# Patient Record
Sex: FEMALE | Race: WHITE | NOT HISPANIC OR LATINO | Employment: OTHER | URBAN - METROPOLITAN AREA
[De-identification: names, ages, dates, MRNs, and addresses within clinical notes are randomized per-mention and may not be internally consistent; named-entity substitution may affect disease eponyms.]

---

## 2017-01-09 RX ORDER — FLECAINIDE ACETATE 100 MG/1
TABLET ORAL
Qty: 60 TABLET | Refills: 0 | Status: SHIPPED | OUTPATIENT
Start: 2017-01-09 | End: 2017-02-07 | Stop reason: SDUPTHER

## 2017-01-11 ENCOUNTER — OUTSIDE FACILITY SERVICE (OUTPATIENT)
Dept: PAIN MEDICINE | Facility: CLINIC | Age: 62
End: 2017-01-11

## 2017-01-11 PROCEDURE — 99152 MOD SED SAME PHYS/QHP 5/>YRS: CPT | Performed by: ANESTHESIOLOGY

## 2017-01-11 PROCEDURE — 64490 INJ PARAVERT F JNT C/T 1 LEV: CPT | Performed by: ANESTHESIOLOGY

## 2017-01-11 PROCEDURE — 64491 INJ PARAVERT F JNT C/T 2 LEV: CPT | Performed by: ANESTHESIOLOGY

## 2017-01-11 PROCEDURE — 99153 MOD SED SAME PHYS/QHP EA: CPT | Performed by: ANESTHESIOLOGY

## 2017-01-13 ENCOUNTER — OFFICE VISIT (OUTPATIENT)
Dept: CARDIOLOGY | Facility: CLINIC | Age: 62
End: 2017-01-13

## 2017-01-13 VITALS
DIASTOLIC BLOOD PRESSURE: 58 MMHG | HEART RATE: 55 BPM | WEIGHT: 187.8 LBS | SYSTOLIC BLOOD PRESSURE: 118 MMHG | HEIGHT: 65 IN | BODY MASS INDEX: 31.29 KG/M2

## 2017-01-13 DIAGNOSIS — R00.2 PALPITATIONS: Primary | ICD-10-CM

## 2017-01-13 DIAGNOSIS — I10 ESSENTIAL HYPERTENSION: ICD-10-CM

## 2017-01-13 PROCEDURE — 93000 ELECTROCARDIOGRAM COMPLETE: CPT | Performed by: INTERNAL MEDICINE

## 2017-01-13 PROCEDURE — 99213 OFFICE O/P EST LOW 20 MIN: CPT | Performed by: INTERNAL MEDICINE

## 2017-01-13 NOTE — MR AVS SNAPSHOT
Mecca Renee   1/13/2017 10:00 AM   Office Visit    Dept Phone:  [phone]   Encounter #:  [id number]    Provider:  William Brewster MD   Department:  [facility] CARDIOLOGY                Your Full Care Plan              Your Updated Medication List          This list is accurate as of: 1/13/17 10:27 AM.  Always use your most recent med list.                ACE WRIST BRACE/SPLINT misc   1 each Daily.       amitriptyline 10 MG tablet   Commonly known as:  ELAVIL   TAKE 1 TABLET BY MOUTH EVERY NIGHT.       bisoprolol 5 MG tablet   Commonly known as:  ZEBeta       diclofenac 1.3 % patch patch   Commonly known as:  FLECTOR   Apply 1 patch topically 2 (Two) Times a Day.       flecainide 100 MG tablet   Commonly known as:  TAMBOCOR   TAKE 1 TABLET EVERY 12 HOURS DAILY.               We Performed the Following     ECG 12 Lead       You Were Diagnosed With        Codes Comments    Palpitations    -  Primary ICD-10-CM: R00.2  ICD-9-CM: 785.1     Essential hypertension     ICD-10-CM: I10  ICD-9-CM: 401.9       Instructions     None    Patient Instructions History      Upcoming Appointments     Visit Type Date Time Department    FOLLOW UP 1/13/2017 10:00 AM [facility] CARD [facility]    OFFICE VISIT 1/17/2017 10:15 AM [facility] PAIN MGMT [facility]      MyChart Signup     Our records indicate that you have declined [facility] ShopTexthart signup. If you would like to sign up for ShopTexthart, please email [facility] [email or web address] or call [phone] to obtain an activation code.             Other Info from Your Visit           Your Appointments     Jan 17, 2017 10:15 AM EST   Office Visit with James Bruno MD   [facility] PAIN MANAGEMENT (--)    [address],  [address] [address] [phone]           Arrive 15 minutes prior to appointment.            Jan 19, 2018 10:15 AM EST   Follow Up with William Brewster MD   [facility]  "[facility] CARDIOLOGY (--)    [address]  [facility] [address] [phone]           Arrive 15 minutes prior to appointment.              Allergies     Sulfa Antibiotics        Reason for Visit     Follow-up PALPS      Vital Signs     Blood Pressure Pulse Height Weight Body Mass Index Smoking Status    118/58 (BP Location: Left arm, Patient Position: Sitting) 55 65\" (165.1 cm) 187 lb 12.8 oz (85.2 kg) 31.25 kg/m2 Never Smoker      Problems and Diagnoses Noted     Palpitations    High blood pressure            "

## 2017-01-13 NOTE — PROGRESS NOTES
[facility]  Office Progress Note  Mecca Renee  1955  [phone]      Visit Date: 01/13/2017    PCP: Mg Sarkar MD  [address] [address]    IDENTIFICATION: A 61 y.o. female female   from [place].     PROBLEM LIST:   1. Palpitations with near syncope:  a. Echocardiogram, 09/13/2013: LVEF (55% to 60%), abnormal LV diastolic filling consistent with impaired relaxation, trace MR, mild TR with an RVSP of 31.  b. Event recorder, September through October 2013: Low frequency PACs with occasional short runs of nonsustained atrial tachycardia.   2. Asthma.  3. GERD.  4. Rheumatoid arthritis.   5. Fibromyalgia.  6. Osteoarthritis.   7. Diverticulosis.   8. Surgical history:  a. Right shoulder surgery.  Right foot surgery.     Chief Complaint   Patient presents with   • Follow-up     PALPS           Allergies  Allergies   Allergen Reactions   • Sulfa Antibiotics        Current Medications    Current Outpatient Prescriptions:   •  amitriptyline (ELAVIL) 10 MG tablet, TAKE 1 TABLET BY MOUTH EVERY NIGHT., Disp: 30 tablet, Rfl: 5  •  bisoprolol (ZEBETA) 5 MG tablet, Take  by mouth., Disp: , Rfl:   •  diclofenac (FLECTOR) 1.3 % patch patch, Apply 1 patch topically 2 (Two) Times a Day., Disp: 60 patch, Rfl: 11  •  Elastic Bandages & Supports (ACE WRIST BRACE/SPLINT) misc, 1 each Daily., Disp: 1 each, Rfl: 0  •  flecainide (TAMBOCOR) 100 MG tablet, TAKE 1 TABLET EVERY 12 HOURS DAILY., Disp: 60 tablet, Rfl: 0      History of Present Illness     Pt denies any chest pain, dyspnea, dyspnea on exertion, orthopnea, PND, palpitations, lower extremity edema.  Neck pain w recent injection per pain mngmt    ROS:  All systems have been reviewed and are negative with the exception of those mentioned in the HPI.    OBJECTIVE:  Vitals:    01/13/17 1009   BP: 118/58   BP Location: Left arm   Patient Position: Sitting   Pulse: 55   Weight: 187 lb 12.8 oz (85.2  "kg)   Height: 65\" (165.1 cm)     Physical Exam   Constitutional: She appears well-developed and well-nourished.   Neck: Normal range of motion. Neck supple. No hepatojugular reflux and no JVD present. Carotid bruit is not present. No tracheal deviation present. No thyromegaly present.   Cardiovascular: Normal rate, regular rhythm, S1 normal, S2 normal, intact distal pulses and normal pulses.  PMI is not displaced.  Exam reveals no gallop, no distant heart sounds, no friction rub, no midsystolic click and no opening snap.    No murmur heard.  Pulses:       Radial pulses are 2+ on the right side, and 2+ on the left side.        Dorsalis pedis pulses are 2+ on the right side, and 2+ on the left side.        Posterior tibial pulses are 2+ on the right side, and 2+ on the left side.   Pulmonary/Chest: Effort normal and breath sounds normal. She has no wheezes. She has no rales.   Abdominal: Soft. Bowel sounds are normal. She exhibits no mass. There is no tenderness. There is no guarding.       Diagnostic Data:    ECG 12 Lead  Date/Time: 1/13/2017 10:19 AM  Performed by: JARED BREWSTER  Authorized by: JARED BREWSTER   Rhythm: sinus rhythm and A-V block  Conduction: 1st degree  Clinical impression: non-specific ECG              ASSESSMENT:   Diagnosis Plan   1. Palpitations     2. Essential hypertension         PLAN:  1. Premature atrial contractions. Suppress flecainide, beta blockade, tolerant of such at current. I would document TSH at this time.   2. Unknown lipid status. Document FLP, lab order given to her.  3. Exogenous obesity. I commended her on weight reduction. Otherwise, I will see her back in 1 year.    Mg Sarkar MD, thank you for referring Ms. Renee for evaluation.  I have forwarded my electronically generated recommendations to you for review.  Please do not hesitate to call with any questions.      Jared Brewster MD, FACC  "

## 2017-01-17 ENCOUNTER — OFFICE VISIT (OUTPATIENT)
Dept: PAIN MEDICINE | Facility: CLINIC | Age: 62
End: 2017-01-17

## 2017-01-17 VITALS
RESPIRATION RATE: 18 BRPM | OXYGEN SATURATION: 98 % | WEIGHT: 188.6 LBS | BODY MASS INDEX: 31.42 KG/M2 | DIASTOLIC BLOOD PRESSURE: 60 MMHG | TEMPERATURE: 97.2 F | HEIGHT: 65 IN | HEART RATE: 60 BPM | SYSTOLIC BLOOD PRESSURE: 106 MMHG

## 2017-01-17 DIAGNOSIS — R53.81 PHYSICAL DECONDITIONING: ICD-10-CM

## 2017-01-17 DIAGNOSIS — M06.9 RHEUMATOID ARTHRITIS, INVOLVING UNSPECIFIED SITE, UNSPECIFIED RHEUMATOID FACTOR PRESENCE: ICD-10-CM

## 2017-01-17 DIAGNOSIS — R00.0 TACHYARRHYTHMIA: ICD-10-CM

## 2017-01-17 DIAGNOSIS — G56.03 BILATERAL CARPAL TUNNEL SYNDROME: ICD-10-CM

## 2017-01-17 DIAGNOSIS — M79.18 MYOFASCIAL PAIN: ICD-10-CM

## 2017-01-17 DIAGNOSIS — M54.81 BILATERAL OCCIPITAL NEURALGIA: ICD-10-CM

## 2017-01-17 DIAGNOSIS — M47.816 SPONDYLOSIS OF LUMBAR REGION WITHOUT MYELOPATHY OR RADICULOPATHY: ICD-10-CM

## 2017-01-17 DIAGNOSIS — M81.0 OSTEOPOROSIS: ICD-10-CM

## 2017-01-17 DIAGNOSIS — M47.812 CERVICAL SPONDYLOSIS WITHOUT MYELOPATHY: ICD-10-CM

## 2017-01-17 DIAGNOSIS — M50.920 CERVICAL DISC DISORDER OF MID-CERVICAL REGION: ICD-10-CM

## 2017-01-17 PROCEDURE — 99213 OFFICE O/P EST LOW 20 MIN: CPT | Performed by: ANESTHESIOLOGY

## 2017-01-17 NOTE — MR AVS SNAPSHOT
Mecca Renee   1/17/2017 10:15 AM   Office Visit    Dept Phone:  [phone]   Encounter #:  [id number]    Provider:  James Bruno MD   Department:  [facility] PAIN MANAGEMENT                Your Full Care Plan              Your Updated Medication List          This list is accurate as of: 1/17/17 10:34 AM.  Always use your most recent med list.                ACE WRIST BRACE/SPLINT misc   1 each Daily.       amitriptyline 10 MG tablet   Commonly known as:  ELAVIL   TAKE 1 TABLET BY MOUTH EVERY NIGHT.       bisoprolol 5 MG tablet   Commonly known as:  ZEBeta       diclofenac 1.3 % patch patch   Commonly known as:  FLECTOR   Apply 1 patch topically 2 (Two) Times a Day.       flecainide 100 MG tablet   Commonly known as:  TAMBOCOR   TAKE 1 TABLET EVERY 12 HOURS DAILY.               You Were Diagnosed With        Codes Comments    Cervical spondylosis without myelopathy     ICD-10-CM: M47.812  ICD-9-CM: 721.0     Cervical disc disorder of mid-cervical region     ICD-10-CM: M50.920  ICD-9-CM: 722.91     Bilateral occipital neuralgia     ICD-10-CM: M54.81  ICD-9-CM: 723.8     Spondylosis of lumbar region without myelopathy or radiculopathy     ICD-10-CM: M47.816  ICD-9-CM: 721.3     Myofascial pain     ICD-10-CM: M79.1  ICD-9-CM: 729.1     Bilateral carpal tunnel syndrome     ICD-10-CM: G56.03  ICD-9-CM: 354.0     Rheumatoid arthritis, involving unspecified site, unspecified rheumatoid factor presence     ICD-10-CM: M06.9  ICD-9-CM: 714.0     Osteoporosis     ICD-10-CM: M81.0  ICD-9-CM: 733.00     Tachyarrhythmia     ICD-10-CM: R00.0  ICD-9-CM: 785.0     Physical deconditioning     ICD-10-CM: R53.81  ICD-9-CM: 799.3       Instructions     None    Patient Instructions History      Upcoming Appointments     Visit Type Date Time Department    OFFICE VISIT 1/17/2017 10:15 AM [facility] PAIN [facility]    OUTSIDE FACILITY 1/30/2017 11:15 AM [facility] PAIN [facility]      MyChart Signup     "Our records indicate that you have declined [facility] Vitaldenthart signup. If you would like to sign up for Vitald[employer]art, please email [facility] [place] [email or web address] or call [phone] to obtain an activation code.             Other Info from Your Visit           Your Appointments     Jan 30, 2017 11:15 AM EST   Outside Facility with James Bruno MD   [facility] PAIN MANAGEMENT (--)    [address],  [address]  [facility] [address] [phone]            Jan 19, 2018 10:15 AM EST   Follow Up with William Brewster MD   [facility] CARDIOLOGY (--)    [address]  [facility] [address] [phone]           Arrive 15 minutes prior to appointment.              Allergies     Sulfa Antibiotics        Reason for Visit     Neck Pain     Headache migrane      Vital Signs     Blood Pressure Pulse Temperature Respirations Height Weight    106/60 (BP Location: Left arm, Patient Position: Sitting) 60 97.2 °F (36.2 °C) (Temporal Artery ) 18 65\" (165.1 cm) 188 lb 9.6 oz (85.5 kg)    Oxygen Saturation Body Mass Index Smoking Status             98% 31.38 kg/m2 Never Smoker         Problems and Diagnoses Noted     Bilateral carpal tunnel syndrome    Bilateral occipital neuralgia    Disc disease of the neck    Degenerative arthritis of cervical spine    Myofascial pain    Osteoarthritis (arthritis due to wear and tear of joints)    Osteoporosis    Physical deconditioning    Rheumatoid arthritis    Tachyarrhythmia        "

## 2017-01-17 NOTE — PROGRESS NOTES
"CHIEF COMPLAINT: \"My neck pain and headaches went away for the rest of the day after the blocks.\"     BRIEF HISTORY: Mrs. Mecca Renee is a 61 y.o. female, who returns to the clinic for follow-up of diagnostic bilateral cervical medial branch blocks performed on 12/11/2017. Patient experienced 100% pain relief along with remarkable functional improvement that lasted until late in the evening, about 12 hours. Her headaches have been much better since her procedure. Patient denies side effects from the procedure.  Ms. Mecca Renee, 61 y.o. Female, was originally referred by Dr.Gerald Díaz in consultation for intractable chronic neck pain and headaches.   Pain history: She reports a 30+ year history of pain, which began without incident. Pain has progressed in intensity over the past 1 year.   Pain description: Constant neck pain with intermittent exacerbation, described as sharp, stabbing and tingling sensation.   Radiation of pain: The pain in the neck radiates up into the occipital region causing headaches.  Pain intensity today: 2/10  Average pain intensity last week: 7/10  Pain intensity ranges from: 2/10 to 9/10  Aggravating factors: Pain increases with extension of the cervical spine, lifting, ambulating more than 10-15 minutes and standing 2-3 minutes.   Alleviating factors: Pain decreases with resting and taking amitriptylene.   Associated symptoms:   Patient denies pain, numbness or weakness in the upper extremities, with the exception of intermittent and random episodes of numbness occurring while she is asleep; and in both hands.   Patient denies any new bladder or bowel problems.   Patient denies difficulties with her balance.   Patient used to experience bilateral occipital headaches lasting 3-4 days and 3 times a month, significantly improved since her diagnostic procedure.  In terms of current analgesics, Mecca Renee takes: gabapentin, amitriptylene, meloxicam, tramadol, without side " effects  Mecca Renee also states that she has a history of lower back pain with radiation to hips. She would like this addressed at a later time.     Review of previous therapies and additional medical records:  Mecca Renee has already failed the following measures, including:   Conservative measures: Oral analgesics, opioids and physical therapy   Interventional measures: As referenced above  Surgical measures: No previous spinal surgery  Mecca Renee underwent neurological consultation with Dr. Díaz on 09/08/2016, and was referred for pain management consultation of cervicalgia.  Patient underwent neurosurgical consultation with Dr. Ronnell Ramirez a few years back and was found to be a potential surgical candidate (records not available for review).  Mecca Renee presents with significant comorbidities including tachyarrhythmia, engaged in treatment.  I have reviewed [facility] Report #[id number] consistent to medication reconciliation.     Diagnostic Studies:  MRI, cervical, without contrast, 12/31/2008; The visualized craniocervical junction and spinal cord are normal. C2-C3 through C4-C5 and C6-C7, C7-T1: The disks are normal.   C5-C6: Broad-based posterior disc protrusion which abuts the spinal cord but does not cause spinal stenosis.  MRI, lumbar, without contrast, December 31, 2008 L1-L2 through L5-S1: The disks are normal. Early changes of degenerative disc disease without disc bulging or herniation.   CT, Head, w/o, 04/26/2011; Normal head CT without contrast.  X-rays of the cervical spine revealed degenerative disc disease and facet arthritis most impressive at C5-C6, 11/4/2015  X-rays of the lumbar spine 11/4/2015, revealed mild levoscoliosis. Lumbar facet arthropathy.  MRI of the lumbar spine from 2008, unrevealing    Review of Systems   HENT: Positive for congestion and postnasal drip.    Musculoskeletal: Positive for arthralgias, back pain, neck pain and neck stiffness.   All other  "systems reviewed and are negative.     The following portions of the patient's history were reviewed and updated as appropriate: problem list, past medical history, past surgery history, social history, family history, medications, and allergies     Visit Vitals   • /60 (BP Location: Left arm, Patient Position: Sitting)   • Pulse 60   • Temp 97.2 °F (36.2 °C) (Temporal Artery )   • Resp 18   • Ht 65\" (165.1 cm)   • Wt 188 lb 9.6 oz (85.5 kg)   • SpO2 98%   • BMI 31.38 kg/m2      Physical Exam   Neurologic Exam  Constitutional: Patient is oriented to person, place, and time. Vital signs are normal. Patient appears well-developed and well-nourished.   HENT: Head: Normocephalic and atraumatic. Eyes: Conjunctivae and lids are normal. Pupils: Equal, round, reactive to light.   Neck: Trachea normal. Neck supple. No JVD present.   Lymphatic: No cervical adenopathy  Pulmonary Respiratory effort: No increased work of breathing or signs of respiratory distress. Auscultation of lungs: Clear to auscultation.   Cardiovascular Auscultation of heart: Normal rate and rhythm, normal S1 and S2, no murmurs. Peripheral vascular exam: Normal. No edema.   Abdomen: The abdomen was soft and nontender. Bowel sounds were normal.   Musculoskeletal   Gait and station: Gait evaluation demonstrated a normal gait.   Cervical spine: Passive and active range of motion is limited secondary to pain. Extension, flexion, lateral flexion, rotation of the cervical spine increased and reproduced pain. Cervical facet joint loading maneuvers are positive.  Presence of trigger points in the bilateral levator scapula.  Shoulders: The range of motion of the glenohumeral joints is full and without pain. Rotator cuff strength is 5/5.   Lumbar spine: The range of motion is limited secondary to pain. Extension, lateral flexion, and rotation increase or reproduce pain. Lumbar facet joint loading maneuvers are positive. Berto and Gaenslen's tests are " negative.  Neurological: Patient is alert and oriented to person, place, and time. Speech: speech is normal. Cortical function: Normal mental status.   Cranial nerves: Cranial nerves 2-12 intact.   Reflex Scores:  Right brachioradialis: 1+  Left brachioradialis: 1+  Right biceps: 1+  Left biceps: 1+  Right triceps: 1+  Left triceps: 1+  Right patellar: 1+  Left patellar: 1+  Right achilles: 1+  Left achilles: 1+  Motor strength: 5/5  Motor Tone: normal tone.   Involuntary movements: none.   Superficial/Primitive Reflexes: primitive reflexes were absent.   Right Villalta: absent  Left Villalta: absent  Right ankle clonus: absent  Left ankle clonus: absent   Spurling sign is negative. Lhermitte sign is negative. Negative long tract signs. Straight leg raising test is negative. Femoral stretch sign is negative. Positive Tinel sign in both wrists.  Sensation: No sensory loss. Sensory exam: intact to light touch, intact pain and temperature sensation, intact vibration sensation and normal proprioception.   Coordination: Normal finger to nose and heel to shin. Normal balance and negative. Romberg's sign negative.   Skin and subcutaneous tissue: Skin is warm and intact. No rash noted. No cyanosis.   Psychiatric:   Judgment and insight: Normal.   Orientation to person, place and time: Normal.   Recent and remote memory: Intact.   Mood and affect: Normal.      ASSESSMENT:   1. Cervical spondylosis without myelopathy    2. Cervical disc disorder of mid-cervical region    3. Bilateral occipital neuralgia    4. Spondylosis of lumbar region without myelopathy or radiculopathy    5. Myofascial pain    6. Bilateral carpal tunnel syndrome    7. Rheumatoid arthritis, involving unspecified site, unspecified rheumatoid factor presence    8. Osteoporosis    9. Tachyarrhythmia    10. Physical deconditioning      PLAN: Patient's chronic pain condition improved after her first set of diagnostic bilateral cervical medial branch blocks, as  referenced above. Continue current management and treatments as outlined in the following plan. I had a lengthy conversation with Ms. Mecca Renee regarding her chronic pain condition and potential therapeutic options. Patient has failed to obtain pain relief with conservative measures. I have reviewed all available patient's medical records as well as previous therapies as referenced above. Therefore, I have proposed the following plan:  1. Interventional pain management measures:   A. For treatment of her chronic cervicalgia and headaches: Patient will be scheduled for a second set of diagnostic bilateral cervical medial branch blocks at C3, C4, C5; for bilateral cervical facet joints at C3-C4, C4-C5 to clarify the origin of chronic refractory mechanical/axial cervicalgia. If patient experiences more than 80% pain relief along with significant improvement in the range of motion of the cervical spine, then, patient will be scheduled for bilateral cervical medial branch rhizotomies combined with trigger point injections at the bilateral levator scapulae to expedite her rehabilitation. Otherwise, we will proceed with cervical epidural steroid injection by interlaminar approach or bilateral occipital nerve blocks. We may repeat procedures depending on patient's outcome.   B. For treatment of her chronic mechanical lower back pain: I have discussed the possibility of diagnostic bilateral lumbar medial branch blocks at L3, L4, L5; for bilateral lumbar facet joints at L4-L5, L5-S1 to clarify the origin of chronic refractory mechanical lower back pain. If patient experiences more than 80% relief along with significant improvement the range of motion of the lumbar spine, then, patient will be scheduled for a second set of diagnostic bilateral lumbar medial branch blocks, to then, proceed with bilateral lumbar medial branch rhizotomies  2. Long-term rehabilitation efforts:  A. The patient does not have a history of  falls. I did complete a risk assessment for falls.   B. Patient will start a comprehensive physical therapy program for water therapy, therapeutic exercise, upper body strengthening/posture correction, core strengthening, gait and balance training, myofascial release, cupping and dry needling once her pain is under control  C. Start an exercise program such as yoga, water therapy and daily walks for fitness  D. Wear bilateral wrist splints/braces for treatment of bilateral carpal tunnel syndrome  3. Patient has been screened for tobacco use: Current tobacco non-user  4. Pharmacological measures: We have deferred the use of systemic analgesics due to potential interactions. Trial with Flector patches   5. The patient has been instructed to contact my office with any questions or difficulties. The patient understands the plan and agrees to proceed accordingly.      Patient Care Team:  Mg Sarkar MD as PCP - General (General Practice)  Shadi Díaz MD as Consulting Physician (Neurology)  Ana Garcia MD as Consulting Physician (Rheumatology)  Mg Sarkar MD as Referring Physician (Family Medicine)     No orders of the defined types were placed in this encounter.        Future Appointments  Date Time Provider Department Center   1/19/2018 10:15 AM William Brewster MD [facility] None         James Bruno MD       EMR Dragon/Transcription disclaimer:  Much of this encounter note is an electronic transcription of spoken language to printed text. Electronic transcription of spoken language may permit erroneous, or at times, nonsensical words or phrases to be inadvertently transcribed. Although I have reviewed the note for such errors, some may still exist.

## 2017-01-17 NOTE — LETTER
"January 17, 2017     Mg Sarkar MD  [address]  [facility] [address]    Patient: Mecca Renee   YOB: 1955   Date of Visit: 1/17/2017       Dear Dr. Antonina MD:    Thank you for referring Mecca Renee to me for evaluation. Below are the relevant portions of my assessment and plan of care.    If you have questions, please do not hesitate to call me. I look forward to following Mecca along with you.         Sincerely,        James Bruno MD        CC: MD Ana Hoyt MD Luis A. Vascello, MD  1/17/2017 10:29 AM  Signed  CHIEF COMPLAINT: \"My neck pain and headaches went away for the rest of the day after the blocks.\"     BRIEF HISTORY: Mrs. Mecca Renee is a 61 y.o. female, who returns to the clinic for follow-up of diagnostic bilateral cervical medial branch blocks performed on 12/11/2017. Patient experienced 100% pain relief along with remarkable functional improvement that lasted until late in the evening, about 12 hours. Her headaches have been much better since her procedure. Patient denies side effects from the procedure.  Ms. Mecca Renee, 61 y.o. Female, was originally referred by Dr.Gerald Díaz in consultation for intractable chronic neck pain and headaches.   Pain history: She reports a 30+ year history of pain, which began without incident. Pain has progressed in intensity over the past 1 year.   Pain description: Constant neck pain with intermittent exacerbation, described as sharp, stabbing and tingling sensation.   Radiation of pain: The pain in the neck radiates up into the occipital region causing headaches.  Pain intensity today: 2/10  Average pain intensity last week: 7/10  Pain intensity ranges from: 2/10 to 9/10  Aggravating factors: Pain increases with extension of the cervical spine, lifting, ambulating more than 10-15 minutes and standing 2-3 minutes.   Alleviating factors: Pain decreases with resting and taking " amitriptylene.   Associated symptoms:   Patient denies pain, numbness or weakness in the upper extremities, with the exception of intermittent and random episodes of numbness occurring while she is asleep; and in both hands.   Patient denies any new bladder or bowel problems.   Patient denies difficulties with her balance.   Patient used to experience bilateral occipital headaches lasting 3-4 days and 3 times a month, significantly improved since her diagnostic procedure.  In terms of current analgesics, Mecca Renee takes: gabapentin, amitriptylene, meloxicam, tramadol, without side effects  Mecca Renee also states that she has a history of lower back pain with radiation to hips. She would like this addressed at a later time.     Review of previous therapies and additional medical records:  Mecca Renee has already failed the following measures, including:   Conservative measures: Oral analgesics, opioids and physical therapy   Interventional measures: As referenced above  Surgical measures: No previous spinal surgery  Mecca Renee underwent neurological consultation with Dr. Díaz on 09/08/2016, and was referred for pain management consultation of cervicalgia.  Patient underwent neurosurgical consultation with Dr. Ronnell Ramirez a few years back and was found to be a potential surgical candidate (records not available for review).  Mecca Renee presents with significant comorbidities including tachyarrhythmia, engaged in treatment.  I have reviewed [facility] Report #[id number] consistent to medication reconciliation.     Diagnostic Studies:  MRI, cervical, without contrast, 12/31/2008; The visualized craniocervical junction and spinal cord are normal. C2-C3 through C4-C5 and C6-C7, C7-T1: The disks are normal.   C5-C6: Broad-based posterior disc protrusion which abuts the spinal cord but does not cause spinal stenosis.  MRI, lumbar, without contrast, December 31, 2008 L1-L2 through L5-S1:  "The disks are normal. Early changes of degenerative disc disease without disc bulging or herniation.   CT, Head, w/o, 04/26/2011; Normal head CT without contrast.  X-rays of the cervical spine revealed degenerative disc disease and facet arthritis most impressive at C5-C6, 11/4/2015  X-rays of the lumbar spine 11/4/2015, revealed mild levoscoliosis. Lumbar facet arthropathy.  MRI of the lumbar spine from 2008, unrevealing    Review of Systems   HENT: Positive for congestion and postnasal drip.    Musculoskeletal: Positive for arthralgias, back pain, neck pain and neck stiffness.   All other systems reviewed and are negative.     The following portions of the patient's history were reviewed and updated as appropriate: problem list, past medical history, past surgery history, social history, family history, medications, and allergies     Visit Vitals   • /60 (BP Location: Left arm, Patient Position: Sitting)   • Pulse 60   • Temp 97.2 °F (36.2 °C) (Temporal Artery )   • Resp 18   • Ht 65\" (165.1 cm)   • Wt 188 lb 9.6 oz (85.5 kg)   • SpO2 98%   • BMI 31.38 kg/m2      Physical Exam   Neurologic Exam  Constitutional: Patient is oriented to person, place, and time. Vital signs are normal. Patient appears well-developed and well-nourished.   HENT: Head: Normocephalic and atraumatic. Eyes: Conjunctivae and lids are normal. Pupils: Equal, round, reactive to light.   Neck: Trachea normal. Neck supple. No JVD present.   Lymphatic: No cervical adenopathy  Pulmonary Respiratory effort: No increased work of breathing or signs of respiratory distress. Auscultation of lungs: Clear to auscultation.   Cardiovascular Auscultation of heart: Normal rate and rhythm, normal S1 and S2, no murmurs. Peripheral vascular exam: Normal. No edema.   Abdomen: The abdomen was soft and nontender. Bowel sounds were normal.   Musculoskeletal   Gait and station: Gait evaluation demonstrated a normal gait.   Cervical spine: Passive and active " range of motion is limited secondary to pain. Extension, flexion, lateral flexion, rotation of the cervical spine increased and reproduced pain. Cervical facet joint loading maneuvers are positive.  Presence of trigger points in the bilateral levator scapula.  Shoulders: The range of motion of the glenohumeral joints is full and without pain. Rotator cuff strength is 5/5.   Lumbar spine: The range of motion is limited secondary to pain. Extension, lateral flexion, and rotation increase or reproduce pain. Lumbar facet joint loading maneuvers are positive. Berto and Gaenslen's tests are negative.  Neurological: Patient is alert and oriented to person, place, and time. Speech: speech is normal. Cortical function: Normal mental status.   Cranial nerves: Cranial nerves 2-12 intact.   Reflex Scores:  Right brachioradialis: 1+  Left brachioradialis: 1+  Right biceps: 1+  Left biceps: 1+  Right triceps: 1+  Left triceps: 1+  Right patellar: 1+  Left patellar: 1+  Right achilles: 1+  Left achilles: 1+  Motor strength: 5/5  Motor Tone: normal tone.   Involuntary movements: none.   Superficial/Primitive Reflexes: primitive reflexes were absent.   Right Villalta: absent  Left Villalta: absent  Right ankle clonus: absent  Left ankle clonus: absent   Spurling sign is negative. Lhermitte sign is negative. Negative long tract signs. Straight leg raising test is negative. Femoral stretch sign is negative. Positive Tinel sign in both wrists.  Sensation: No sensory loss. Sensory exam: intact to light touch, intact pain and temperature sensation, intact vibration sensation and normal proprioception.   Coordination: Normal finger to nose and heel to shin. Normal balance and negative. Romberg's sign negative.   Skin and subcutaneous tissue: Skin is warm and intact. No rash noted. No cyanosis.   Psychiatric:   Judgment and insight: Normal.   Orientation to person, place and time: Normal.   Recent and remote memory: Intact.   Mood and  affect: Normal.      ASSESSMENT:   1. Cervical spondylosis without myelopathy    2. Cervical disc disorder of mid-cervical region    3. Bilateral occipital neuralgia    4. Spondylosis of lumbar region without myelopathy or radiculopathy    5. Myofascial pain    6. Bilateral carpal tunnel syndrome    7. Rheumatoid arthritis, involving unspecified site, unspecified rheumatoid factor presence    8. Osteoporosis    9. Tachyarrhythmia    10. Physical deconditioning      PLAN: Patient's chronic pain condition improved after her first set of diagnostic bilateral cervical medial branch blocks, as referenced above. Continue current management and treatments as outlined in the following plan. I had a lengthy conversation with Ms. Mecca Renee regarding her chronic pain condition and potential therapeutic options. Patient has failed to obtain pain relief with conservative measures. I have reviewed all available patient's medical records as well as previous therapies as referenced above. Therefore, I have proposed the following plan:  1. Interventional pain management measures:   A. For treatment of her chronic cervicalgia and headaches: Patient will be scheduled for a second set of diagnostic bilateral cervical medial branch blocks at C3, C4, C5; for bilateral cervical facet joints at C3-C4, C4-C5 to clarify the origin of chronic refractory mechanical/axial cervicalgia. If patient experiences more than 80% pain relief along with significant improvement in the range of motion of the cervical spine, then, patient will be scheduled for bilateral cervical medial branch rhizotomies combined with trigger point injections at the bilateral levator scapulae to expedite her rehabilitation. Otherwise, we will proceed with cervical epidural steroid injection by interlaminar approach or bilateral occipital nerve blocks. We may repeat procedures depending on patient's outcome.   B. For treatment of her chronic mechanical lower back  pain: I have discussed the possibility of diagnostic bilateral lumbar medial branch blocks at L3, L4, L5; for bilateral lumbar facet joints at L4-L5, L5-S1 to clarify the origin of chronic refractory mechanical lower back pain. If patient experiences more than 80% relief along with significant improvement the range of motion of the lumbar spine, then, patient will be scheduled for a second set of diagnostic bilateral lumbar medial branch blocks, to then, proceed with bilateral lumbar medial branch rhizotomies  2. Long-term rehabilitation efforts:  A. The patient does not have a history of falls. I did complete a risk assessment for falls.   B. Patient will start a comprehensive physical therapy program for water therapy, therapeutic exercise, upper body strengthening/posture correction, core strengthening, gait and balance training, myofascial release, cupping and dry needling once her pain is under control  C. Start an exercise program such as yoga, water therapy and daily walks for fitness  D. Wear bilateral wrist splints/braces for treatment of bilateral carpal tunnel syndrome  3. Patient has been screened for tobacco use: Current tobacco non-user  4. Pharmacological measures: We have deferred the use of systemic analgesics due to potential interactions. Trial with Flector patches   5. The patient has been instructed to contact my office with any questions or difficulties. The patient understands the plan and agrees to proceed accordingly.      Patient Care Team:  Mg Sarkar MD as PCP - General (General Practice)  Shadi Díaz MD as Consulting Physician (Neurology)  Ana Garcia MD as Consulting Physician (Rheumatology)  Mg Sarkar MD as Referring Physician (Family Medicine)     No orders of the defined types were placed in this encounter.        Future Appointments  Date Time Provider Department Center   1/19/2018 10:15 AM William Brewster MD [facility] None         James Bruno MD        EMR Dragon/Transcription disclaimer:  Much of this encounter note is an electronic transcription of spoken language to printed text. Electronic transcription of spoken language may permit erroneous, or at times, nonsensical words or phrases to be inadvertently transcribed. Although I have reviewed the note for such errors, some may still exist.

## 2017-01-20 RX ORDER — BISOPROLOL FUMARATE 5 MG/1
TABLET, FILM COATED ORAL
Qty: 30 TABLET | Refills: 6 | Status: SHIPPED | OUTPATIENT
Start: 2017-01-20 | End: 2017-08-22 | Stop reason: SDUPTHER

## 2017-01-30 ENCOUNTER — OUTSIDE FACILITY SERVICE (OUTPATIENT)
Dept: PAIN MEDICINE | Facility: CLINIC | Age: 62
End: 2017-01-30

## 2017-01-30 PROCEDURE — 64490 INJ PARAVERT F JNT C/T 1 LEV: CPT | Performed by: ANESTHESIOLOGY

## 2017-01-30 PROCEDURE — 64491 INJ PARAVERT F JNT C/T 2 LEV: CPT | Performed by: ANESTHESIOLOGY

## 2017-01-30 PROCEDURE — 99152 MOD SED SAME PHYS/QHP 5/>YRS: CPT | Performed by: ANESTHESIOLOGY

## 2017-01-31 ENCOUNTER — OFFICE VISIT (OUTPATIENT)
Dept: PAIN MEDICINE | Facility: CLINIC | Age: 62
End: 2017-01-31

## 2017-01-31 VITALS
WEIGHT: 189 LBS | HEIGHT: 65 IN | RESPIRATION RATE: 18 BRPM | BODY MASS INDEX: 31.49 KG/M2 | SYSTOLIC BLOOD PRESSURE: 91 MMHG | DIASTOLIC BLOOD PRESSURE: 52 MMHG | TEMPERATURE: 97.8 F | OXYGEN SATURATION: 97 % | HEART RATE: 59 BPM

## 2017-01-31 DIAGNOSIS — M47.816 SPONDYLOSIS OF LUMBAR REGION WITHOUT MYELOPATHY OR RADICULOPATHY: ICD-10-CM

## 2017-01-31 DIAGNOSIS — M81.0 OSTEOPOROSIS: ICD-10-CM

## 2017-01-31 DIAGNOSIS — R00.0 TACHYARRHYTHMIA: ICD-10-CM

## 2017-01-31 DIAGNOSIS — M47.812 CERVICAL SPONDYLOSIS WITHOUT MYELOPATHY: ICD-10-CM

## 2017-01-31 DIAGNOSIS — M79.7 FIBROMYALGIA: ICD-10-CM

## 2017-01-31 DIAGNOSIS — M06.00 RHEUMATOID ARTHRITIS WITH NEGATIVE RHEUMATOID FACTOR, INVOLVING UNSPECIFIED SITE (HCC): ICD-10-CM

## 2017-01-31 DIAGNOSIS — G56.03 BILATERAL CARPAL TUNNEL SYNDROME: ICD-10-CM

## 2017-01-31 DIAGNOSIS — M54.81 BILATERAL OCCIPITAL NEURALGIA: ICD-10-CM

## 2017-01-31 DIAGNOSIS — M50.920 CERVICAL DISC DISORDER OF MID-CERVICAL REGION: ICD-10-CM

## 2017-01-31 DIAGNOSIS — M15.9 PRIMARY OSTEOARTHRITIS INVOLVING MULTIPLE JOINTS: ICD-10-CM

## 2017-01-31 DIAGNOSIS — R53.81 PHYSICAL DECONDITIONING: ICD-10-CM

## 2017-01-31 PROCEDURE — 99212 OFFICE O/P EST SF 10 MIN: CPT | Performed by: ANESTHESIOLOGY

## 2017-01-31 NOTE — MR AVS SNAPSHOT
Mecca Renee   1/31/2017 1:30 PM   Office Visit    Dept Phone:  [phone]   Encounter #:  [id number]    Provider:  James Bruno MD   Department:  [facility]                Your Full Care Plan              Your Updated Medication List          This list is accurate as of: 1/31/17  1:27 PM.  Always use your most recent med list.                ACE WRIST BRACE/SPLINT misc   1 each Daily.       amitriptyline 10 MG tablet   Commonly known as:  ELAVIL   TAKE 1 TABLET BY MOUTH EVERY NIGHT.       bisoprolol 5 MG tablet   Commonly known as:  ZEBeta   TAKE 1/2 TABLET BY MOUTH TWICE DAILY       diclofenac 1.3 % patch patch   Commonly known as:  FLECTOR   Apply 1 patch topically 2 (Two) Times a Day.       flecainide 100 MG tablet   Commonly known as:  TAMBOCOR   TAKE 1 TABLET EVERY 12 HOURS DAILY.               Instructions     None    Patient Instructions History      Upcoming Appointments     Visit Type Date Time Department    OFFICE VISIT 1/31/2017  1:30 PM [facility]    OUTSIDE FACILITY 2/8/2017 11:00 AM [facility]    FOLLOW UP 1/19/2018 10:15 AM [facility]      [employer] Signup     Our records indicate that you have declined [facility] [employer] signup. If you would like to sign up for [employer], please email [email or web address] or call [phone] to obtain an activation code.             Other Info from Your Visit           Your Appointments     Jan 31, 2017  1:30 PM EST   Office Visit with James Bruno MD   [facility] (--)    [address],  [address] [address] [phone]           Arrive 15 minutes prior to appointment.            Feb 08, 2017 11:00 AM EST   Outside Facility with James Bruno MD   [facility] (--)    [address],  [address] [id number] [phone]            Jan 19, 2018 10:15 AM  "EST   Follow Up with William Brewster MD   [facility] (--)    [address]  [facility] [address] [phone]           Arrive 15 minutes prior to appointment.              Allergies     Sulfa Antibiotics        Reason for Visit     Neck Pain           Vital Signs     Blood Pressure Pulse Temperature Respirations Height Weight    91/52 (BP Location: Left arm, Patient Position: Sitting) 59 97.8 °F (36.6 °C) (Temporal Artery ) 18 65\" (165.1 cm) 189 lb (85.7 kg)    Oxygen Saturation Body Mass Index Smoking Status             97% 31.45 kg/m2 Never Smoker           "

## 2017-01-31 NOTE — LETTER
"January 31, 2017     Mg Sarkar MD  [address]  formerly [facility] [id number]    Patient: Mecca Renee   YOB: 1955   Date of Visit: 1/31/2017       Dear Dr. Antonina MD:    Thank you for referring Mecca Renee to me for evaluation. Below are the relevant portions of my assessment and plan of care.    If you have questions, please do not hesitate to call me. I look forward to following Mecca along with you.         Sincerely,        James Bruno MD        CC: MD Ana Hoyt MD Luis A. Vascello, MD  1/31/2017  5:54 PM  Signed  CHIEF COMPLAINT: \"Again, I did great after my diagnostic blocks.  I was pain free until today at noon.  I just have a mild headache.\"     BRIEF HISTORY: Mrs. Mecca Renee is a 61 y.o. female, who returns to the clinic for follow-up of her second set of diagnostic bilateral cervical medial branch blocks performed on 01/30/2017. Patient reports that she experienced 100% pain relief along with remarkable functional improvement that lasted until today at noon. Her headaches also resolved, and just at the time of today's visit, she started expressing a mild/low-grade headache.  Patient denies side effects from the procedure.  Ms. Mecca Renee, 61 y.o. Female, was originally referred by Dr.Gerald Díaz in consultation for intractable chronic neck pain and headaches.   Pain history: She reports a 30+ year history of pain, which began without incident. Pain has progressed in intensity over the past 1 year.   Pain description: Constant neck pain with intermittent exacerbation, described as sharp, stabbing and tingling sensation.   Radiation of pain: The pain in the neck radiates up into the occipital region causing headaches.  Pain intensity today: 4/10  Average pain intensity last week: 7/10  Pain intensity ranges from: 2/10 to 9/10  Aggravating factors: Pain increases with extension of the cervical spine, lifting, ambulating more " than 10-15 minutes and standing 2-3 minutes.   Alleviating factors: Pain decreases with resting and taking amitriptylene.   Associated symptoms:   Patient denies pain, numbness or weakness in the upper extremities, with the exception of intermittent and random episodes of numbness occurring while she is asleep; and in both hands.   Patient denies any new bladder or bowel problems.   Patient denies difficulties with her balance.   Patient used to experience bilateral occipital headaches lasting 3-4 days and 3 times a month, significantly improved since her diagnostic procedure.  In terms of current analgesics, Mecca Renee takes: gabapentin, amitriptylene, meloxicam, tramadol, without side effects  Mecca Renee also states that she has a history of lower back pain with radiation to hips. She would like this addressed at a later time.     Review of previous therapies and additional medical records:  Mecca Renee has already failed the following measures, including:   Conservative measures: Oral analgesics, opioids and physical therapy   Interventional measures: As referenced above  Surgical measures: No previous spinal surgery  Mecca Renee underwent neurological consultation with Dr. Díaz on 09/08/2016, and was referred for pain management consultation of cervicalgia.  Patient underwent neurosurgical consultation with Dr. Ronnell Ramirez a few years back and was found to be a potential surgical candidate (records not available for review).  Mecca Renee presents with significant comorbidities including tachyarrhythmia, engaged in treatment.  I have reviewed [facility] Report #[id number] consistent to medication reconciliation.     Diagnostic Studies:  MRI, cervical, without contrast, 12/31/2008; The visualized craniocervical junction and spinal cord are normal. C2-C3 through C4-C5 and C6-C7, C7-T1: The disks are normal.   C5-C6: Broad-based posterior disc protrusion which abuts the spinal cord  "but does not cause spinal stenosis.  MRI, lumbar, without contrast, December 31, 2008 L1-L2 through L5-S1: The disks are normal. Early changes of degenerative disc disease without disc bulging or herniation.   CT, Head, w/o, 04/26/2011; Normal head CT without contrast.  X-rays of the cervical spine revealed degenerative disc disease and facet arthritis most impressive at C5-C6, 11/4/2015  X-rays of the lumbar spine 11/4/2015, revealed mild levoscoliosis. Lumbar facet arthropathy.  MRI of the lumbar spine from 2008, unrevealing    Review of Systems   Musculoskeletal: Positive for back pain, neck pain and neck stiffness.   All other systems reviewed and are negative.     The following portions of the patient's history were reviewed and updated as appropriate: problem list, past medical history, past surgery history, social history, family history, medications, and allergies     Visit Vitals   • BP 91/52 (BP Location: Left arm, Patient Position: Sitting)   • Pulse 59   • Temp 97.8 °F (36.6 °C) (Temporal Artery )   • Resp 18   • Ht 65\" (165.1 cm)   • Wt 189 lb (85.7 kg)   • SpO2 97%   • BMI 31.45 kg/m2      Physical Exam   Neurologic Exam  Constitutional: Patient is oriented to person, place, and time. Vital signs are normal. Patient appears well-developed and well-nourished.   HENT: Head: Normocephalic and atraumatic. Eyes: Conjunctivae and lids are normal. Pupils: Equal, round, reactive to light.   Neck: Trachea normal. Neck supple. No JVD present.   Lymphatic: No cervical adenopathy  Pulmonary Respiratory effort: No increased work of breathing or signs of respiratory distress. Auscultation of lungs: Clear to auscultation.   Cardiovascular Auscultation of heart: Normal rate and rhythm, normal S1 and S2, no murmurs. Peripheral vascular exam: Normal. No edema.   Abdomen: The abdomen was soft and nontender. Bowel sounds were normal.   Musculoskeletal   Gait and station: Gait evaluation demonstrated a normal " gait.   Cervical spine: Passive and active range of motion is limited secondary to pain. Extension, flexion, lateral flexion, rotation of the cervical spine increased and reproduced pain. Cervical facet joint loading maneuvers are positive.  Presence of trigger points in the bilateral levator scapula.  Shoulders: The range of motion of the glenohumeral joints is full and without pain. Rotator cuff strength is 5/5.   Lumbar spine: The range of motion is limited secondary to pain. Extension, lateral flexion, and rotation increase or reproduce pain. Lumbar facet joint loading maneuvers are positive. Berto and Gaenslen's tests are negative.  Neurological: Patient is alert and oriented to person, place, and time. Speech: speech is normal. Cortical function: Normal mental status.   Cranial nerves: Cranial nerves 2-12 intact.   Reflex Scores:  Right brachioradialis: 1+  Left brachioradialis: 1+  Right biceps: 1+  Left biceps: 1+  Right triceps: 1+  Left triceps: 1+  Right patellar: 1+  Left patellar: 1+  Right achilles: 1+  Left achilles: 1+  Motor strength: 5/5  Motor Tone: normal tone.   Involuntary movements: none.   Superficial/Primitive Reflexes: primitive reflexes were absent.   Right Villalta: absent  Left Villalta: absent  Right ankle clonus: absent  Left ankle clonus: absent   Spurling sign is negative. Lhermitte sign is negative. Negative long tract signs. Straight leg raising test is negative. Femoral stretch sign is negative. Positive Tinel sign in both wrists.  Sensation: No sensory loss. Sensory exam: intact to light touch, intact pain and temperature sensation, intact vibration sensation and normal proprioception.   Coordination: Normal finger to nose and heel to shin. Normal balance and negative. Romberg's sign negative.   Skin and subcutaneous tissue: Skin is warm and intact. No rash noted. No cyanosis.   Psychiatric:   Judgment and insight: Normal.   Orientation to person, place and time: Normal.   Recent  and remote memory: Intact.   Mood and affect: Normal.      ASSESSMENT:   1. Cervical spondylosis without myelopathy    2. Cervical disc disorder of mid-cervical region    3. Bilateral occipital neuralgia    4. Spondylosis of lumbar region without myelopathy or radiculopathy    5. Primary osteoarthritis involving multiple joints    6. Bilateral carpal tunnel syndrome    7. Fibromyalgia    8. Rheumatoid arthritis     9. Osteoporosis    10. Tachyarrhythmia    11. Physical deconditioning      PLAN: Patient's chronic pain condition improved after her first set of diagnostic bilateral cervical medial branch blocks, as referenced above. Continue current management and treatments as outlined in the following plan. I had a lengthy conversation with Ms. Mecca Renee regarding her chronic pain condition and potential therapeutic options. Patient has failed to obtain pain relief with conservative measures. I have reviewed all available patient's medical records as well as previous therapies as referenced above. Therefore, I have proposed the following plan:  1. Interventional pain management measures:   A. For treatment of her chronic cervicalgia and headaches: Patient will be scheduled for a bilateral cervical medial branch rhizotomies at C3, C4, C5; for bilateral cervical facet joints at C3-C4, C4-C5 combined with trigger point injections at the bilateral levator scapulae to expedite her rehabilitation.   B. For treatment of her chronic mechanical lower back pain: I have discussed the possibility of diagnostic bilateral lumbar medial branch blocks at L3, L4, L5; for bilateral lumbar facet joints at L4-L5, L5-S1 to clarify the origin of chronic refractory mechanical lower back pain. If patient experiences more than 80% relief along with significant improvement the range of motion of the lumbar spine, then, patient will be scheduled for a second set of diagnostic bilateral lumbar medial branch blocks, to then, proceed with  bilateral lumbar medial branch rhizotomies  2. Long-term rehabilitation efforts:  A. Patient will start a comprehensive physical therapy program for water therapy, therapeutic exercise, upper body strengthening/posture correction, core strengthening, gait and balance training, myofascial release, cupping and dry needling once her pain is under control  B. Start an exercise program such as yoga, water therapy and daily walks for fitness  C. Wear bilateral wrist splints/braces for treatment of bilateral carpal tunnel syndrome  3. Pharmacological measures: We have deferred the use of systemic analgesics due to potential interactions. Trial with Flector patches   4. The patient has been instructed to contact my office with any questions or difficulties. The patient understands the plan and agrees to proceed accordingly.      Patient Care Team:  Mg Sarkar MD as PCP - General (General Practice)  Shadi Díaz MD as Consulting Physician (Neurology)  Ana Garcia MD as Consulting Physician (Rheumatology)  Mg Sarkar MD as Referring Physician (Family Medicine)     No orders of the defined types were placed in this encounter.        Future Appointments  Date Time Provider Department Center   2/8/2017 11:00 AM James Bruno MD [facility] None   1/19/2018 10:15 AM William Brewster MD [facility] None         James Bruno MD       EMR Dragon/Transcription disclaimer:  Much of this encounter note is an electronic transcription of spoken language to printed text. Electronic transcription of spoken language may permit erroneous, or at times, nonsensical words or phrases to be inadvertently transcribed. Although I have reviewed the note for such errors, some may still exist.

## 2017-01-31 NOTE — PROGRESS NOTES
"CHIEF COMPLAINT: \"Again, I did great after my diagnostic blocks.  I was pain free until today at noon.  I just have a mild headache.\"     BRIEF HISTORY: Mrs. Mecca Renee is a 61 y.o. female, who returns to the clinic for follow-up of her second set of diagnostic bilateral cervical medial branch blocks performed on 01/30/2017. Patient reports that she experienced 100% pain relief along with remarkable functional improvement that lasted until today at noon. Her headaches also resolved, and just at the time of today's visit, she started expressing a mild/low-grade headache.  Patient denies side effects from the procedure.  Ms. Mecca Renee, 61 y.o. Female, was originally referred by Dr.Gerald Díaz in consultation for intractable chronic neck pain and headaches.   Pain history: She reports a 30+ year history of pain, which began without incident. Pain has progressed in intensity over the past 1 year.   Pain description: Constant neck pain with intermittent exacerbation, described as sharp, stabbing and tingling sensation.   Radiation of pain: The pain in the neck radiates up into the occipital region causing headaches.  Pain intensity today: 4/10  Average pain intensity last week: 7/10  Pain intensity ranges from: 2/10 to 9/10  Aggravating factors: Pain increases with extension of the cervical spine, lifting, ambulating more than 10-15 minutes and standing 2-3 minutes.   Alleviating factors: Pain decreases with resting and taking amitriptylene.   Associated symptoms:   Patient denies pain, numbness or weakness in the upper extremities, with the exception of intermittent and random episodes of numbness occurring while she is asleep; and in both hands.   Patient denies any new bladder or bowel problems.   Patient denies difficulties with her balance.   Patient used to experience bilateral occipital headaches lasting 3-4 days and 3 times a month, significantly improved since her diagnostic procedure.  In " terms of current analgesics, Mecca Renee takes: gabapentin, amitriptylene, meloxicam, tramadol, without side effects  Mecca Renee also states that she has a history of lower back pain with radiation to hips. She would like this addressed at a later time.     Review of previous therapies and additional medical records:  Mecca Renee has already failed the following measures, including:   Conservative measures: Oral analgesics, opioids and physical therapy   Interventional measures: As referenced above  Surgical measures: No previous spinal surgery  Mecca Renee underwent neurological consultation with Dr. Díaz on 09/08/2016, and was referred for pain management consultation of cervicalgia.  Patient underwent neurosurgical consultation with Dr. Ronnell Ramirez a few years back and was found to be a potential surgical candidate (records not available for review).  Mecca Renee presents with significant comorbidities including tachyarrhythmia, engaged in treatment.  I have reviewed [facility] Report #[id number] consistent to medication reconciliation.     Diagnostic Studies:  MRI, cervical, without contrast, 12/31/2008; The visualized craniocervical junction and spinal cord are normal. C2-C3 through C4-C5 and C6-C7, C7-T1: The disks are normal.   C5-C6: Broad-based posterior disc protrusion which abuts the spinal cord but does not cause spinal stenosis.  MRI, lumbar, without contrast, December 31, 2008 L1-L2 through L5-S1: The disks are normal. Early changes of degenerative disc disease without disc bulging or herniation.   CT, Head, w/o, 04/26/2011; Normal head CT without contrast.  X-rays of the cervical spine revealed degenerative disc disease and facet arthritis most impressive at C5-C6, 11/4/2015  X-rays of the lumbar spine 11/4/2015, revealed mild levoscoliosis. Lumbar facet arthropathy.  MRI of the lumbar spine from 2008, unrevealing    Review of Systems   Musculoskeletal: Positive for  "back pain, neck pain and neck stiffness.   All other systems reviewed and are negative.     The following portions of the patient's history were reviewed and updated as appropriate: problem list, past medical history, past surgery history, social history, family history, medications, and allergies     Visit Vitals   • BP 91/52 (BP Location: Left arm, Patient Position: Sitting)   • Pulse 59   • Temp 97.8 °F (36.6 °C) (Temporal Artery )   • Resp 18   • Ht 65\" (165.1 cm)   • Wt 189 lb (85.7 kg)   • SpO2 97%   • BMI 31.45 kg/m2      Physical Exam   Neurologic Exam  Constitutional: Patient is oriented to person, place, and time. Vital signs are normal. Patient appears well-developed and well-nourished.   HENT: Head: Normocephalic and atraumatic. Eyes: Conjunctivae and lids are normal. Pupils: Equal, round, reactive to light.   Neck: Trachea normal. Neck supple. No JVD present.   Lymphatic: No cervical adenopathy  Pulmonary Respiratory effort: No increased work of breathing or signs of respiratory distress. Auscultation of lungs: Clear to auscultation.   Cardiovascular Auscultation of heart: Normal rate and rhythm, normal S1 and S2, no murmurs. Peripheral vascular exam: Normal. No edema.   Abdomen: The abdomen was soft and nontender. Bowel sounds were normal.   Musculoskeletal   Gait and station: Gait evaluation demonstrated a normal gait.   Cervical spine: Passive and active range of motion is limited secondary to pain. Extension, flexion, lateral flexion, rotation of the cervical spine increased and reproduced pain. Cervical facet joint loading maneuvers are positive.  Presence of trigger points in the bilateral levator scapula.  Shoulders: The range of motion of the glenohumeral joints is full and without pain. Rotator cuff strength is 5/5.   Lumbar spine: The range of motion is limited secondary to pain. Extension, lateral flexion, and rotation increase or reproduce pain. Lumbar facet joint loading maneuvers are " positive. Berto and Gaenslen's tests are negative.  Neurological: Patient is alert and oriented to person, place, and time. Speech: speech is normal. Cortical function: Normal mental status.   Cranial nerves: Cranial nerves 2-12 intact.   Reflex Scores:  Right brachioradialis: 1+  Left brachioradialis: 1+  Right biceps: 1+  Left biceps: 1+  Right triceps: 1+  Left triceps: 1+  Right patellar: 1+  Left patellar: 1+  Right achilles: 1+  Left achilles: 1+  Motor strength: 5/5  Motor Tone: normal tone.   Involuntary movements: none.   Superficial/Primitive Reflexes: primitive reflexes were absent.   Right Villalta: absent  Left Villalta: absent  Right ankle clonus: absent  Left ankle clonus: absent   Spurling sign is negative. Lhermitte sign is negative. Negative long tract signs. Straight leg raising test is negative. Femoral stretch sign is negative. Positive Tinel sign in both wrists.  Sensation: No sensory loss. Sensory exam: intact to light touch, intact pain and temperature sensation, intact vibration sensation and normal proprioception.   Coordination: Normal finger to nose and heel to shin. Normal balance and negative. Romberg's sign negative.   Skin and subcutaneous tissue: Skin is warm and intact. No rash noted. No cyanosis.   Psychiatric:   Judgment and insight: Normal.   Orientation to person, place and time: Normal.   Recent and remote memory: Intact.   Mood and affect: Normal.      ASSESSMENT:   1. Cervical spondylosis without myelopathy    2. Cervical disc disorder of mid-cervical region    3. Bilateral occipital neuralgia    4. Spondylosis of lumbar region without myelopathy or radiculopathy    5. Primary osteoarthritis involving multiple joints    6. Bilateral carpal tunnel syndrome    7. Fibromyalgia    8. Rheumatoid arthritis     9. Osteoporosis    10. Tachyarrhythmia    11. Physical deconditioning      PLAN: Patient's chronic pain condition improved after her first set of diagnostic bilateral  cervical medial branch blocks, as referenced above. Continue current management and treatments as outlined in the following plan. I had a lengthy conversation with Ms. Mecca Renee regarding her chronic pain condition and potential therapeutic options. Patient has failed to obtain pain relief with conservative measures. I have reviewed all available patient's medical records as well as previous therapies as referenced above. Therefore, I have proposed the following plan:  1. Interventional pain management measures:   A. For treatment of her chronic cervicalgia and headaches: Patient will be scheduled for a bilateral cervical medial branch rhizotomies at C3, C4, C5; for bilateral cervical facet joints at C3-C4, C4-C5 combined with trigger point injections at the bilateral levator scapulae to expedite her rehabilitation.   B. For treatment of her chronic mechanical lower back pain: I have discussed the possibility of diagnostic bilateral lumbar medial branch blocks at L3, L4, L5; for bilateral lumbar facet joints at L4-L5, L5-S1 to clarify the origin of chronic refractory mechanical lower back pain. If patient experiences more than 80% relief along with significant improvement the range of motion of the lumbar spine, then, patient will be scheduled for a second set of diagnostic bilateral lumbar medial branch blocks, to then, proceed with bilateral lumbar medial branch rhizotomies  2. Long-term rehabilitation efforts:  A. Patient will start a comprehensive physical therapy program for water therapy, therapeutic exercise, upper body strengthening/posture correction, core strengthening, gait and balance training, myofascial release, cupping and dry needling once her pain is under control  B. Start an exercise program such as yoga, water therapy and daily walks for fitness  C. Wear bilateral wrist splints/braces for treatment of bilateral carpal tunnel syndrome  3. Pharmacological measures: We have deferred the use  of systemic analgesics due to potential interactions. Trial with Flector patches   4. The patient has been instructed to contact my office with any questions or difficulties. The patient understands the plan and agrees to proceed accordingly.      Patient Care Team:  Mg Sarkar MD as PCP - General (General Practice)  Shadi Díaz MD as Consulting Physician (Neurology)  Ana Garcia MD as Consulting Physician (Rheumatology)  Mg Sarkar MD as Referring Physician (Family Medicine)     No orders of the defined types were placed in this encounter.        Future Appointments  Date Time Provider Department Center   2/8/2017 11:00 AM James Bruno MD [facility] None   1/19/2018 10:15 AM William Brewster MD [facility] None         James Bruno MD       EMR Dragon/Transcription disclaimer:  Much of this encounter note is an electronic transcription of spoken language to printed text. Electronic transcription of spoken language may permit erroneous, or at times, nonsensical words or phrases to be inadvertently transcribed. Although I have reviewed the note for such errors, some may still exist.

## 2017-02-07 RX ORDER — FLECAINIDE ACETATE 100 MG/1
TABLET ORAL
Qty: 180 TABLET | Refills: 1 | Status: SHIPPED | OUTPATIENT
Start: 2017-02-07 | End: 2017-08-22 | Stop reason: SDUPTHER

## 2017-02-08 ENCOUNTER — OUTSIDE FACILITY SERVICE (OUTPATIENT)
Dept: PAIN MEDICINE | Facility: CLINIC | Age: 62
End: 2017-02-08

## 2017-02-08 PROCEDURE — 99153 MOD SED SAME PHYS/QHP EA: CPT | Performed by: ANESTHESIOLOGY

## 2017-02-08 PROCEDURE — 99152 MOD SED SAME PHYS/QHP 5/>YRS: CPT | Performed by: ANESTHESIOLOGY

## 2017-02-08 PROCEDURE — 64634 DESTROY C/TH FACET JNT ADDL: CPT | Performed by: ANESTHESIOLOGY

## 2017-02-08 PROCEDURE — 20552 NJX 1/MLT TRIGGER POINT 1/2: CPT | Performed by: ANESTHESIOLOGY

## 2017-02-08 PROCEDURE — 64633 DESTROY CERV/THOR FACET JNT: CPT | Performed by: ANESTHESIOLOGY

## 2017-02-14 ENCOUNTER — OFFICE VISIT (OUTPATIENT)
Dept: PAIN MEDICINE | Facility: CLINIC | Age: 62
End: 2017-02-14

## 2017-02-14 VITALS
HEIGHT: 65 IN | DIASTOLIC BLOOD PRESSURE: 65 MMHG | TEMPERATURE: 97.3 F | BODY MASS INDEX: 31.36 KG/M2 | OXYGEN SATURATION: 96 % | SYSTOLIC BLOOD PRESSURE: 114 MMHG | RESPIRATION RATE: 18 BRPM | HEART RATE: 57 BPM | WEIGHT: 188.2 LBS

## 2017-02-14 DIAGNOSIS — R53.81 PHYSICAL DECONDITIONING: ICD-10-CM

## 2017-02-14 DIAGNOSIS — M54.81 BILATERAL OCCIPITAL NEURALGIA: ICD-10-CM

## 2017-02-14 DIAGNOSIS — M47.816 SPONDYLOSIS OF LUMBAR REGION WITHOUT MYELOPATHY OR RADICULOPATHY: ICD-10-CM

## 2017-02-14 DIAGNOSIS — M47.812 CERVICAL SPONDYLOSIS WITHOUT MYELOPATHY: ICD-10-CM

## 2017-02-14 DIAGNOSIS — M50.920 CERVICAL DISC DISORDER OF MID-CERVICAL REGION: ICD-10-CM

## 2017-02-14 DIAGNOSIS — M79.7 FIBROMYALGIA: ICD-10-CM

## 2017-02-14 DIAGNOSIS — M81.0 OSTEOPOROSIS: ICD-10-CM

## 2017-02-14 DIAGNOSIS — G56.03 BILATERAL CARPAL TUNNEL SYNDROME: ICD-10-CM

## 2017-02-14 DIAGNOSIS — M15.9 PRIMARY OSTEOARTHRITIS INVOLVING MULTIPLE JOINTS: ICD-10-CM

## 2017-02-14 PROCEDURE — 99024 POSTOP FOLLOW-UP VISIT: CPT | Performed by: ANESTHESIOLOGY

## 2017-02-14 PROCEDURE — 99213 OFFICE O/P EST LOW 20 MIN: CPT | Performed by: ANESTHESIOLOGY

## 2017-02-14 NOTE — PROGRESS NOTES
"CHIEF COMPLAINT: \"My neck pain and the movement of my neck are much better since my rhizotomies. I am here for my lower back pain.\"     BRIEF HISTORY: Mrs. Mecca Renee is a 61 y.o. female, who returns to the clinic for evaluation of her mechanical lower back pain.  Patient underwent cervical medial branch rhizotomies on February 8, 2017, and experienced 100% pain relief along with a remarkable improvement in the range of motion of her cervical spine.  Patient reports resolution of her pre-existing severe headaches. Patient used to experience bilateral occipital headaches lasting 3-4 days and 3 times a month.  In addition, patient reports significant analgesics benefit from the use of wrist splints for treatment of her bilateral carpal tunnel syndrome. Pain level in her cervical spine 0/10.   Pain history:   Pain history: Ms. Mecca Renee, 61 y.o. Female, was originally referred by Dr.Gerald Díaz in consultation for intractable chronic neck pain, headaches, and chronic lower back pain. She reports a 30+ year history of pain, which began without incident.   Pain description: Constant lower back pain with intermittent exacerbation, described as aching and dull sensation.   Radiation of pain: The lower back pain radiates to the hips  Pain intensity today: 6/10  Average pain intensity last week: 7/10  Pain intensity ranges from: 2/10 to 9/10  Aggravating factors: Pain increases with extension of the lumbar spine, lifting, ambulating more than 10-15 minutes, and standing 2-3 minutes.   Alleviating factors: Pain decreases with rest and amitriptylene.   Associated symptoms: Patient denies pain, numbness or weakness in the upper extremities, with the exception of intermittent and random episodes of numbness occurring while she is asleep; and in both hands (improved by wearing bilateral wrist splints).   Patient denies any new bladder or bowel problems.   Patient denies difficulties with her balance.   In terms " of current analgesics, Mecca Renee takes: gabapentin, amitriptylene, meloxicam, tramadol, without side effects     Review of previous therapies and additional medical records:  Mecca Renee has already failed the following measures, including:   Conservative measures: Oral analgesics, opioids and physical therapy   Interventional measures: As referenced above  Surgical measures: No previous spinal surgery  Mecca Renee underwent neurological consultation with Dr. Díaz on 09/08/2016, and was referred for pain management consultation of cervicalgia.  Patient underwent neurosurgical consultation with Dr. Ronnell Ramirez a few years back and was found to be a potential surgical candidate (records not available for review).  Mecca Renee presents with significant comorbidities including tachyarrhythmia, engaged in treatment.  I have reviewed [facility] Report #[id number] consistent to medication reconciliation.     Diagnostic Studies:  MRI, cervical, without contrast, 12/31/2008; The visualized craniocervical junction and spinal cord are normal. C2-C3 through C4-C5 and C6-C7, C7-T1: The disks are normal.   C5-C6: Broad-based posterior disc protrusion which abuts the spinal cord but does not cause spinal stenosis.  MRI, lumbar, without contrast, December 31, 2008 L1-L2 through L5-S1: The disks are normal. Early changes of degenerative disc disease without disc bulging or herniation.   CT, Head, w/o, 04/26/2011; Normal head CT without contrast.  X-rays of the cervical spine revealed degenerative disc disease and facet arthritis most impressive at C5-C6, 11/4/2015  X-rays of the lumbar spine 11/4/2015, revealed mild levoscoliosis. Lumbar facet arthropathy.    Review of Systems   Musculoskeletal: Positive for arthralgias and back pain.   Hematological: Bruises/bleeds easily.   All other systems reviewed and are negative.     The following portions of the patient's history were reviewed and updated as  "appropriate: problem list, past medical history, past surgery history, social history, family history, medications, and allergies     Visit Vitals   • /65 (BP Location: Left arm, Patient Position: Sitting)   • Pulse 57   • Temp 97.3 °F (36.3 °C) (Temporal Artery )   • Resp 18   • Ht 65\" (165.1 cm)   • Wt 188 lb 3.2 oz (85.4 kg)   • SpO2 96%   • BMI 31.32 kg/m2      Physical Exam   Neurologic Exam  Constitutional: Patient is oriented to person, place, and time. Vital signs are normal. Patient appears well-developed and well-nourished.   HENT: Head: Normocephalic and atraumatic. Eyes: Conjunctivae and lids are normal. Pupils: Equal, round, reactive to light.   Neck: Trachea normal. Neck supple. No JVD present.   Lymphatic: No cervical adenopathy  Pulmonary Respiratory effort: No increased work of breathing or signs of respiratory distress. Auscultation of lungs: Clear to auscultation.   Cardiovascular Auscultation of heart: Normal rate and rhythm, normal S1 and S2, no murmurs. Peripheral vascular exam: Normal. No edema.   Abdomen: The abdomen was soft and nontender. Bowel sounds were normal.   Musculoskeletal   Gait and station: Gait evaluation demonstrated a normal gait.   Cervical spine: Passive and active range of motion full and without pain.  Extension, flexion, lateral flexion, rotation of the cervical spine did not increase or produce pain. Cervical facet joint loading maneuvers are negative at this time. No active trigger points in the bilateral levator scapulae.  Shoulders: The range of motion of the glenohumeral joints is full and without pain. Rotator cuff strength is 5/5.   Lumbar spine: The range of motion is limited secondary to pain. Extension, lateral flexion, and rotation increase or reproduce pain. Lumbar facet joint loading maneuvers are positive. Berto and Gaenslen's tests are negative.  Neurological: Patient is alert and oriented to person, place, and time. Speech: speech is normal. " Cortical function: Normal mental status.   Cranial nerves: Cranial nerves 2-12 intact.   Reflex Scores:  Right brachioradialis: 1+  Left brachioradialis: 1+  Right biceps: 1+  Left biceps: 1+  Right triceps: 1+  Left triceps: 1+  Right patellar: 1+  Left patellar: 1+  Right achilles: 1+  Left achilles: 1+  Motor strength: 5/5  Motor Tone: normal tone.   Involuntary movements: none.   Superficial/Primitive Reflexes: primitive reflexes were absent.   Right Villatla: absent  Left Villalta: absent  Right ankle clonus: absent  Left ankle clonus: absent   Spurling sign is negative. Lhermitte sign is negative. Negative long tract signs. Straight leg raising test is negative. Femoral stretch sign is negative. Positive Tinel sign in both wrists.  Sensation: No sensory loss. Sensory exam: intact to light touch, intact pain and temperature sensation, intact vibration sensation and normal proprioception.   Coordination: Normal finger to nose and heel to shin. Normal balance and negative. Romberg's sign negative.   Skin and subcutaneous tissue: Skin is warm and intact. No rash noted. No cyanosis.   Psychiatric:   Judgment and insight: Normal.   Orientation to person, place and time: Normal.   Recent and remote memory: Intact.   Mood and affect: Normal.      ASSESSMENT:   1. Spondylosis of lumbar region without myelopathy or radiculopathy    2. Bilateral carpal tunnel syndrome    3. Cervical spondylosis without myelopathy    4. Cervical disc disorder of mid-cervical region    5. Bilateral occipital neuralgia    6. Fibromyalgia    7. Primary osteoarthritis involving multiple joints    8. Osteoporosis    9. Physical deconditioning         PLAN: Patient's chronic cervicalgia and headaches have resolved since bilateral cervical medial branch rhizotomies, as referenced under HPI. I had a lengthy conversation with Ms. Mecca ROMERO Renee regarding her chronic mechanical lower back pain and potential therapeutic options. Patient has failed  to obtain pain relief with conservative measures. I have reviewed all available patient's medical records as well as previous therapies as referenced above. Therefore, I have proposed the following plan:  1. Interventional pain management measures: Patient will be scheduled for diagnostic bilateral lumbar medial branch blocks at L3, L4, L5; for bilateral lumbar facet joints at L4-L5, L5-S1 to clarify the origin of chronic refractory mechanical lower back pain. If patient experiences more than 80% relief along with significant improvement the range of motion of the lumbar spine, then, patient will be scheduled for a second set of diagnostic bilateral lumbar medial branch blocks, to then, proceed with bilateral lumbar medial branch rhizotomies  2. Long-term rehabilitation efforts:  A. Start a comprehensive physical therapy program for water therapy, therapeutic exercise, upper body strengthening/posture correction, core strengthening, gait and balance training, myofascial release, cupping and dry needling   B. Start an exercise program such as yoga, water therapy and daily walks for fitness  C. Wear bilateral wrist splints/braces for treatment of bilateral carpal tunnel syndrome  3. Pharmacological measures: We have deferred the use of systemic analgesics due to potential interactions. Trial with Flector patches   4. The patient has been instructed to contact my office with any questions or difficulties. The patient understands the plan and agrees to proceed accordingly.      Patient Care Team:  Mg Sarkar MD as PCP - General (General Practice)  Shadi Díaz MD as Consulting Physician (Neurology)  Ana Garcia MD as Consulting Physician (Rheumatology)  Mg Sarkar MD as Referring Physician (Family Medicine)     No orders of the defined types were placed in this encounter.        Future Appointments  Date Time Provider Department Center   2/27/2017 11:15 AM James Bruno MD [facility] None    1/19/2018 10:15 AM William Brewster MD [facility] None         James Bruno MD       EMR Dragon/Transcription disclaimer:  Much of this encounter note is an electronic transcription of spoken language to printed text. Electronic transcription of spoken language may permit erroneous, or at times, nonsensical words or phrases to be inadvertently transcribed. Although I have reviewed the note for such errors, some may still exist.

## 2017-02-21 DIAGNOSIS — M47.816 SPONDYLOSIS OF LUMBAR REGION WITHOUT MYELOPATHY OR RADICULOPATHY: Primary | ICD-10-CM

## 2017-03-14 ENCOUNTER — HOSPITAL ENCOUNTER (OUTPATIENT)
Dept: MRI IMAGING | Facility: HOSPITAL | Age: 62
Discharge: HOME | End: 2017-03-14
Attending: ANESTHESIOLOGY | Admitting: ANESTHESIOLOGY

## 2017-03-14 PROCEDURE — 72148 MRI LUMBAR SPINE W/O DYE: CPT

## 2017-03-30 ENCOUNTER — TELEPHONE (OUTPATIENT)
Dept: PAIN MEDICINE | Facility: CLINIC | Age: 62
End: 2017-03-30

## 2017-04-17 ENCOUNTER — OUTSIDE FACILITY SERVICE (OUTPATIENT)
Dept: PAIN MEDICINE | Facility: CLINIC | Age: 62
End: 2017-04-17

## 2017-04-17 PROCEDURE — 64494 INJ PARAVERT F JNT L/S 2 LEV: CPT | Performed by: ANESTHESIOLOGY

## 2017-04-17 PROCEDURE — 64493 INJ PARAVERT F JNT L/S 1 LEV: CPT | Performed by: ANESTHESIOLOGY

## 2017-04-27 ENCOUNTER — OFFICE VISIT (OUTPATIENT)
Dept: PAIN MEDICINE | Facility: CLINIC | Age: 62
End: 2017-04-27

## 2017-04-27 VITALS
RESPIRATION RATE: 18 BRPM | OXYGEN SATURATION: 99 % | BODY MASS INDEX: 31.19 KG/M2 | SYSTOLIC BLOOD PRESSURE: 89 MMHG | TEMPERATURE: 97.4 F | HEART RATE: 54 BPM | WEIGHT: 187.2 LBS | DIASTOLIC BLOOD PRESSURE: 62 MMHG | HEIGHT: 65 IN

## 2017-04-27 DIAGNOSIS — M05.79 RHEUMATOID ARTHRITIS INVOLVING MULTIPLE SITES WITH POSITIVE RHEUMATOID FACTOR (HCC): ICD-10-CM

## 2017-04-27 DIAGNOSIS — G56.03 BILATERAL CARPAL TUNNEL SYNDROME: ICD-10-CM

## 2017-04-27 DIAGNOSIS — M50.920 CERVICAL DISC DISORDER OF MID-CERVICAL REGION: ICD-10-CM

## 2017-04-27 DIAGNOSIS — R53.81 PHYSICAL DECONDITIONING: ICD-10-CM

## 2017-04-27 DIAGNOSIS — M47.812 CERVICAL SPONDYLOSIS WITHOUT MYELOPATHY: ICD-10-CM

## 2017-04-27 DIAGNOSIS — M79.7 FIBROMYALGIA: ICD-10-CM

## 2017-04-27 DIAGNOSIS — M81.0 OSTEOPOROSIS: ICD-10-CM

## 2017-04-27 DIAGNOSIS — M15.9 PRIMARY OSTEOARTHRITIS INVOLVING MULTIPLE JOINTS: ICD-10-CM

## 2017-04-27 DIAGNOSIS — M54.81 BILATERAL OCCIPITAL NEURALGIA: ICD-10-CM

## 2017-04-27 DIAGNOSIS — M47.816 SPONDYLOSIS OF LUMBAR REGION WITHOUT MYELOPATHY OR RADICULOPATHY: ICD-10-CM

## 2017-04-27 PROCEDURE — 99213 OFFICE O/P EST LOW 20 MIN: CPT | Performed by: ANESTHESIOLOGY

## 2017-04-27 NOTE — PROGRESS NOTES
"CHIEF COMPLAINT: \"I did very well after my blocks.\"      BRIEF HISTORY: Mrs. Mecca Renee underwent a first set of diagnostic bilateral lumbar medial branch rhizotomies at L3, L4, L5; for bilateral lumbar facet joints at L4-L5 and L5-S1 on April 17 or 17th and experienced complete relief along with remarkable functional improvement that lasted for approximately 24 hours.    Patient underwent cervical medial branch rhizotomies on February 8, 2017, and since then, she has experienced 100% ongoing pain relief along with a remarkable improvement in the range of motion of her cervical spine.  Patient reports resolution of her pre-existing severe headaches. Patient used to experience bilateral occipital headaches lasting 3-4 days and 3 times a month.  In addition, patient reports significant analgesics benefit from the use of wrist splints for treatment of her bilateral carpal tunnel syndrome. Pain level in her cervical spine 0/10.   Pain description: Constant lower back pain with intermittent exacerbation, described as aching and dull sensation.   Radiation of pain: The lower back pain radiates to the hips  Pain intensity today: 7/10  Pain intensity ranges from: 2/10 to 8/10  Aggravating factors: Pain increases with extension of the lumbar spine, lifting, ambulating more than 10-15 minutes, and standing 2-3 minutes.   Alleviating factors: Pain decreases with rest and amitriptylene.   Associated symptoms: Patient denies pain, numbness or weakness in the upper extremities, with the exception of intermittent and random episodes of numbness occurring while she is asleep; and in both hands (improved by wearing bilateral wrist splints).   Patient denies any new bladder or bowel problems.   Patient denies difficulties with her balance.      Review of previous therapies and additional medical records:  Mecca Renee has already failed the following measures, including:   Conservative measures: Oral analgesics, opioids " and physical therapy   Interventional measures: As referenced under HPI  Surgical measures: No previous spinal surgery  Mecca Renee underwent neurological consultation with Dr. Díaz on 09/08/2016, and was referred for pain management consultation of cervicalgia.  Patient underwent neurosurgical consultation with Dr. Ronnell Ramirez a few years back and was found to be a potential surgical candidate (records not available for review).  Mecca Renee presents with significant comorbidities including tachyarrhythmia, engaged in treatment.  In terms of current analgesics, Mecca Renee takes: gabapentin, amitriptylene, meloxicam, tramadol, without side effects  I have reviewed [facility] Report #[id number] consistent to medication reconciliation.     Diagnostic Studies:  MRI, cervical, without contrast, 12/31/2008; The visualized craniocervical junction and spinal cord are normal. C2-C3 through C4-C5 and C6-C7, C7-T1: The disks are normal.   C5-C6: Broad-based posterior disc protrusion which abuts the spinal cord but does not cause spinal stenosis.  MRI, lumbar, without contrast, December 31, 2008 L1-L2 through L5-S1: The disks are normal. Early changes of degenerative disc disease without disc bulging or herniation.   CT, Head, w/o, 04/26/2011; Normal head CT without contrast.  X-rays of the cervical spine revealed degenerative disc disease and facet arthritis most impressive at C5-C6, 11/4/2015  X-rays of the lumbar spine 11/4/2015, revealed mild levoscoliosis. Lumbar facet arthropathy.    Review of Systems   HENT: Positive for congestion.    Respiratory: Positive for cough.    Musculoskeletal: Positive for back pain and myalgias.   All other systems reviewed and are negative.     The following portions of the patient's history were reviewed and updated as appropriate: problem list, past medical history, past surgery history, social history, family history, medications, and allergies     BP (!) 89/62   "Pulse 54  Temp 97.4 °F (36.3 °C) (Temporal Artery )   Resp 18  Ht 65\" (165.1 cm)  Wt 187 lb 3.2 oz (84.9 kg)  SpO2 99%  BMI 31.15 kg/m2     Physical Exam   Neurologic Exam  Constitutional: Patient is oriented to person, place, and time. Vital signs are normal. Patient appears well-developed and well-nourished.   HENT: Head: Normocephalic and atraumatic. Eyes: Conjunctivae and lids are normal. Pupils: Equal, round, reactive to light.   Neck: Trachea normal. Neck supple. No JVD present.   Lymphatic: No cervical adenopathy  Pulmonary Respiratory effort: No increased work of breathing or signs of respiratory distress. Auscultation of lungs: Clear to auscultation.   Cardiovascular Auscultation of heart: Normal rate and rhythm, normal S1 and S2, no murmurs. Peripheral vascular exam: Normal. No edema.   Abdomen: The abdomen was soft and nontender. Bowel sounds were normal.   Musculoskeletal   Gait and station: Gait evaluation demonstrated a normal gait.   Cervical spine: Passive and active range of motion full and without pain.  Extension, flexion, lateral flexion, rotation of the cervical spine did not increase or produce pain. Cervical facet joint loading maneuvers are negative at this time. No active trigger points in the bilateral levator scapulae.  Shoulders: The range of motion of the glenohumeral joints is full and without pain. Rotator cuff strength is 5/5.   Lumbar spine: The range of motion is limited secondary to pain. Extension, lateral flexion, and rotation increase or reproduce pain. Lumbar facet joint loading maneuvers are positive. Berto and Gaenslen's tests are negative.  Neurological: Patient is alert and oriented to person, place, and time. Speech: speech is normal. Cortical function: Normal mental status.   Cranial nerves: Cranial nerves 2-12 intact.   Reflex Scores:  Right brachioradialis: 1+  Left brachioradialis: 1+  Right biceps: 1+  Left biceps: 1+  Right triceps: 1+  Left triceps: " 1+  Right patellar: 1+  Left patellar: 1+  Right achilles: 1+  Left achilles: 1+  Motor strength: 5/5  Motor Tone: normal tone.   Involuntary movements: none.   Superficial/Primitive Reflexes: primitive reflexes were absent.   Right Villalta: absent  Left Villalta: absent  Right ankle clonus: absent  Left ankle clonus: absent   Spurling sign is negative. Lhermitte sign is negative. Negative long tract signs. Straight leg raising test is negative. Femoral stretch sign is negative. Positive Tinel sign in both wrists.  Sensation: No sensory loss. Sensory exam: intact to light touch, intact pain and temperature sensation, intact vibration sensation and normal proprioception.   Coordination: Normal finger to nose and heel to shin. Normal balance and negative. Romberg's sign negative.   Skin and subcutaneous tissue: Skin is warm and intact. No rash noted. No cyanosis.   Psychiatric:   Judgment and insight: Normal.   Orientation to person, place and time: Normal.   Recent and remote memory: Intact.   Mood and affect: Normal.      ASSESSMENT:   1. Spondylosis of lumbar region without myelopathy or radiculopathy    2. Bilateral carpal tunnel syndrome    3. Cervical spondylosis without myelopathy    4. Cervical disc disorder of mid-cervical region    5. Bilateral occipital neuralgia    6. Fibromyalgia    7. Primary osteoarthritis involving multiple joints    8. Osteoporosis    9. Rheumatoid arthritis involving multiple sites with positive rheumatoid factor    10. Physical deconditioning       PLAN: Patient experienced complete pain relief and functional improvement that lasted for two days after her first set of diagnostic bilateral lumbar medial branch blocks, as referenced under HPI. I have reviewed all available patient's medical records as well as previous therapies as referenced above. Therefore, I have proposed the following plan:  1. Interventional pain management measures: Patient will be scheduled for a second set of  diagnostic bilateral lumbar medial branch blocks at L3, L4, L5; for bilateral lumbar facet joints at L4-L5, L5-S1 to confirm the origin of chronic refractory mechanical lower back pain. If patient experiences more than 80% relief along with significant improvement the range of motion of the lumbar spine, then, patient will be scheduled for bilateral lumbar medial branch rhizotomies  2. Long-term rehabilitation efforts:  A. Patient will start a comprehensive physical therapy program for water therapy, therapeutic exercise, upper body strengthening/posture correction, core strengthening, gait and balance training, myofascial release, cupping and dry needling after her lumbar RFTC  B. Start an exercise program such as yoga, water therapy and daily walks for fitness  C. Wear bilateral wrist splints/braces for treatment of bilateral carpal tunnel syndrome  3. Pharmacological measures:   A. We have deferred the use of systemic analgesics due to potential interactions.   B. Trial with Flector patches (samples)  4. The patient has been instructed to contact my office with any questions or difficulties. The patient understands the plan and agrees to proceed accordingly.      Patient Care Team:  Mg Sarkar MD as PCP - General (General Practice)  Shadi Díaz MD as Consulting Physician (Neurology)  Ana Garcia MD as Consulting Physician (Rheumatology)  James Bruno MD as Consulting Physician (Pain Medicine)  William Brewster MD as Consulting Physician (Cardiology)     No orders of the defined types were placed in this encounter.        Future Appointments  Date Time Provider Department Center   5/1/2017 2:10 PM Catherine Helm MD [facility] None   1/19/2018 10:15 AM William Brewster MD [facility] None         James Bruno MD       EMR Dragon/Transcription disclaimer:  Much of this encounter note is an electronic transcription of spoken language to printed text. Electronic transcription of spoken  language may permit erroneous, or at times, nonsensical words or phrases to be inadvertently transcribed. Although I have reviewed the note for such errors, some may still exist.

## 2017-05-01 ENCOUNTER — OFFICE VISIT (OUTPATIENT)
Dept: ORTHOPEDIC SURGERY | Facility: CLINIC | Age: 62
End: 2017-05-01

## 2017-05-01 VITALS
BODY MASS INDEX: 31.24 KG/M2 | SYSTOLIC BLOOD PRESSURE: 110 MMHG | DIASTOLIC BLOOD PRESSURE: 66 MMHG | HEIGHT: 64 IN | WEIGHT: 183 LBS | HEART RATE: 57 BPM

## 2017-05-01 DIAGNOSIS — M15.9 PRIMARY OSTEOARTHRITIS INVOLVING MULTIPLE JOINTS: ICD-10-CM

## 2017-05-01 DIAGNOSIS — L40.3 PUSTULAR PSORIASIS OF THE PALMS AND/OR SOLES: ICD-10-CM

## 2017-05-01 DIAGNOSIS — M05.79 RHEUMATOID ARTHRITIS INVOLVING MULTIPLE SITES WITH POSITIVE RHEUMATOID FACTOR (HCC): Primary | ICD-10-CM

## 2017-05-01 DIAGNOSIS — I87.8 VENOUS STASIS: ICD-10-CM

## 2017-05-01 PROCEDURE — 99213 OFFICE O/P EST LOW 20 MIN: CPT | Performed by: ORTHOPAEDIC SURGERY

## 2017-05-01 RX ORDER — GABAPENTIN 100 MG/1
100 CAPSULE ORAL 2 TIMES DAILY
COMMUNITY
End: 2019-04-17

## 2017-05-01 RX ORDER — UBIDECARENONE 100 MG
100 CAPSULE ORAL DAILY
COMMUNITY
End: 2021-02-12

## 2017-05-10 ENCOUNTER — OUTSIDE FACILITY SERVICE (OUTPATIENT)
Dept: PAIN MEDICINE | Facility: CLINIC | Age: 62
End: 2017-05-10

## 2017-05-10 PROCEDURE — 99152 MOD SED SAME PHYS/QHP 5/>YRS: CPT | Performed by: ANESTHESIOLOGY

## 2017-05-10 PROCEDURE — 64493 INJ PARAVERT F JNT L/S 1 LEV: CPT | Performed by: ANESTHESIOLOGY

## 2017-05-10 PROCEDURE — 64494 INJ PARAVERT F JNT L/S 2 LEV: CPT | Performed by: ANESTHESIOLOGY

## 2017-05-18 ENCOUNTER — OFFICE VISIT (OUTPATIENT)
Dept: PAIN MEDICINE | Facility: CLINIC | Age: 62
End: 2017-05-18

## 2017-05-18 VITALS
DIASTOLIC BLOOD PRESSURE: 64 MMHG | TEMPERATURE: 97 F | SYSTOLIC BLOOD PRESSURE: 106 MMHG | BODY MASS INDEX: 31.24 KG/M2 | RESPIRATION RATE: 18 BRPM | OXYGEN SATURATION: 95 % | HEART RATE: 56 BPM | HEIGHT: 64 IN | WEIGHT: 183 LBS

## 2017-05-18 DIAGNOSIS — M50.920 CERVICAL DISC DISORDER OF MID-CERVICAL REGION: ICD-10-CM

## 2017-05-18 DIAGNOSIS — M47.812 CERVICAL SPONDYLOSIS WITHOUT MYELOPATHY: ICD-10-CM

## 2017-05-18 DIAGNOSIS — M81.0 OSTEOPOROSIS: ICD-10-CM

## 2017-05-18 DIAGNOSIS — M15.9 PRIMARY OSTEOARTHRITIS INVOLVING MULTIPLE JOINTS: ICD-10-CM

## 2017-05-18 DIAGNOSIS — M47.816 SPONDYLOSIS OF LUMBAR REGION WITHOUT MYELOPATHY OR RADICULOPATHY: ICD-10-CM

## 2017-05-18 DIAGNOSIS — R53.81 PHYSICAL DECONDITIONING: ICD-10-CM

## 2017-05-18 DIAGNOSIS — G56.03 BILATERAL CARPAL TUNNEL SYNDROME: ICD-10-CM

## 2017-05-18 DIAGNOSIS — M79.7 FIBROMYALGIA: ICD-10-CM

## 2017-05-18 DIAGNOSIS — M54.81 BILATERAL OCCIPITAL NEURALGIA: ICD-10-CM

## 2017-05-18 PROCEDURE — 99213 OFFICE O/P EST LOW 20 MIN: CPT | Performed by: ANESTHESIOLOGY

## 2017-06-07 ENCOUNTER — OUTSIDE FACILITY SERVICE (OUTPATIENT)
Dept: PAIN MEDICINE | Facility: CLINIC | Age: 62
End: 2017-06-07

## 2017-06-07 PROCEDURE — 99153 MOD SED SAME PHYS/QHP EA: CPT | Performed by: ANESTHESIOLOGY

## 2017-06-07 PROCEDURE — 64636 DESTROY L/S FACET JNT ADDL: CPT | Performed by: ANESTHESIOLOGY

## 2017-06-07 PROCEDURE — 99152 MOD SED SAME PHYS/QHP 5/>YRS: CPT | Performed by: ANESTHESIOLOGY

## 2017-06-07 PROCEDURE — 64635 DESTROY LUMB/SAC FACET JNT: CPT | Performed by: ANESTHESIOLOGY

## 2017-06-07 RX ORDER — TIZANIDINE 2 MG/1
TABLET ORAL
Qty: 90 TABLET | Refills: 3 | Status: SHIPPED | OUTPATIENT
Start: 2017-06-07 | End: 2021-02-12

## 2017-08-22 RX ORDER — FLECAINIDE ACETATE 100 MG/1
TABLET ORAL
Qty: 180 TABLET | Refills: 3 | Status: SHIPPED | OUTPATIENT
Start: 2017-08-22 | End: 2018-09-10 | Stop reason: SDUPTHER

## 2017-08-22 RX ORDER — BISOPROLOL FUMARATE 5 MG/1
TABLET, FILM COATED ORAL
Qty: 30 TABLET | Refills: 6 | Status: SHIPPED | OUTPATIENT
Start: 2017-08-22 | End: 2018-03-23 | Stop reason: SDUPTHER

## 2018-01-19 ENCOUNTER — OFFICE VISIT (OUTPATIENT)
Dept: CARDIOLOGY | Facility: CLINIC | Age: 63
End: 2018-01-19

## 2018-01-19 VITALS
HEART RATE: 51 BPM | WEIGHT: 189.4 LBS | BODY MASS INDEX: 31.56 KG/M2 | HEIGHT: 65 IN | SYSTOLIC BLOOD PRESSURE: 106 MMHG | DIASTOLIC BLOOD PRESSURE: 62 MMHG

## 2018-01-19 DIAGNOSIS — I10 ESSENTIAL HYPERTENSION: ICD-10-CM

## 2018-01-19 DIAGNOSIS — R00.2 PALPITATIONS: Primary | ICD-10-CM

## 2018-01-19 DIAGNOSIS — E78.2 MIXED HYPERLIPIDEMIA: ICD-10-CM

## 2018-01-19 PROCEDURE — 99213 OFFICE O/P EST LOW 20 MIN: CPT | Performed by: INTERNAL MEDICINE

## 2018-01-19 PROCEDURE — 93000 ELECTROCARDIOGRAM COMPLETE: CPT | Performed by: INTERNAL MEDICINE

## 2018-01-19 NOTE — PROGRESS NOTES
[facility] at [facility]  Office Progress Note  Mecca Renee  1955  [phone]      Visit Date: 01/13/2017    PCP: Mg Sarkar MD  [address] [address]    IDENTIFICATION: A 62 y.o. female female   from [place].     PROBLEM LIST:   1. Palpitations with near syncope:  a. Echocardiogram, 09/13/2013: LVEF (55% to 60%), abnormal LV diastolic filling consistent with impaired relaxation, trace MR, mild TR with an RVSP of 31.  b. Event recorder, September through October 2013: Low frequency PACs with occasional short runs of nonsustained atrial tachycardia.   2. CP  6/15 Lexiscan wnl  3. GERD.  4. Rheumatoid arthritis.   5. Fibromyalgia.  6. Osteoarthritis.   7. Diverticulosis.   8. Surgical history:  a. Right shoulder surgery.  Right foot surgery.     Chief Complaint   Patient presents with   • Tachyarrhythmia   • Palpitations           Allergies  Allergies   Allergen Reactions   • Sulfa Antibiotics        Current Medications    Current Outpatient Prescriptions:   •  amitriptyline (ELAVIL) 10 MG tablet, TAKE 1 TABLET BY MOUTH EVERY NIGHT., Disp: 30 tablet, Rfl: 5  •  bisoprolol (ZEBeta) 5 MG tablet, TAKE 1/2 TABLET BY MOUTH TWICE DAILY, Disp: 30 tablet, Rfl: 6  •  Calcium Carbonate-Vit D-Min (CALCIUM 1200 PO), Take 1 tablet by mouth 2 (Two) Times a Day., Disp: , Rfl:   •  Cholecalciferol (VITAMIN D3) 5000 UNITS capsule capsule, Take  by mouth Daily., Disp: , Rfl:   •  coenzyme Q10 100 MG capsule, Take 100 mg by mouth Daily., Disp: , Rfl:   •  diclofenac (VOLTAREN) 1 % gel gel, Apply 4 g topically As Needed., Disp: , Rfl:   •  Elastic Bandages & Supports (ACE WRIST BRACE/SPLINT) misc, 1 each Daily., Disp: 1 each, Rfl: 0  •  flecainide (TAMBOCOR) 100 MG tablet, TAKE 1 TABLET EVERY 12 HOURS DAILY., Disp: 180 tablet, Rfl: 3  •  gabapentin (NEURONTIN) 100 MG capsule, Take 100 mg by mouth 2 (Two) Times a Day., Disp: , Rfl:   •  Hydroxychloroquine Sulfate  "(PLAQUENIL PO), Take 100 mg by mouth 2 (Two) Times a Day., Disp: , Rfl:   •  Loratadine (CLARITIN PO), Take 1 tablet by mouth Every Night., Disp: , Rfl:   •  tiZANidine (ZANAFLEX) 2 MG tablet, Take half to 1 tablet three times a day as needed for muscle spasms. (Patient taking differently: As Needed. Take half to 1 tablet three times a day as needed for muscle spasms.), Disp: 90 tablet, Rfl: 3      History of Present Illness     Pt denies any chest pain, dyspnea, dyspnea on exertion, orthopnea, PND, palpitations, lower extremity edema.  Neck pain w recent injection per pain mngmt  Seeing ortho re L knee pain.    ROS:  All systems have been reviewed and are negative with the exception of those mentioned in the HPI.    OBJECTIVE:  Vitals:    01/19/18 1017   BP: 106/62   BP Location: Right arm   Patient Position: Sitting   Pulse: 51   Weight: 85.9 kg (189 lb 6.4 oz)   Height: 165.1 cm (65\")     Physical Exam   Constitutional: She appears well-developed and well-nourished.   Neck: Normal range of motion. Neck supple. No hepatojugular reflux and no JVD present. Carotid bruit is not present. No tracheal deviation present. No thyromegaly present.   Cardiovascular: Normal rate, regular rhythm, S1 normal, S2 normal, intact distal pulses and normal pulses.  PMI is not displaced.  Exam reveals no gallop, no distant heart sounds, no friction rub, no midsystolic click and no opening snap.    No murmur heard.  Pulses:       Radial pulses are 2+ on the right side, and 2+ on the left side.        Dorsalis pedis pulses are 2+ on the right side, and 2+ on the left side.        Posterior tibial pulses are 2+ on the right side, and 2+ on the left side.   Pulmonary/Chest: Effort normal and breath sounds normal. She has no wheezes. She has no rales.   Abdominal: Soft. Bowel sounds are normal. She exhibits no mass. There is no tenderness. There is no guarding.       Diagnostic Data:    ECG 12 Lead  Date/Time: 1/19/2018 10:32 AM  Performed " by: JARED BREWSTER  Authorized by: JARED BREWSTER   Rhythm: sinus rhythm and A-V block  Clinical impression: non-specific ECG              ASSESSMENT:   Diagnosis Plan   1. Palpitations     2. Essential hypertension     3. Mixed hyperlipidemia         PLAN:  1. Premature atrial contractions. Suppress flecainide, beta blockade, tolerant of such at current. I would document TSH at this time.   2. Unknown recent lipid status. Documented FLP w pcp will obtain.  3. Exogenous obesity. I commended her on weight reduction. Otherwise, I will see her back in 1 year.    Mg Sarkar MD, thank you for referring Ms. Renee for evaluation.  I have forwarded my electronically generated recommendations to you for review.  Please do not hesitate to call with any questions.      Jared Brewster MD, FACC

## 2018-01-24 ENCOUNTER — OFFICE VISIT (OUTPATIENT)
Dept: ORTHOPEDIC SURGERY | Facility: CLINIC | Age: 63
End: 2018-01-24

## 2018-01-24 VITALS
BODY MASS INDEX: 31.22 KG/M2 | HEIGHT: 65 IN | HEART RATE: 60 BPM | SYSTOLIC BLOOD PRESSURE: 136 MMHG | WEIGHT: 187.39 LBS | DIASTOLIC BLOOD PRESSURE: 65 MMHG

## 2018-01-24 DIAGNOSIS — S83.242A TEAR OF MEDIAL MENISCUS OF LEFT KNEE, UNSPECIFIED TEAR TYPE, UNSPECIFIED WHETHER OLD OR CURRENT TEAR, INITIAL ENCOUNTER: ICD-10-CM

## 2018-01-24 DIAGNOSIS — M17.5 OTHER SECONDARY OSTEOARTHRITIS OF LEFT KNEE: Primary | ICD-10-CM

## 2018-01-24 PROCEDURE — 20610 DRAIN/INJ JOINT/BURSA W/O US: CPT | Performed by: PHYSICIAN ASSISTANT

## 2018-01-24 PROCEDURE — 99214 OFFICE O/P EST MOD 30 MIN: CPT | Performed by: PHYSICIAN ASSISTANT

## 2018-01-24 RX ORDER — ETODOLAC 400 MG/1
400 TABLET, FILM COATED ORAL 2 TIMES DAILY
COMMUNITY

## 2018-01-24 RX ORDER — LIDOCAINE HYDROCHLORIDE 10 MG/ML
4 INJECTION, SOLUTION INFILTRATION; PERINEURAL
Status: COMPLETED | OUTPATIENT
Start: 2018-01-24 | End: 2018-01-24

## 2018-01-24 RX ORDER — ASPIRIN 81 MG/1
81 TABLET ORAL DAILY
COMMUNITY

## 2018-01-24 RX ORDER — TRAMADOL HYDROCHLORIDE 50 MG/1
50 TABLET ORAL EVERY 6 HOURS PRN
COMMUNITY

## 2018-01-24 RX ORDER — TRIAMCINOLONE ACETONIDE 40 MG/ML
40 INJECTION, SUSPENSION INTRA-ARTICULAR; INTRAMUSCULAR
Status: DISCONTINUED | OUTPATIENT
Start: 2018-01-24 | End: 2018-01-30

## 2018-01-24 RX ADMIN — METHYLPREDNISOLONE ACETATE 40 MG: 40 INJECTION, SUSPENSION INTRA-ARTICULAR; INTRALESIONAL; INTRAMUSCULAR; SOFT TISSUE at 14:33

## 2018-01-24 RX ADMIN — LIDOCAINE HYDROCHLORIDE 4 ML: 10 INJECTION, SOLUTION INFILTRATION; PERINEURAL at 14:33

## 2018-01-24 NOTE — PROGRESS NOTES
Procedure   Large Joint Arthrocentesis  Date/Time: 1/24/2018 2:33 PM  Consent given by: patient  Site marked: site marked  Timeout: Immediately prior to procedure a time out was called to verify the correct patient, procedure, equipment, support staff and site/side marked as required   Supporting Documentation  Indications: pain   Procedure Details  Location: knee - L knee  Preparation: Patient was prepped and draped in the usual sterile fashion  Needle size: 22 G  Approach: anterolateral  Medications administered: 4 mL lidocaine 1 %; 40 mg methylPREDNISolone acetate 40 MG/ML (4cc Bupivacaine  NDC#22073-533-36  Lot#[id number]  Exp. Date: 09/01/2019)  Patient tolerance: patient tolerated the procedure well with no immediate complications

## 2018-01-24 NOTE — PROGRESS NOTES
[facility] Note    Subjective     Patient: Mecca Renee  : 1955    Primary Care Provider: Mg Sarkar MD    Requesting Provider: As above    Pain of the Left Knee      History    Chief Complaint: Left knee pain    History of Present Illness: This is a very pleasant 62-year-old female presenting today to discuss her left anterior knee pain.  She complains of pain for 1 month after twisting the knee.  She complains of an aching and throbbing pain with weightbearing and walking and an occasional aching and throbbing pain at night.  She denies any radiating pain with some anterior popping but no catching or locking in the knee.  She rates the pain 8/10 at its worst.  She reports 50% improved pain since the injury happened.  She's been treating with a copper knee sleeve.  She takes etodolac and Plaquenil for rheumatoid arthritis.  She denies any medial pain or mechanical symptoms.  She had MRI 18 which showed a complex tear of the posterior medial meniscus and degenerative changes in the knee.  She is not had any previous surgery or injections in the knee.  She is here today to find out what is causing her pain and see what she can do to improve it.  She runs a  and is up and on her feet all day.    Current Outpatient Prescriptions on File Prior to Visit   Medication Sig Dispense Refill   • bisoprolol (ZEBeta) 5 MG tablet TAKE 1/2 TABLET BY MOUTH TWICE DAILY 30 tablet 6   • Calcium Carbonate-Vit D-Min (CALCIUM 1200 PO) Take 1 tablet by mouth 2 (Two) Times a Day.     • Cholecalciferol (VITAMIN D3) 5000 UNITS capsule capsule Take  by mouth Daily.     • coenzyme Q10 100 MG capsule Take 100 mg by mouth Daily.     • diclofenac (VOLTAREN) 1 % gel gel Apply 4 g topically As Needed.     • Elastic Bandages & Supports (ACE WRIST BRACE/SPLINT) misc 1 each Daily. 1 each 0   • flecainide (TAMBOCOR) 100 MG tablet TAKE 1 TABLET EVERY 12 HOURS DAILY. 180 tablet 3   •  gabapentin (NEURONTIN) 100 MG capsule Take 100 mg by mouth 2 (Two) Times a Day.     • Hydroxychloroquine Sulfate (PLAQUENIL PO) Take 100 mg by mouth 2 (Two) Times a Day.     • Loratadine (CLARITIN PO) Take 1 tablet by mouth Every Night.     • tiZANidine (ZANAFLEX) 2 MG tablet Take half to 1 tablet three times a day as needed for muscle spasms. (Patient taking differently: As Needed. Take half to 1 tablet three times a day as needed for muscle spasms.) 90 tablet 3   • amitriptyline (ELAVIL) 10 MG tablet TAKE 1 TABLET BY MOUTH EVERY NIGHT. 30 tablet 5     No current facility-administered medications on file prior to visit.       Allergies   Allergen Reactions   • Sulfa Antibiotics       Past Medical History:   Diagnosis Date   • Asthma    • Chronic pain disorder    • Diverticulosis    • Fibromyalgia    • GERD (gastroesophageal reflux disease)    • Migraine    • Neck pain    • Osteoarthritis    • Palpitations     palpitations with near syncope   • Rheumatoid arthritis      Past Surgical History:   Procedure Laterality Date   • CHOLECYSTECTOMY     • FOOT SURGERY Right    • SHOULDER SURGERY Right      Family History   Problem Relation Age of Onset   • Diabetes Mother    • Cancer Mother    • Heart disease Mother    • Hypertension Mother    • Cancer Father    • Cancer Brother    • Heart disease Maternal Grandmother    • Stroke Maternal Grandmother       Social History     Social History   • Marital status:      Spouse name: N/A   • Number of children: N/A   • Years of education: N/A     Occupational History   • Not on file.     Social History Main Topics   • Smoking status: Never Smoker   • Smokeless tobacco: Never Used   • Alcohol use No   • Drug use: No   • Sexual activity: Defer     Other Topics Concern   • Not on file     Social History Narrative        Review of Systems   Constitutional: Positive for chills (Every now and then) and fatigue. Negative for fever.   Eyes: Negative.    Respiratory: Negative.   "  Cardiovascular: Negative.    Gastrointestinal: Negative.    Endocrine: Negative.    Genitourinary: Negative.    Musculoskeletal: Positive for arthralgias (Left knee pain), back pain and neck pain.   Skin: Negative.    Allergic/Immunologic: Negative.    Neurological: Positive for headaches.   Hematological: Negative.    Psychiatric/Behavioral: Negative.        The following portions of the patient's history were reviewed and updated as appropriate: allergies, current medications, past family history, past medical history, past social history, past surgical history and problem list.      Objective      Physical Exam  /65  Pulse 60  Ht 165 cm (64.96\")  Wt 85 kg (187 lb 6.3 oz)  BMI 31.22 kg/m2    Body mass index is 31.22 kg/(m^2).    GENERAL: Body habitus: obese    Lower extremity edema: Left: trace; Right: trace    Varicose veins:  Left: moderate; Right: moderate    Gait: antalgic     Mental Status:  awake and alert; oriented to person, place, and time    Voice:  clear  SKIN:  Normal    Hair Growth:  Right:normal; Left:  normal  HEENT: Head: Normocephalic, atraumatic,  without obvious abnormality.  eye: normal external eye, no icterus   PULM:  Repiratory effort normal    Ortho Exam  Peripheral Vascular:    Upper Extremity:   Inspection:  Left--no cyanotic nail beds Right--no cyanotic nail beds   Bilateral:  Pink nail beds with brisk capillary refill   Palpation:  Bilateral radial pulse normal    Musculoskeletal:  Global Assessment:  Overall assessment of Lower Extremity Muscle Strength and Tone:  Left quadriceps--5/5   Left hamstrings--5/5       Left tibialis anterior--5/5  Left gastroc-soleus--5/5  Left EHL--5/5      Lower Extremity:  Knee/Patella:  No digital clubbing or cyanosis.    Examination of left and right knees reveals:  Normal deep tendon reflexes, coordination, strength, tone, sensation.  No known fractures or deformities.    Inspection and Palpation:    Left knee:  Tenderness:  Medial, " patellofemoral tenderness with direct posterior knee tenderness  Effusion:  none  Crepitus:  Positive with positive grind test  Pulses:  2+  Ecchymosis:  None  Warmth:  None     Right knee:  Tenderness:  Medial joint line  Effusion:  none  Crepitus:  positive  Pulses:  2+  Ecchymosis:  None  Warmth:  None     ROM:  Right:  Extension:0    Flexion:135  Left:  Extension:0     Flexion:135    Instability:    Left:  Lachman Test:  Negative, Varus stress test negative, Valgus stress test negative   Anterior Drawer Test:  Negative, Posterior Drawer Test:  Negative    Right:  Lachman Test:  Negative, Varus stress test negative, Valgus stress test negative   Anterior Drawer Test:  Negative, Posterior Drawer Test:  Negative    Deformities/Malalignments/Discrepancies:    Left:  none  Right:  none    Functional Testing:    Left:  Jhonny's test:  Negative  Patella grind test:  Positive    Q-angle:  Normal  Apprehension Sign:  Negative    SENSATION TO LIGHT TOUCH:  DEEP PERONEAL/SUPERFICIAL PERONEAL/SURAL/SAPHENOUS/TIBIAL:   Left intact; Right intact          Medical Decision Making    Data Review:   ordered and reviewed x-rays today and reviewed radiology images  MRI left knee as noted above    Assessment:  1. Other secondary osteoarthritis of left knee    2. Tear of medial meniscus of left knee, unspecified tear type, unspecified whether old or current tear, initial encounter        Plan:  Left knee arthritis with medial meniscus tear.  I reviewed x-rays, MRI findings and clinical findings with the patient.  X-rays today show medial compartment arthritis with sclerosis of the medial tibial plateau and mild pat-fem arthritis.  MRI shows medial meniscus tear, however, I told her that I do not think that the meniscus tear is causing her pain.  Her complaints are all anterior in nature.  I suspect her pain and functional limitations are secondary to a flare of the arthritis.  We discussed treatment options including good shoe  wear, ice, NSAIDs, low impact exercise, knee/hip strengthening, weight loss and the 4 fold multiplier on the joint, steroid injection, viscosupplementation and eventually knee replacement.  We discussed avoiding patella loading activities like squatting, kneeling.  She reports she does not do a lot of those activities.  Recommendation today is steroid injection into the left knee.  I explained should she begin having medial pain with mechanical symptoms, she may want to consider arthroscopy to address the meniscus tear.  She will see how the steroid improves her pain and return in 6 weeks to consider some PT and possibly injection for the right knee as well.    Using sterile technique, the left knee was sterilely prepped with Hibiclens. Following a time out,  using a 22 gauge needle, the left knee was injected with 40mg Depo Medrol, 4 cc lidocaine and 4 cc marcaine.  Patient tolerated the procedure well.  No complications.      Leny Alcaraz PA-C  01/24/18  2:50 PM

## 2018-01-30 RX ORDER — METHYLPREDNISOLONE ACETATE 40 MG/ML
40 INJECTION, SUSPENSION INTRA-ARTICULAR; INTRALESIONAL; INTRAMUSCULAR; SOFT TISSUE
Status: COMPLETED | OUTPATIENT
Start: 2018-01-24 | End: 2018-01-24

## 2018-03-21 ENCOUNTER — OFFICE VISIT (OUTPATIENT)
Dept: ORTHOPEDIC SURGERY | Facility: CLINIC | Age: 63
End: 2018-03-21

## 2018-03-21 VITALS
DIASTOLIC BLOOD PRESSURE: 67 MMHG | BODY MASS INDEX: 31.16 KG/M2 | SYSTOLIC BLOOD PRESSURE: 138 MMHG | HEART RATE: 57 BPM | HEIGHT: 65 IN | WEIGHT: 187 LBS

## 2018-03-21 DIAGNOSIS — M17.4 OTHER SECONDARY OSTEOARTHRITIS OF BOTH KNEES: Primary | ICD-10-CM

## 2018-03-21 DIAGNOSIS — M25.572 LEFT ANKLE PAIN, UNSPECIFIED CHRONICITY: ICD-10-CM

## 2018-03-21 PROCEDURE — 20610 DRAIN/INJ JOINT/BURSA W/O US: CPT | Performed by: PHYSICIAN ASSISTANT

## 2018-03-21 PROCEDURE — 99214 OFFICE O/P EST MOD 30 MIN: CPT | Performed by: PHYSICIAN ASSISTANT

## 2018-03-21 RX ORDER — METHYLPREDNISOLONE ACETATE 40 MG/ML
40 INJECTION, SUSPENSION INTRA-ARTICULAR; INTRALESIONAL; INTRAMUSCULAR; SOFT TISSUE
Status: COMPLETED | OUTPATIENT
Start: 2018-03-21 | End: 2018-03-21

## 2018-03-21 RX ORDER — LIDOCAINE HYDROCHLORIDE 10 MG/ML
4 INJECTION, SOLUTION INFILTRATION; PERINEURAL
Status: COMPLETED | OUTPATIENT
Start: 2018-03-21 | End: 2018-03-21

## 2018-03-21 RX ADMIN — LIDOCAINE HYDROCHLORIDE 4 ML: 10 INJECTION, SOLUTION INFILTRATION; PERINEURAL at 09:11

## 2018-03-21 RX ADMIN — METHYLPREDNISOLONE ACETATE 40 MG: 40 INJECTION, SUSPENSION INTRA-ARTICULAR; INTRALESIONAL; INTRAMUSCULAR; SOFT TISSUE at 09:11

## 2018-03-21 NOTE — PROGRESS NOTES
I have reviewed the notes, assessments, and/or procedures performed by Leny Alcaraz PA-C, I concur with her documentation of Mecca Renee.

## 2018-03-21 NOTE — PROGRESS NOTES
[facility]    Subjective     Patient: Mecca Renee  : 1955    Primary Care Provider: Mg Sarkar MD    Requesting Provider: As above    Follow-up of the Left Knee (Other secondary osteoarthritis of left knee. 6 week f/u) and Follow-up and Pain of the Right Knee      History    Chief Complaint: Right knee pain and left ankle pain    History of Present Illness: Patient presents today to discuss her right knee pain.  She denies any trauma or injury.  She reports pain similar to her left knee.  She complains of anterior functional pain with no night pain.  Pain is worse with stairs, better with walking flat surfaces.  She denies any radiating pain or mechanical symptoms.  She rates the pain to be 6/10 when it is bothering her.  She has RA which is well controlled with plaquenil and etodolac.  She has done well with steroid injection in the left knee and was hoping that it may help the right knee as well.    Complains a 2-3 month history of left medial hindfoot and ankle pain.  She denies any trauma or injury.  She reports no rest pain with worsening pain as the day progresses weightbearing and walking.  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  She denies any warmth, erythema or swelling.  She rates the pain to be 8/10 at its worst.  She has custom orthotics that she uses the majority of the time prescribed to her by Dr. Hoang.  She reports this is a different pain than she had when she last saw Dr. Hoang for her thin fat pads.  She also has developed a painful corn between the left great toe and second toe.  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    Current Outpatient Prescriptions on File Prior to Visit   Medication Sig Dispense Refill   • amitriptyline (ELAVIL) 10 MG tablet TAKE 1 TABLET BY MOUTH EVERY NIGHT. 30 tablet 5   • aspirin 81 MG EC tablet Take 81 mg by mouth Daily.     • bisoprolol  (ZEBeta) 5 MG tablet TAKE 1/2 TABLET BY MOUTH TWICE DAILY 30 tablet 6   • Calcium Carbonate-Vit D-Min (CALCIUM 1200 PO) Take 1 tablet by mouth 2 (Two) Times a Day.     • Cholecalciferol (VITAMIN D3) 5000 UNITS capsule capsule Take  by mouth Daily.     • coenzyme Q10 100 MG capsule Take 100 mg by mouth Daily.     • diclofenac (VOLTAREN) 1 % gel gel Apply 4 g topically As Needed.     • Elastic Bandages & Supports (ACE WRIST BRACE/SPLINT) misc 1 each Daily. 1 each 0   • etodolac (LODINE) 400 MG tablet Take 400 mg by mouth 2 (Two) Times a Day.     • flecainide (TAMBOCOR) 100 MG tablet TAKE 1 TABLET EVERY 12 HOURS DAILY. 180 tablet 3   • gabapentin (NEURONTIN) 100 MG capsule Take 100 mg by mouth 2 (Two) Times a Day.     • Hydroxychloroquine Sulfate (PLAQUENIL PO) Take 100 mg by mouth 2 (Two) Times a Day.     • Loratadine (CLARITIN PO) Take 1 tablet by mouth Every Night.     • tiZANidine (ZANAFLEX) 2 MG tablet Take half to 1 tablet three times a day as needed for muscle spasms. (Patient taking differently: As Needed. Take half to 1 tablet three times a day as needed for muscle spasms.) 90 tablet 3   • traMADol (ULTRAM) 50 MG tablet Take 50 mg by mouth Every 6 (Six) Hours As Needed for Moderate Pain .       No current facility-administered medications on file prior to visit.       Allergies   Allergen Reactions   • Sulfa Antibiotics       Past Medical History:   Diagnosis Date   • Asthma    • Chronic pain disorder    • Diverticulosis    • Fibromyalgia    • GERD (gastroesophageal reflux disease)    • Migraine    • Neck pain    • Osteoarthritis    • Palpitations     palpitations with near syncope   • Rheumatoid arthritis      Past Surgical History:   Procedure Laterality Date   • CHOLECYSTECTOMY     • FOOT SURGERY Right    • SHOULDER SURGERY Right      Family History   Problem Relation Age of Onset   • Diabetes Mother    • Cancer Mother    • Heart disease Mother    • Hypertension Mother    • Cancer Father    • Cancer Brother  "   • Heart disease Maternal Grandmother    • Stroke Maternal Grandmother       Social History     Social History   • Marital status:      Spouse name: N/A   • Number of children: N/A   • Years of education: N/A     Occupational History   • Not on file.     Social History Main Topics   • Smoking status: Never Smoker   • Smokeless tobacco: Never Used   • Alcohol use No   • Drug use: No   • Sexual activity: Defer     Other Topics Concern   • Not on file     Social History Narrative   • No narrative on file        Review of Systems   Constitutional: Negative.    HENT: Negative.    Eyes: Negative.    Respiratory: Negative.    Cardiovascular: Negative.    Gastrointestinal: Negative.    Endocrine: Negative.    Genitourinary: Negative.    Musculoskeletal: Positive for joint swelling.        Joint Pain    Skin: Negative.    Allergic/Immunologic: Negative.    Neurological: Negative.    Hematological: Negative.    Psychiatric/Behavioral: Negative.        The following portions of the patient's history were reviewed and updated as appropriate: allergies, current medications, past family history, past medical history, past social history, past surgical history and problem list.      Objective      Physical Exam  /67   Pulse 57   Ht 165 cm (64.96\")   Wt 84.8 kg (187 lb)   BMI 31.16 kg/m²     Body mass index is 31.16 kg/m².    GENERAL: Body habitus: obese    Lower extremity edema: Left: trace; Right: trace    Varicose veins:  Left: moderate; Right: moderate    Gait: antalgic     Mental Status:  awake and alert; oriented to person, place, and time    Voice:  clear  SKIN:  Normal    Hair Growth:  Right:normal; Left:  normal  HEENT: Head: Normocephalic, atraumatic,  without obvious abnormality.  eye: normal external eye, no icterus   PULM:  Repiratory effort normal    Ortho Exam  Right Knee Exam  ----------  ALIGNMENT: Right: neutral----------  RANGE OF MOTION:  Right: 0-120  LIGAMENTOUS STABILITY:   Right:stable to " varus and valgus stress at terminal extension and 30 degrees without any evidence of laxity----------  STRENGTH:  KNEE FLEXION Right 5/5  KNEE EXTENSION Right 5/5 ----------  PAIN WITH PALPATION: Right medial joint line and anterior knee  PAIN WITH KNEE ROM: Right no  PATELLAR CREPITUS: Right yes   ----------  SENSATION TO LIGHT TOUCH:  DEEP PERONEAL/SUPERFICIAL PERONEAL/SURAL/SAPHENOUS/TIBIAL:   Right intact----------  EDEMA:  Right:  no;  ERYTHEMA:  Right: no;  WOUNDS/INCISIONS: none, no overlying skin problems.    V:  Dorsalis Pedis:  Right: 2+; Left:2+    Posterior Tibial: Right:2+; Left:2+    Capillary Refill:  Brisk  MSK:  Hand:mild arthritis, CMC      Tibia:  Right:  non tender; Left:  non tender      Ankle:  Right: non tender, ROM  normal and motor function  normal; Left:  tender over the posterior tib behind the medial malleolus, ROM  symmetric and motor function  normal      Foot:  Right:  non tender, ROM  normal and motor function  normal; Left:  tender at the navicular, ROM  normal and motor function  normal      NEURO: Heel Walking:  Right:  normal; Left:  normal    Toe Walking:  Right:  normal; Left:  no inversion with single leg toe raise and positive too many toes sign     Greenwood-Eva 5.07 monofilament test: not evaluated    Lower extremity sensation: intact     Calf Atrophy:none    Motor Function: all 5/5        Medical Decision Making    Data Review:   ordered and reviewed x-rays today    Assessment:  1. Other secondary osteoarthritis of both knees    2. Left ankle pain, unspecified chronicity        Plan:  1.  Bilateral knee arthritis in the face of RA.  I reviewed right knee x-rays clinical findings past and current treatment the patient.  On exam, she has medial and patellofemoral joint line tenderness with no evidence of osteolysis, subsidence or fracture.  X-rays show moderate tricompartmental arthritis with.  Articular osteophytes very similar to the left knee.  She's been treated in the  past with steroid injection the left knee with good relief.  We briefly reviewed symptomatic treatment for knee arthritis.  Plan today is steroid injection into the right knee.    2.  Ankle pain.  I reviewed x-rays and clinical findings with the patient.  On exam, she is tender along the posterior tibial tendon with no inversion of the heel with coming up on her toes into many toes signs.  She also has pain with firing of the posterior tib.  X-rays show arthritic changes in the midfoot with no evidence of fracture or anything more worrisome.  I think her pain and functional limitations may be secondary to posterior tibial tendinitis.  She has significant varicose veins along that area as well.    Recommendation today is MRI of the left ankle for further evaluation.  I recommended she begin using her custom orthotics and good shoes again.  I also recommended she return to using compression stockings for her varicose veins.  She will return following the MRI of the ankle for further treatment recommendations.    Using sterile technique, the right knee was sterilely prepped with Hibiclens.  Following a time out,  using a 22 gauge needle, the right knee was injected with 40mg Depo Medrol, 4 cc lidocaine and 4 cc marcaine.  Patient tolerated the procedure well.  No complications.          Leny Alcaraz PA-C  03/21/18  11:42 AM

## 2018-03-21 NOTE — PROGRESS NOTES
Procedure   Large Joint Arthrocentesis  Date/Time: 3/21/2018 9:11 AM  Consent given by: patient  Site marked: site marked  Timeout: Immediately prior to procedure a time out was called to verify the correct patient, procedure, equipment, support staff and site/side marked as required   Supporting Documentation  Indications: pain   Procedure Details  Location: knee - R knee  Preparation: Patient was prepped and draped in the usual sterile fashion  Needle size: 22 G  Approach: anterolateral  Medications administered: 4 mL lidocaine 1 %; 40 mg methylPREDNISolone acetate 40 MG/ML (Bupivacaine 0.25%, NDC: 1679-3297-13, LOT: [id number], EXP: 09/01/2019, [facility])  Patient tolerance: patient tolerated the procedure well with no immediate complications

## 2018-03-26 RX ORDER — BISOPROLOL FUMARATE 5 MG/1
TABLET, FILM COATED ORAL
Qty: 30 TABLET | Refills: 6 | Status: SHIPPED | OUTPATIENT
Start: 2018-03-26 | End: 2018-11-06 | Stop reason: SDUPTHER

## 2018-04-10 ENCOUNTER — TELEPHONE (OUTPATIENT)
Dept: ORTHOPEDIC SURGERY | Facility: CLINIC | Age: 63
End: 2018-04-10

## 2018-04-10 NOTE — TELEPHONE ENCOUNTER
I spoke with the patient today regarding her denial of her MRI.  We discussed further treatment including trying to get the MRI approved versus treating her pain as posterior tibial tendinitis.  I again reviewed the treatment regimen with her.  She would like to try to get the MRI approved.  I will call her back once I've done this.

## 2018-04-24 ENCOUNTER — TELEPHONE (OUTPATIENT)
Dept: ORTHOPEDIC SURGERY | Facility: CLINIC | Age: 63
End: 2018-04-24

## 2018-04-24 NOTE — TELEPHONE ENCOUNTER
----- Message from Leny Alcaraz PA-C sent at 4/24/2018  2:05 PM EDT -----  Can you please call the patient and let her know I spoke with her dermatologist and it is fine she be casted.  Tell her to come next week prepared for casting.  Thanks,  Leny

## 2018-05-03 ENCOUNTER — OFFICE VISIT (OUTPATIENT)
Dept: ORTHOPEDIC SURGERY | Facility: CLINIC | Age: 63
End: 2018-05-03

## 2018-05-03 VITALS — WEIGHT: 191.8 LBS | HEART RATE: 87 BPM | BODY MASS INDEX: 31.96 KG/M2 | HEIGHT: 65 IN

## 2018-05-03 DIAGNOSIS — M76.822 POSTERIOR TIBIAL TENDONITIS, LEFT: ICD-10-CM

## 2018-05-03 DIAGNOSIS — S93.402A SPRAIN OF LEFT ANKLE, UNSPECIFIED LIGAMENT, INITIAL ENCOUNTER: Primary | ICD-10-CM

## 2018-05-03 PROCEDURE — 99214 OFFICE O/P EST MOD 30 MIN: CPT | Performed by: PHYSICIAN ASSISTANT

## 2018-05-03 NOTE — PROGRESS NOTES
[facility] Orthopaedic Surgery Clinic Note    Subjective     Patient: Mecca Renee  : 1955    Primary Care Provider: Mg Sarkar MD    Requesting Provider: As above    Follow-up of the Left Ankle      History    Chief Complaint: Follow-up left posterior tib tendinitis and new lateral ankle pain    History of Present Illness: Patient returns today for follow-up of her left posterior tibial tendinitis here to go into a cast.  She reports a week or 2 ago she inverted her ankle and now has lateral ankle pain as well.  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  She reports the medial ankle pain has not changed at all and is making her limp by the end of the day.  She did have a biopsy of her 6 psoriasis on her foot and it is still an open wounds.  She is using cream on S and it seems to be healing well.    Current Outpatient Prescriptions on File Prior to Visit   Medication Sig Dispense Refill   • amitriptyline (ELAVIL) 10 MG tablet TAKE 1 TABLET BY MOUTH EVERY NIGHT. 30 tablet 5   • aspirin 81 MG EC tablet Take 81 mg by mouth Daily.     • bisoprolol (ZEBeta) 5 MG tablet TAKE 1/2 TABLET BY MOUTH TWICE DAILY 30 tablet 6   • Calcium Carbonate-Vit D-Min (CALCIUM 1200 PO) Take 1 tablet by mouth 2 (Two) Times a Day.     • Cholecalciferol (VITAMIN D3) 5000 UNITS capsule capsule Take  by mouth Daily.     • coenzyme Q10 100 MG capsule Take 100 mg by mouth Daily.     • diclofenac (VOLTAREN) 1 % gel gel Apply 4 g topically As Needed.     • Elastic Bandages & Supports (ACE WRIST BRACE/SPLINT) misc 1 each Daily. 1 each 0   • etodolac (LODINE) 400 MG tablet Take 400 mg by mouth 2 (Two) Times a Day.     • flecainide (TAMBOCOR) 100 MG tablet TAKE 1 TABLET EVERY 12 HOURS DAILY. 180 tablet 3   • gabapentin (NEURONTIN) 100 MG capsule Take 100 mg by mouth 2 (Two) Times a Day.     • Hydroxychloroquine Sulfate (PLAQUENIL PO) Take 100 mg by mouth 2 (Two) Times a Day.     •  Loratadine (CLARITIN PO) Take 1 tablet by mouth Every Night.     • tiZANidine (ZANAFLEX) 2 MG tablet Take half to 1 tablet three times a day as needed for muscle spasms. (Patient taking differently: As Needed. Take half to 1 tablet three times a day as needed for muscle spasms.) 90 tablet 3   • traMADol (ULTRAM) 50 MG tablet Take 50 mg by mouth Every 6 (Six) Hours As Needed for Moderate Pain .       No current facility-administered medications on file prior to visit.       Allergies   Allergen Reactions   • Sulfa Antibiotics       Past Medical History:   Diagnosis Date   • Asthma    • Chronic pain disorder    • Diverticulosis    • Fibromyalgia    • GERD (gastroesophageal reflux disease)    • Migraine    • Neck pain    • Osteoarthritis    • Palpitations     palpitations with near syncope   • Rheumatoid arthritis      Past Surgical History:   Procedure Laterality Date   • CHOLECYSTECTOMY     • FOOT SURGERY Right    • SHOULDER SURGERY Right      Family History   Problem Relation Age of Onset   • Diabetes Mother    • Cancer Mother    • Heart disease Mother    • Hypertension Mother    • Cancer Father    • Cancer Brother    • Heart disease Maternal Grandmother    • Stroke Maternal Grandmother       Social History     Social History   • Marital status:      Spouse name: N/A   • Number of children: N/A   • Years of education: N/A     Occupational History   • Not on file.     Social History Main Topics   • Smoking status: Never Smoker   • Smokeless tobacco: Never Used   • Alcohol use No   • Drug use: No   • Sexual activity: Defer     Other Topics Concern   • Not on file     Social History Narrative   • No narrative on file        Review of Systems    The following portions of the patient's history were reviewed and updated as appropriate: allergies, current medications, past family history, past medical history, past social history, past surgical history and problem list.      Objective      Physical Exam  Pulse 87    "Ht 165 cm (64.96\")   Wt 87 kg (191 lb 12.8 oz)   BMI 31.96 kg/m²     Body mass index is 31.96 kg/m².      Ortho Exam  Peripheral Vascular:  Lower Extremity:  Inspection:  Left--rapid capillary refill  Palpation:  Dorsalis pedis pulse:  Left--normal    Neurologic  Sensory:    Light Touch:     Left foot:  Dorsal intact and plantar intact    Overall Assessment of Muscle Strength and Tone:  Lower Extremities:       Left:  Tibialis anterior--5/5    Gastroc soleus--5/5    EHL--5/5    FHL--5/5    Musculoskeletal  Lower Extremity  Ankle/Foot:  Inspection and Palpation:        Left:  Tenderness:over ATFL and posterior tibial tendon    Swelling:none with significant varicose veins    Effusion:  None    Crepitus:  None     ROM:     Left: Plantarflexion--50    Dorsiflexion--20    Inversion--10    Eversion--10      Medical Decision Making    Data Review:   ordered and reviewed x-rays today    Assessment:  1. Sprain of left ankle, unspecified ligament, initial encounter    2. Posterior tibial tendonitis, left        Plan:  Left ankle sprain with persisting left posterior tibial tendinitis.  I reviewed x-rays and clinical findings with the patient today.  On exam, she is tender over the ATFL and posterior tibial tendon with no bony tenderness and normal motion and strength.  X-rays show no evidence of fracture or anything more worrisome.  She does have an open wound on her left lateral here from biopsy at the dermatologist.  She was supposed to go into a cast today for the posterior tibial tendinitis, however, I do not want to cast her with this open wound.  Plan today is to put her in a walking boot that she will treat as a cast to come out of just for bathing and dressing the wound.  I think the boot would also take some of the pressure off of the area to help it heal.  She will return in 2 weeks to hopefully at that time go into a solid cast.  I explained to her that the normal mobilization in the cast will help settle down " the pain from the ankle sprain as well.      Leny Alcaraz PA-C  05/03/18  4:01 PM

## 2018-05-17 ENCOUNTER — OFFICE VISIT (OUTPATIENT)
Dept: ORTHOPEDIC SURGERY | Facility: CLINIC | Age: 63
End: 2018-05-17

## 2018-05-17 VITALS
BODY MASS INDEX: 31.96 KG/M2 | HEART RATE: 58 BPM | SYSTOLIC BLOOD PRESSURE: 125 MMHG | HEIGHT: 65 IN | DIASTOLIC BLOOD PRESSURE: 69 MMHG | WEIGHT: 191.8 LBS

## 2018-05-17 DIAGNOSIS — M76.822 POSTERIOR TIBIAL TENDONITIS, LEFT: Primary | ICD-10-CM

## 2018-05-17 PROCEDURE — 99212 OFFICE O/P EST SF 10 MIN: CPT | Performed by: PHYSICIAN ASSISTANT

## 2018-05-17 NOTE — PROGRESS NOTES
[facility] Orthopaedic Surgery Clinic Note    Subjective     Patient: Mecca Renee  : 1955    Primary Care Provider: Mg Sarkar MD    Requesting Provider: As above    Follow-up of the Left Ankle (2 weeks)      History    Chief Complaint: Follow-up left posterior tib tendinitis    History of Present Illness: Patient returns after 2 weeks of using the boot as a cast for her posterior tib tendinitis.  She reports improved pain in the posterior tib.  She reports that the wound on the bottom of her heel from her biopsy by dermatology continues to improve, but she doesn't think it is healed enough to go in a cast.    Current Outpatient Prescriptions on File Prior to Visit   Medication Sig Dispense Refill   • amitriptyline (ELAVIL) 10 MG tablet TAKE 1 TABLET BY MOUTH EVERY NIGHT. 30 tablet 5   • aspirin 81 MG EC tablet Take 81 mg by mouth Daily.     • bisoprolol (ZEBeta) 5 MG tablet TAKE 1/2 TABLET BY MOUTH TWICE DAILY 30 tablet 6   • Calcium Carbonate-Vit D-Min (CALCIUM 1200 PO) Take 1 tablet by mouth 2 (Two) Times a Day.     • Cholecalciferol (VITAMIN D3) 5000 UNITS capsule capsule Take  by mouth Daily.     • coenzyme Q10 100 MG capsule Take 100 mg by mouth Daily.     • diclofenac (VOLTAREN) 1 % gel gel Apply 4 g topically As Needed.     • Elastic Bandages & Supports (ACE WRIST BRACE/SPLINT) misc 1 each Daily. 1 each 0   • etodolac (LODINE) 400 MG tablet Take 400 mg by mouth 2 (Two) Times a Day.     • flecainide (TAMBOCOR) 100 MG tablet TAKE 1 TABLET EVERY 12 HOURS DAILY. 180 tablet 3   • gabapentin (NEURONTIN) 100 MG capsule Take 100 mg by mouth 2 (Two) Times a Day.     • Hydroxychloroquine Sulfate (PLAQUENIL PO) Take 100 mg by mouth 2 (Two) Times a Day.     • Loratadine (CLARITIN PO) Take 1 tablet by mouth Every Night.     • tiZANidine (ZANAFLEX) 2 MG tablet Take half to 1 tablet three times a day as needed for muscle spasms. (Patient taking differently: As Needed. Take half to 1 tablet three  times a day as needed for muscle spasms.) 90 tablet 3   • traMADol (ULTRAM) 50 MG tablet Take 50 mg by mouth Every 6 (Six) Hours As Needed for Moderate Pain .       No current facility-administered medications on file prior to visit.       Allergies   Allergen Reactions   • Sulfa Antibiotics       Past Medical History:   Diagnosis Date   • Asthma    • Chronic pain disorder    • Diverticulosis    • Fibromyalgia    • GERD (gastroesophageal reflux disease)    • Migraine    • Neck pain    • Osteoarthritis    • Palpitations     palpitations with near syncope   • Rheumatoid arthritis      Past Surgical History:   Procedure Laterality Date   • CHOLECYSTECTOMY     • FOOT SURGERY Right    • SHOULDER SURGERY Right      Family History   Problem Relation Age of Onset   • Diabetes Mother    • Cancer Mother    • Heart disease Mother    • Hypertension Mother    • Cancer Father    • Cancer Brother    • Heart disease Maternal Grandmother    • Stroke Maternal Grandmother       Social History     Social History   • Marital status:      Spouse name: N/A   • Number of children: N/A   • Years of education: N/A     Occupational History   • Not on file.     Social History Main Topics   • Smoking status: Never Smoker   • Smokeless tobacco: Never Used   • Alcohol use No   • Drug use: No   • Sexual activity: Defer     Other Topics Concern   • Not on file     Social History Narrative   • No narrative on file        Review of Systems   Constitutional: Negative.    HENT: Negative.    Eyes: Negative.    Respiratory: Negative.    Cardiovascular: Negative.    Gastrointestinal: Negative.    Endocrine: Negative.    Genitourinary: Negative.    Musculoskeletal: Positive for arthralgias (ankle pain).   Skin: Negative.    Allergic/Immunologic: Negative.    Neurological: Negative.    Hematological: Negative.    Psychiatric/Behavioral: Negative.        The following portions of the patient's history were reviewed and updated as appropriate:  "allergies, current medications, past family history, past medical history, past social history, past surgical history and problem list.      Objective      Physical Exam  /69   Pulse 58   Ht 165 cm (64.96\")   Wt 87 kg (191 lb 12.8 oz)   BMI 31.96 kg/m²     Body mass index is 31.96 kg/m².      Ortho Exam  Left foot and ankle exam:  Tender over the posterior tibial tendon  Normal strength and motion  3 mm open wound on the lateral heel with no erythema or drainage.  Superficial wound.  Pulses 2+      Medical Decision Making    Data Review:   none    Assessment:  1. Posterior tibial tendonitis, left        Plan:  Left posterior tibial tendinitis using a boot as a cast secondary to wound on the heel.  Patient reports improved pain.  This the wound on the heel is still superficial and not completely healed.  We discussed further treatment options including continue use of the boot as a cast and she has been compliant.  Plan today is to schedule her an appointment for 6 weeks.  Hoping that the wound on the heel will heal in the next several weeks, she'll return to be casted.      Leny Alcaraz PA-C  05/18/18  9:14 AM  "

## 2018-06-05 ENCOUNTER — TELEPHONE (OUTPATIENT)
Dept: ORTHOPEDIC SURGERY | Facility: CLINIC | Age: 63
End: 2018-06-05

## 2018-06-05 NOTE — TELEPHONE ENCOUNTER
Ask the patient if the wound on her foot as healed yet.  If it is better, she can come in for a cast.

## 2018-06-07 ENCOUNTER — OFFICE VISIT (OUTPATIENT)
Dept: ORTHOPEDIC SURGERY | Facility: CLINIC | Age: 63
End: 2018-06-07

## 2018-06-07 VITALS — HEIGHT: 65 IN | WEIGHT: 191.8 LBS | BODY MASS INDEX: 31.96 KG/M2 | HEART RATE: 67 BPM | OXYGEN SATURATION: 96 %

## 2018-06-07 DIAGNOSIS — S99.912A INJURY OF LEFT ANKLE, INITIAL ENCOUNTER: Primary | ICD-10-CM

## 2018-06-07 DIAGNOSIS — M76.822 POSTERIOR TIBIAL TENDONITIS, LEFT: ICD-10-CM

## 2018-06-07 PROCEDURE — 29425 APPL SHORT LEG CAST WALKING: CPT | Performed by: PHYSICIAN ASSISTANT

## 2018-06-07 PROCEDURE — 99214 OFFICE O/P EST MOD 30 MIN: CPT | Performed by: PHYSICIAN ASSISTANT

## 2018-06-07 NOTE — PROGRESS NOTES
[facility] Orthopaedic Surgery Clinic Note    Subjective     Patient: Mecca Renee  : 1955    Primary Care Provider: Mg Sarkar MD    Requesting Provider: As above    Follow-up (3 weeks-  Posterior tibial tendonitis, left)      History    Chief Complaint: f/up left posterior tib and new ankle injury    History of Present Illness: Patient returns for f/u of her left posterior tibial tendonitis with new Ankle injury when she fell with her boot on.  She fell approximately 1 week ago She complains of left lateral ankle pain since the injury.  She has swelling.  She reports the medial aspect of the ankle is mildly improved since being in the boot for 5 weeks.  She does have orthotics, but does not have them in her boot.    Current Outpatient Prescriptions on File Prior to Visit   Medication Sig Dispense Refill   • amitriptyline (ELAVIL) 10 MG tablet TAKE 1 TABLET BY MOUTH EVERY NIGHT. 30 tablet 5   • aspirin 81 MG EC tablet Take 81 mg by mouth Daily.     • bisoprolol (ZEBeta) 5 MG tablet TAKE 1/2 TABLET BY MOUTH TWICE DAILY 30 tablet 6   • Calcium Carbonate-Vit D-Min (CALCIUM 1200 PO) Take 1 tablet by mouth 2 (Two) Times a Day.     • Cholecalciferol (VITAMIN D3) 5000 UNITS capsule capsule Take  by mouth Daily.     • coenzyme Q10 100 MG capsule Take 100 mg by mouth Daily.     • diclofenac (VOLTAREN) 1 % gel gel Apply 4 g topically As Needed.     • Elastic Bandages & Supports (ACE WRIST BRACE/SPLINT) misc 1 each Daily. 1 each 0   • etodolac (LODINE) 400 MG tablet Take 400 mg by mouth 2 (Two) Times a Day.     • flecainide (TAMBOCOR) 100 MG tablet TAKE 1 TABLET EVERY 12 HOURS DAILY. 180 tablet 3   • gabapentin (NEURONTIN) 100 MG capsule Take 100 mg by mouth 2 (Two) Times a Day.     • Hydroxychloroquine Sulfate (PLAQUENIL PO) Take 100 mg by mouth 2 (Two) Times a Day.     • Loratadine (CLARITIN PO) Take 1 tablet by mouth Every Night.     • tiZANidine (ZANAFLEX) 2 MG tablet Take half to 1 tablet three  times a day as needed for muscle spasms. (Patient taking differently: As Needed. Take half to 1 tablet three times a day as needed for muscle spasms.) 90 tablet 3   • traMADol (ULTRAM) 50 MG tablet Take 50 mg by mouth Every 6 (Six) Hours As Needed for Moderate Pain .       No current facility-administered medications on file prior to visit.       Allergies   Allergen Reactions   • Sulfa Antibiotics       Past Medical History:   Diagnosis Date   • Asthma    • Chronic pain disorder    • Diverticulosis    • Fibromyalgia    • GERD (gastroesophageal reflux disease)    • Migraine    • Neck pain    • Osteoarthritis    • Palpitations     palpitations with near syncope   • Rheumatoid arthritis      Past Surgical History:   Procedure Laterality Date   • CHOLECYSTECTOMY     • FOOT SURGERY Right    • SHOULDER SURGERY Right      Family History   Problem Relation Age of Onset   • Diabetes Mother    • Cancer Mother    • Heart disease Mother    • Hypertension Mother    • Cancer Father    • Cancer Brother    • Heart disease Maternal Grandmother    • Stroke Maternal Grandmother       Social History     Social History   • Marital status:      Spouse name: N/A   • Number of children: N/A   • Years of education: N/A     Occupational History   • Not on file.     Social History Main Topics   • Smoking status: Never Smoker   • Smokeless tobacco: Never Used   • Alcohol use No   • Drug use: No   • Sexual activity: Defer     Other Topics Concern   • Not on file     Social History Narrative   • No narrative on file        Review of Systems   Constitutional: Negative.    HENT: Negative.    Eyes: Negative.    Respiratory: Negative.    Cardiovascular: Negative.    Gastrointestinal: Negative.    Endocrine: Negative.    Genitourinary: Negative.    Musculoskeletal: Positive for arthralgias, gait problem and joint swelling.   Skin: Negative.    Allergic/Immunologic: Negative.    Hematological: Negative.    Psychiatric/Behavioral: Negative.   "      The following portions of the patient's history were reviewed and updated as appropriate: allergies, current medications, past family history, past medical history, past social history, past surgical history and problem list.      Objective      Physical Exam  Pulse 67   Ht 165 cm (64.96\")   Wt 87 kg (191 lb 12.8 oz)   SpO2 96%   BMI 31.96 kg/m²     Body mass index is 31.96 kg/m².      Ortho Exam  Left ankle exam:  Tender over the posterior tibial and around the lateral malleolus  Boggy swelling in the ankle  Normal ROM and strength  Pulses 2+    Medical Decision Making    Data Review:   ordered and reviewed x-rays today    Assessment:  1. Injury of left ankle, initial encounter    2. Posterior tibial tendonitis, left        Plan:  Posterior tibial tendinitis with left ankle injury and sprain.  I reviewed today's x-rays and clinical findings with the patient.  She is tender over the posterior tibial tendon and diffusely around the lateral malleolus.  X-rays show no evidence of fracture or anything more worrisome.  She's been treated in her posterior tibial tendinitis and a boot for 5 weeks secondary to a biopsy she had on the plantar aspect of her foot that was not healing.  The wound has now completely healed.  We discussed further options including starting from the beginning and with casting for 6 weeks.  She reports mild improvement of pain since being in the boot.  Patient was placed in a short leg WB fiberglass cast today.  I told her this will also treat the pain from her ankle sprain.  She return to see me in 6 weeks or sooner if needed.      Leny Alcaraz PA-C  06/11/18  2:20 PM  "

## 2018-06-07 NOTE — TELEPHONE ENCOUNTER
The patient called the office today to check on the status of her message. She said her wound on her foot has healed and I scheduled her to come in today to see Leny and get her cast put on.

## 2018-06-07 NOTE — TELEPHONE ENCOUNTER
I called and spoke with the patient and she is going to come in today at 3:00 to get her cast put on.  Bobbi

## 2018-06-12 ENCOUNTER — TELEPHONE (OUTPATIENT)
Dept: ORTHOPEDIC SURGERY | Facility: CLINIC | Age: 63
End: 2018-06-12

## 2018-06-12 DIAGNOSIS — M79.605 LEG PAIN, LEFT: Primary | ICD-10-CM

## 2018-06-12 DIAGNOSIS — S99.912D INJURY OF LEFT ANKLE, SUBSEQUENT ENCOUNTER: ICD-10-CM

## 2018-06-12 DIAGNOSIS — M76.821 POSTERIOR TIBIAL TENDINITIS OF RIGHT LOWER EXTREMITY: ICD-10-CM

## 2018-06-12 NOTE — TELEPHONE ENCOUNTER
Patient came in and I removed the cast and had Leny see the patient.  She ordered Doppler study of lower extremity to check for blood clot and she ordered an MRI.  The patient will go back into her boot for support and they we will see her back here in the office after MRI is complete.  The doppler results will be called to [facility]

## 2018-06-12 NOTE — TELEPHONE ENCOUNTER
PATIENT IS HAVING A LOT OF PAIN SINCE CAST WAS PUT ON. PLEASE CALL WITH SUGGESTIONS TO HELP WITH THE PAIN.

## 2018-06-12 NOTE — TELEPHONE ENCOUNTER
Patient came into the office today with pain in her cast.  She had a cast applied on 6/8/18 for both posterior tibial tendinitis as well as left ankle injury.  She reports pain and swelling in the cast.  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  Prior to casting she had been treated in a walking boot for 5 weeks.  She has limited her activity as much as she can without improvement of the pain. She is having daily functional pain and rest pain at this point with persisting swelling.  Recommendation is an MRI of the left ankle for further evaluation.  Given the swelling and exquisite tenderness, we will get a venous doppler today as well.  She will return after the MRI for further treatment recommendations.  She will return to her boot with the orthotic in the boot.

## 2018-06-18 ENCOUNTER — TELEPHONE (OUTPATIENT)
Dept: ORTHOPEDIC SURGERY | Facility: CLINIC | Age: 63
End: 2018-06-18

## 2018-06-18 NOTE — TELEPHONE ENCOUNTER
Naila Michele is out this week.  Can you review chart and see if you see any reason to do Bilat feet and ankle MRI's ??   Bobbi

## 2018-06-18 NOTE — TELEPHONE ENCOUNTER
PT CALLING AND REQUESTED TO JIMENEZ MARCIE KOTHARI. SHE IS HAVING A MRI ON HER ANKLE. SHE IS SAYING HER FOOT IS REALLY IN PAIN AND SHE WAS WANTING AN MRI OF BOTH ANKLE AND FOOT. PLEASE CALL HER BACK [phone].

## 2018-06-19 ENCOUNTER — HOSPITAL ENCOUNTER (OUTPATIENT)
Dept: MRI IMAGING | Facility: HOSPITAL | Age: 63
Discharge: HOME | End: 2018-06-19
Admitting: PHYSICIAN ASSISTANT

## 2018-06-19 DIAGNOSIS — M76.821 POSTERIOR TIBIAL TENDINITIS OF RIGHT LOWER EXTREMITY: ICD-10-CM

## 2018-06-19 DIAGNOSIS — S99.912D INJURY OF LEFT ANKLE, SUBSEQUENT ENCOUNTER: ICD-10-CM

## 2018-06-19 PROCEDURE — 73721 MRI JNT OF LWR EXTRE W/O DYE: CPT

## 2018-06-20 NOTE — TELEPHONE ENCOUNTER
The patients daughter called back stating she is in so much pain she can barely get out of bed. Would you be willing to see this patient tomorrow since Leny is out of the office?

## 2018-06-20 NOTE — TELEPHONE ENCOUNTER
PATIENT CALLED BACK THIS MORNING STATING SHE WAS IN A LOT OF PAIN AND DIDN'T THINK SHE COULD WAIT TO SEE VANESSA NEXT Thursday. SHE HAD HER MRI YESTERDAY

## 2018-06-20 NOTE — TELEPHONE ENCOUNTER
Per Renata LOUISE she came between sx cases to look at MRI briefly.  Might have nondisplaced fracture in distal tibia  with inflammation. Stay NWB.  Wear boot to appt and if not wearing Bring boot to appt.      See tomorrow at 8:30.and she will plan to  Send to PT and/or pain management.  If can't wait she can go to the ER.  .

## 2018-06-20 NOTE — TELEPHONE ENCOUNTER
I called and told the patient this and they are coming in tomorrow morning at 8:30 per Radames Ortiz

## 2018-06-22 ENCOUNTER — OFFICE VISIT (OUTPATIENT)
Dept: ORTHOPEDIC SURGERY | Facility: CLINIC | Age: 63
End: 2018-06-22

## 2018-06-22 VITALS — HEIGHT: 65 IN | BODY MASS INDEX: 30.82 KG/M2 | OXYGEN SATURATION: 98 % | HEART RATE: 66 BPM | WEIGHT: 184.97 LBS

## 2018-06-22 DIAGNOSIS — M05.79 RHEUMATOID ARTHRITIS INVOLVING MULTIPLE SITES WITH POSITIVE RHEUMATOID FACTOR (HCC): Primary | ICD-10-CM

## 2018-06-22 DIAGNOSIS — M84.362A STRESS FRACTURE OF LEFT TIBIA, INITIAL ENCOUNTER: ICD-10-CM

## 2018-06-22 PROCEDURE — 99213 OFFICE O/P EST LOW 20 MIN: CPT | Performed by: ORTHOPAEDIC SURGERY

## 2018-06-22 NOTE — PROGRESS NOTES
ESTABLISHED PATIENT    Patient: Mecca Renee  : 1955    Primary Care Provider: Mg Sarkar MD    Requesting Provider: As above    Follow-up of the Left Ankle (Post MRI)      History    Chief Complaint: Follow-up after MRI left ankle    History of Present Illness: She returns for follow-up of her left ankle MRI.  It does show some posterior tibial tendinitis, without tear of the tendon.  More impressively shows a transverse stress fracture in the tibia just above the malleoli.  This is a classic location.  She has a history of osteoporosis.  She is very active.  She runs a .  She has been in a boot with her orthotic, using a knee walker.    Current Outpatient Prescriptions on File Prior to Visit   Medication Sig Dispense Refill   • amitriptyline (ELAVIL) 10 MG tablet TAKE 1 TABLET BY MOUTH EVERY NIGHT. 30 tablet 5   • aspirin 81 MG EC tablet Take 81 mg by mouth Daily.     • bisoprolol (ZEBeta) 5 MG tablet TAKE 1/2 TABLET BY MOUTH TWICE DAILY 30 tablet 6   • Calcium Carbonate-Vit D-Min (CALCIUM 1200 PO) Take 1 tablet by mouth 2 (Two) Times a Day.     • Cholecalciferol (VITAMIN D3) 5000 UNITS capsule capsule Take  by mouth Daily.     • coenzyme Q10 100 MG capsule Take 100 mg by mouth Daily.     • diclofenac (VOLTAREN) 1 % gel gel Apply 4 g topically As Needed.     • Elastic Bandages & Supports (ACE WRIST BRACE/SPLINT) misc 1 each Daily. 1 each 0   • etodolac (LODINE) 400 MG tablet Take 400 mg by mouth 2 (Two) Times a Day.     • flecainide (TAMBOCOR) 100 MG tablet TAKE 1 TABLET EVERY 12 HOURS DAILY. 180 tablet 3   • gabapentin (NEURONTIN) 100 MG capsule Take 100 mg by mouth 2 (Two) Times a Day.     • Hydroxychloroquine Sulfate (PLAQUENIL PO) Take 100 mg by mouth 2 (Two) Times a Day.     • Loratadine (CLARITIN PO) Take 1 tablet by mouth Every Night.     • tiZANidine (ZANAFLEX) 2 MG tablet Take half to 1 tablet three times a day as needed for muscle spasms. (Patient taking differently: As  Needed. Take half to 1 tablet three times a day as needed for muscle spasms.) 90 tablet 3   • traMADol (ULTRAM) 50 MG tablet Take 50 mg by mouth Every 6 (Six) Hours As Needed for Moderate Pain .       No current facility-administered medications on file prior to visit.       Allergies   Allergen Reactions   • Sulfa Antibiotics       Past Medical History:   Diagnosis Date   • Asthma    • Chronic pain disorder    • Diverticulosis    • Fibromyalgia    • GERD (gastroesophageal reflux disease)    • Migraine    • Neck pain    • Osteoarthritis    • Palpitations     palpitations with near syncope   • Rheumatoid arthritis      Past Surgical History:   Procedure Laterality Date   • CHOLECYSTECTOMY     • FOOT SURGERY Right    • SHOULDER SURGERY Right      Family History   Problem Relation Age of Onset   • Diabetes Mother    • Cancer Mother    • Heart disease Mother    • Hypertension Mother    • Cancer Father    • Cancer Brother    • Heart disease Maternal Grandmother    • Stroke Maternal Grandmother       Social History     Social History   • Marital status:      Spouse name: N/A   • Number of children: N/A   • Years of education: N/A     Occupational History   • Not on file.     Social History Main Topics   • Smoking status: Never Smoker   • Smokeless tobacco: Never Used   • Alcohol use No   • Drug use: No   • Sexual activity: Defer     Other Topics Concern   • Not on file     Social History Narrative   • No narrative on file        Review of Systems   Constitutional: Negative.    HENT: Negative.    Eyes: Negative.    Respiratory: Negative.    Cardiovascular: Negative.    Gastrointestinal: Negative.    Endocrine: Negative.    Genitourinary: Negative.    Musculoskeletal: Positive for joint swelling (Ankle ).        Ankle Pain    Skin: Negative.    Allergic/Immunologic: Negative.    Neurological: Negative.    Hematological: Negative.    Psychiatric/Behavioral: Negative.        The following portions of the patient's  "history were reviewed and updated as appropriate: allergies, current medications, past family history, past medical history, past social history, past surgical history and problem list.    Physical Exam:   Pulse 66   Ht 165.1 cm (65\")   Wt 83.9 kg (184 lb 15.5 oz)   LMP  (LMP Unknown)   SpO2 98%   BMI 30.78 kg/m²   GENERAL: Gait: using knee walker       MSK:  Ankle:  Right: Very tender to palpation just above the malleoli on the distal tibia, mildly tender over the posterior tibial tendon;   Medical Decision Making    Data Review:   reviewed radiology images and reviewed radiology results    Assessment/Plan/Diagnosis/Treatment Options:   1. Rheumatoid arthritis involving multiple sites with positive rheumatoid factor  She has several risk factors for stress fracture including rheumatoid arthritis, osteoporosis    2. Stress fracture of left tibia, initial encounter  She has a stress fracture of the tibia.  As above she has several risk factors.  The boot is very reasonable immobilization.  She may walk short distances, but uses a scooter for long distances.  I will see her in 8 weeks for standing 2 views of the ankle.                            "

## 2018-08-20 ENCOUNTER — OFFICE VISIT (OUTPATIENT)
Dept: ORTHOPEDIC SURGERY | Facility: CLINIC | Age: 63
End: 2018-08-20

## 2018-08-20 VITALS — BODY MASS INDEX: 31.22 KG/M2 | OXYGEN SATURATION: 98 % | WEIGHT: 187.39 LBS | HEIGHT: 65 IN | HEART RATE: 59 BPM

## 2018-08-20 DIAGNOSIS — M25.572 CHRONIC PAIN OF LEFT ANKLE: Primary | ICD-10-CM

## 2018-08-20 DIAGNOSIS — M84.362D STRESS FRACTURE OF LEFT TIBIA WITH ROUTINE HEALING, SUBSEQUENT ENCOUNTER: ICD-10-CM

## 2018-08-20 DIAGNOSIS — M05.79 RHEUMATOID ARTHRITIS INVOLVING MULTIPLE SITES WITH POSITIVE RHEUMATOID FACTOR (HCC): ICD-10-CM

## 2018-08-20 DIAGNOSIS — G89.29 CHRONIC PAIN OF LEFT ANKLE: Primary | ICD-10-CM

## 2018-08-20 DIAGNOSIS — M76.822 POSTERIOR TIBIAL TENDINITIS OF LEFT LOWER EXTREMITY: ICD-10-CM

## 2018-08-20 PROCEDURE — 99213 OFFICE O/P EST LOW 20 MIN: CPT | Performed by: ORTHOPAEDIC SURGERY

## 2018-08-20 NOTE — PROGRESS NOTES
ESTABLISHED PATIENT    Patient: Mecca Renee  : 1955    Primary Care Provider: Mg Sarkar MD    Requesting Provider: As above    Follow-up of the Left Ankle (8 week f/u/Rheumatoid arthritis involving multiple sites with positive rheumatoid factor (CMS/HCC)      History    Chief Complaint: left ankle pain    History of Present Illness: she returns for f/u of left tibial stress fracture and post tib tendinitis in the face of RA.  She is much improved, much less pain. She is walking without the knee walker today in the tall boot    Current Outpatient Prescriptions on File Prior to Visit   Medication Sig Dispense Refill   • amitriptyline (ELAVIL) 10 MG tablet TAKE 1 TABLET BY MOUTH EVERY NIGHT. 30 tablet 5   • aspirin 81 MG EC tablet Take 81 mg by mouth Daily.     • bisoprolol (ZEBeta) 5 MG tablet TAKE 1/2 TABLET BY MOUTH TWICE DAILY 30 tablet 6   • Calcium Carbonate-Vit D-Min (CALCIUM 1200 PO) Take 1 tablet by mouth 2 (Two) Times a Day.     • Cholecalciferol (VITAMIN D3) 5000 UNITS capsule capsule Take  by mouth Daily.     • coenzyme Q10 100 MG capsule Take 100 mg by mouth Daily.     • diclofenac (VOLTAREN) 1 % gel gel Apply 4 g topically As Needed.     • Elastic Bandages & Supports (ACE WRIST BRACE/SPLINT) misc 1 each Daily. 1 each 0   • etodolac (LODINE) 400 MG tablet Take 400 mg by mouth 2 (Two) Times a Day.     • flecainide (TAMBOCOR) 100 MG tablet TAKE 1 TABLET EVERY 12 HOURS DAILY. 180 tablet 3   • gabapentin (NEURONTIN) 100 MG capsule Take 100 mg by mouth 2 (Two) Times a Day.     • Hydroxychloroquine Sulfate (PLAQUENIL PO) Take 100 mg by mouth 2 (Two) Times a Day.     • Loratadine (CLARITIN PO) Take 1 tablet by mouth Every Night.     • tiZANidine (ZANAFLEX) 2 MG tablet Take half to 1 tablet three times a day as needed for muscle spasms. (Patient taking differently: As Needed. Take half to 1 tablet three times a day as needed for muscle spasms.) 90 tablet 3   • traMADol (ULTRAM) 50 MG tablet  Take 50 mg by mouth Every 6 (Six) Hours As Needed for Moderate Pain .       No current facility-administered medications on file prior to visit.       Allergies   Allergen Reactions   • Sulfa Antibiotics       Past Medical History:   Diagnosis Date   • Asthma    • Chronic pain disorder    • Diverticulosis    • Fibromyalgia    • GERD (gastroesophageal reflux disease)    • Migraine    • Neck pain    • Osteoarthritis    • Palpitations     palpitations with near syncope   • Rheumatoid arthritis (CMS/HCC)      Past Surgical History:   Procedure Laterality Date   • CHOLECYSTECTOMY     • FOOT SURGERY Right    • SHOULDER SURGERY Right      Family History   Problem Relation Age of Onset   • Diabetes Mother    • Cancer Mother    • Heart disease Mother    • Hypertension Mother    • Cancer Father    • Cancer Brother    • Heart disease Maternal Grandmother    • Stroke Maternal Grandmother       Social History     Social History   • Marital status:      Spouse name: N/A   • Number of children: N/A   • Years of education: N/A     Occupational History   • Not on file.     Social History Main Topics   • Smoking status: Never Smoker   • Smokeless tobacco: Never Used   • Alcohol use No   • Drug use: No   • Sexual activity: Defer     Other Topics Concern   • Not on file     Social History Narrative   • No narrative on file        Review of Systems   Constitutional: Negative.    HENT: Negative.    Eyes: Negative.    Respiratory: Negative.    Cardiovascular: Negative.    Gastrointestinal: Negative.    Endocrine: Negative.    Genitourinary: Negative.    Musculoskeletal: Positive for arthralgias and joint swelling.   Skin: Negative.    Allergic/Immunologic: Negative.    Neurological: Negative.    Hematological: Negative.    Psychiatric/Behavioral: Negative.        The following portions of the patient's history were reviewed and updated as appropriate: allergies, current medications, past family history, past medical history, past  "social history, past surgical history and problem list.    Physical Exam:   Pulse 59   Ht 165.1 cm (65\")   Wt 85 kg (187 lb 6.3 oz)   LMP  (LMP Unknown)   SpO2 98%   BMI 31.18 kg/m²   GENERAL: Body habitus: overweight    Lower extremity edema: Left: trace; Right: trace    Gait: in boot     Mental Status:  awake and alert; oriented to person, place, and time  MSK:  Mildly tender over left tibia above medial mall, much less swelling, not tender over post tib  Medical Decision Making    Data Review:   ordered and reviewed x-rays today    Assessment/Plan/Diagnosis/Treatment Options:   1 Rheumatoid arthritis involving multiple sites with positive rheumatoid factor (CMS/HCC)  stable    3. Stress fracture of left tibia with routine healing, subsequent encounter  Improving steadily, not fully healed yet.  I will see her in 6 weeks for ankle xray 2 views, to probably start PT    4. Posterior tibial tendinitis of left lower extremity  As above                            "

## 2018-09-07 ENCOUNTER — TELEPHONE (OUTPATIENT)
Dept: CARDIOLOGY | Facility: CLINIC | Age: 63
End: 2018-09-07

## 2018-09-07 NOTE — TELEPHONE ENCOUNTER
Called patient and left VM that I would recommend she hold her bisoprolol this morning with BP 83/57 and make sure she is well hydrated. Continue to monitor BP/HR and call if her BP remains decreased or follow up with pcp today is possible.

## 2018-09-11 RX ORDER — FLECAINIDE ACETATE 100 MG/1
TABLET ORAL
Qty: 180 TABLET | Refills: 3 | Status: SHIPPED | OUTPATIENT
Start: 2018-09-11 | End: 2019-10-15 | Stop reason: SDUPTHER

## 2018-09-14 NOTE — PROGRESS NOTES
"CHIEF COMPLAINT: \"I have pain in my low back.\"     BRIEF HISTORY: Mrs. Mecca Renee was last seen on 06/07/2017 for bilateral lumbar medial branch rhizotomies with Dr. Bruno.  Patient reports experiencing approximately 80% pain relief that lasted until a month ago.  Pain has progressivley returned.  Patient denies injury but states that she lifted her mother about a month ago, and aggravated her lower back.  Patient states that low-back pain has improved a bit since then.  She is followed by Dr. Helm for stress fracture of left tibia, and has been wearing an orthopedic boot for a couple of months.  She will be starting physical therapy, per Ortho.  Pain description: Constant lower back pain with intermittent exacerbation, described as stabbing sensation.   Radiation of pain: The lower back pain radiates to the hips  Pain intensity today: 5/10  Pain intensity ranges from: 2/10 to 10/10 (worse in the mornings)  Aggravating factors: Pain increases with extension of the lumbar spine, lifting, ambulating more than 10-15 minutes, and standing 10 minutes.   Alleviating factors: Pain decreases with rest and amitriptylene.   Associated symptoms: Patient denies pain, numbness or weakness in the upper extremities, with the exception of intermittent and random episodes of numbness occurring while she is asleep; and in both hands (improved by wearing bilateral wrist splints).  Patient denies pain, numbness, or weakness in the lower extremities.  Patient denies any new bladder or bowel problems.   Patient reports difficulties with her balance.      Review of previous therapies and additional medical records:  Mecca Renee has already failed the following measures, including:   Conservative measures: Oral analgesics, opioids and physical therapy   Interventional measures: As referenced above.  Cervical medial branch rhizotomies on 02/08/2017.    Surgical measures: No previous spinal surgery  Mecca Renee " underwent neurological consultation with Dr. Díaz on 09/08/2016, and was referred for pain management consultation of cervicalgia.  Patient underwent neurosurgical consultation with Dr. Ronnell Ramirez a few years back and was found to be a potential surgical candidate (records not available for review).  Mecca Renee presents with significant comorbidities including tachyarrhythmia, engaged in treatment.  In terms of current analgesics, Mecca Renee takes: gabapentin, amitriptylene, etodolac, tramadol, without side effects  I have reviewed [facility] Report #[id number] consistent to medication reconciliation.    Global Pain Scale 09-          Pain 18          Feelings 7          Clinical outcomes 19          Activities 5          GPS Total: 49            Diagnostic Studies:  MRI LUMBAR SPINE WO CONTRAST- 03/14/2017: Minimal degenerative lumbar spondylosis.  MRI, cervical, without contrast, 12/31/2008; The visualized craniocervical junction and spinal cord are normal. C2-C3 through C4-C5 and C6-C7, C7-T1: The disks are normal.   C5-C6: Broad-based posterior disc protrusion which abuts the spinal cord but does not cause spinal stenosis.  MRI, lumbar, without contrast, December 31, 2008 L1-L2 through L5-S1: The disks are normal. Early changes of degenerative disc disease without disc bulging or herniation.   CT, Head, w/o, 04/26/2011; Normal head CT without contrast.  X-rays of the cervical spine revealed degenerative disc disease and facet arthritis most impressive at C5-C6, 11/4/2015  X-rays of the lumbar spine 11/4/2015, revealed mild levoscoliosis. Lumbar facet arthropathy.    Review of Systems   Constitutional: Positive for chills and fatigue.   HENT: Positive for congestion and sinus pressure.    Eyes: Positive for itching.   Endocrine: Positive for cold intolerance.   Musculoskeletal: Positive for back pain, neck pain and neck stiffness.   Skin: Positive for rash.   Neurological: Positive for  "headaches.   All other systems reviewed and are negative.     The following portions of the patient's history were reviewed and updated as appropriate: problem list, past medical history, past surgery history, social history, family history, medications, and allergies     /60   Pulse (!) 48   Temp 97.9 °F (36.6 °C) (Temporal Artery )   Resp 18   Ht 165.1 cm (65\")   Wt 87.1 kg (192 lb)   LMP  (LMP Unknown)   SpO2 99%   BMI 31.95 kg/m²      Physical Exam   Neurologic Exam  Constitutional: Patient is oriented to person, place, and time. Patient appears well-developed and well-nourished.   HENT: Head: Normocephalic and atraumatic. Eyes: Conjunctivae and lids are normal. Pupils: Equal, round, reactive to light.   Neck: Trachea normal. Neck supple. No JVD present.   Lymphatic: No cervical adenopathy  Pulmonary Respiratory effort: No increased work of breathing or signs of respiratory distress. Auscultation of lungs: Clear to auscultation.   Cardiovascular Auscultation of heart: Normal rate and rhythm, normal S1 and S2, no murmurs. Peripheral vascular exam: Normal. No edema.   Abdomen: The abdomen was soft and nontender. Bowel sounds were normal.   Musculoskeletal   Gait and station: Gait evaluation demonstrated a normal gait.  Orthopedic walking boot on left leg.  Lumbar spine: The range of motion is limited secondary to pain. Extension, lateral flexion, and rotation increased and reproduced pain. Lumbar facet joint loading maneuvers are positive. Berto and Gaenslen's tests are negative.  Neurological: Patient is alert and oriented to person, place, and time. Speech: speech is normal. Cortical function: Normal mental status.   Cranial nerves: Cranial nerves 2-12 intact.   Reflex Scores:  Right brachioradialis: 1+  Left brachioradialis: 1+  Right biceps: 1+  Left biceps: 1+  Right triceps: 1+  Left triceps: 1+  Right patellar: 1+  Left patellar: 1+  Right achilles: 1+  Left achilles: 1+  Motor strength: " 5/5  Motor Tone: normal tone.   Involuntary movements: none.   Superficial/Primitive Reflexes: primitive reflexes were absent.   Right Villalta: absent  Left Villalta: absent  Right ankle clonus: absent  Left ankle clonus: absent   Spurling sign is negative. Lhermitte sign is negative. Negative long tract signs. Straight leg raising test is negative. Femoral stretch sign is negative. Positive Tinel sign in both wrists.  Sensation: No sensory loss. Sensory exam: intact to light touch, intact pain and temperature sensation, intact vibration sensation and normal proprioception.   Coordination: Normal finger to nose and heel to shin. Normal balance and negative. Romberg's sign negative.   Skin and subcutaneous tissue: Skin is warm and intact. No rash noted. No cyanosis.   Psychiatric:   Judgment and insight: Normal.   Orientation to person, place and time: Normal.   Recent and remote memory: Intact.   Mood and affect: Normal.      ASSESSMENT:   1. Spondylosis of lumbar region without myelopathy or radiculopathy    2. Bilateral carpal tunnel syndrome    3. Cervical spondylosis without myelopathy    4. Cervical disc disorder of mid-cervical region    5. Bilateral occipital neuralgia    6. Fibromyalgia    7. Other secondary osteoarthritis of both knees    8. Osteoporosis without current pathological fracture, unspecified osteoporosis type    9. Physical deconditioning       PLAN:   I have reviewed all available patient's medical records as well as previous therapies as referenced above, and discussed the patient with Dr. Bruno.  1. Patient will be scheduled for a set of diagnostic bilateral lumbar medial branch blocks at L3, L4, and L5 for bilateral lumbar facet joints at L4-L5, L5-S1 to confirm the origin of chronic refractory mechanical lower back pain.  If patient experiences more than 80% relief along with significant improvement in the range of motion of lumbar spine, then patient will be scheduled for bilateral  lumbar medial branch rhizotomies.   2. Long-term rehabilitation efforts:  A. Patient will start a comprehensive physical therapy program for water therapy, therapeutic exercise, upper body strengthening/posture correction, core strengthening, gait and balance training, myofascial release, cupping and dry needling after her lumbar RFTC  B. Start an exercise program such as yoga, water therapy and daily walks for fitness  C. Wear bilateral wrist splints/braces for treatment of bilateral carpal tunnel syndrome  3. Pharmacological measures:   A. We have deferred the use of systemic analgesics due to potential interactions.   4.  The patient has been instructed to contact my office with any questions or difficulties. The patient understands the plan and agrees to proceed accordingly.      Patient Care Team:  Mg Sarkar MD as PCP - General (General Practice)  Shadi Díaz MD (Inactive) as Consulting Physician (Neurology)  Ana Garcia MD as Consulting Physician (Rheumatology)  James Bruno MD as Consulting Physician (Pain Medicine)  William Brewster MD as Consulting Physician (Cardiology)  Pj Miranda PA-C as Physician Assistant (Physician Assistant)     No orders of the defined types were placed in this encounter.        Future Appointments  Date Time Provider Department Center   10/1/2018 9:50 AM Catherine Helm MD [facility] None   10/3/2018 10:00 AM James Bruno MD [facility] None   1/25/2019 10:30 AM William Brewster MD [facility] None         Pj Miranda PA-C       EMR Dragon/Transcription disclaimer:  Much of this encounter note is an electronic transcription of spoken language to printed text. Electronic transcription of spoken language may permit erroneous, or at times, nonsensical words or phrases to be inadvertently transcribed. Although I have reviewed the note for such errors, some may still exist.

## 2018-09-17 ENCOUNTER — TELEPHONE (OUTPATIENT)
Dept: PAIN MEDICINE | Facility: CLINIC | Age: 63
End: 2018-09-17

## 2018-09-17 NOTE — TELEPHONE ENCOUNTER
James Bruno MD Townsend, Carrie A, Todded Rep             [place];   Did you call the lady back, or this is what you recall from the conversation yesterday?    Previous Messages      ----- Message -----   From: [place] Worley RegSched Rep   Sent: 9/6/2018   9:09 AM   To: James Bruno MD     To clarify the patient said that the pain relief lasted only a month. Not sure why she waited this long to call back   ----- Message -----   From: James Bruno MD   Sent: 9/5/2018   4:55 PM   To: Leila Walker RN, *     I am not sure if this lady got 80% pain relief that lasted until last month, or if it lasted for one month. Her RFTC was in 6/2017, and she is calling more than 14 months after her procedure   She has had numerous visits with different providers (knee pain mainly)   Please clarify duration of relief   ----- Message -----   From: [place] Worley RegSched Rep   Sent: 9/5/2018   3:28 PM   To: James Bruno MD     Patient called to make an appt   She was last seen 6/7/17 for a toyin lumbar rftc. She says she received 80% relief that lasted one month. Her current pain is across her lower back and radiates into both hips   Current pain level is 6   Sitting/standing makes the pain worse immediately and laying down makes it better   Numbness/tingling in both legs all the way to feet   Did not attend PT   No new scans

## 2018-09-18 ENCOUNTER — OFFICE VISIT (OUTPATIENT)
Dept: PAIN MEDICINE | Facility: CLINIC | Age: 63
End: 2018-09-18

## 2018-09-18 VITALS
SYSTOLIC BLOOD PRESSURE: 120 MMHG | OXYGEN SATURATION: 99 % | HEART RATE: 48 BPM | HEIGHT: 65 IN | BODY MASS INDEX: 31.99 KG/M2 | DIASTOLIC BLOOD PRESSURE: 60 MMHG | WEIGHT: 192 LBS | RESPIRATION RATE: 18 BRPM | TEMPERATURE: 97.9 F

## 2018-09-18 DIAGNOSIS — M17.4 OTHER SECONDARY OSTEOARTHRITIS OF BOTH KNEES: ICD-10-CM

## 2018-09-18 DIAGNOSIS — G56.03 BILATERAL CARPAL TUNNEL SYNDROME: ICD-10-CM

## 2018-09-18 DIAGNOSIS — M47.812 CERVICAL SPONDYLOSIS WITHOUT MYELOPATHY: ICD-10-CM

## 2018-09-18 DIAGNOSIS — M54.81 BILATERAL OCCIPITAL NEURALGIA: ICD-10-CM

## 2018-09-18 DIAGNOSIS — M79.7 FIBROMYALGIA: ICD-10-CM

## 2018-09-18 DIAGNOSIS — M81.0 OSTEOPOROSIS WITHOUT CURRENT PATHOLOGICAL FRACTURE, UNSPECIFIED OSTEOPOROSIS TYPE: ICD-10-CM

## 2018-09-18 DIAGNOSIS — M47.816 SPONDYLOSIS OF LUMBAR REGION WITHOUT MYELOPATHY OR RADICULOPATHY: Primary | ICD-10-CM

## 2018-09-18 DIAGNOSIS — R53.81 PHYSICAL DECONDITIONING: ICD-10-CM

## 2018-09-18 DIAGNOSIS — M50.920 CERVICAL DISC DISORDER OF MID-CERVICAL REGION: ICD-10-CM

## 2018-09-18 PROCEDURE — 99213 OFFICE O/P EST LOW 20 MIN: CPT | Performed by: PHYSICIAN ASSISTANT

## 2018-09-18 RX ORDER — CETIRIZINE HYDROCHLORIDE 10 MG/1
10 TABLET ORAL DAILY
COMMUNITY
End: 2019-10-28 | Stop reason: SDUPTHER

## 2018-10-01 ENCOUNTER — OFFICE VISIT (OUTPATIENT)
Dept: ORTHOPEDIC SURGERY | Facility: CLINIC | Age: 63
End: 2018-10-01

## 2018-10-01 VITALS — HEART RATE: 70 BPM | WEIGHT: 181.88 LBS | BODY MASS INDEX: 30.3 KG/M2 | OXYGEN SATURATION: 99 % | HEIGHT: 65 IN

## 2018-10-01 DIAGNOSIS — M25.572 CHRONIC PAIN OF LEFT ANKLE: Primary | ICD-10-CM

## 2018-10-01 DIAGNOSIS — G89.29 CHRONIC PAIN OF LEFT ANKLE: Primary | ICD-10-CM

## 2018-10-01 DIAGNOSIS — M84.362D STRESS FRACTURE OF LEFT TIBIA WITH ROUTINE HEALING, SUBSEQUENT ENCOUNTER: ICD-10-CM

## 2018-10-01 PROCEDURE — 99212 OFFICE O/P EST SF 10 MIN: CPT | Performed by: ORTHOPAEDIC SURGERY

## 2018-10-01 NOTE — PROGRESS NOTES
ESTABLISHED PATIENT    Patient: Mecca Renee  : 1955    Primary Care Provider: Mg Sarkar MD    Requesting Provider: As above    Follow-up (6 weeks - Stress fracture of left tibia; Posterior tibial tendinitis of left lower extremity;Rheumatoid arthritis involving multiple sites with positive rheumatoid factor (CMS/[facility]) )      History    Chief Complaint: Left tibial stress fracture    History of Present Illness: She returns for follow-up of left tibial stress fracture, she reports she is feeling much better.  Much less pain.  She is doing well in boot.    Current Outpatient Prescriptions on File Prior to Visit   Medication Sig Dispense Refill   • amitriptyline (ELAVIL) 10 MG tablet TAKE 1 TABLET BY MOUTH EVERY NIGHT. 30 tablet 5   • aspirin 81 MG EC tablet Take 81 mg by mouth Daily.     • bisoprolol (ZEBeta) 5 MG tablet TAKE 1/2 TABLET BY MOUTH TWICE DAILY 30 tablet 6   • Calcium Carbonate-Vit D-Min (CALCIUM 1200 PO) Take 1 tablet by mouth 2 (Two) Times a Day.     • cetirizine (zyrTEC) 10 MG tablet Take 10 mg by mouth Daily.     • Cholecalciferol (VITAMIN D3) 5000 UNITS capsule capsule Take  by mouth Daily.     • coenzyme Q10 100 MG capsule Take 100 mg by mouth Daily.     • diclofenac (VOLTAREN) 1 % gel gel Apply 4 g topically As Needed.     • Elastic Bandages & Supports (ACE WRIST BRACE/SPLINT) misc 1 each Daily. 1 each 0   • etodolac (LODINE) 400 MG tablet Take 400 mg by mouth 2 (Two) Times a Day.     • flecainide (TAMBOCOR) 100 MG tablet TAKE 1 TABLET EVERY 12 HOURS DAILY. 180 tablet 3   • gabapentin (NEURONTIN) 100 MG capsule Take 100 mg by mouth 2 (Two) Times a Day.     • tiZANidine (ZANAFLEX) 2 MG tablet Take half to 1 tablet three times a day as needed for muscle spasms. (Patient taking differently: As Needed. Take half to 1 tablet three times a day as needed for muscle spasms.) 90 tablet 3   • traMADol (ULTRAM) 50 MG tablet Take 50 mg by mouth Every 6 (Six) Hours As Needed for Moderate  Pain .       No current facility-administered medications on file prior to visit.       Allergies   Allergen Reactions   • Sulfa Antibiotics       Past Medical History:   Diagnosis Date   • Asthma    • Chronic pain disorder    • Diverticulosis    • Fibromyalgia    • GERD (gastroesophageal reflux disease)    • Migraine    • Neck pain    • Osteoarthritis    • Palpitations     palpitations with near syncope   • Rheumatoid arthritis (CMS/HCC)      Past Surgical History:   Procedure Laterality Date   • CHOLECYSTECTOMY     • FOOT SURGERY Right    • SHOULDER SURGERY Right      Family History   Problem Relation Age of Onset   • Diabetes Mother    • Cancer Mother    • Heart disease Mother    • Hypertension Mother    • Cancer Father    • Cancer Brother    • Heart disease Maternal Grandmother    • Stroke Maternal Grandmother       Social History     Social History   • Marital status:      Spouse name: N/A   • Number of children: N/A   • Years of education: N/A     Occupational History   • Not on file.     Social History Main Topics   • Smoking status: Never Smoker   • Smokeless tobacco: Never Used   • Alcohol use No   • Drug use: No   • Sexual activity: Defer     Other Topics Concern   • Not on file     Social History Narrative   • No narrative on file        Review of Systems   Constitutional: Negative.    HENT: Negative.    Eyes: Negative.    Respiratory: Negative.    Cardiovascular: Negative.    Gastrointestinal: Negative.    Endocrine: Negative.    Genitourinary: Negative.    Musculoskeletal: Positive for arthralgias and joint swelling.   Skin: Negative.    Allergic/Immunologic: Negative.    Neurological: Negative.    Hematological: Negative.    Psychiatric/Behavioral: Negative.        The following portions of the patient's history were reviewed and updated as appropriate: allergies, current medications, past family history, past medical history, past social history, past surgical history and problem  "list.    Physical Exam:   Pulse 70   Ht 165.1 cm (65\")   Wt 82.5 kg (181 lb 14.1 oz)   LMP  (LMP Unknown)   SpO2 99%   BMI 30.27 kg/m²   GENERAL: Body habitus: overweight    Lower extremity edema: Left: trace; Right: trace    Gait: antalgic     Mental Status:  awake and alert; oriented to person, place, and time  MSK:  Tibia:  Right:  non tender; Left:  Very slightly tender over the distal medial tibia above the ankle, no significant edema here           Medical Decision Making    Data Review:   ordered and reviewed x-rays today    Assessment/Plan/Diagnosis/Treatment Options:   1. Stress fracture of left tibia with routine healing, subsequent encounter  She is much improved.  She may now start physical therapy.  She may wean out of the boot.  She should not go back to activity too quickly and we discussed this.  I'll see her in 3 months for x-ray of the ankle for probable last visit                            "

## 2018-10-10 ENCOUNTER — OUTSIDE FACILITY SERVICE (OUTPATIENT)
Dept: PAIN MEDICINE | Facility: CLINIC | Age: 63
End: 2018-10-10

## 2018-10-10 PROCEDURE — 64494 INJ PARAVERT F JNT L/S 2 LEV: CPT | Performed by: ANESTHESIOLOGY

## 2018-10-10 PROCEDURE — 64493 INJ PARAVERT F JNT L/S 1 LEV: CPT | Performed by: ANESTHESIOLOGY

## 2018-10-10 PROCEDURE — 99152 MOD SED SAME PHYS/QHP 5/>YRS: CPT | Performed by: ANESTHESIOLOGY

## 2018-10-11 ENCOUNTER — TELEPHONE (OUTPATIENT)
Dept: PAIN MEDICINE | Facility: CLINIC | Age: 63
End: 2018-10-11

## 2018-10-11 NOTE — TELEPHONE ENCOUNTER
Patient had 1st set dx bilateral lumbar medial branch blocks on 10/10/2018. Called patient to f/u post procedure. Reached voicemail. Left message for return call

## 2018-10-24 ENCOUNTER — OUTSIDE FACILITY SERVICE (OUTPATIENT)
Dept: PAIN MEDICINE | Facility: CLINIC | Age: 63
End: 2018-10-24

## 2018-10-24 PROCEDURE — 99152 MOD SED SAME PHYS/QHP 5/>YRS: CPT | Performed by: ANESTHESIOLOGY

## 2018-10-24 PROCEDURE — 64493 INJ PARAVERT F JNT L/S 1 LEV: CPT | Performed by: ANESTHESIOLOGY

## 2018-10-24 PROCEDURE — 64494 INJ PARAVERT F JNT L/S 2 LEV: CPT | Performed by: ANESTHESIOLOGY

## 2018-10-25 ENCOUNTER — TELEPHONE (OUTPATIENT)
Dept: PAIN MEDICINE | Facility: CLINIC | Age: 63
End: 2018-10-25

## 2018-10-25 NOTE — TELEPHONE ENCOUNTER
Patient had 2nd set dx bilateral lumbar medial branch blocks on 10/24/2018. Called patient to f/u post procedure. No answer, no voicemail

## 2018-11-01 NOTE — PROGRESS NOTES
"CHIEF COMPLAINT: \"I did great after my second diagnostic blocks.\"      BRIEF HISTORY: Mrs. Mecca Renee underwent a second set of diagnostic bilateral lumbar medial branch blocks at L3, L4, L5; for bilateral lumbar facet joints at L4-L5 and L5-S1 on 10/24/2018 and experienced complete relief along with remarkable functional improvement that lasted for approximately 24 hours.    Pain description: Constant lower back pain with intermittent exacerbation, described as aching and dull sensation.   Radiation of pain: The lower back pain radiates to the hips  Pain intensity today: 5/10  Pain intensity ranges from: 2/10 to 5/10  Aggravating factors: Pain increases with extension of the lumbar spine, lifting, ambulating more than 10-15 minutes, and standing 10 minutes.   Alleviating factors: Pain decreases with rest and amitriptylene.   Associated symptoms: Patient denies pain, numbness or weakness in the upper extremities, with the exception of intermittent and random episodes of numbness occurring while she is asleep; and in both hands (improved by wearing bilateral wrist splints).   Patient denies any new bladder or bowel problems.   Patient denies difficulties with her balance.      Review of previous therapies and additional medical records:  Mecca Renee has already failed the following measures, including:   Conservative measures: Oral analgesics, opioids and physical therapy   Interventional measures: Cervical medial branch rhizotomies on February 8, 2017, with 100% ongoing pain relief of her chronic neck pain and headaches along and remarkable improvement in the range of motion of her cervical spine.  Patient used to experience bilateral occipital headaches lasting 3-4 days and 3 times a month.    Surgical measures: No previous spinal surgery  Mecac Renee underwent neurological consultation with Dr. Díaz on 09/08/2016, and was referred for pain management consultation of cervicalgia.  Patient " underwent neurosurgical consultation with Dr. Ronnell Ramirez a few years back and was found to be a potential surgical candidate (records not available for review).  Mecca Renee presents with significant comorbidities including tachyarrhythmia, engaged in treatment.  In terms of current analgesics, Mecca Renee takes: gabapentin, amitriptylene, meloxicam, tramadol, without side effects  I have reviewed [facility] Report #[id number] consistent to medication reconciliation.    Global Pain Scale 11-6-18          Pain 15          Feelings 2          Clinical outcomes 5          Activities 0          GPS Total: 22            Diagnostic Studies:  MRI LUMBAR SPINE WO CONTRAST- 03/14/2017. Normal anatomic alignment of the lumbar spine is preserved. The conus medullaris terminates posterior to T12. There is no intrathecal mass. 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 The parasagittal T1-weighted sequences reveal the neuroforamina to be widely patent. STIR sequences reveal no areas of significant osseous edema. Axial images:  L3-L4: The disc space is normal. The facet joints are grossly unremarkable. There is no neuroforaminal narrowing or central canal stenosis.  L4-L5: The disc space is normal. Mild bilateral facet hypertrophy is present. There is no neuroforaminal narrowing or central canal stenosis.  L5-S1: The disc space is normal. There is no neuroforaminal narrowing or central canal stenosis.  MRI, cervical, without contrast, 12/31/2008; The visualized craniocervical junction and spinal cord are normal. C2-C3 through C4-C5 and C6-C7, C7-T1: The disks are normal.   C5-C6: Broad-based posterior disc protrusion which abuts the spinal cord but does not cause spinal stenosis.  MRI, lumbar, without contrast, December 31, 2008 L1-L2 through L5-S1: The disks are normal. Early changes of degenerative disc disease without disc bulging or herniation.   CT, Head, w/o, 04/26/2011; Normal head CT without  contrast.  X-rays of the cervical spine revealed degenerative disc disease and facet arthritis most impressive at C5-C6, 11/4/2015  X-rays of the lumbar spine 11/4/2015, revealed mild levoscoliosis. Lumbar facet arthropathy.    Review of Systems   Constitutional: Positive for fatigue.   Eyes: Positive for photophobia.   Endocrine: Positive for cold intolerance.   Musculoskeletal: Positive for back pain.   All other systems reviewed and are negative.     The following portions of the patient's history were reviewed and updated as appropriate: problem list, past medical history, past surgery history, social history, family history, medications, and allergies     /60 (BP Location: Left arm, Patient Position: Sitting, Cuff Size: Adult)   Pulse 59   Temp 96.8 °F (36 °C) (Temporal Artery )   Resp 18   Wt 85.9 kg (189 lb 6 oz)   LMP  (LMP Unknown)   SpO2 94%   BMI 31.51 kg/m²      Physical Exam   Neurologic Exam  Constitutional: Patient is oriented to person, place, and time. Patient appears well-developed and well-nourished.   HENT: Head: Normocephalic and atraumatic. Eyes: Conjunctivae and lids are normal. Pupils: Equal, round, reactive to light.   Neck: Trachea normal. Neck supple. No JVD present.   Lymphatic: No cervical adenopathy  Pulmonary Respiratory effort: No increased work of breathing or signs of respiratory distress. Auscultation of lungs: Clear to auscultation.   Cardiovascular Auscultation of heart: Normal rate and rhythm, normal S1 and S2, no murmurs. Peripheral vascular exam: Normal. No edema.   Abdomen: The abdomen was soft and nontender. Bowel sounds were normal.   Musculoskeletal   Gait and station: Gait evaluation demonstrated a normal gait.   Cervical spine: Passive and active range of motion full and without pain.  Extension, flexion, lateral flexion, rotation of the cervical spine did not increase or produce pain. Cervical facet joint loading maneuvers are negative at this time. No  active trigger points in the bilateral levator scapulae.  Shoulders: The range of motion of the glenohumeral joints is full and without pain. Rotator cuff strength is 5/5.   Lumbar spine: The range of motion is limited secondary to pain. Extension, lateral flexion, and rotation increase or reproduce pain. Lumbar facet joint loading maneuvers are positive. Berto and Gaenslen's tests are negative.  Neurological: Patient is alert and oriented to person, place, and time. Speech: speech is normal. Cortical function: Normal mental status.   Cranial nerves: Cranial nerves 2-12 intact.   Reflex Scores:  Right brachioradialis: 1+  Left brachioradialis: 1+  Right biceps: 1+  Left biceps: 1+  Right triceps: 1+  Left triceps: 1+  Right patellar: 1+  Left patellar: 1+  Right achilles: 1+  Left achilles: 1+  Motor strength: 5/5  Motor Tone: normal tone.   Involuntary movements: none.   Superficial/Primitive Reflexes: primitive reflexes were absent.   Right Villalta: absent  Left Villalta: absent  Right ankle clonus: absent  Left ankle clonus: absent   Spurling sign is negative. Lhermitte sign is negative. Negative long tract signs. Straight leg raising test is negative. Femoral stretch sign is negative. Positive Tinel sign in both wrists.  Sensation: No sensory loss. Sensory exam: intact to light touch, intact pain and temperature sensation, intact vibration sensation and normal proprioception.   Coordination: Normal finger to nose and heel to shin. Normal balance and negative. Romberg's sign negative.   Skin and subcutaneous tissue: Skin is warm and intact. No rash noted. No cyanosis.   Psychiatric:   Judgment and insight: Normal.   Orientation to person, place and time: Normal.   Recent and remote memory: Intact.   Mood and affect: Normal.      ASSESSMENT:   1. Spondylosis of lumbar region without myelopathy or radiculopathy    2. Bilateral carpal tunnel syndrome    3. Cervical spondylosis without myelopathy    4. Cervical disc  disorder of mid-cervical region    5. Bilateral occipital neuralgia    6. Fibromyalgia    7. Rheumatoid arthritis involving multiple sites with positive rheumatoid factor (CMS/[facility])    8. Osteoporosis without current pathological fracture, unspecified osteoporosis type    9. Physical deconditioning         PLAN: Patient experienced complete pain relief and functional improvement that lasted for more than two days after her second set of diagnostic bilateral lumbar medial branch blocks, as referenced under HPI. I have reviewed all available patient's medical records as well as previous therapies as referenced above. Therefore, I have proposed the following plan:  1. Interventional pain management measures: Patient will be scheduled for bilateral lumbar medial branch rhizotomies at L3, L4, L5; for bilateral lumbar facet joints at L4-L5, L5-S1.   2. Long-term rehabilitation efforts:  A. Patient will start a comprehensive physical therapy program for water therapy, therapeutic exercise, upper body strengthening/posture correction, core strengthening, gait and balance training, myofascial release, cupping and dry needling after her lumbar RFTC  B. Start an exercise program such as yoga, water therapy and daily walks for fitness  C. Wear bilateral wrist splints/braces for treatment of bilateral carpal tunnel syndrome  3.  Pharmacological measures: We have deferred the use of systemic analgesics due to potential interactions.   4.  The patient has been instructed to contact my office with any questions or difficulties. The patient understands the plan and agrees to proceed accordingly.      Patient Care Team:  Mg Sarkar MD as PCP - General (General Practice)  Shadi Díaz MD (Inactive) as Consulting Physician (Neurology)  Ana Garcia MD as Consulting Physician (Rheumatology)  James Bruno MD as Consulting Physician (Pain Medicine)  William Brewster MD as Consulting Physician (Cardiology)  Ruben  Pj VICTOR PA-C as Physician Assistant (Physician Assistant)     No orders of the defined types were placed in this encounter.        Future Appointments  Date Time Provider Department Center   1/2/2019 9:50 AM Catherine Hoang MD [facility] None   1/25/2019 10:30 AM William Brewster MD [facility] None         James Bruno MD       EMR Dragon/Transcription disclaimer:  Much of this encounter note is an electronic transcription of spoken language to printed text. Electronic transcription of spoken language may permit erroneous, or at times, nonsensical words or phrases to be inadvertently transcribed. Although I have reviewed the note for such errors, some may still exist.

## 2018-11-06 ENCOUNTER — OFFICE VISIT (OUTPATIENT)
Dept: PAIN MEDICINE | Facility: CLINIC | Age: 63
End: 2018-11-06

## 2018-11-06 VITALS
RESPIRATION RATE: 18 BRPM | TEMPERATURE: 96.8 F | OXYGEN SATURATION: 94 % | BODY MASS INDEX: 31.51 KG/M2 | HEART RATE: 59 BPM | WEIGHT: 189.38 LBS | DIASTOLIC BLOOD PRESSURE: 60 MMHG | SYSTOLIC BLOOD PRESSURE: 110 MMHG

## 2018-11-06 DIAGNOSIS — R53.81 PHYSICAL DECONDITIONING: ICD-10-CM

## 2018-11-06 DIAGNOSIS — M47.816 SPONDYLOSIS OF LUMBAR REGION WITHOUT MYELOPATHY OR RADICULOPATHY: Primary | ICD-10-CM

## 2018-11-06 DIAGNOSIS — M05.79 RHEUMATOID ARTHRITIS INVOLVING MULTIPLE SITES WITH POSITIVE RHEUMATOID FACTOR (HCC): ICD-10-CM

## 2018-11-06 DIAGNOSIS — M79.7 FIBROMYALGIA: ICD-10-CM

## 2018-11-06 DIAGNOSIS — M50.920 CERVICAL DISC DISORDER OF MID-CERVICAL REGION: ICD-10-CM

## 2018-11-06 DIAGNOSIS — M06.9 RHEUMATOID ARTHRITIS INVOLVING MULTIPLE SITES, UNSPECIFIED RHEUMATOID FACTOR PRESENCE: ICD-10-CM

## 2018-11-06 DIAGNOSIS — G56.03 BILATERAL CARPAL TUNNEL SYNDROME: ICD-10-CM

## 2018-11-06 DIAGNOSIS — M54.81 BILATERAL OCCIPITAL NEURALGIA: ICD-10-CM

## 2018-11-06 DIAGNOSIS — M47.812 CERVICAL SPONDYLOSIS WITHOUT MYELOPATHY: ICD-10-CM

## 2018-11-06 DIAGNOSIS — M47.816 SPONDYLOSIS OF LUMBAR REGION WITHOUT MYELOPATHY OR RADICULOPATHY: ICD-10-CM

## 2018-11-06 DIAGNOSIS — M81.0 OSTEOPOROSIS WITHOUT CURRENT PATHOLOGICAL FRACTURE, UNSPECIFIED OSTEOPOROSIS TYPE: ICD-10-CM

## 2018-11-06 PROCEDURE — 99213 OFFICE O/P EST LOW 20 MIN: CPT | Performed by: ANESTHESIOLOGY

## 2018-11-06 RX ORDER — BISOPROLOL FUMARATE 5 MG/1
TABLET, FILM COATED ORAL
Qty: 30 TABLET | Refills: 6 | Status: SHIPPED | OUTPATIENT
Start: 2018-11-06 | End: 2019-06-24 | Stop reason: SDUPTHER

## 2018-11-28 ENCOUNTER — OUTSIDE FACILITY SERVICE (OUTPATIENT)
Dept: PAIN MEDICINE | Facility: CLINIC | Age: 63
End: 2018-11-28

## 2018-11-28 PROCEDURE — 99152 MOD SED SAME PHYS/QHP 5/>YRS: CPT | Performed by: ANESTHESIOLOGY

## 2018-11-28 PROCEDURE — 64635 DESTROY LUMB/SAC FACET JNT: CPT | Performed by: ANESTHESIOLOGY

## 2018-11-28 PROCEDURE — 64636 DESTROY L/S FACET JNT ADDL: CPT | Performed by: ANESTHESIOLOGY

## 2018-11-29 ENCOUNTER — TELEPHONE (OUTPATIENT)
Dept: PAIN MEDICINE | Facility: CLINIC | Age: 63
End: 2018-11-29

## 2018-11-29 NOTE — TELEPHONE ENCOUNTER
Patient had bilateral lumbar RFTC on 11/28/2018. Patient reports she is doing ok. She does have some pain and soreness. Advised to use contrast therapy by alternating ice and heat. Patient verbalized understanding

## 2018-12-18 DIAGNOSIS — M47.816 SPONDYLOSIS OF LUMBAR REGION WITHOUT MYELOPATHY OR RADICULOPATHY: Primary | ICD-10-CM

## 2018-12-19 ENCOUNTER — TELEPHONE (OUTPATIENT)
Dept: PAIN MEDICINE | Facility: CLINIC | Age: 63
End: 2018-12-19

## 2018-12-19 ENCOUNTER — HOSPITAL ENCOUNTER (OUTPATIENT)
Dept: GENERAL RADIOLOGY | Facility: HOSPITAL | Age: 63
Discharge: HOME | End: 2018-12-19
Attending: ANESTHESIOLOGY | Admitting: ANESTHESIOLOGY

## 2018-12-19 DIAGNOSIS — M47.816 SPONDYLOSIS OF LUMBAR REGION WITHOUT MYELOPATHY OR RADICULOPATHY: ICD-10-CM

## 2018-12-19 PROCEDURE — 72114 X-RAY EXAM L-S SPINE BENDING: CPT

## 2018-12-19 NOTE — TELEPHONE ENCOUNTER
----- Message from James Bruno MD sent at 12/18/2018  5:10 PM EST -----  perfect  ----- Message -----  From: Tatiana Rivera  Sent: 12/18/2018   4:13 PM  To: James Bruno MD, Melva Sol MA    She is taking Methocarbamol 750 mg as needed, she had some  from her pcp. She also still has some tizanidine you had gave her before.  She is coming to have the xray done in the morning.   ----- Message -----  From: James Bruno MD  Sent: 12/18/2018   1:57 PM  To: Melva Montoya MA    We can give her some tizanidine (if she is not taking any muscle relaxers)  She should start PT  We can send an order for lumbar x-rays, full views with flexion and extension to be done ASAP within [facility] (since I will be out soon)    ----- Message -----  From: Tatiana Rivera  Sent: 12/18/2018   1:44 PM  To: James Bruno MD    Patient called today , she was last seen 12/3 for toyin lumbar RFTC  . She states that she has been in a lot of pain since her procedure.   Her current pain is in the mid of her back, the same place it was before her procedure.it does not radiate   She states she got no relief from the RFTC.  She has not started pt due to be in so much pain.  Current pain level- 7   She is using heat and ice at times as well.  She has some pain cream an say it does seem to help a little.  She has some tinging,weakness in the left foot.

## 2018-12-24 ENCOUNTER — TELEPHONE (OUTPATIENT)
Dept: PAIN MEDICINE | Facility: CLINIC | Age: 63
End: 2018-12-24

## 2018-12-24 NOTE — TELEPHONE ENCOUNTER
Patient notified and verbalized understanding     ----- Message from James Bruno MD sent at 12/21/2018  3:48 PM EST -----  She can do PT, contrast Tx, TENS, OTC analgesics, muscle relaxers, etc. She just had her RFTC 3 weeks ago  Her X-rays did not revealed much other than degenerative changes.  If she has unbearable pain, or she develops more consistent symptoms (bladder or bowel issues, numbness, weakness, etc), then, see PCP or go to ER  ----- Message -----  From: Tatiana Rivera  Sent: 12/21/2018  11:23 AM  To: James Bruno MD     Her pain is in the mid part of her back, she also has some pain in both hips. She states this is the same location her pain was before her procedure, but the intensity of the pain is just a lot worse then it was before.  She is still taking the meds mentioned below.  --------------------------------------  X-rays show degenerative changes. Lots of gas and stool in her bowel. Where is her pain, and what is different with the pain she had before..?   Previous Messages      ----- Message -----   From: Tatiana Rivera   Sent: 12/21/2018   8:07 AM   To: James Bruno MD     If you have a minute will you review this patients xray when you have a chance, she called me 2x yesterday and already once this morning .     ----- Message -----  From: James Bruno MD  Sent: 12/18/2018   5:10 PM  To: Melva Montoya MA    perfect  ----- Message -----  From: Tatiana Rivera  Sent: 12/18/2018   4:13 PM  To: James Bruno MD, Melva Sol MA    She is taking Methocarbamol 750 mg as needed, she had some  from her pcp. She also still has some tizanidine you had gave her before.  She is coming to have the xray done in the morning.   ----- Message -----  From: James Bruno MD  Sent: 12/18/2018   1:57 PM  To: Melva Montoya MA    We can give her some tizanidine (if she is not taking any muscle relaxers)  She should start PT  We can send an order  for lumbar x-rays, full views with flexion and extension to be done ASAP within [facility] (since I will be out soon)    ----- Message -----  From: Tatiana Rivera  Sent: 12/18/2018   1:44 PM  To: James Bruno MD    Patient called today , she was last seen 12/3 for toyin lumbar RFTC  . She states that she has been in a lot of pain since her procedure.   Her current pain is in the mid of her back, the same place it was before her procedure.it does not radiate   She states she got no relief from the RFTC.  She has not started pt due to be in so much pain.  Current pain level- 7   She is using heat and ice at times as well.  She has some pain cream an say it does seem to help a little.  She has some tinging,weakness in the left foot.

## 2019-01-02 ENCOUNTER — OFFICE VISIT (OUTPATIENT)
Dept: ORTHOPEDIC SURGERY | Facility: CLINIC | Age: 64
End: 2019-01-02

## 2019-01-02 VITALS — HEART RATE: 70 BPM | WEIGHT: 183.64 LBS | OXYGEN SATURATION: 98 % | HEIGHT: 65 IN | BODY MASS INDEX: 30.6 KG/M2

## 2019-01-02 DIAGNOSIS — M84.362D STRESS FRACTURE OF LEFT TIBIA WITH ROUTINE HEALING, SUBSEQUENT ENCOUNTER: Primary | ICD-10-CM

## 2019-01-02 PROCEDURE — 99212 OFFICE O/P EST SF 10 MIN: CPT | Performed by: ORTHOPAEDIC SURGERY

## 2019-01-02 RX ORDER — SUMATRIPTAN 100 MG/1
TABLET, FILM COATED ORAL
COMMUNITY
Start: 2018-08-24 | End: 2020-12-17

## 2019-01-02 NOTE — PROGRESS NOTES
ESTABLISHED PATIENT    Patient: Mecca Renee  : 1955    Primary Care Provider: Mg Sarkar MD    Requesting Provider: As above    Follow-up of the Left Ankle (Stress Fracture of Left Tibia/3 month f/u)      History    Chief Complaint: Left tibial stress fracture    History of Present Illness: She returns for follow-up of her left distal tibial stress fracture.  She is much improved.  She reports that she jammed the leg recently, and was worried she might have hurt it, but the pain resolved.    Current Outpatient Medications on File Prior to Visit   Medication Sig Dispense Refill   • amitriptyline (ELAVIL) 10 MG tablet TAKE 1 TABLET BY MOUTH EVERY NIGHT. 30 tablet 5   • Apremilast (OTEZLA PO) Take  by mouth 2 (Two) Times a Day.     • aspirin 81 MG EC tablet Take 81 mg by mouth Daily.     • bisoprolol (ZEBeta) 5 MG tablet TAKE 1/2 TABLET BY MOUTH TWICE DAILY 30 tablet 6   • Calcium Carbonate-Vit D-Min (CALCIUM 1200 PO) Take 1 tablet by mouth 2 (Two) Times a Day.     • cetirizine (zyrTEC) 10 MG tablet Take 10 mg by mouth Daily.     • Cholecalciferol (VITAMIN D3) 5000 UNITS capsule capsule Take  by mouth Daily.     • coenzyme Q10 100 MG capsule Take 100 mg by mouth Daily.     • diclofenac (VOLTAREN) 1 % gel gel Apply 4 g topically As Needed.     • Elastic Bandages & Supports (ACE WRIST BRACE/SPLINT) misc 1 each Daily. 1 each 0   • etodolac (LODINE) 400 MG tablet Take 400 mg by mouth 2 (Two) Times a Day.     • flecainide (TAMBOCOR) 100 MG tablet TAKE 1 TABLET EVERY 12 HOURS DAILY. 180 tablet 3   • gabapentin (NEURONTIN) 100 MG capsule Take 100 mg by mouth 2 (Two) Times a Day.     • SUMAtriptan (IMITREX) 100 MG tablet SUMATRIPTAN SUCCINATE 100 MG TABS     • tiZANidine (ZANAFLEX) 2 MG tablet Take half to 1 tablet three times a day as needed for muscle spasms. (Patient taking differently: As Needed. Take half to 1 tablet three times a day as needed for muscle spasms.) 90 tablet 3   • traMADol (ULTRAM) 50  MG tablet Take 50 mg by mouth Every 6 (Six) Hours As Needed for Moderate Pain .       No current facility-administered medications on file prior to visit.       Allergies   Allergen Reactions   • Sulfa Antibiotics       Past Medical History:   Diagnosis Date   • Asthma    • Chronic pain disorder    • Diverticulosis    • Fibromyalgia    • GERD (gastroesophageal reflux disease)    • Migraine    • Neck pain    • Osteoarthritis    • Palpitations     palpitations with near syncope   • Rheumatoid arthritis (CMS/HCC)      Past Surgical History:   Procedure Laterality Date   • CHOLECYSTECTOMY     • FOOT SURGERY Right    • SHOULDER SURGERY Right      Family History   Problem Relation Age of Onset   • Diabetes Mother    • Cancer Mother    • Heart disease Mother    • Hypertension Mother    • Cancer Father    • Cancer Brother    • Heart disease Maternal Grandmother    • Stroke Maternal Grandmother       Social History     Socioeconomic History   • Marital status:      Spouse name: Not on file   • Number of children: Not on file   • Years of education: Not on file   • Highest education level: Not on file   Social Needs   • Financial resource strain: Not on file   • Food insecurity - worry: Not on file   • Food insecurity - inability: Not on file   • Transportation needs - medical: Not on file   • Transportation needs - non-medical: Not on file   Occupational History   • Not on file   Tobacco Use   • Smoking status: Never Smoker   • Smokeless tobacco: Never Used   Substance and Sexual Activity   • Alcohol use: No   • Drug use: No   • Sexual activity: Defer   Other Topics Concern   • Not on file   Social History Narrative   • Not on file        Review of Systems   Constitutional: Negative.    HENT: Negative.    Eyes: Negative.    Respiratory: Negative.    Cardiovascular: Negative.    Gastrointestinal: Negative.    Endocrine: Negative.    Genitourinary: Negative.    Musculoskeletal: Positive for joint swelling.   Skin:  "Negative.    Allergic/Immunologic: Negative.    Neurological: Negative.    Hematological: Negative.    Psychiatric/Behavioral: Negative.        The following portions of the patient's history were reviewed and updated as appropriate: allergies, current medications, past family history, past medical history, past social history, past surgical history and problem list.    Physical Exam:   Pulse 70   Ht 165.1 cm (65\")   Wt 83.3 kg (183 lb 10.3 oz)   LMP  (LMP Unknown)   SpO2 98%   BMI 30.56 kg/m²   GENERAL: Body habitus: overweight    Lower extremity edema: Left: trace; Right: trace    Gait: normal     Mental Status:  awake and alert; oriented to person, place, and time  MSK:  Tibia:  Right:  non tender; Left:  non tender        Ankle:  Right: non tender; Left:  non tender        Foot:  Right:  non tender; Left:  non tender    NEURO Sensation:  intact     Medical Decision Making    Data Review:   ordered and reviewed x-rays today    Assessment/Plan/Diagnosis/Treatment Options:   1. Stress fracture of left tibia with routine healing, subsequent encounter  The stress fracture is healed radiographically and clinically.  She may slowly increase her activities.  I'll be happy to see her any time  - XR Ankle 2 View Left    \                        "

## 2019-01-25 ENCOUNTER — OFFICE VISIT (OUTPATIENT)
Dept: CARDIOLOGY | Facility: CLINIC | Age: 64
End: 2019-01-25

## 2019-01-25 VITALS
OXYGEN SATURATION: 99 % | HEIGHT: 65 IN | DIASTOLIC BLOOD PRESSURE: 68 MMHG | HEART RATE: 56 BPM | BODY MASS INDEX: 31.42 KG/M2 | WEIGHT: 188.6 LBS | SYSTOLIC BLOOD PRESSURE: 112 MMHG

## 2019-01-25 DIAGNOSIS — R00.2 PALPITATIONS: Primary | ICD-10-CM

## 2019-01-25 DIAGNOSIS — E78.2 MIXED HYPERLIPIDEMIA: ICD-10-CM

## 2019-01-25 PROCEDURE — 93000 ELECTROCARDIOGRAM COMPLETE: CPT | Performed by: INTERNAL MEDICINE

## 2019-01-25 PROCEDURE — 99213 OFFICE O/P EST LOW 20 MIN: CPT | Performed by: INTERNAL MEDICINE

## 2019-04-15 NOTE — PROGRESS NOTES
"CHIEF COMPLAINT: \"Doing better.\"      BRIEF HISTORY: Mrs. Mecca Renee returns today for post-procedure follow-up.  Patient was last seen on 11/28/2018 for bilateral lumbar medial branch rhizotomies and experienced at least 50% relief that is ongoing.  Patient did not participate in Physical Therapy, as medically advised.   Pain description: previously constant lower back pain with intermittent exacerbation, described as aching and dull sensation.   Radiation of pain: The lower back pain radiates to the hips.  Pain intensity today: 2/10  Pain intensity ranges from: 1/10 to 5/10  Aggravating factors: Pain increases with extension of the lumbar spine, lifting, ambulating more than 10-15 minutes, and standing 10 minutes.   Alleviating factors: Pain decreases with heat, rest and amitriptyline.   Associated symptoms: Patient denies pain, numbness or weakness in the upper extremities.  Patient reports intermittent and random episodes of numbness occurring in both hands while asleep (improved by wearing bilateral wrist splints).   Patient denies any new bladder or bowel problems.   Patient denies difficulties with her balance.      Review of previous therapies and additional medical records:  Mecca Renee has already failed the following measures, including:   Conservative measures: Oral analgesics, opioids and physical therapy   Interventional measures: As referenced above.  Cervical RFTC on 02/08/2017.   Surgical measures: No previous spinal surgery  Mecca Renee underwent neurological consultation with Dr. Díaz on 09/08/2016, and was referred for pain management consultation of cervicalgia.  Patient underwent neurosurgical consultation with Dr. Ronnell Ramirez a few years back and was found to be a potential surgical candidate (records not available for review).  Mecca Renee presents with significant comorbidities including tachyarrhythmia, engaged in treatment.  In terms of current analgesics, " "Mecca NICK Renee takes: Lyrica, amitriptyline, tramadol  I have reviewed [facility] Report #[id number] consistent to medication reconciliation.    Global Pain Scale 11-6-18 04-17-19         Pain 15 7         Feelings 2 10         Clinical outcomes 5 4         Activities 0 9         GPS Total: 22 30           Diagnostic Studies:  XR SPINE LUMBAR COMPLETE W FLEX EXT- 12/20/2018: Mild lumbar scoliosis, and mild lumbar degenerative disc changes. No evidence of acute trauma, significant focal subluxation, or evidence of instability from flexion to extension.  MRI LUMBAR SPINE WO CONTRAST- 03/14/2017:   L3-L4: The disc space is normal. The facet joints are grossly unremarkable. There is no neuroforaminal narrowing or central canal stenosis.  L4-L5: The disc space is normal. Mild bilateral facet hypertrophy is present. There is no neuroforaminal narrowing or central canal stenosis.  L5-S1: The disc space is normal. There is no neuroforaminal narrowing or central canal stenosis.    Review of Systems   Constitutional: Positive for fatigue.   Eyes: Positive for photophobia.   Endocrine: Positive for cold intolerance.   Musculoskeletal: Positive for back pain.   All other systems reviewed and are negative.     The following portions of the patient's history were reviewed and updated as appropriate: problem list, past medical history, past surgery history, social history, family history, medications, and allergies     /60   Pulse 75   Temp 96.4 °F (35.8 °C) (Temporal)   Resp 20   Ht 165 cm (64.96\")   Wt 85.3 kg (188 lb)   LMP  (LMP Unknown)   SpO2 98%   BMI 31.32 kg/m²      Physical Exam   Neurologic Exam  Constitutional: Patient is oriented to person, place, and time.  Appears well-developed and well-nourished.   Head: Normocephalic and atraumatic. Eyes: Conjunctivae and lids are normal. Pupils: Equal, round, and reactive to light.   Neck: Trachea normal. Neck supple. No JVD present.   Lymphatic: No cervical " adenopathy  Pulmonary: No increased work of breathing or signs of respiratory distress.  Clear to auscultation bilaterally.   Cardiovascular: Normal rate and rhythm, normal S1 and S2, no murmurs. Peripheral vascular exam: Normal. No edema.    Musculoskeletal   Gait and station: Normal    Lumbar spine: The range of motion is limited secondary to pain. Extension, lateral flexion, and rotation increase or reproduce pain. Lumbar facet joint loading maneuvers are positive.   Berto and Gaenslen's tests are negative.  Neurological: Patient is alert and oriented to person, place, and time. Speech: speech is normal. Cortical function: Normal mental status.   Cranial nerves: Cranial nerves 2-12 intact.   Reflex Scores:  brachioradialis: 1+ bilaterally  biceps: 1+ bilaterally  triceps: 1+ bilaterally  patellar: 1+ bilaterally  Achilles: 1+ bilaterally  Motor strength: 5/5  Motor Tone: normal tone.   Involuntary movements: none.   Right ankle clonus: absent  Left ankle clonus: absent   Negative long tract signs. Straight leg raising test is negative.   Sensation: No sensory loss. Sensory exam: intact to light touch, intact pain and temperature sensation, intact vibration sensation and normal proprioception.   Coordination: Normal finger to nose and heel to shin. Normal balance and negative. Romberg's sign negative.   Skin and subcutaneous tissue: Skin is warm and intact. No rash noted. No cyanosis.   Psychiatric:   Judgment and insight: Normal.   Orientation to person, place and time: Normal.   Recent and remote memory: Intact.   Mood and affect: Normal.      ASSESSMENT:   1. Spondylosis of lumbar region without myelopathy or radiculopathy    2. Bilateral carpal tunnel syndrome    3. Cervical spondylosis without myelopathy    4. Cervical disc disorder of mid-cervical region    5. Bilateral occipital neuralgia    6. Fibromyalgia    7. Rheumatoid arthritis involving multiple sites with positive rheumatoid factor (CMS/[facility])    8.  Osteoporosis without current pathological fracture, unspecified osteoporosis type    9. Physical deconditioning         PLAN: I have reviewed all available medical records as well as previous therapies as referenced above, and discussed the patient with Dr. Bruno.  1. Interventional pain management measures: None indicated.  Follow-up prn.   2. Long-term rehabilitation efforts:  A. Patient will start a comprehensive physical therapy program for water therapy, therapeutic exercise, upper body strengthening/posture correction, core strengthening, gait and balance training, myofascial release, cupping and dry needling   B. Start an exercise program such as yoga, water therapy and daily walks for fitness  C. Wear bilateral wrist splints/braces for treatment of bilateral carpal tunnel syndrome  3.  Pharmacological measures: Reviewed.   4.  The patient has been instructed to contact my office with any questions or difficulties. The patient understands the plan and agrees to proceed accordingly.      Patient Care Team:  Mg Sarkar MD as PCP - General (General Practice)  Shadi Díaz MD (Inactive) as Consulting Physician (Neurology)  Ana Garcia MD as Consulting Physician (Rheumatology)  James Bruno MD as Consulting Physician (Pain Medicine)  William Brewster MD as Consulting Physician (Cardiology)  Pj Miranda PA-C as Physician Assistant (Physician Assistant)     No orders of the defined types were placed in this encounter.        Future Appointments   Date Time Provider Department Center   1/31/2020 10:45 AM William Brewster MD [facility] None         Pj Miranda PA-C       EMR Dragon/Transcription disclaimer:  Much of this encounter note is an electronic transcription of spoken language to printed text. Electronic transcription of spoken language may permit erroneous, or at times, nonsensical words or phrases to be inadvertently transcribed. Although I have reviewed the note for  such errors, some may still exist.

## 2019-04-17 ENCOUNTER — OFFICE VISIT (OUTPATIENT)
Dept: PAIN MEDICINE | Facility: CLINIC | Age: 64
End: 2019-04-17

## 2019-04-17 VITALS
DIASTOLIC BLOOD PRESSURE: 60 MMHG | OXYGEN SATURATION: 98 % | SYSTOLIC BLOOD PRESSURE: 110 MMHG | WEIGHT: 188 LBS | BODY MASS INDEX: 31.32 KG/M2 | HEART RATE: 75 BPM | HEIGHT: 65 IN | TEMPERATURE: 96.4 F | RESPIRATION RATE: 20 BRPM

## 2019-04-17 DIAGNOSIS — M47.816 SPONDYLOSIS OF LUMBAR REGION WITHOUT MYELOPATHY OR RADICULOPATHY: Primary | ICD-10-CM

## 2019-04-17 DIAGNOSIS — M50.920 CERVICAL DISC DISORDER OF MID-CERVICAL REGION: ICD-10-CM

## 2019-04-17 DIAGNOSIS — M79.7 FIBROMYALGIA: ICD-10-CM

## 2019-04-17 DIAGNOSIS — M05.79 RHEUMATOID ARTHRITIS INVOLVING MULTIPLE SITES WITH POSITIVE RHEUMATOID FACTOR (HCC): ICD-10-CM

## 2019-04-17 DIAGNOSIS — M81.0 OSTEOPOROSIS WITHOUT CURRENT PATHOLOGICAL FRACTURE, UNSPECIFIED OSTEOPOROSIS TYPE: ICD-10-CM

## 2019-04-17 DIAGNOSIS — R53.81 PHYSICAL DECONDITIONING: ICD-10-CM

## 2019-04-17 DIAGNOSIS — G56.03 BILATERAL CARPAL TUNNEL SYNDROME: ICD-10-CM

## 2019-04-17 DIAGNOSIS — M47.812 CERVICAL SPONDYLOSIS WITHOUT MYELOPATHY: ICD-10-CM

## 2019-04-17 DIAGNOSIS — M54.81 BILATERAL OCCIPITAL NEURALGIA: ICD-10-CM

## 2019-04-17 PROCEDURE — 99213 OFFICE O/P EST LOW 20 MIN: CPT | Performed by: PHYSICIAN ASSISTANT

## 2019-04-17 RX ORDER — PREGABALIN 75 MG/1
75 CAPSULE ORAL 2 TIMES DAILY
COMMUNITY
End: 2021-02-12

## 2019-05-15 ENCOUNTER — OFFICE VISIT (OUTPATIENT)
Dept: ORTHOPEDIC SURGERY | Facility: CLINIC | Age: 64
End: 2019-05-15

## 2019-05-15 VITALS — OXYGEN SATURATION: 98 % | WEIGHT: 188 LBS | HEART RATE: 59 BPM | HEIGHT: 65 IN | BODY MASS INDEX: 31.32 KG/M2

## 2019-05-15 DIAGNOSIS — M05.79 RHEUMATOID ARTHRITIS INVOLVING MULTIPLE SITES WITH POSITIVE RHEUMATOID FACTOR (HCC): ICD-10-CM

## 2019-05-15 DIAGNOSIS — M25.572 LEFT ANKLE PAIN, UNSPECIFIED CHRONICITY: Primary | ICD-10-CM

## 2019-05-15 DIAGNOSIS — I87.8 VENOUS STASIS: ICD-10-CM

## 2019-05-15 DIAGNOSIS — M84.362D STRESS FRACTURE OF LEFT TIBIA WITH ROUTINE HEALING, SUBSEQUENT ENCOUNTER: ICD-10-CM

## 2019-05-15 DIAGNOSIS — M84.364A STRESS FRACTURE OF LEFT FIBULA, INITIAL ENCOUNTER: ICD-10-CM

## 2019-05-15 PROCEDURE — 99214 OFFICE O/P EST MOD 30 MIN: CPT | Performed by: ORTHOPAEDIC SURGERY

## 2019-05-15 NOTE — PROGRESS NOTES
ESTABLISHED PATIENT    Patient: Mecca Renee  : 1955    Primary Care Provider: Mg Sarkar MD    Requesting Provider: As above    Pain of the Left Ankle (3.5 Months- 19-Stress fracture of left tibia with routine healing-pt stepped off a step and heard a pop Saturday.)      History    Chief Complaint: Left ankle pain    History of Present Illness: She returns unexpectedly, on Monday, May 13, she had a small misstep going out of a restaurant, and has had severe pain and swelling over the left fibula since then.  She did not fall, it was a mild twisting injury.  She thought it would get better, but it is still exquisitely painful.  She still has her boot at home, but did not put it on as yet.    Current Outpatient Medications on File Prior to Visit   Medication Sig Dispense Refill   • amitriptyline (ELAVIL) 10 MG tablet TAKE 1 TABLET BY MOUTH EVERY NIGHT. 30 tablet 5   • Apremilast (OTEZLA PO) Take  by mouth 2 (Two) Times a Day.     • aspirin 81 MG EC tablet Take 81 mg by mouth Daily.     • bisoprolol (ZEBeta) 5 MG tablet TAKE 1/2 TABLET BY MOUTH TWICE DAILY 30 tablet 6   • Calcium Carbonate-Vit D-Min (CALCIUM 1200 PO) Take 1 tablet by mouth 2 (Two) Times a Day.     • cetirizine (zyrTEC) 10 MG tablet Take 10 mg by mouth Daily.     • Cholecalciferol (VITAMIN D3) 5000 UNITS capsule capsule Take  by mouth Daily.     • coenzyme Q10 100 MG capsule Take 100 mg by mouth Daily.     • diclofenac (VOLTAREN) 1 % gel gel Apply 4 g topically As Needed.     • etodolac (LODINE) 400 MG tablet Take 400 mg by mouth 2 (Two) Times a Day.     • flecainide (TAMBOCOR) 100 MG tablet TAKE 1 TABLET EVERY 12 HOURS DAILY. 180 tablet 3   • pregabalin (LYRICA) 75 MG capsule Take 75 mg by mouth 2 (Two) Times a Day.     • SUMAtriptan (IMITREX) 100 MG tablet SUMATRIPTAN SUCCINATE 100 MG TABS     • tiZANidine (ZANAFLEX) 2 MG tablet Take half to 1 tablet three times a day as needed for muscle spasms. (Patient taking  differently: As Needed. Take half to 1 tablet three times a day as needed for muscle spasms.) 90 tablet 3   • traMADol (ULTRAM) 50 MG tablet Take 50 mg by mouth Every 6 (Six) Hours As Needed for Moderate Pain .       No current facility-administered medications on file prior to visit.       Allergies   Allergen Reactions   • Sulfa Antibiotics       Past Medical History:   Diagnosis Date   • Asthma    • Chronic pain disorder    • Diverticulosis    • Fibromyalgia    • GERD (gastroesophageal reflux disease)    • Migraine    • Neck pain    • Osteoarthritis    • Palpitations     palpitations with near syncope   • Rheumatoid arthritis (CMS/HCC)      Past Surgical History:   Procedure Laterality Date   • CHOLECYSTECTOMY     • FOOT SURGERY Right    • SHOULDER SURGERY Right      Family History   Problem Relation Age of Onset   • Diabetes Mother    • Cancer Mother    • Heart disease Mother    • Hypertension Mother    • Cancer Father    • Cancer Brother    • Heart disease Maternal Grandmother    • Stroke Maternal Grandmother       Social History     Socioeconomic History   • Marital status:      Spouse name: Not on file   • Number of children: Not on file   • Years of education: Not on file   • Highest education level: Not on file   Tobacco Use   • Smoking status: Never Smoker   • Smokeless tobacco: Never Used   Substance and Sexual Activity   • Alcohol use: No   • Drug use: No   • Sexual activity: Defer        Review of Systems   Constitutional: Negative.    HENT: Negative.    Eyes: Negative.    Respiratory: Negative.    Cardiovascular: Negative.    Gastrointestinal: Negative.    Endocrine: Negative.    Genitourinary: Negative.    Musculoskeletal: Positive for arthralgias, gait problem and joint swelling.   Skin: Negative.    Allergic/Immunologic: Negative.    Hematological: Negative.    Psychiatric/Behavioral: Negative.        The following portions of the patient's history were reviewed and updated as appropriate:  "allergies, current medications, past family history, past medical history, past social history, past surgical history and problem list.    Physical Exam:   Pulse 59   Ht 165 cm (64.96\")   Wt 85.3 kg (188 lb)   LMP  (LMP Unknown)   SpO2 98%   BMI 31.32 kg/m²   GENERAL: Body habitus: obese    Lower extremity edema: Left: 1+ pitting; Right: 1+ pitting    Gait: antalgic     Mental Status:  awake and alert; oriented to person, place, and time  MSK:  Tibia:  Right:  non tender; Left:  non tender        Ankle:  Right: non tender; Left:  Exquisitely tender over the fibula from about 5 cm above the joint down to the tip, swelling here, mildly warm, nontender over the tibia, nontender over the ankle joint, nontender in the heel midfoot and forefoot        Foot:  Right:  non tender; Left:  See ankle exam    NEURO Sensation:  intact     Medical Decision Making    Data Review:   ordered and reviewed x-rays today    Assessment/Plan/Diagnosis/Treatment Options:   1. Stress fracture of left fibula, initial encounter  Given her history, the top diagnosis in my differential is fibular stress fracture.  She healed the tibial stress fracture, and now the pain is exquisite over the fibula.  We need to do an MRI for evaluation.  She has her tall boot she will put it on and use it whenever she is walking.  I will see her back following the MRI.    2. Stress fracture of left tibia with routine healing, subsequent encounter  The tibial stress fractures healed, I am concerned about a fibular stress fracture  - MRI Ankle Left Without Contrast; Future    3. Rheumatoid arthritis involving multiple sites with positive rheumatoid factor (CMS/HCC)  No erosions on the x-ray, nothing that indicates this is a rheumatoid flare, but that has to be included in the differential    4. Venous stasis  She has significant venous stasis, she is wearing her support stockings                              "

## 2019-05-25 ENCOUNTER — HOSPITAL ENCOUNTER (OUTPATIENT)
Dept: MRI IMAGING | Facility: HOSPITAL | Age: 64
Discharge: HOME | End: 2019-05-25
Admitting: ORTHOPAEDIC SURGERY

## 2019-05-25 DIAGNOSIS — M84.362D STRESS FRACTURE OF LEFT TIBIA WITH ROUTINE HEALING, SUBSEQUENT ENCOUNTER: ICD-10-CM

## 2019-05-25 DIAGNOSIS — M25.572 LEFT ANKLE PAIN, UNSPECIFIED CHRONICITY: ICD-10-CM

## 2019-05-25 PROCEDURE — 73721 MRI JNT OF LWR EXTRE W/O DYE: CPT

## 2019-06-03 ENCOUNTER — OFFICE VISIT (OUTPATIENT)
Dept: ORTHOPEDIC SURGERY | Facility: CLINIC | Age: 64
End: 2019-06-03

## 2019-06-03 VITALS — HEART RATE: 62 BPM | WEIGHT: 187.39 LBS | OXYGEN SATURATION: 98 % | HEIGHT: 65 IN | BODY MASS INDEX: 31.22 KG/M2

## 2019-06-03 DIAGNOSIS — M84.364A STRESS FRACTURE OF LEFT FIBULA, INITIAL ENCOUNTER: Primary | ICD-10-CM

## 2019-06-03 PROCEDURE — 99213 OFFICE O/P EST LOW 20 MIN: CPT | Performed by: ORTHOPAEDIC SURGERY

## 2019-06-03 NOTE — PROGRESS NOTES
ESTABLISHED PATIENT    Patient: Mecca Renee  : 1955    Primary Care Provider: Mg Sarkar MD    Requesting Provider: As above    Follow-up (2.5 weeks - MRI (19) Recheck - Stress fracture of left fibula; Stress fracture of left tibia with routine healing; Rheumatoid arthritis involving multiple sites with positive rheumatoid factor; venous stasis )      History    Chief Complaint: Left ankle pain    History of Present Illness: She returns for follow-up of the MRI done 2019, I reviewed it and the report.  She does indeed have a fibular stress fracture at the classic location about 4 cm above the joint.  She is in the boot.  She reports it still painful, but the boot has helped.  She needs a new boot.  The old one that we used for the tibial stress fracture is very worn.    Current Outpatient Medications on File Prior to Visit   Medication Sig Dispense Refill   • amitriptyline (ELAVIL) 10 MG tablet TAKE 1 TABLET BY MOUTH EVERY NIGHT. 30 tablet 5   • Apremilast (OTEZLA PO) Take  by mouth 2 (Two) Times a Day.     • aspirin 81 MG EC tablet Take 81 mg by mouth Daily.     • bisoprolol (ZEBeta) 5 MG tablet TAKE 1/2 TABLET BY MOUTH TWICE DAILY 30 tablet 6   • Calcium Carbonate-Vit D-Min (CALCIUM 1200 PO) Take 1 tablet by mouth 2 (Two) Times a Day.     • cetirizine (zyrTEC) 10 MG tablet Take 10 mg by mouth Daily.     • Cholecalciferol (VITAMIN D3) 5000 UNITS capsule capsule Take  by mouth Daily.     • coenzyme Q10 100 MG capsule Take 100 mg by mouth Daily.     • diclofenac (VOLTAREN) 1 % gel gel Apply 4 g topically As Needed.     • etodolac (LODINE) 400 MG tablet Take 400 mg by mouth 2 (Two) Times a Day.     • flecainide (TAMBOCOR) 100 MG tablet TAKE 1 TABLET EVERY 12 HOURS DAILY. 180 tablet 3   • pregabalin (LYRICA) 75 MG capsule Take 75 mg by mouth 2 (Two) Times a Day.     • SUMAtriptan (IMITREX) 100 MG tablet SUMATRIPTAN SUCCINATE 100 MG TABS     • tiZANidine (ZANAFLEX) 2 MG tablet Take half  to 1 tablet three times a day as needed for muscle spasms. (Patient taking differently: As Needed. Take half to 1 tablet three times a day as needed for muscle spasms.) 90 tablet 3   • traMADol (ULTRAM) 50 MG tablet Take 50 mg by mouth Every 6 (Six) Hours As Needed for Moderate Pain .       No current facility-administered medications on file prior to visit.       Allergies   Allergen Reactions   • Sulfa Antibiotics       Past Medical History:   Diagnosis Date   • Asthma    • Chronic pain disorder    • Diverticulosis    • Fibromyalgia    • GERD (gastroesophageal reflux disease)    • Migraine    • Neck pain    • Osteoarthritis    • Palpitations     palpitations with near syncope   • Rheumatoid arthritis (CMS/HCC)      Past Surgical History:   Procedure Laterality Date   • CHOLECYSTECTOMY     • FOOT SURGERY Right    • SHOULDER SURGERY Right      Family History   Problem Relation Age of Onset   • Diabetes Mother    • Cancer Mother    • Heart disease Mother    • Hypertension Mother    • Cancer Father    • Cancer Brother    • Heart disease Maternal Grandmother    • Stroke Maternal Grandmother       Social History     Socioeconomic History   • Marital status:      Spouse name: Not on file   • Number of children: Not on file   • Years of education: Not on file   • Highest education level: Not on file   Tobacco Use   • Smoking status: Never Smoker   • Smokeless tobacco: Never Used   Substance and Sexual Activity   • Alcohol use: No   • Drug use: No   • Sexual activity: Defer        Review of Systems   Constitutional: Negative.    HENT: Negative.    Eyes: Negative.    Respiratory: Negative.    Cardiovascular: Negative.    Gastrointestinal: Negative.    Endocrine: Negative.    Genitourinary: Negative.    Musculoskeletal: Positive for arthralgias.   Skin: Negative.    Allergic/Immunologic: Negative.    Neurological: Negative.    Hematological: Negative.    Psychiatric/Behavioral: Negative.        The following portions  "of the patient's history were reviewed and updated as appropriate: allergies, current medications, past family history, past medical history, past social history, past surgical history and problem list.    Physical Exam:   Pulse 62   Ht 165 cm (64.96\")   Wt 85 kg (187 lb 6.3 oz)   LMP  (LMP Unknown)   SpO2 98%   BMI 31.22 kg/m²   GENERAL: Gait: antalgic       MSK:  Ankle:  Right: non tender; Left:  Tender over the fibula about 4 cm above the joint, moderate swelling        Foot:  Right:  non tender; Left:  non tender    NEURO Sensation:  intact     Medical Decision Making    Data Review:   reviewed radiology images and reviewed radiology results    Assessment/Plan/Diagnosis/Treatment Options:   1. Stress fracture of left fibula, initial encounter  New fibula stress fracture on the left, healed prior tibial stress fracture.  We placed her in a new tall boot.  I will see her in 8 weeks for standing 3 views of the left ankle                            "

## 2019-06-25 RX ORDER — BISOPROLOL FUMARATE 5 MG/1
TABLET, FILM COATED ORAL
Qty: 90 TABLET | Refills: 3 | Status: SHIPPED | OUTPATIENT
Start: 2019-06-25 | End: 2020-07-20

## 2019-07-29 ENCOUNTER — OFFICE VISIT (OUTPATIENT)
Dept: ORTHOPEDIC SURGERY | Facility: CLINIC | Age: 64
End: 2019-07-29

## 2019-07-29 VITALS — HEART RATE: 71 BPM | HEIGHT: 65 IN | WEIGHT: 191.8 LBS | BODY MASS INDEX: 31.96 KG/M2 | OXYGEN SATURATION: 98 %

## 2019-07-29 DIAGNOSIS — Z09 FRACTURE FOLLOW-UP: Primary | ICD-10-CM

## 2019-07-29 DIAGNOSIS — M84.364D STRESS FRACTURE OF LEFT FIBULA WITH ROUTINE HEALING, SUBSEQUENT ENCOUNTER: ICD-10-CM

## 2019-07-29 DIAGNOSIS — M05.79 RHEUMATOID ARTHRITIS INVOLVING MULTIPLE SITES WITH POSITIVE RHEUMATOID FACTOR (HCC): ICD-10-CM

## 2019-07-29 PROCEDURE — 99213 OFFICE O/P EST LOW 20 MIN: CPT | Performed by: ORTHOPAEDIC SURGERY

## 2019-07-29 NOTE — PROGRESS NOTES
ESTABLISHED PATIENT    Patient: Mecca Renee  : 1955    Primary Care Provider: Mg Sarkar MD    Requesting Provider: As above    Follow-up (Stress fracture of left fibula)      History    Chief Complaint: Left fibular stress fracture    History of Present Illness: She returns for follow-up of her left fibular stress fracture.  She has been in a boot.  She reports much less pain.  She also has a history of rheumatoid arthritis, she reports reasonable control.    Current Outpatient Medications on File Prior to Visit   Medication Sig Dispense Refill   • amitriptyline (ELAVIL) 10 MG tablet TAKE 1 TABLET BY MOUTH EVERY NIGHT. 30 tablet 5   • Apremilast (OTEZLA PO) Take  by mouth 2 (Two) Times a Day.     • aspirin 81 MG EC tablet Take 81 mg by mouth Daily.     • bisoprolol (ZEBeta) 5 MG tablet TAKE 1/2 TABLET BY MOUTH TWICE DAILY 90 tablet 3   • Calcium Carbonate-Vit D-Min (CALCIUM 1200 PO) Take 1 tablet by mouth 2 (Two) Times a Day.     • cetirizine (zyrTEC) 10 MG tablet Take 10 mg by mouth Daily.     • Cholecalciferol (VITAMIN D3) 5000 UNITS capsule capsule Take  by mouth Daily.     • coenzyme Q10 100 MG capsule Take 100 mg by mouth Daily.     • diclofenac (VOLTAREN) 1 % gel gel Apply 4 g topically As Needed.     • etodolac (LODINE) 400 MG tablet Take 400 mg by mouth 2 (Two) Times a Day.     • flecainide (TAMBOCOR) 100 MG tablet TAKE 1 TABLET EVERY 12 HOURS DAILY. 180 tablet 3   • pregabalin (LYRICA) 75 MG capsule Take 75 mg by mouth 2 (Two) Times a Day.     • SUMAtriptan (IMITREX) 100 MG tablet SUMATRIPTAN SUCCINATE 100 MG TABS     • tiZANidine (ZANAFLEX) 2 MG tablet Take half to 1 tablet three times a day as needed for muscle spasms. (Patient taking differently: As Needed. Take half to 1 tablet three times a day as needed for muscle spasms.) 90 tablet 3   • traMADol (ULTRAM) 50 MG tablet Take 50 mg by mouth Every 6 (Six) Hours As Needed for Moderate Pain .       No current facility-administered  "medications on file prior to visit.       Allergies   Allergen Reactions   • Sulfa Antibiotics       Past Medical History:   Diagnosis Date   • Asthma    • Chronic pain disorder    • Diverticulosis    • Fibromyalgia    • GERD (gastroesophageal reflux disease)    • Migraine    • Neck pain    • Osteoarthritis    • Palpitations     palpitations with near syncope   • Rheumatoid arthritis (CMS/HCC)      Past Surgical History:   Procedure Laterality Date   • CHOLECYSTECTOMY     • FOOT SURGERY Right    • SHOULDER SURGERY Right      Family History   Problem Relation Age of Onset   • Diabetes Mother    • Cancer Mother    • Heart disease Mother    • Hypertension Mother    • Cancer Father    • Cancer Brother    • Heart disease Maternal Grandmother    • Stroke Maternal Grandmother       Social History     Socioeconomic History   • Marital status:      Spouse name: Not on file   • Number of children: Not on file   • Years of education: Not on file   • Highest education level: Not on file   Tobacco Use   • Smoking status: Never Smoker   • Smokeless tobacco: Never Used   Substance and Sexual Activity   • Alcohol use: No   • Drug use: No   • Sexual activity: Defer        Review of Systems   Constitutional: Negative.    HENT: Negative.    Eyes: Negative.    Respiratory: Negative.    Cardiovascular: Negative.    Gastrointestinal: Negative.    Endocrine: Negative.    Genitourinary: Negative.    Musculoskeletal: Positive for joint swelling.   Skin: Negative.    Allergic/Immunologic: Negative.    Neurological: Negative.    Hematological: Negative.    Psychiatric/Behavioral: Negative.        The following portions of the patient's history were reviewed and updated as appropriate: allergies, current medications, past family history, past medical history, past social history, past surgical history and problem list.    Physical Exam:   Pulse 71   Ht 165 cm (64.96\")   Wt 87 kg (191 lb 12.8 oz)   LMP  (LMP Unknown)   SpO2 98%   " BMI 31.96 kg/m²   GENERAL: Body habitus: overweight    Lower extremity edema: Left: trace; Right: trace    Gait: antalgic     Mental Status:  awake and alert; oriented to person, place, and time  MSK:  Tibia:  Right:  non tender; Left:  non tender        Ankle:  Right: non tender; Left:  Mildly tender over the stress fracture of the fibula about 5 cm above the joint, nontender in the ankle joint, much less swelling        Foot:  Right:  non tender; Left:  non tender    NEURO Sensation:  intact     Medical Decision Making    Data Review:   ordered and reviewed x-rays today    Assessment/Plan/Diagnosis/Treatment Options:   1. Stress fracture of left fibula with routine healing, subsequent encounter  She is improving.  She may start weaning out of the boot and start physical therapy.  She should use the boot for any long distances.  I expected to take 6 weeks to completely wean out of the boot.  I will see her in 10 to 12 weeks with repeat standing 3 views of the left ankle, or sooner with any problems  - Ambulatory Referral to Physical Therapy    2. Rheumatoid arthritis involving multiple sites with positive rheumatoid factor (CMS/HCC)  No active synovitis today

## 2019-10-15 RX ORDER — FLECAINIDE ACETATE 100 MG/1
TABLET ORAL
Qty: 180 TABLET | Refills: 3 | Status: SHIPPED | OUTPATIENT
Start: 2019-10-15 | End: 2020-10-14

## 2019-10-28 ENCOUNTER — OFFICE VISIT (OUTPATIENT)
Dept: ORTHOPEDIC SURGERY | Facility: CLINIC | Age: 64
End: 2019-10-28

## 2019-10-28 VITALS — HEART RATE: 69 BPM | OXYGEN SATURATION: 98 % | WEIGHT: 187.8 LBS | HEIGHT: 65 IN | BODY MASS INDEX: 31.29 KG/M2

## 2019-10-28 DIAGNOSIS — M79.671 RIGHT FOOT PAIN: ICD-10-CM

## 2019-10-28 DIAGNOSIS — M84.364D STRESS FRACTURE OF LEFT FIBULA WITH ROUTINE HEALING, SUBSEQUENT ENCOUNTER: Primary | ICD-10-CM

## 2019-10-28 PROCEDURE — 99213 OFFICE O/P EST LOW 20 MIN: CPT | Performed by: ORTHOPAEDIC SURGERY

## 2019-10-28 RX ORDER — CETIRIZINE HYDROCHLORIDE 10 MG/1
1 TABLET ORAL
COMMUNITY
End: 2022-10-10 | Stop reason: ALTCHOICE

## 2019-10-28 RX ORDER — PANTOPRAZOLE SODIUM 20 MG/1
20 TABLET, DELAYED RELEASE ORAL DAILY
COMMUNITY

## 2019-10-28 RX ORDER — PHENOL 1.4 %
AEROSOL, SPRAY (ML) MUCOUS MEMBRANE
COMMUNITY
End: 2019-10-28 | Stop reason: SDUPTHER

## 2019-10-28 RX ORDER — BACLOFEN 10 MG/1
TABLET ORAL
Refills: 5 | COMMUNITY
Start: 2019-08-14 | End: 2021-02-12

## 2019-10-28 RX ORDER — GUAIFENESIN 600 MG/1
600 TABLET, EXTENDED RELEASE ORAL AS NEEDED
COMMUNITY

## 2019-10-28 RX ORDER — IPRATROPIUM/ALBUTEROL SULFATE 20-100 MCG
1 MIST INHALER (GRAM) INHALATION AS NEEDED
Refills: 3 | COMMUNITY
Start: 2019-09-09

## 2019-10-28 RX ORDER — ESTROGEN,CON/M-PROGEST ACET 0.3-1.5MG
1 TABLET ORAL DAILY
Refills: 2 | COMMUNITY
Start: 2019-10-10 | End: 2022-08-26

## 2019-10-28 RX ORDER — FLUTICASONE PROPIONATE 50 MCG
2 SPRAY, SUSPENSION (ML) NASAL DAILY PRN
COMMUNITY

## 2019-10-28 NOTE — PROGRESS NOTES
ESTABLISHED PATIENT    Patient: Mecca Renee  : 1955    Primary Care Provider: Mg Sarkar MD    Requesting Provider: As above    Pain of the Right Foot and Follow-up (13 week follow up; Stress fracture of left fibula with routine healing)      History    Chief Complaint: Left ankle pain, right foot pain    History of Present Illness: She returns for follow-up of her left fibular stress fracture.  She notes that it is improved, but still is occasionally sore and feels somewhat weak.  She did do physical therapy.  She has had some pain in the right foot, under the fifth metatarsal where she had a Guo fracture in the past.  She wanted to make sure there was no reinjury.  She did not have any specific twisting or event, but has developed an area of pain.  On exam it is actually a painful corn.    Current Outpatient Medications on File Prior to Visit   Medication Sig Dispense Refill   • amitriptyline (ELAVIL) 10 MG tablet TAKE 1 TABLET BY MOUTH EVERY NIGHT. 30 tablet 5   • Apremilast (OTEZLA PO) Take  by mouth 2 (Two) Times a Day.     • aspirin 81 MG EC tablet Take 81 mg by mouth Daily.     • baclofen (LIORESAL) 10 MG tablet TAKE 1/2 TABLET BY MOUTH EVERY MORNING AND EVERY AFTERNOON, AND 1 TABLET AT BEDTIME  5   • bisoprolol (ZEBeta) 5 MG tablet TAKE 1/2 TABLET BY MOUTH TWICE DAILY 90 tablet 3   • Calcium Carbonate-Vit D-Min (CALCIUM 1200 PO) Take 1 tablet by mouth 2 (Two) Times a Day.     • cetirizine (ZYRTEC ALLERGY) 10 MG tablet Take 1 tablet by mouth.     • Cholecalciferol (VITAMIN D3) 5000 UNITS capsule capsule Take  by mouth Daily.     • Cholecalciferol 25 MCG (1000 UT) tablet Take  by mouth.     • Coenzyme Q10 (COQ-10) 100 MG capsule oral daily     • coenzyme Q10 100 MG capsule Take 100 mg by mouth Daily.     • COMBIVENT RESPIMAT  MCG/ACT inhaler Inhale 1 puff 4 (Four) Times a Day.  3   • diclofenac (VOLTAREN) 1 % gel gel Apply 4 g topically As Needed.     • etodolac (LODINE) 400 MG  tablet Take 400 mg by mouth 2 (Two) Times a Day.     • flecainide (TAMBOCOR) 100 MG tablet TAKE 1 TABLET EVERY 12 HOURS DAILY. 180 tablet 3   • fluticasone (FLONASE) 50 MCG/ACT nasal spray as needed     • guaiFENesin (MUCINEX) 600 MG 12 hr tablet as needed     • MULTIPLE VITAMIN PO Take 1 tablet by mouth.     • pantoprazole (PROTONIX) 20 MG EC tablet once daily     • pregabalin (LYRICA) 75 MG capsule Take 75 mg by mouth 2 (Two) Times a Day.     • PREMPRO 0.3-1.5 MG per tablet Take 1 tablet by mouth Daily.  2   • SUMAtriptan (IMITREX) 100 MG tablet SUMATRIPTAN SUCCINATE 100 MG TABS     • tiZANidine (ZANAFLEX) 2 MG tablet Take half to 1 tablet three times a day as needed for muscle spasms. (Patient taking differently: As Needed. Take half to 1 tablet three times a day as needed for muscle spasms.) 90 tablet 3   • traMADol (ULTRAM) 50 MG tablet Take 50 mg by mouth Every 6 (Six) Hours As Needed for Moderate Pain .     • [DISCONTINUED] cetirizine (zyrTEC) 10 MG tablet Take 10 mg by mouth Daily.     • [DISCONTINUED] calcium carbonate (CALCIUM 600) 600 MG tablet Take  by mouth.       No current facility-administered medications on file prior to visit.       Allergies   Allergen Reactions   • Sulfa Antibiotics       Past Medical History:   Diagnosis Date   • Asthma    • Chronic pain disorder    • Diverticulosis    • Fibromyalgia    • GERD (gastroesophageal reflux disease)    • Migraine    • Neck pain    • Osteoarthritis    • Palpitations     palpitations with near syncope   • Rheumatoid arthritis (CMS/HCC)      Past Surgical History:   Procedure Laterality Date   • CHOLECYSTECTOMY     • FOOT SURGERY Right    • SHOULDER SURGERY Right      Family History   Problem Relation Age of Onset   • Diabetes Mother    • Cancer Mother    • Heart disease Mother    • Hypertension Mother    • Cancer Father    • Cancer Brother    • Heart disease Maternal Grandmother    • Stroke Maternal Grandmother       Social History     Socioeconomic  "History   • Marital status:      Spouse name: Not on file   • Number of children: Not on file   • Years of education: Not on file   • Highest education level: Not on file   Tobacco Use   • Smoking status: Never Smoker   • Smokeless tobacco: Never Used   Substance and Sexual Activity   • Alcohol use: No   • Drug use: No   • Sexual activity: Defer        Review of Systems   Constitutional: Negative.    HENT: Negative.    Eyes: Negative.    Respiratory: Negative.    Cardiovascular: Negative.    Gastrointestinal: Negative.    Endocrine: Negative.    Genitourinary: Negative.    Musculoskeletal: Positive for arthralgias.   Skin: Negative.    Allergic/Immunologic: Negative.    Neurological: Negative.    Hematological: Negative.    Psychiatric/Behavioral: Negative.        The following portions of the patient's history were reviewed and updated as appropriate: allergies, current medications, past family history, past medical history, past social history, past surgical history and problem list.    Physical Exam:   Pulse 69   Ht 165 cm (64.96\")   Wt 85.2 kg (187 lb 12.8 oz)   LMP  (LMP Unknown)   SpO2 98%   BMI 31.29 kg/m²   GENERAL: Body habitus: Overweight    Lower extremity edema: Left: trace; Right: trace    Gait: antalgic     Mental Status:  awake and alert; oriented to person, place, and time  MSK:  Tibia:  Right:  non tender; Left:  non tender        Ankle:  Right: non tender; Left:  Slightly tender over the fibular stress fracture, range of motion is normal        Foot:  Right:  Tender corn on the plantar surface of the fifth metatarsal, at the mid third distal third junction, otherwise nontender; Left:  non tender    NEURO Sensation:  intact    Medical Decision Making    Data Review:   ordered and reviewed x-rays today    Assessment/Plan/Diagnosis/Treatment Options:   1. Stress fracture of left fibula with routine healing, subsequent encounter  The fibular stress fracture is healing, it still a little bit " tender.  I want to re-x-ray it again in 10 to 12 weeks, standing 3 views of the ankle.  She is out of the boot, and may slowly increase her activity.  - XR Ankle 3+ View Left    2. Right foot pain  No signs of fracture in the right foot, I think the corn is causing the pain.  We discussed callus and corn care.  - XR Foot 2 View Right

## 2020-01-15 ENCOUNTER — OFFICE VISIT (OUTPATIENT)
Dept: ORTHOPEDIC SURGERY | Facility: CLINIC | Age: 65
End: 2020-01-15

## 2020-01-15 VITALS — WEIGHT: 190 LBS | HEIGHT: 65 IN | BODY MASS INDEX: 31.65 KG/M2 | HEART RATE: 57 BPM | OXYGEN SATURATION: 94 %

## 2020-01-15 DIAGNOSIS — M17.4 OTHER SECONDARY OSTEOARTHRITIS OF BOTH KNEES: Primary | ICD-10-CM

## 2020-01-15 PROCEDURE — 20610 DRAIN/INJ JOINT/BURSA W/O US: CPT | Performed by: PHYSICIAN ASSISTANT

## 2020-01-15 PROCEDURE — 99213 OFFICE O/P EST LOW 20 MIN: CPT | Performed by: PHYSICIAN ASSISTANT

## 2020-01-15 RX ADMIN — METHYLPREDNISOLONE ACETATE 40 MG: 40 INJECTION, SUSPENSION INTRA-ARTICULAR; INTRALESIONAL; INTRAMUSCULAR; SOFT TISSUE at 13:38

## 2020-01-15 RX ADMIN — LIDOCAINE HYDROCHLORIDE 4 ML: 10 INJECTION, SOLUTION EPIDURAL; INFILTRATION; INTRACAUDAL; PERINEURAL at 13:38

## 2020-01-15 NOTE — PROGRESS NOTES
Procedure   Large Joint Arthrocentesis: L knee  Date/Time: 1/15/2020 1:38 PM  Consent given by: patient  Site marked: site marked  Timeout: Immediately prior to procedure a time out was called to verify the correct patient, procedure, equipment, support staff and site/side marked as required   Supporting Documentation  Indications: pain   Procedure Details  Location: knee - L knee  Preparation: Patient was prepped and draped in the usual sterile fashion  Needle size: 22 G  Approach: anterolateral  Medications administered: 4 mL lidocaine PF 1% 1 %; 40 mg methylPREDNISolone acetate 40 MG/ML  Patient tolerance: patient tolerated the procedure well with no immediate complications

## 2020-01-15 NOTE — PROGRESS NOTES
[facility] [place] [facility] Note    Subjective     Patient: Mecca Renee  : 1955    Primary Care Provider: Mg Sarkar MD    Requesting Provider: As above    Follow-up (22 month recheck - Other secondary osteoarthritis of both knees )      History    Chief Complaint: Bilateral knee pain    History of Present Illness: Patient presents today with increasing left knee pain.  She is well-known to me.  I have seen her for bilateral knee arthritis in the past but not since 2018.  She received steroid injection at that time and was doing well.  She reports  increasing pain over the past couple months.  She has functional pain with no night pain.  No radiating pain or mechanical symptoms.  She is recently treated with anti-inflammatories, her RA medications and topical gel.  She is here for further evaluation and treatment recommendations.    Current Outpatient Medications on File Prior to Visit   Medication Sig Dispense Refill   • amitriptyline (ELAVIL) 10 MG tablet TAKE 1 TABLET BY MOUTH EVERY NIGHT. 30 tablet 5   • Apremilast (OTEZLA PO) Take  by mouth 2 (Two) Times a Day.     • aspirin 81 MG EC tablet Take 81 mg by mouth Daily.     • baclofen (LIORESAL) 10 MG tablet TAKE 1/2 TABLET BY MOUTH EVERY MORNING AND EVERY AFTERNOON, AND 1 TABLET AT BEDTIME  5   • bisoprolol (ZEBeta) 5 MG tablet TAKE 1/2 TABLET BY MOUTH TWICE DAILY 90 tablet 3   • Calcium Carbonate-Vit D-Min (CALCIUM 1200 PO) Take 1 tablet by mouth 2 (Two) Times a Day.     • cetirizine (ZYRTEC ALLERGY) 10 MG tablet Take 1 tablet by mouth.     • Cholecalciferol (VITAMIN D3) 5000 UNITS capsule capsule Take  by mouth Daily.     • Cholecalciferol 25 MCG (1000 UT) tablet Take  by mouth.     • Coenzyme Q10 (COQ-10) 100 MG capsule oral daily     • coenzyme Q10 100 MG capsule Take 100 mg by mouth Daily.     • COMBIVENT RESPIMAT  MCG/ACT inhaler Inhale 1 puff 4 (Four) Times a Day.  3   • diclofenac (VOLTAREN) 1 % gel gel Apply 4 g  topically As Needed.     • etodolac (LODINE) 400 MG tablet Take 400 mg by mouth 2 (Two) Times a Day.     • flecainide (TAMBOCOR) 100 MG tablet TAKE 1 TABLET EVERY 12 HOURS DAILY. 180 tablet 3   • fluticasone (FLONASE) 50 MCG/ACT nasal spray as needed     • guaiFENesin (MUCINEX) 600 MG 12 hr tablet as needed     • MULTIPLE VITAMIN PO Take 1 tablet by mouth.     • pantoprazole (PROTONIX) 20 MG EC tablet once daily     • pregabalin (LYRICA) 75 MG capsule Take 75 mg by mouth 2 (Two) Times a Day.     • PREMPRO 0.3-1.5 MG per tablet Take 1 tablet by mouth Daily.  2   • SUMAtriptan (IMITREX) 100 MG tablet SUMATRIPTAN SUCCINATE 100 MG TABS     • tiZANidine (ZANAFLEX) 2 MG tablet Take half to 1 tablet three times a day as needed for muscle spasms. (Patient taking differently: As Needed. Take half to 1 tablet three times a day as needed for muscle spasms.) 90 tablet 3   • traMADol (ULTRAM) 50 MG tablet Take 50 mg by mouth Every 6 (Six) Hours As Needed for Moderate Pain .       No current facility-administered medications on file prior to visit.       Allergies   Allergen Reactions   • Sulfa Antibiotics       Past Medical History:   Diagnosis Date   • Asthma    • Chronic pain disorder    • Diverticulosis    • Fibromyalgia    • GERD (gastroesophageal reflux disease)    • Migraine    • Neck pain    • Osteoarthritis    • Palpitations     palpitations with near syncope   • Rheumatoid arthritis (CMS/HCC)      Past Surgical History:   Procedure Laterality Date   • CHOLECYSTECTOMY     • EYE SURGERY      eye lid lift   • FOOT SURGERY Right    • SHOULDER SURGERY Right      Family History   Problem Relation Age of Onset   • Diabetes Mother    • Cancer Mother    • Heart disease Mother    • Hypertension Mother    • Cancer Father    • Cancer Brother    • Heart disease Maternal Grandmother    • Stroke Maternal Grandmother       Social History     Socioeconomic History   • Marital status:      Spouse name: Not on file   • Number of  "children: Not on file   • Years of education: Not on file   • Highest education level: Not on file   Tobacco Use   • Smoking status: Never Smoker   • Smokeless tobacco: Never Used   Substance and Sexual Activity   • Alcohol use: No   • Drug use: No   • Sexual activity: Defer        Review of Systems   Constitutional: Positive for activity change.   HENT: Negative.    Eyes: Negative.         Dry eye   Respiratory: Negative.    Cardiovascular: Negative.    Gastrointestinal: Negative.    Endocrine: Negative.    Genitourinary: Negative.    Musculoskeletal: Positive for arthralgias and joint swelling.   Skin: Negative.    Allergic/Immunologic: Negative.    Neurological: Positive for light-headedness.   Hematological: Negative.    Psychiatric/Behavioral: Negative.        The following portions of the patient's history were reviewed and updated as appropriate: allergies, current medications, past family history, past medical history, past social history, past surgical history and problem list.      Objective      Physical Exam  Pulse 57   Ht 165 cm (64.96\")   Wt 86.2 kg (190 lb)   LMP  (LMP Unknown)   SpO2 94%   BMI 31.66 kg/m²     Body mass index is 31.66 kg/m².    Patient is well developed, well nourished and in no acute distress.  Alert and oriented x 3.    Ortho Exam      Left Knee Exam  ----------  ALIGNMENT: Right: neutral----------  RANGE OF MOTION:  Right: 0-120  LIGAMENTOUS STABILITY:   Right: stable to valgus and varus stress----------  STRENGTH:  KNEE FLEXION Right 5/5  KNEE EXTENSION Right 5/5 ----------  PAIN WITH PALPATION: Right medial joint line  PAIN WITH KNEE ROM: Right no  PATELLAR CREPITUS: Right yes         Medical Decision Making    Data Review:   ordered and reviewed x-rays today    Assessment:  1. Other secondary osteoarthritis of both knees        Plan:  Left  knee arthritis in the face of rheumatoid arthritis.  I reviewed today's x-rays and clinical findings with the patient.  Patient has " medial joint line tenderness with crepitus no evidence of ligament or meniscal pathology.  She has done well with steroid injection in the past the last being in 2018.  Plan today is repeat steroid injection into bilateral knees.  I will see her back in 2 months to see how she is progressed or sooner if needed.    Using sterile technique, the left knee was sterilely prepped with Hibiclens. Following a time out,  using a 22 gauge needle, the left knee was injected with 40mg Depo Medrol, 4 cc lidocaine.  Patient tolerated the procedure well.  No complications.  '      Leny Alcaraz PA-C  01/17/20  8:16 AM

## 2020-01-17 RX ORDER — LIDOCAINE HYDROCHLORIDE 10 MG/ML
4 INJECTION, SOLUTION EPIDURAL; INFILTRATION; INTRACAUDAL; PERINEURAL
Status: COMPLETED | OUTPATIENT
Start: 2020-01-15 | End: 2020-01-15

## 2020-01-17 RX ORDER — METHYLPREDNISOLONE ACETATE 40 MG/ML
40 INJECTION, SUSPENSION INTRA-ARTICULAR; INTRALESIONAL; INTRAMUSCULAR; SOFT TISSUE
Status: COMPLETED | OUTPATIENT
Start: 2020-01-15 | End: 2020-01-15

## 2020-02-05 NOTE — PROGRESS NOTES
[facility]  Office Progress Note  Mecca Renee  1955      Visit Date: 02/06/20    PCP: Mg Sarkar MD  [address] [id number]    IDENTIFICATION: A 64 y.o. female   from [place].     PROBLEM LIST:   1. Palpitations with near syncope:  a. Echocardiogram, 09/13/2013: LVEF (55% to 60%), abnormal LV diastolic filling consistent with impaired relaxation, trace MR, mild TR with an RVSP of 31.  b. Event recorder, September through October 2013: Low frequency PACs with occasional short runs of nonsustained atrial tachycardia.   2. CP  a. 6/15 Lexiscan wnl  3. GERD.  4. Rheumatoid arthritis.   5. bilat carpal tunnel  6. Osteoarthritis.   7. Diverticulosis.   8. Surgical history:  a. Right shoulder surgery.  b. Right foot surgery.       Chief Complaint   Patient presents with   • tachyarrhythmia        Allergies  Allergies   Allergen Reactions   • Sulfa Antibiotics        Current Medications    Current Outpatient Medications:   •  amitriptyline (ELAVIL) 10 MG tablet, TAKE 1 TABLET BY MOUTH EVERY NIGHT. (Patient taking differently: Take 10 mg by mouth As Needed.), Disp: 30 tablet, Rfl: 5  •  Apremilast (OTEZLA PO), Take  by mouth 2 (Two) Times a Day., Disp: , Rfl:   •  aspirin 81 MG EC tablet, Take 81 mg by mouth Daily., Disp: , Rfl:   •  baclofen (LIORESAL) 10 MG tablet, TAKE 1/2 TABLET BY MOUTH EVERY MORNING AND EVERY AFTERNOON, AND 1 TABLET AT BEDTIME, Disp: , Rfl: 5  •  bisoprolol (ZEBeta) 5 MG tablet, TAKE 1/2 TABLET BY MOUTH TWICE DAILY (Patient taking differently: Take 5 mg by mouth. 0.5 tablet daily), Disp: 90 tablet, Rfl: 3  •  Calcium Carbonate-Vit D-Min (CALCIUM 1200 PO), Take 1 tablet by mouth 2 (Two) Times a Day., Disp: , Rfl:   •  cetirizine (ZYRTEC ALLERGY) 10 MG tablet, Take 1 tablet by mouth., Disp: , Rfl:   •  Cholecalciferol (VITAMIN D3) 5000 UNITS capsule capsule, Take  by mouth Daily., Disp: , Rfl:   •  Cholecalciferol 25 MCG  "(1000 UT) tablet, Take  by mouth., Disp: , Rfl:   •  Coenzyme Q10 (COQ-10) 100 MG capsule, oral daily, Disp: , Rfl:   •  coenzyme Q10 100 MG capsule, Take 100 mg by mouth Daily., Disp: , Rfl:   •  COMBIVENT RESPIMAT  MCG/ACT inhaler, Inhale 1 puff 4 (Four) Times a Day., Disp: , Rfl: 3  •  diclofenac (VOLTAREN) 1 % gel gel, Apply 4 g topically As Needed., Disp: , Rfl:   •  etodolac (LODINE) 400 MG tablet, Take 400 mg by mouth 2 (Two) Times a Day., Disp: , Rfl:   •  flecainide (TAMBOCOR) 100 MG tablet, TAKE 1 TABLET EVERY 12 HOURS DAILY., Disp: 180 tablet, Rfl: 3  •  fluticasone (FLONASE) 50 MCG/ACT nasal spray, as needed, Disp: , Rfl:   •  guaiFENesin (MUCINEX) 600 MG 12 hr tablet, as needed, Disp: , Rfl:   •  MULTIPLE VITAMIN PO, Take 1 tablet by mouth., Disp: , Rfl:   •  pantoprazole (PROTONIX) 20 MG EC tablet, once daily, Disp: , Rfl:   •  pregabalin (LYRICA) 75 MG capsule, Take 75 mg by mouth 2 (Two) Times a Day., Disp: , Rfl:   •  PREMPRO 0.3-1.5 MG per tablet, Take 1 tablet by mouth Daily., Disp: , Rfl: 2  •  SUMAtriptan (IMITREX) 100 MG tablet, SUMATRIPTAN SUCCINATE 100 MG TABS, Disp: , Rfl:   •  tiZANidine (ZANAFLEX) 2 MG tablet, Take half to 1 tablet three times a day as needed for muscle spasms. (Patient taking differently: As Needed. Take half to 1 tablet three times a day as needed for muscle spasms.), Disp: 90 tablet, Rfl: 3  •  traMADol (ULTRAM) 50 MG tablet, Take 50 mg by mouth Every 6 (Six) Hours As Needed for Moderate Pain ., Disp: , Rfl:       History of Present Illness   Mecca Renee is a 64 y.o. year old female here for follow up.    Decreased her bisoprolol 11/19 w low bp/hr.  Better since then.  Continues to work daily without issues.      OBJECTIVE:  Vitals:    02/06/20 1109   BP: 98/58   BP Location: Left arm   Patient Position: Sitting   Pulse: 60   SpO2: 97%   Weight: 85.7 kg (189 lb)   Height: 165.1 cm (65\")     Physical Exam   Constitutional: She appears well-developed and " well-nourished.   Neck: Normal range of motion. Neck supple. No hepatojugular reflux and no JVD present. Carotid bruit is not present. No tracheal deviation present. No thyromegaly present.   Cardiovascular: Regular rhythm, S1 normal, S2 normal, intact distal pulses and normal pulses. Bradycardia present. PMI is not displaced. Exam reveals no gallop, no distant heart sounds, no friction rub, no midsystolic click and no opening snap.   No murmur heard.  Pulses:       Radial pulses are 2+ on the right side, and 2+ on the left side.        Dorsalis pedis pulses are 2+ on the right side, and 2+ on the left side.        Posterior tibial pulses are 2+ on the right side, and 2+ on the left side.   Pulmonary/Chest: Effort normal and breath sounds normal. She has no wheezes. She has no rales.   Abdominal: Soft. Bowel sounds are normal. She exhibits no mass. There is no tenderness. There is no guarding.       Diagnostic Data:    ECG 12 Lead  Date/Time: 2/6/2020 11:23 AM  Performed by: William Brewster MD  Authorized by: William Brewster MD   Comparison: compared with previous ECG from 1/25/2019  Similar to previous ECG  Rhythm: sinus bradycardia  BPM: 53  Conduction: 1st degree AV block    Clinical impression: non-specific ECG              ASSESSMENT:   Diagnosis Plan   1. Palpitations     2. Essential hypertension         PLAN:  Observations acceptable on low-dose bisoprolol and flecainide at current first-degree AV block or bradycardia worsen will need alternative treatment    Hypertension with hypotension and current    Needs follow-up lipid profile if not obtained within the last 1 year    Mg Sarkar MD, thank you for referring Ms. Renee for evaluation.  I have forwarded my electronically generated recommendations to you for review.  Please do not hesitate to call with any questions.      William Brewster MD, [facility]

## 2020-02-06 ENCOUNTER — OFFICE VISIT (OUTPATIENT)
Dept: CARDIOLOGY | Facility: CLINIC | Age: 65
End: 2020-02-06

## 2020-02-06 VITALS
HEART RATE: 60 BPM | HEIGHT: 65 IN | SYSTOLIC BLOOD PRESSURE: 98 MMHG | BODY MASS INDEX: 31.49 KG/M2 | OXYGEN SATURATION: 97 % | WEIGHT: 189 LBS | DIASTOLIC BLOOD PRESSURE: 58 MMHG

## 2020-02-06 DIAGNOSIS — I10 ESSENTIAL HYPERTENSION: ICD-10-CM

## 2020-02-06 DIAGNOSIS — R00.2 PALPITATIONS: Primary | ICD-10-CM

## 2020-02-06 PROCEDURE — 99213 OFFICE O/P EST LOW 20 MIN: CPT | Performed by: INTERNAL MEDICINE

## 2020-02-06 PROCEDURE — 93000 ELECTROCARDIOGRAM COMPLETE: CPT | Performed by: INTERNAL MEDICINE

## 2020-03-18 ENCOUNTER — OFFICE VISIT (OUTPATIENT)
Dept: ORTHOPEDIC SURGERY | Facility: CLINIC | Age: 65
End: 2020-03-18

## 2020-03-18 VITALS — WEIGHT: 188 LBS | HEART RATE: 69 BPM | HEIGHT: 65 IN | OXYGEN SATURATION: 98 % | BODY MASS INDEX: 31.32 KG/M2

## 2020-03-18 DIAGNOSIS — M17.4 OTHER SECONDARY OSTEOARTHRITIS OF BOTH KNEES: Primary | ICD-10-CM

## 2020-03-18 PROCEDURE — 99213 OFFICE O/P EST LOW 20 MIN: CPT | Performed by: PHYSICIAN ASSISTANT

## 2020-03-18 NOTE — PROGRESS NOTES
[facility]    Subjective     Patient: Mecca Renee  : 1955    Primary Care Provider: Mg Sarkar MD    Requesting Provider: As above    Follow-up (2 month follow up; Other secondary osteoarthritis of both knees-injections given on 1/15/20)      History    Chief Complaint: Bilateral knee pain    History of Present Illness: Patient returns today with increasing bilateral knee pain, especially the left knee.  She had steroid injection in January which only improved her pain for a short time.  She continues to have progressively worsening medial and anterior knee pain.  She feels her rheumatoid arthritis is well controlled.  She reports the pain is affecting her ability to work and do her normal daily activity.  She is here for further evaluation and treatment recommendations.    Current Outpatient Medications on File Prior to Visit   Medication Sig Dispense Refill   • amitriptyline (ELAVIL) 10 MG tablet TAKE 1 TABLET BY MOUTH EVERY NIGHT. (Patient taking differently: Take 10 mg by mouth As Needed.) 30 tablet 5   • Apremilast (OTEZLA PO) Take  by mouth 2 (Two) Times a Day.     • aspirin 81 MG EC tablet Take 81 mg by mouth Daily.     • baclofen (LIORESAL) 10 MG tablet TAKE 1/2 TABLET BY MOUTH EVERY MORNING AND EVERY AFTERNOON, AND 1 TABLET AT BEDTIME  5   • bisoprolol (ZEBeta) 5 MG tablet TAKE 1/2 TABLET BY MOUTH TWICE DAILY (Patient taking differently: Take 5 mg by mouth. 0.5 tablet daily) 90 tablet 3   • Calcium Carbonate-Vit D-Min (CALCIUM 1200 PO) Take 1 tablet by mouth 2 (Two) Times a Day.     • cetirizine (ZYRTEC ALLERGY) 10 MG tablet Take 1 tablet by mouth.     • Cholecalciferol (VITAMIN D3) 5000 UNITS capsule capsule Take  by mouth Daily.     • Cholecalciferol 25 MCG (1000 UT) tablet Take  by mouth.     • Coenzyme Q10 (COQ-10) 100 MG capsule oral daily     • coenzyme Q10 100 MG capsule Take 100 mg by mouth Daily.     • COMBIVENT RESPIMAT  MCG/ACT inhaler  Inhale 1 puff 4 (Four) Times a Day.  3   • diclofenac (VOLTAREN) 1 % gel gel Apply 4 g topically As Needed.     • etodolac (LODINE) 400 MG tablet Take 400 mg by mouth 2 (Two) Times a Day.     • flecainide (TAMBOCOR) 100 MG tablet TAKE 1 TABLET EVERY 12 HOURS DAILY. 180 tablet 3   • fluticasone (FLONASE) 50 MCG/ACT nasal spray as needed     • guaiFENesin (MUCINEX) 600 MG 12 hr tablet as needed     • MULTIPLE VITAMIN PO Take 1 tablet by mouth.     • pantoprazole (PROTONIX) 20 MG EC tablet once daily     • pregabalin (LYRICA) 75 MG capsule Take 75 mg by mouth 2 (Two) Times a Day.     • PREMPRO 0.3-1.5 MG per tablet Take 1 tablet by mouth Daily.  2   • SUMAtriptan (IMITREX) 100 MG tablet SUMATRIPTAN SUCCINATE 100 MG TABS     • tiZANidine (ZANAFLEX) 2 MG tablet Take half to 1 tablet three times a day as needed for muscle spasms. (Patient taking differently: As Needed. Take half to 1 tablet three times a day as needed for muscle spasms.) 90 tablet 3   • traMADol (ULTRAM) 50 MG tablet Take 50 mg by mouth Every 6 (Six) Hours As Needed for Moderate Pain .       No current facility-administered medications on file prior to visit.       Allergies   Allergen Reactions   • Sulfa Antibiotics       Past Medical History:   Diagnosis Date   • Asthma    • Chronic pain disorder    • Diverticulosis    • Fibromyalgia    • GERD (gastroesophageal reflux disease)    • Migraine    • Neck pain    • Osteoarthritis    • Palpitations     palpitations with near syncope   • Rheumatoid arthritis (CMS/HCC)      Past Surgical History:   Procedure Laterality Date   • CHOLECYSTECTOMY     • EYE SURGERY      eye lid lift   • EYE SURGERY  12/2019   • FOOT SURGERY Right    • SHOULDER SURGERY Right      Family History   Problem Relation Age of Onset   • Diabetes Mother    • Cancer Mother    • Heart disease Mother    • Hypertension Mother    • Cancer Father    • Cancer Brother    • Heart disease Maternal Grandmother    • Stroke Maternal Grandmother      "  Social History     Socioeconomic History   • Marital status:      Spouse name: Not on file   • Number of children: Not on file   • Years of education: Not on file   • Highest education level: Not on file   Tobacco Use   • Smoking status: Never Smoker   • Smokeless tobacco: Never Used   Substance and Sexual Activity   • Alcohol use: No   • Drug use: No   • Sexual activity: Defer        Review of Systems   Constitutional: Positive for activity change.   Eyes: Negative.    Respiratory: Negative.    Cardiovascular: Negative.    Gastrointestinal: Negative.    Endocrine: Negative.    Genitourinary: Negative.    Musculoskeletal: Positive for arthralgias, back pain and joint swelling.   Skin: Negative.    Allergic/Immunologic: Negative.    Neurological: Positive for headaches.   Hematological: Negative.    Psychiatric/Behavioral: Negative.        The following portions of the patient's history were reviewed and updated as appropriate: allergies, current medications, past family history, past medical history, past social history, past surgical history and problem list.      Objective      Physical Exam  Pulse 69   Ht 165.1 cm (65\")   Wt 85.3 kg (188 lb)   LMP  (LMP Unknown)   SpO2 98%   BMI 31.28 kg/m²     Body mass index is 31.28 kg/m².    Patient is well developed, well nourished and in no acute distress.  Alert and oriented x 3.    Ortho Exam    Right Knee Exam  ----------  ALIGNMENT: Right: neutral----------  RANGE OF MOTION:  Right: 0-120  LIGAMENTOUS STABILITY:   Right: stable to valgus and varus stress----------  STRENGTH:  KNEE FLEXION Right 5/5  KNEE EXTENSION Right 5/5 ----------  PAIN WITH PALPATION: Right medial joint line and anterior knee  PAIN WITH KNEE ROM: Right no  PATELLAR CREPITUS: Right yes   ----------    Left Knee Exam  ----------  ALIGNMENT: Left: neutral----------  RANGE OF MOTION:  Left: 0-120  LIGAMENTOUS STABILITY:   Left: stable to valgus and varus stress----------  STRENGTH:  KNEE " FLEXION left 5/5  KNEE EXTENSION left 5/5 ----------  PAIN WITH PALPATION: Left medial joint line and anterior knee  PAIN WITH KNEE ROM: Left no  PATELLAR CREPITUS: Left yes         Medical Decision Making    Data Review:   none    Assessment:  1. Other secondary osteoarthritis of both knees        Plan:  Bilateral knee pain in the face of rheumatoid arthritis.  Reviewed clinical findings, past and current treatment the patient.  Patient got only short-term relief from the steroid injection.  We discussed further treatment options including Visco supplementation.  Her daughter just received a series and has done very well.  Patient would like to try this in both knees.  Plan today is to get Visco approved for bilateral knees and she will return to begin the series once approved.      Leny Alcaraz PA-C  03/19/20  09:59

## 2020-05-12 ENCOUNTER — CLINICAL SUPPORT (OUTPATIENT)
Dept: ORTHOPEDIC SURGERY | Facility: CLINIC | Age: 65
End: 2020-05-12

## 2020-05-12 DIAGNOSIS — M17.0 PRIMARY OSTEOARTHRITIS OF BOTH KNEES: Primary | ICD-10-CM

## 2020-05-12 PROCEDURE — 20610 DRAIN/INJ JOINT/BURSA W/O US: CPT | Performed by: PHYSICIAN ASSISTANT

## 2020-05-12 RX ORDER — NARATRIPTAN 1 MG/1
1 TABLET ORAL ONCE AS NEEDED
COMMUNITY
Start: 2020-05-08 | End: 2021-08-17

## 2020-05-12 NOTE — PROGRESS NOTES
Procedure   Large Joint Arthrocentesis: R knee  Date/Time: 5/12/2020 2:09 PM  Consent given by: patient  Site marked: site marked  Timeout: Immediately prior to procedure a time out was called to verify the correct patient, procedure, equipment, support staff and site/side marked as required   Supporting Documentation  Indications: pain   Procedure Details  Location: knee - R knee  Preparation: Patient was prepped and draped in the usual sterile fashion  Needle size: 22 G  Approach: anterolateral  Medications administered: 30 mg Hyaluronan 30 MG/2ML  Patient tolerance: patient tolerated the procedure well with no immediate complications    Large Joint Arthrocentesis: L knee  Date/Time: 5/12/2020 2:10 PM  Consent given by: patient  Site marked: site marked  Timeout: Immediately prior to procedure a time out was called to verify the correct patient, procedure, equipment, support staff and site/side marked as required   Supporting Documentation  Indications: pain   Procedure Details  Location: knee - L knee  Preparation: Patient was prepped and draped in the usual sterile fashion  Needle size: 22 G  Approach: anterolateral  Medications administered: 30 mg Hyaluronan 30 MG/2ML  Patient tolerance: patient tolerated the procedure well with no immediate complications

## 2020-05-12 NOTE — PROGRESS NOTES
Subjective     Injections (Bilateral knee OrthoVisc injections #1)      Mecca Renee is a 65 y.o. female.     History of Present Illness   Patient presents for the 1st injection of Orthovisc in bilateral knees.  She has not had prior visco in the past.  Allergies   Allergen Reactions   • Sulfa Antibiotics      Current Outpatient Medications on File Prior to Visit   Medication Sig Dispense Refill   • amitriptyline (ELAVIL) 10 MG tablet TAKE 1 TABLET BY MOUTH EVERY NIGHT. (Patient taking differently: Take 10 mg by mouth As Needed.) 30 tablet 5   • Apremilast (OTEZLA PO) Take  by mouth 2 (Two) Times a Day.     • aspirin 81 MG EC tablet Take 81 mg by mouth Daily.     • baclofen (LIORESAL) 10 MG tablet TAKE 1/2 TABLET BY MOUTH EVERY MORNING AND EVERY AFTERNOON, AND 1 TABLET AT BEDTIME  5   • bisoprolol (ZEBeta) 5 MG tablet TAKE 1/2 TABLET BY MOUTH TWICE DAILY (Patient taking differently: Take 5 mg by mouth. 0.5 tablet daily) 90 tablet 3   • Calcium Carbonate-Vit D-Min (CALCIUM 1200 PO) Take 1 tablet by mouth 2 (Two) Times a Day.     • cetirizine (ZYRTEC ALLERGY) 10 MG tablet Take 1 tablet by mouth.     • Cholecalciferol (VITAMIN D3) 5000 UNITS capsule capsule Take  by mouth Daily.     • Cholecalciferol 25 MCG (1000 UT) tablet Take  by mouth.     • Coenzyme Q10 (COQ-10) 100 MG capsule oral daily     • coenzyme Q10 100 MG capsule Take 100 mg by mouth Daily.     • COMBIVENT RESPIMAT  MCG/ACT inhaler Inhale 1 puff 4 (Four) Times a Day.  3   • diclofenac (VOLTAREN) 1 % gel gel Apply 4 g topically As Needed.     • etodolac (LODINE) 400 MG tablet Take 400 mg by mouth 2 (Two) Times a Day.     • flecainide (TAMBOCOR) 100 MG tablet TAKE 1 TABLET EVERY 12 HOURS DAILY. 180 tablet 3   • fluticasone (FLONASE) 50 MCG/ACT nasal spray as needed     • guaiFENesin (MUCINEX) 600 MG 12 hr tablet as needed     • MULTIPLE VITAMIN PO Take 1 tablet by mouth.     • naratriptan (AMERGE) 1 MG tablet      • pantoprazole (PROTONIX) 20 MG  EC tablet once daily     • pregabalin (LYRICA) 75 MG capsule Take 75 mg by mouth 2 (Two) Times a Day.     • PREMPRO 0.3-1.5 MG per tablet Take 1 tablet by mouth Daily.  2   • SUMAtriptan (IMITREX) 100 MG tablet SUMATRIPTAN SUCCINATE 100 MG TABS     • tiZANidine (ZANAFLEX) 2 MG tablet Take half to 1 tablet three times a day as needed for muscle spasms. (Patient taking differently: As Needed. Take half to 1 tablet three times a day as needed for muscle spasms.) 90 tablet 3   • traMADol (ULTRAM) 50 MG tablet Take 50 mg by mouth Every 6 (Six) Hours As Needed for Moderate Pain .       No current facility-administered medications on file prior to visit.      Social History     Socioeconomic History   • Marital status:      Spouse name: Not on file   • Number of children: Not on file   • Years of education: Not on file   • Highest education level: Not on file   Tobacco Use   • Smoking status: Never Smoker   • Smokeless tobacco: Never Used   Substance and Sexual Activity   • Alcohol use: No   • Drug use: No   • Sexual activity: Defer     Past Surgical History:   Procedure Laterality Date   • CHOLECYSTECTOMY     • EYE SURGERY      eye lid lift   • EYE SURGERY  12/2019   • FOOT SURGERY Right    • SHOULDER SURGERY Right      Family History   Problem Relation Age of Onset   • Diabetes Mother    • Cancer Mother    • Heart disease Mother    • Hypertension Mother    • Cancer Father    • Cancer Brother    • Heart disease Maternal Grandmother    • Stroke Maternal Grandmother      Past Medical History:   Diagnosis Date   • Asthma    • Chronic pain disorder    • Diverticulosis    • Fibromyalgia    • GERD (gastroesophageal reflux disease)    • Migraine    • Neck pain    • Osteoarthritis    • Palpitations     palpitations with near syncope   • Rheumatoid arthritis (CMS/HCC)          Review of Systems   Constitutional: Negative.    HENT: Negative.    Eyes: Negative.    Respiratory: Negative.    Cardiovascular: Negative.     Gastrointestinal: Negative.    Endocrine: Negative.    Genitourinary: Negative.    Musculoskeletal: Positive for arthralgias.   Skin: Negative.    Allergic/Immunologic: Negative.    Neurological: Negative.    Hematological: Negative.    Psychiatric/Behavioral: Negative.        The following portions of the patient's history were reviewed and updated as appropriate: allergies, current medications, past family history, past medical history, past social history, past surgical history and problem list.    Ortho Exam      Medical Decision Making    Assessment and Plan/ Diagnosis/Treatment options:   Bilateral knee arthritis.  We will the series in both knees.  RTC in 1 weeks for the 2nd injection or sooner if needed.    Using sterile technique, the left knee was sterilely prepped with Hibiclens.  Following time out, using a 22 gauge needle the left knee was aspirated and then injected with 2 ml Orthovisc. Approximately 0.5 mm of straw-colored fluid was obtained.  Patient tolerated the procedure well.  No complications.      Using sterile technique, the right knee was sterilely prepped with Hibiclens.  Following time out, using a 22 gauge needle the right knee was aspirated and then injected with 2 ml Orthovisc. Approximately 0.5 mm of straw-colored fluid was obtained.  Patient tolerated the procedure well.  No complications.

## 2020-05-19 ENCOUNTER — CLINICAL SUPPORT (OUTPATIENT)
Dept: ORTHOPEDIC SURGERY | Facility: CLINIC | Age: 65
End: 2020-05-19

## 2020-05-19 DIAGNOSIS — M17.4 OTHER SECONDARY OSTEOARTHRITIS OF BOTH KNEES: Primary | ICD-10-CM

## 2020-05-19 PROCEDURE — 20610 DRAIN/INJ JOINT/BURSA W/O US: CPT | Performed by: PHYSICIAN ASSISTANT

## 2020-05-19 RX ORDER — LIDOCAINE HYDROCHLORIDE 10 MG/ML
3 INJECTION, SOLUTION EPIDURAL; INFILTRATION; INTRACAUDAL; PERINEURAL
Status: COMPLETED | OUTPATIENT
Start: 2020-05-19 | End: 2020-05-19

## 2020-05-19 RX ADMIN — LIDOCAINE HYDROCHLORIDE 3 ML: 10 INJECTION, SOLUTION EPIDURAL; INFILTRATION; INTRACAUDAL; PERINEURAL at 14:08

## 2020-05-19 NOTE — PROGRESS NOTES
Procedure   Large Joint Arthrocentesis: R knee  Date/Time: 5/19/2020 2:08 PM  Consent given by: patient  Site marked: site marked  Timeout: Immediately prior to procedure a time out was called to verify the correct patient, procedure, equipment, support staff and site/side marked as required   Supporting Documentation  Indications: pain   Procedure Details  Location: knee - R knee  Preparation: Patient was prepped and draped in the usual sterile fashion  Needle size: 22 G  Approach: anterolateral  Medications administered: 30 mg Hyaluronan 30 MG/2ML; 3 mL lidocaine PF 1% 1 %  Patient tolerance: patient tolerated the procedure well with no immediate complications    Large Joint Arthrocentesis: L knee  Date/Time: 5/19/2020 2:08 PM  Consent given by: patient  Site marked: site marked  Timeout: Immediately prior to procedure a time out was called to verify the correct patient, procedure, equipment, support staff and site/side marked as required   Supporting Documentation  Indications: pain   Procedure Details  Location: knee - L knee  Preparation: Patient was prepped and draped in the usual sterile fashion  Needle size: 22 G  Approach: anterolateral  Medications administered: 30 mg Hyaluronan 30 MG/2ML; 3 mL lidocaine PF 1% 1 %  Patient tolerance: patient tolerated the procedure well with no immediate complications

## 2020-05-19 NOTE — PROGRESS NOTES
Subjective     Injections (bilateral knee orthovisc injection #2 )      Mecca Renee is a 65 y.o. female.     History of Present Illness   Patient presents for the second injection of Orthovisc in bilateral knees.  She is tolerated previous injections well.  Allergies   Allergen Reactions   • Sulfa Antibiotics      Current Outpatient Medications on File Prior to Visit   Medication Sig Dispense Refill   • amitriptyline (ELAVIL) 10 MG tablet TAKE 1 TABLET BY MOUTH EVERY NIGHT. (Patient taking differently: Take 10 mg by mouth As Needed.) 30 tablet 5   • Apremilast (OTEZLA PO) Take  by mouth 2 (Two) Times a Day.     • aspirin 81 MG EC tablet Take 81 mg by mouth Daily.     • baclofen (LIORESAL) 10 MG tablet TAKE 1/2 TABLET BY MOUTH EVERY MORNING AND EVERY AFTERNOON, AND 1 TABLET AT BEDTIME  5   • bisoprolol (ZEBeta) 5 MG tablet TAKE 1/2 TABLET BY MOUTH TWICE DAILY (Patient taking differently: Take 5 mg by mouth. 0.5 tablet daily) 90 tablet 3   • Calcium Carbonate-Vit D-Min (CALCIUM 1200 PO) Take 1 tablet by mouth 2 (Two) Times a Day.     • cetirizine (ZYRTEC ALLERGY) 10 MG tablet Take 1 tablet by mouth.     • Cholecalciferol (VITAMIN D3) 5000 UNITS capsule capsule Take  by mouth Daily.     • Cholecalciferol 25 MCG (1000 UT) tablet Take  by mouth.     • Coenzyme Q10 (COQ-10) 100 MG capsule oral daily     • coenzyme Q10 100 MG capsule Take 100 mg by mouth Daily.     • COMBIVENT RESPIMAT  MCG/ACT inhaler Inhale 1 puff 4 (Four) Times a Day.  3   • diclofenac (VOLTAREN) 1 % gel gel Apply 4 g topically As Needed.     • etodolac (LODINE) 400 MG tablet Take 400 mg by mouth 2 (Two) Times a Day.     • flecainide (TAMBOCOR) 100 MG tablet TAKE 1 TABLET EVERY 12 HOURS DAILY. 180 tablet 3   • fluticasone (FLONASE) 50 MCG/ACT nasal spray as needed     • guaiFENesin (MUCINEX) 600 MG 12 hr tablet as needed     • MULTIPLE VITAMIN PO Take 1 tablet by mouth.     • naratriptan (AMERGE) 1 MG tablet      • pantoprazole (PROTONIX) 20  MG EC tablet once daily     • pregabalin (LYRICA) 75 MG capsule Take 75 mg by mouth 2 (Two) Times a Day.     • PREMPRO 0.3-1.5 MG per tablet Take 1 tablet by mouth Daily.  2   • SUMAtriptan (IMITREX) 100 MG tablet SUMATRIPTAN SUCCINATE 100 MG TABS     • tiZANidine (ZANAFLEX) 2 MG tablet Take half to 1 tablet three times a day as needed for muscle spasms. (Patient taking differently: As Needed. Take half to 1 tablet three times a day as needed for muscle spasms.) 90 tablet 3   • traMADol (ULTRAM) 50 MG tablet Take 50 mg by mouth Every 6 (Six) Hours As Needed for Moderate Pain .       No current facility-administered medications on file prior to visit.      Social History     Socioeconomic History   • Marital status:      Spouse name: Not on file   • Number of children: Not on file   • Years of education: Not on file   • Highest education level: Not on file   Tobacco Use   • Smoking status: Never Smoker   • Smokeless tobacco: Never Used   Substance and Sexual Activity   • Alcohol use: No   • Drug use: No   • Sexual activity: Defer     Past Surgical History:   Procedure Laterality Date   • CHOLECYSTECTOMY     • EYE SURGERY      eye lid lift   • EYE SURGERY  12/2019   • FOOT SURGERY Right    • SHOULDER SURGERY Right      Family History   Problem Relation Age of Onset   • Diabetes Mother    • Cancer Mother    • Heart disease Mother    • Hypertension Mother    • Cancer Father    • Cancer Brother    • Heart disease Maternal Grandmother    • Stroke Maternal Grandmother      Past Medical History:   Diagnosis Date   • Asthma    • Chronic pain disorder    • Diverticulosis    • Fibromyalgia    • GERD (gastroesophageal reflux disease)    • Migraine    • Neck pain    • Osteoarthritis    • Palpitations     palpitations with near syncope   • Rheumatoid arthritis (CMS/[facility])          Review of Systems    The following portions of the patient's history were reviewed and updated as appropriate: allergies, current medications,  past family history, past medical history, past social history, past surgical history and problem list.    Ortho Exam      Medical Decision Making    Assessment and Plan/ Diagnosis/Treatment options:   Bilateral knee arthritis undergoing an Orthovisc series.  Plan is to proceed with the second injection in both knees I will see her back in 1 week for the final injection or sooner if needed..    Using sterile technique, the left knee was sterilely prepped with Hibiclens.  Following time out, using a 22 gauge needle the left knee was aspirated and then injected with 2 ml Orthovisc. Approximately 0.5 mm of straw-colored fluid was obtained.  Patient tolerated the procedure well.  No complications.      Using sterile technique, the right knee was sterilely prepped with Hibiclens.  Following time out, using a 22 gauge needle the right knee was aspirated and then injected with 2 ml Orthovisc. Approximately 0.5 mm of straw-colored fluid was obtained.  Patient tolerated the procedure well.  No complications.

## 2020-05-26 ENCOUNTER — CLINICAL SUPPORT (OUTPATIENT)
Dept: ORTHOPEDIC SURGERY | Facility: CLINIC | Age: 65
End: 2020-05-26

## 2020-05-26 DIAGNOSIS — M17.4 OTHER SECONDARY OSTEOARTHRITIS OF BOTH KNEES: Primary | ICD-10-CM

## 2020-05-26 PROCEDURE — 20610 DRAIN/INJ JOINT/BURSA W/O US: CPT | Performed by: PHYSICIAN ASSISTANT

## 2020-05-26 RX ORDER — LIDOCAINE HYDROCHLORIDE 10 MG/ML
3 INJECTION, SOLUTION EPIDURAL; INFILTRATION; INTRACAUDAL; PERINEURAL
Status: COMPLETED | OUTPATIENT
Start: 2020-05-26 | End: 2020-05-26

## 2020-05-26 RX ADMIN — LIDOCAINE HYDROCHLORIDE 3 ML: 10 INJECTION, SOLUTION EPIDURAL; INFILTRATION; INTRACAUDAL; PERINEURAL at 14:23

## 2020-05-26 NOTE — PROGRESS NOTES
Procedure   Large Joint Arthrocentesis: R knee  Date/Time: 5/26/2020 2:23 PM  Consent given by: patient  Site marked: site marked  Timeout: Immediately prior to procedure a time out was called to verify the correct patient, procedure, equipment, support staff and site/side marked as required   Supporting Documentation  Indications: pain   Procedure Details  Location: knee - R knee  Preparation: Patient was prepped and draped in the usual sterile fashion  Needle size: 22 G  Approach: anterolateral  Medications administered: 30 mg Hyaluronan 30 MG/2ML; 3 mL lidocaine PF 1% 1 %  Patient tolerance: patient tolerated the procedure well with no immediate complications    Large Joint Arthrocentesis: L knee  Date/Time: 5/26/2020 2:23 PM  Consent given by: patient  Site marked: site marked  Timeout: Immediately prior to procedure a time out was called to verify the correct patient, procedure, equipment, support staff and site/side marked as required   Supporting Documentation  Indications: pain   Procedure Details  Location: knee - L knee  Preparation: Patient was prepped and draped in the usual sterile fashion  Needle size: 22 G  Approach: anterolateral  Medications administered: 30 mg Hyaluronan 30 MG/2ML; 3 mL lidocaine PF 1% 1 %  Patient tolerance: patient tolerated the procedure well with no immediate complications

## 2020-05-26 NOTE — PROGRESS NOTES
Subjective     Injections (bilateral knee orthovisc injection #3 )      Mecca Renee is a 65 y.o. female.     History of Present Illness   Patient presents for the final injection of Orthovisc in bilateral knees.  She has tolerated previous injections well.  Allergies   Allergen Reactions   • Sulfa Antibiotics      Current Outpatient Medications on File Prior to Visit   Medication Sig Dispense Refill   • amitriptyline (ELAVIL) 10 MG tablet TAKE 1 TABLET BY MOUTH EVERY NIGHT. (Patient taking differently: Take 10 mg by mouth As Needed.) 30 tablet 5   • Apremilast (OTEZLA PO) Take  by mouth 2 (Two) Times a Day.     • aspirin 81 MG EC tablet Take 81 mg by mouth Daily.     • baclofen (LIORESAL) 10 MG tablet TAKE 1/2 TABLET BY MOUTH EVERY MORNING AND EVERY AFTERNOON, AND 1 TABLET AT BEDTIME  5   • bisoprolol (ZEBeta) 5 MG tablet TAKE 1/2 TABLET BY MOUTH TWICE DAILY (Patient taking differently: Take 5 mg by mouth. 0.5 tablet daily) 90 tablet 3   • Calcium Carbonate-Vit D-Min (CALCIUM 1200 PO) Take 1 tablet by mouth 2 (Two) Times a Day.     • cetirizine (ZYRTEC ALLERGY) 10 MG tablet Take 1 tablet by mouth.     • Cholecalciferol (VITAMIN D3) 5000 UNITS capsule capsule Take  by mouth Daily.     • Cholecalciferol 25 MCG (1000 UT) tablet Take  by mouth.     • Coenzyme Q10 (COQ-10) 100 MG capsule oral daily     • coenzyme Q10 100 MG capsule Take 100 mg by mouth Daily.     • COMBIVENT RESPIMAT  MCG/ACT inhaler Inhale 1 puff 4 (Four) Times a Day.  3   • diclofenac (VOLTAREN) 1 % gel gel Apply 4 g topically As Needed.     • etodolac (LODINE) 400 MG tablet Take 400 mg by mouth 2 (Two) Times a Day.     • flecainide (TAMBOCOR) 100 MG tablet TAKE 1 TABLET EVERY 12 HOURS DAILY. 180 tablet 3   • fluticasone (FLONASE) 50 MCG/ACT nasal spray as needed     • guaiFENesin (MUCINEX) 600 MG 12 hr tablet as needed     • MULTIPLE VITAMIN PO Take 1 tablet by mouth.     • naratriptan (AMERGE) 1 MG tablet      • pantoprazole (PROTONIX) 20  MG EC tablet once daily     • pregabalin (LYRICA) 75 MG capsule Take 75 mg by mouth 2 (Two) Times a Day.     • PREMPRO 0.3-1.5 MG per tablet Take 1 tablet by mouth Daily.  2   • SUMAtriptan (IMITREX) 100 MG tablet SUMATRIPTAN SUCCINATE 100 MG TABS     • tiZANidine (ZANAFLEX) 2 MG tablet Take half to 1 tablet three times a day as needed for muscle spasms. (Patient taking differently: As Needed. Take half to 1 tablet three times a day as needed for muscle spasms.) 90 tablet 3   • traMADol (ULTRAM) 50 MG tablet Take 50 mg by mouth Every 6 (Six) Hours As Needed for Moderate Pain .       No current facility-administered medications on file prior to visit.      Social History     Socioeconomic History   • Marital status:      Spouse name: Not on file   • Number of children: Not on file   • Years of education: Not on file   • Highest education level: Not on file   Tobacco Use   • Smoking status: Never Smoker   • Smokeless tobacco: Never Used   Substance and Sexual Activity   • Alcohol use: No   • Drug use: No   • Sexual activity: Defer     Past Surgical History:   Procedure Laterality Date   • CHOLECYSTECTOMY     • EYE SURGERY      eye lid lift   • EYE SURGERY  12/2019   • FOOT SURGERY Right    • SHOULDER SURGERY Right      Family History   Problem Relation Age of Onset   • Diabetes Mother    • Cancer Mother    • Heart disease Mother    • Hypertension Mother    • Cancer Father    • Cancer Brother    • Heart disease Maternal Grandmother    • Stroke Maternal Grandmother      Past Medical History:   Diagnosis Date   • Asthma    • Chronic pain disorder    • Diverticulosis    • Fibromyalgia    • GERD (gastroesophageal reflux disease)    • Migraine    • Neck pain    • Osteoarthritis    • Palpitations     palpitations with near syncope   • Rheumatoid arthritis (CMS/[facility])          Review of Systems    The following portions of the patient's history were reviewed and updated as appropriate: allergies, current medications,  past family history, past medical history, past social history, past surgical history and problem list.    Ortho Exam      Medical Decision Making    Assessment and Plan/ Diagnosis/Treatment options:   Bilateral knee arthritis undergoing Orthovisc series.  Plan is to finish the series in both knees today.  She will return in 2 months to see how she is progress or sooner if needed.    Using sterile technique, the left knee was sterilely prepped with Hibiclens.  Following time out, using a 22 gauge needle the left knee was aspirated and then injected with 2 ml Orthovisc. Approximately 0.5 mm of straw-colored fluid was obtained.  Patient tolerated the procedure well.  No complications.      Using sterile technique, the right knee was sterilely prepped with Hibiclens.  Following time out, using a 22 gauge needle the right knee was aspirated and then injected with 2 ml Orthovisc. Approximately 0.5 mm of straw-colored fluid was obtained.  Patient tolerated the procedure well.  No complications.

## 2020-06-17 NOTE — PROGRESS NOTES
"CHIEF COMPLAINT: \"Pain in my lower back.\"      BRIEF HISTORY: Ms. Mecca Renee, 65 y.o. female returns to the clinic for evaluation of recurrent lower back pain. Patient was last seen by Pj Thomas PA-C on 04/17/2019, for follow up evaluation after bilateral lumbar medial branch rhizotomies performed on 11/28/2018. Patient reported more than 50% pain relief and functional improvement that lasted until about 5 months ago. Pain progressively recurred thereafter. Patient participated in a Physical Therapy, as medically advised.     Problem #1: Lower Back Pain  Pain Description: Constant lower back pain with intermittent exacerbation, described as aching, burning and throbbing sensation.   Radiation of pain: The lower back pain radiates to the hips.  Pain intensity today: 5/10  Pain intensity ranges from: 2/10 to 10/10  Aggravating factors: Pain increases with extension of the lumbar spine, lifting, ambulating more than 10-15 minutes, and standing 10 minutes.   Alleviating factors: Pain decreases with heat, rest and amitriptyline.   Associated symptoms:   Patient denies pain, numbness or weakness in the upper or lower extremities. Patient reports intermittent and random episodes of weakness in her legs   Patient denies any new bladder or bowel problems.   Patient reports difficulties with her balance but denies recent falls      Problem #2: Chronic Neck Pain and Headaches  Patient underwent cervical medial branch rhizotomies on February 8, 2017, with 100% ongoing pain relief of her chronic neck pain and headaches along and remarkable improvement in the range of motion of her cervical spine. Patient used to experience bilateral occipital headaches lasting 3-4 days and 3 times a month prior to her RFTC.    Review of previous therapies and additional medical records:  Mecca Renee has already failed the following measures, including:   Conservative Measures: Oral analgesics, opioids and physical therapy "   Interventional Measures:   Cervical medial branch rhizotomies on February 8, 2017, with 100% ongoing pain relief of her chronic neck pain and headaches along and remarkable improvement in the range of motion of her cervical spine.  Patient used to experience bilateral occipital headaches lasting 3-4 days and 3 times a month.  Bilateral lumbar medial branch rhizotomies on 11/28/2018.  Surgical Measures: No history of previous cervical or lumbar spine surgery  Mecca Renee underwent neurological consultation with Dr. Díaz on 09/08/2016, and was referred for pain management consultation of cervicalgia. Patient underwent neurosurgical consultation with Dr. Ronnell Ramirez a few years back and was found to be a potential surgical candidate (records not available for review).  Mecca Renee presents with significant comorbidities including tachyarrhythmia  In terms of current analgesics, Mecca Renee takes: Etodolac, Lyrica, amitriptyline, tramadol  I have reviewed [facility] Report #[id number] consistent to medication reconciliation.    Global Pain Scale 11-06 2018 04-17 2019 06-23 2020        Pain 15 7 15        Feelings 2 10 10        Clinical outcomes 5 4 5        Activities 0 9 10        GPS Total: 22 30 40          Diagnostic Studies:  XR SPINE LUMBAR COMPLETE W FLEX EXT- 12/20/2018: Mild lumbar scoliosis, and mild lumbar degenerative disc changes. No evidence of acute trauma, significant focal subluxation, or evidence of instability from flexion to extension.  MRI LUMBAR SPINE WO CONTRAST- 03/14/2017. Normal anatomic alignment of the lumbar spine is preserved. The conus medullaris terminates posterior to T12. There is no intrathecal mass. 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 The parasagittal T1-weighted sequences reveal the neuroforamina to be widely patent. STIR sequences reveal no areas of significant osseous edema. Axial images:  L3-L4: Disc space is normal. Facet joints  "are grossly unremarkable. No neuroforaminal or central canal stenosis.  L4-L5: Disc space is normal. Mild bilateral facet hypertrophy. No neuroforaminal or central canal stenosis.  L5-S1: Disc space is normal. No neuroforaminal or central canal stenosis.  MRI, cervical, without contrast, 12/31/2008; The visualized craniocervical junction and spinal cord are normal.   C2-C3 through C4-C5 and C6-C7, C7-T1: The discs are normal.   C5-C6: Broad-based posterior disc protrusion which abuts the spinal cord but does not cause spinal stenosis.  CT, Head, w/o, 04/26/2011; Normal head CT without contrast.  X-rays of the cervical spine revealed degenerative disc disease and facet arthritis most impressive at C5-C6, 11/4/2015  X-rays of the lumbar spine 11/4/2015, revealed mild levoscoliosis. Lumbar facet arthropathy.     Review of Systems   Constitutional: Positive for fatigue.   HENT: Positive for sinus pressure.    Eyes: Positive for photophobia.   Cardiovascular: Positive for leg swelling.   Endocrine: Positive for cold intolerance.   Musculoskeletal: Positive for back pain and myalgias.   Neurological: Positive for weakness and light-headedness.   All other systems reviewed and are negative.     The following portions of the patient's history were reviewed and updated as appropriate: problem list, past medical history, past surgery history, social history, family history, medications, and allergies     /70 (BP Location: Left arm, Patient Position: Sitting)   Pulse 56   Temp 98.1 °F (36.7 °C)   Resp 16   Ht 165.1 cm (65\")   Wt 88.8 kg (195 lb 12.8 oz)   LMP  (LMP Unknown)   SpO2 98%   BMI 32.58 kg/m²      Physical Exam   Constitutional: Patient is oriented to person, place, and time.   Patient appears well-developed and well-nourished.   HENT: Head: Normocephalic and atraumatic.   Eyes: Conjunctivae and lids are normal.   Pupils: Equal, round, reactive to light.   Neck: Trachea normal. Neck supple. No JVD " present.   Lymphatic: No cervical adenopathy  Pulmonary Respiratory effort: No increased work of breathing or signs of respiratory distress. Auscultation of lungs: Clear to auscultation.   Cardiovascular Auscultation of heart: Normal rate and rhythm, normal S1 and S2, no murmurs.   Peripheral vascular exam: Normal. No edema.   Abdomen: The abdomen was soft and nontender. Bowel sounds were normal.   Musculoskeletal   Gait and station: Gait evaluation demonstrated a normal gait.   Cervical spine: Passive and active range of motion full and without pain.  Extension, flexion, lateral flexion, rotation of the cervical spine did not increase or produce pain. Cervical facet joint loading maneuvers are negative at this time.   No active trigger points in the bilateral levator scapulae.  Shoulders: The range of motion of the glenohumeral joints is full and without pain. Rotator cuff strength is 5/5.   Lumbar spine: The range of motion is limited secondary to pain. Extension, lateral flexion, and rotation increased and reproduce her lower back pain. Lumbar facet joint loading maneuvers are positive.   Berto and Gaenslen's tests are negative.  Neurological:   Patient is alert and oriented to person, place, and time.   Speech: speech is normal.   Cortical function: Normal mental status.   Cranial nerves: Cranial nerves 2-12 intact.   Reflex Scores:  Right brachioradialis: 1+  Left brachioradialis: 1+  Right biceps: 1+  Left biceps: 1+  Right triceps: 1+  Left triceps: 1+  Right patellar: 1+  Left patellar: 1+  Right achilles: 1+  Left achilles: 1+  Motor strength: 5/5  Motor Tone: normal tone.   Involuntary movements: none.   Superficial/Primitive Reflexes: primitive reflexes were absent.   Right Villalta: absent  Left Villalta: absent  Right ankle clonus: absent  Left ankle clonus: absent   Spurling sign is negative. Lhermitte sign is negative. Negative long tract signs. Straight leg raising test is negative. Femoral stretch  sign is negative. Positive Tinel sign in both wrists.  Sensation: No sensory loss. Sensory exam: intact to light touch, intact pain and temperature sensation, intact vibration sensation and normal proprioception.   Coordination: Normal finger to nose and heel to shin. Normal balance and negative. Romberg's sign negative.   Skin and subcutaneous tissue: Skin is warm and intact. No rash noted. No cyanosis.   Psychiatric:   Judgment and insight: Normal.   Orientation to person, place and time: Normal.   Recent and remote memory: Intact.   Mood and affect: Normal.      ASSESSMENT:   1. Spondylosis of lumbar region without myelopathy or radiculopathy    2. Cervical disc disorder of mid-cervical region    3. Cervical spondylosis without myelopathy    4. Bilateral occipital neuralgia    5. Bilateral carpal tunnel syndrome    6. Fibromyalgia    7. Rheumatoid arthritis involving multiple sites with positive rheumatoid factor (CMS/HCC)    8. Osteoporosis without current pathological fracture, unspecified osteoporosis type    9. Physical deconditioning         MEDICAL DECISION MAKING/PLAN: Patient experienced remarkable pain relief and functional improvement from lumbar and cervical RFTC, as referenced on previous notes and under HPI. I have reviewed all available patient's medical records as well as previous therapies as referenced above. Therefore, I have proposed the following plan:  1. Interventional pain management measures:    A. Treatment of Chronic Lower Back Pain: Patient will be scheduled for one set of diagnostic bilateral lumbar medial branch blocks at L3, L4, L5; for bilateral lumbar facet joints at L4-L5, L5-S1 to confirm the origin of her recurrent mechanical lower back pain. If patient experiences more than 80% relief along with significant improvement the range of motion of the lumbar spine, then, patient will be scheduled for bilateral lumbar medial branch rhizotomies  B. Treatment of Chronic Neck Pain: None  indicated at this time. If pain recurs, then, patient will be scheduled for diagnostic bilateral cervical medial branch blocks at  C3, C4, C5; for bilateral cervical facet joints at C3-C4, C4-C5, to confirm the origin of her recurrent cervicalgia. If patient experiences more than 80% pain relief along with significant improvement in the range of motion of the cervical spine, then, patient will be scheduled for bilateral cervical medial branch rhizotomies   2. Long-term rehabilitation efforts:  A. Patient will start a comprehensive physical therapy program for water therapy, therapeutic exercise, upper body strengthening/posture correction, core strengthening, gait and balance training, myofascial release, cupping and dry needling   B. Start an exercise program such as yoga, water therapy and daily walks for fitness  C. Wear bilateral wrist splints/braces for treatment of bilateral carpal tunnel syndrome  3.  Pharmacological measures: Reviewed.   A. Patient takes Etodolac, Lyrica, amitriptyline, tramadol  B. Trial with Rheumate twice daily  4. Diagnostic Studies: Lumbar X-rays, full views + flexion and extension  5. The patient has been instructed to contact my office with any questions or difficulties. The patient understands the plan and agrees to proceed accordingly.      Patient Care Team:  Mg Sarkar MD as PCP - General (General Practice)  Shadi Díaz MD (Inactive) as Consulting Physician (Neurology)  Ana Garcia MD as Consulting Physician (Rheumatology)  James Bruno MD as Consulting Physician (Pain Medicine)  William Brewster MD as Consulting Physician (Cardiology)  Pj Miranda PA-C as Physician Assistant (Physician Assistant)     No orders of the defined types were placed in this encounter.        Future Appointments   Date Time Provider Department Center   7/28/2020  2:20 PM Leny Alcaraz PA-C [facility] None   2/12/2021 11:15 AM William Brewster MD [facility] None          MD BRAULIO Mccauley Dragon/Transcription disclaimer:  Much of this encounter note is an electronic transcription of spoken language to printed text. Electronic transcription of spoken language may permit erroneous, or at times, nonsensical words or phrases to be inadvertently transcribed. Although I have reviewed the note for such errors, some may still exist.

## 2020-06-23 ENCOUNTER — HOSPITAL ENCOUNTER (OUTPATIENT)
Dept: GENERAL RADIOLOGY | Facility: HOSPITAL | Age: 65
Discharge: HOME | End: 2020-06-23
Admitting: ANESTHESIOLOGY

## 2020-06-23 ENCOUNTER — OFFICE VISIT (OUTPATIENT)
Dept: PAIN MEDICINE | Facility: CLINIC | Age: 65
End: 2020-06-23

## 2020-06-23 VITALS
HEART RATE: 56 BPM | TEMPERATURE: 98.1 F | OXYGEN SATURATION: 98 % | WEIGHT: 195.8 LBS | RESPIRATION RATE: 16 BRPM | BODY MASS INDEX: 32.62 KG/M2 | DIASTOLIC BLOOD PRESSURE: 70 MMHG | SYSTOLIC BLOOD PRESSURE: 110 MMHG | HEIGHT: 65 IN

## 2020-06-23 DIAGNOSIS — M79.7 FIBROMYALGIA: ICD-10-CM

## 2020-06-23 DIAGNOSIS — M47.816 SPONDYLOSIS OF LUMBAR REGION WITHOUT MYELOPATHY OR RADICULOPATHY: Primary | ICD-10-CM

## 2020-06-23 DIAGNOSIS — M81.0 OSTEOPOROSIS WITHOUT CURRENT PATHOLOGICAL FRACTURE, UNSPECIFIED OSTEOPOROSIS TYPE: ICD-10-CM

## 2020-06-23 DIAGNOSIS — R53.81 PHYSICAL DECONDITIONING: ICD-10-CM

## 2020-06-23 DIAGNOSIS — M50.920 CERVICAL DISC DISORDER OF MID-CERVICAL REGION: ICD-10-CM

## 2020-06-23 DIAGNOSIS — M54.81 BILATERAL OCCIPITAL NEURALGIA: ICD-10-CM

## 2020-06-23 DIAGNOSIS — M47.816 SPONDYLOSIS OF LUMBAR REGION WITHOUT MYELOPATHY OR RADICULOPATHY: ICD-10-CM

## 2020-06-23 DIAGNOSIS — M47.812 CERVICAL SPONDYLOSIS WITHOUT MYELOPATHY: ICD-10-CM

## 2020-06-23 DIAGNOSIS — G56.03 BILATERAL CARPAL TUNNEL SYNDROME: ICD-10-CM

## 2020-06-23 DIAGNOSIS — M05.79 RHEUMATOID ARTHRITIS INVOLVING MULTIPLE SITES WITH POSITIVE RHEUMATOID FACTOR (HCC): ICD-10-CM

## 2020-06-23 PROCEDURE — 72114 X-RAY EXAM L-S SPINE BENDING: CPT

## 2020-06-23 PROCEDURE — 99214 OFFICE O/P EST MOD 30 MIN: CPT | Performed by: ANESTHESIOLOGY

## 2020-06-23 RX ORDER — METHOCARBAMOL 750 MG/1
750 TABLET, FILM COATED ORAL 2 TIMES DAILY
COMMUNITY
Start: 2020-05-22

## 2020-06-23 RX ORDER — ME-TETRAHYDROFOLATE/B12/HRB236 1-1-500 MG
1 CAPSULE ORAL DAILY
Qty: 90 CAPSULE | Refills: 1 | Status: SHIPPED | OUTPATIENT
Start: 2020-06-23 | End: 2021-01-14 | Stop reason: SDUPTHER

## 2020-07-09 DIAGNOSIS — M47.816 SPONDYLOSIS OF LUMBAR REGION WITHOUT MYELOPATHY OR RADICULOPATHY: Primary | ICD-10-CM

## 2020-07-10 ENCOUNTER — APPOINTMENT (OUTPATIENT)
Dept: PREADMISSION TESTING | Facility: HOSPITAL | Age: 65
End: 2020-07-10

## 2020-07-10 PROCEDURE — U0002 COVID-19 LAB TEST NON-CDC: HCPCS

## 2020-07-10 PROCEDURE — U0004 COV-19 TEST NON-CDC HGH THRU: HCPCS

## 2020-07-10 PROCEDURE — C9803 HOPD COVID-19 SPEC COLLECT: HCPCS

## 2020-07-11 LAB
REF LAB TEST METHOD: NORMAL
SARS-COV-2 RNA RESP QL NAA+PROBE: NOT DETECTED

## 2020-07-13 ENCOUNTER — OUTSIDE FACILITY SERVICE (OUTPATIENT)
Dept: PAIN MEDICINE | Facility: CLINIC | Age: 65
End: 2020-07-13

## 2020-07-13 DIAGNOSIS — M47.816 SPONDYLOSIS OF LUMBAR REGION WITHOUT MYELOPATHY OR RADICULOPATHY: Primary | ICD-10-CM

## 2020-07-13 PROCEDURE — 99152 MOD SED SAME PHYS/QHP 5/>YRS: CPT | Performed by: ANESTHESIOLOGY

## 2020-07-13 PROCEDURE — 64494 INJ PARAVERT F JNT L/S 2 LEV: CPT | Performed by: ANESTHESIOLOGY

## 2020-07-13 PROCEDURE — 64493 INJ PARAVERT F JNT L/S 1 LEV: CPT | Performed by: ANESTHESIOLOGY

## 2020-07-20 RX ORDER — BISOPROLOL FUMARATE 5 MG/1
TABLET, FILM COATED ORAL
Qty: 90 TABLET | Refills: 1 | Status: SHIPPED | OUTPATIENT
Start: 2020-07-20 | End: 2021-06-08

## 2020-07-22 NOTE — PROGRESS NOTES
"This consultation was provided with the use of interactive audio and video telecommunications system that permits real time communication with the patient, as patient is unable to attend an in-office appointment due to the COVID-19 crisis. Consent for assessment and treatment was provided by the patient.  You have chosen to receive care through a telephone visit today. Do you consent to use a telephone visit for your medical care today? Yes       CHIEF COMPLAINT: \"I did great after the blocks. I'm ready for my rhizotomy.\"      BRIEF HISTORY: Ms. Mecca Renee, 65 y.o. female who underwent one set of diagnostic bilateral lumbar medial branch blocks at L3, L4, L5; for bilateral lumbar facet joints at L4-L5, L5-S1 on 07/13/2020 from which she experienced almost 100% pain relief along with significant improvement the range of motion of the lumbar spine that lasted for more than 24 hours. Patient underwent this procedure on 11/28/2018 and experienced more than 50% pain relief and functional improvement that lasted for more than 18 months. Pain progressively recurred thereafter. Patient denies changes in her medical history.  She reports more difficulties with her hands secondary to her known history of bilateral carpal tunnel syndrome.  She has some hand orthosis that are not longer working for her.  We have discussed sending a new prescription.  Her carpal tunnel syndrome symptoms interferes with her sleep.    Problem #1: Lower Back Pain  Pain Description: Constant lower back pain with intermittent exacerbation, described as aching, burning and throbbing sensation.   Radiation of pain: The lower back pain radiates to the hips.  Pain intensity today: 4/10  Pain intensity ranges from: 2/10 to 10/10  Aggravating factors: Pain increases with extension of the lumbar spine, lifting, ambulating more than 10-15 minutes, and standing 10 minutes.   Alleviating factors: Pain decreases with heat, rest and amitriptyline. "   Associated symptoms:   Patient denies pain, numbness or weakness in the upper or lower extremities. Patient reports intermittent and random episodes of weakness in her legs   Patient denies any new bladder or bowel problems.   Patient reports difficulties with her balance but denies recent falls      Problem #2: Chronic Neck Pain and Headaches  Patient underwent cervical medial branch rhizotomies on February 8, 2017, with 100% ongoing pain relief of her chronic neck pain and headaches along and remarkable improvement in the range of motion of her cervical spine. Patient used to experience bilateral occipital headaches lasting 3-4 days and 3 times a month prior to her RFTC.    Review of previous therapies and additional medical records:  Mecca Renee has already failed the following measures, including:   Conservative Measures: Oral analgesics, opioids and physical therapy   Interventional Measures:   Cervical medial branch rhizotomies on February 8, 2017, with 100% ongoing pain relief of her chronic neck pain and headaches along and remarkable improvement in the range of motion of her cervical spine.  Patient used to experience bilateral occipital headaches lasting 3-4 days and 3 times a month.  Bilateral lumbar medial branch rhizotomies on 11/28/2018.  Surgical Measures: No history of previous cervical or lumbar spine surgery  Mecca Renee underwent neurological consultation with Dr. Díaz on 09/08/2016, and was referred for pain management consultation of cervicalgia. Patient underwent neurosurgical consultation with Dr. Ronnell Ramirez a few years back and was found to be a potential surgical candidate (records not available for review).  Mecca Renee presents with significant comorbidities including tachyarrhythmia  In terms of current analgesics, Mecca Renee takes: Etodolac, Lyrica, amitriptyline, tramadol  I have reviewed [facility] Report #[id number] consistent to medication  reconciliation.    Global Pain Scale 11-06 2018 04-17 2019 06-23 2020        Pain 15 7 15        Feelings 2 10 10        Clinical outcomes 5 4 5        Activities 0 9 10        GPS Total: 22 30 40          Review of New Diagnostic Studies:  XR SPINE LUMBAR COMPLETE W FLEX EXT- 06/23/2020: Degenerative changes identified at the L4/L5 level with no inducible instability. There is slight curvature identified of the spine with convexity to the right.     Review of Previous Diagnostic Studies:  XR SPINE LUMBAR COMPLETE W FLEX EXT- 12/20/2018: Mild lumbar scoliosis, and mild lumbar degenerative disc changes. No evidence of acute trauma, significant focal subluxation, or evidence of instability from flexion to extension.  MRI LUMBAR SPINE WO CONTRAST- 03/14/2017. Normal anatomic alignment of the lumbar spine is preserved. The conus medullaris terminates posterior to T12. There is no intrathecal mass. 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 The parasagittal T1-weighted sequences reveal the neuroforamina to be widely patent. STIR sequences reveal no areas of significant osseous edema. Axial images:  L3-L4: Disc space is normal. Facet joints are grossly unremarkable. No neuroforaminal or central canal stenosis.  L4-L5: Disc space is normal. Mild bilateral facet hypertrophy. No neuroforaminal or central canal stenosis.  L5-S1: Disc space is normal. No neuroforaminal or central canal stenosis.  MRI, cervical, without contrast, 12/31/2008; The visualized craniocervical junction and spinal cord are normal.   C2-C3 through C4-C5 and C6-C7, C7-T1: The discs are normal.   C5-C6: Broad-based posterior disc protrusion which abuts the spinal cord but does not cause spinal stenosis.  CT, Head, w/o, 04/26/2011; Normal head CT without contrast.  X-rays of the cervical spine revealed degenerative disc disease and facet arthritis most impressive at C5-C6, 11/4/2015  X-rays of the lumbar spine 11/4/2015, revealed mild  levoscoliosis. Lumbar facet arthropathy.     Review of Systems   Constitutional: Positive for fatigue.   HENT: Positive for sinus pressure.    Eyes: Positive for photophobia.   Cardiovascular: Positive for leg swelling.   Endocrine: Positive for cold intolerance.   Musculoskeletal: Positive for back pain and myalgias.   Neurological: Positive for weakness and light-headedness.   All other systems reviewed and are negative.     The following portions of the patient's history were reviewed and updated as appropriate: problem list, past medical history, past surgery history, social history, family history, medications, and allergies     LMP  (LMP Unknown)      Physical Exam   Due to the constraints of the telemedicine format, no physical exam was performed     From previous physical exam  Constitutional: Patient is oriented to person, place, and time.   Patient appears well-developed and well-nourished.   HENT: Head: Normocephalic and atraumatic.   Eyes: Conjunctivae and lids are normal.   Pupils: Equal, round, reactive to light.   Neck: Trachea normal. Neck supple. No JVD present.   Lymphatic: No cervical adenopathy  Pulmonary Respiratory effort: No increased work of breathing or signs of respiratory distress. Auscultation of lungs: Clear to auscultation.   Cardiovascular Auscultation of heart: Normal rate and rhythm, normal S1 and S2, no murmurs.   Peripheral vascular exam: Normal. No edema.   Abdomen: The abdomen was soft and nontender. Bowel sounds were normal.   Musculoskeletal   Gait and station: Gait evaluation demonstrated a normal gait.   Cervical spine: Passive and active range of motion full and without pain.  Extension, flexion, lateral flexion, rotation of the cervical spine did not increase or produce pain. Cervical facet joint loading maneuvers are negative at this time.   No active trigger points in the bilateral levator scapulae.  Shoulders: The range of motion of the glenohumeral joints is full and  without pain. Rotator cuff strength is 5/5.   Lumbar spine: The range of motion is limited secondary to pain. Extension, lateral flexion, and rotation increased and reproduce her lower back pain. Lumbar facet joint loading maneuvers are positive.   Berto and Gaenslen's tests are negative.  Neurological:   Patient is alert and oriented to person, place, and time.   Speech: speech is normal.   Cortical function: Normal mental status.   Cranial nerves: Cranial nerves 2-12 intact.   Reflex Scores:  Right brachioradialis: 1+  Left brachioradialis: 1+  Right biceps: 1+  Left biceps: 1+  Right triceps: 1+  Left triceps: 1+  Right patellar: 1+  Left patellar: 1+  Right achilles: 1+  Left achilles: 1+  Motor strength: 5/5  Motor Tone: normal tone.   Involuntary movements: none.   Superficial/Primitive Reflexes: primitive reflexes were absent.   Right Villalta: absent  Left Villalta: absent  Right ankle clonus: absent  Left ankle clonus: absent   Spurling sign is negative. Lhermitte sign is negative. Negative long tract signs. Straight leg raising test is negative. Femoral stretch sign is negative. Positive Tinel sign in both wrists.  Sensation: No sensory loss. Sensory exam: intact to light touch, intact pain and temperature sensation, intact vibration sensation and normal proprioception.   Coordination: Normal finger to nose and heel to shin. Normal balance and negative. Romberg's sign negative.   Skin and subcutaneous tissue: Skin is warm and intact. No rash noted. No cyanosis.   Psychiatric:   Judgment and insight: Normal.   Orientation to person, place and time: Normal.   Recent and remote memory: Intact.   Mood and affect: Normal.      ASSESSMENT:   1. Spondylosis of lumbar region without myelopathy or radiculopathy    2. Cervical spondylosis without myelopathy    3. Cervical disc disorder of mid-cervical region    4. Bilateral occipital neuralgia    5. Bilateral carpal tunnel syndrome    6. Rheumatoid arthritis  involving multiple sites with positive rheumatoid factor (CMS/[facility])    7. Fibromyalgia    8. Osteoporosis without current pathological fracture, unspecified osteoporosis type    9. Physical deconditioning         MEDICAL DECISION MAKING/PLAN: Patient experienced remarkable analgesic and functional improvement from lumbar and cervical RFTC, as referenced on previous notes and under HPI. Patient underwent one set of diagnostic bilateral lumbar medial branch blocks at L3, L4, L5; for bilateral lumbar facet joints at L4-L5, L5-S1 on 07/13/2020 from which she experienced almost 100% pain relief along with significant improvement the range of motion of the lumbar spine that lasted for about 24 hours. I have reviewed all available patient's medical records as well as previous therapies as referenced above. Therefore, I have proposed the following plan:  1. Interventional pain management measures:    A. Treatment of Chronic Lower Back Pain: Patient will be scheduled for bilateral lumbar medial branch rhizotomies at L3, L4, L5; for bilateral lumbar facet joints at L4-L5, L5-S1   B. Treatment of Chronic Neck Pain: None indicated at this time. If pain recurs, then, patient will be scheduled for diagnostic bilateral cervical medial branch blocks at  C3, C4, C5; for bilateral cervical facet joints at C3-C4, C4-C5, to confirm the origin of her recurrent cervicalgia. If patient experiences more than 80% pain relief along with significant improvement in the range of motion of the cervical spine, then, patient will be scheduled for bilateral cervical medial branch rhizotomies   2. Long-term rehabilitation efforts:  A. Patient will start a comprehensive physical therapy program for water therapy, therapeutic exercise, upper body strengthening/posture correction, core strengthening, gait and balance training, myofascial release, cupping and dry needling   B. Start an exercise program such as yoga, water therapy and daily walks for  "fitness  C. Will send a Rx for Wrist Hand Orthosis, Wrist Extension Control Cock-up: Patient has been prescribed a rigid wrist splint to limit motion of the wrist due to bilateral CTS. Will send to [facility] [phone]  3.  Pharmacological measures: Reviewed.   A. Patient takes Etodolac, Lyrica, amitriptyline, tramadol  B. Continue Rheumate twice daily  4. The patient has been instructed to contact my office with any questions or difficulties. The patient understands the plan and agrees to proceed accordingly.     Time spent of discussion: 23 minutes    Patient Care Team:  Mg Sarkar MD as PCP - General (General Practice)  Shadi Díaz MD (Inactive) as Consulting Physician (Neurology)  Ana Garcia MD as Consulting Physician (Rheumatology)  James Bruno MD as Consulting Physician (Pain Medicine)  William Brewster MD as Consulting Physician (Cardiology)  Pj Miranda PA-C as Physician Assistant (Physician Assistant)     No orders of the defined types were placed in this encounter.        Future Appointments   Date Time Provider Department Center   7/28/2020  1:00 PM James Bruno MD [facility]   8/2/2020  1:45 PM COVID-19 [facility]   8/5/2020  7:30 AM James Bruno MD [facility]   8/18/2020  1:00 PM Leny Alcaraz PA-C [facility]   2/12/2021 11:15 AM William Brewster MD [facility] None         James Bruno MD       EMR Dragon/Transcription disclaimer:  Much of this encounter note is an electronic transcription of spoken language to printed text. Electronic transcription of spoken language may permit erroneous, or at times, nonsensical words or phrases to be inadvertently transcribed. Although I have reviewed the note for such errors, some may still exist.            "

## 2020-07-28 ENCOUNTER — OFFICE VISIT (OUTPATIENT)
Dept: PAIN MEDICINE | Facility: CLINIC | Age: 65
End: 2020-07-28

## 2020-07-28 DIAGNOSIS — M50.920 CERVICAL DISC DISORDER OF MID-CERVICAL REGION: ICD-10-CM

## 2020-07-28 DIAGNOSIS — G56.03 BILATERAL CARPAL TUNNEL SYNDROME: Primary | ICD-10-CM

## 2020-07-28 DIAGNOSIS — M47.816 SPONDYLOSIS OF LUMBAR REGION WITHOUT MYELOPATHY OR RADICULOPATHY: ICD-10-CM

## 2020-07-28 DIAGNOSIS — M05.79 RHEUMATOID ARTHRITIS INVOLVING MULTIPLE SITES WITH POSITIVE RHEUMATOID FACTOR (HCC): ICD-10-CM

## 2020-07-28 DIAGNOSIS — M81.0 OSTEOPOROSIS WITHOUT CURRENT PATHOLOGICAL FRACTURE, UNSPECIFIED OSTEOPOROSIS TYPE: ICD-10-CM

## 2020-07-28 DIAGNOSIS — G56.03 BILATERAL CARPAL TUNNEL SYNDROME: ICD-10-CM

## 2020-07-28 DIAGNOSIS — M47.812 CERVICAL SPONDYLOSIS WITHOUT MYELOPATHY: ICD-10-CM

## 2020-07-28 DIAGNOSIS — R53.81 PHYSICAL DECONDITIONING: ICD-10-CM

## 2020-07-28 DIAGNOSIS — M54.81 BILATERAL OCCIPITAL NEURALGIA: ICD-10-CM

## 2020-07-28 DIAGNOSIS — M79.7 FIBROMYALGIA: ICD-10-CM

## 2020-07-28 PROCEDURE — 99443 PR PHYS/QHP TELEPHONE EVALUATION 21-30 MIN: CPT | Performed by: ANESTHESIOLOGY

## 2020-08-02 ENCOUNTER — APPOINTMENT (OUTPATIENT)
Dept: PREADMISSION TESTING | Facility: HOSPITAL | Age: 65
End: 2020-08-02

## 2020-08-02 PROCEDURE — U0002 COVID-19 LAB TEST NON-CDC: HCPCS

## 2020-08-02 PROCEDURE — U0004 COV-19 TEST NON-CDC HGH THRU: HCPCS

## 2020-08-02 PROCEDURE — C9803 HOPD COVID-19 SPEC COLLECT: HCPCS

## 2020-08-03 LAB
REF LAB TEST METHOD: NORMAL
SARS-COV-2 RNA RESP QL NAA+PROBE: NOT DETECTED

## 2020-08-04 DIAGNOSIS — M47.816 SPONDYLOSIS OF LUMBAR REGION WITHOUT MYELOPATHY OR RADICULOPATHY: Primary | ICD-10-CM

## 2020-08-05 ENCOUNTER — OUTSIDE FACILITY SERVICE (OUTPATIENT)
Dept: PAIN MEDICINE | Facility: CLINIC | Age: 65
End: 2020-08-05

## 2020-08-05 PROCEDURE — 99152 MOD SED SAME PHYS/QHP 5/>YRS: CPT | Performed by: ANESTHESIOLOGY

## 2020-08-05 PROCEDURE — 64636 DESTROY L/S FACET JNT ADDL: CPT | Performed by: ANESTHESIOLOGY

## 2020-08-05 PROCEDURE — 64635 DESTROY LUMB/SAC FACET JNT: CPT | Performed by: ANESTHESIOLOGY

## 2020-08-06 ENCOUNTER — TELEPHONE (OUTPATIENT)
Dept: PAIN MEDICINE | Facility: CLINIC | Age: 65
End: 2020-08-06

## 2020-08-06 NOTE — TELEPHONE ENCOUNTER
bilateral lumbar medial branch rhizotomies  at L3, L4, L5 08/05/2020.    Patient reports she is in pain and has pressure in the area of her procedure.   She has taken Tramadol and used ice on the area. I advised patient that the symptoms should begin to subside throughout the day. She was advised to contact our office if her symptoms persist or worsen. She verbalized understanding.    Appointment card, PT order placed in outgoing mail.

## 2020-09-10 ENCOUNTER — OFFICE VISIT (OUTPATIENT)
Dept: ORTHOPEDIC SURGERY | Facility: CLINIC | Age: 65
End: 2020-09-10

## 2020-09-10 VITALS — BODY MASS INDEX: 31.82 KG/M2 | HEIGHT: 65 IN | OXYGEN SATURATION: 98 % | WEIGHT: 191 LBS | HEART RATE: 62 BPM

## 2020-09-10 DIAGNOSIS — M17.4 OTHER SECONDARY OSTEOARTHRITIS OF BOTH KNEES: Primary | ICD-10-CM

## 2020-09-10 PROCEDURE — 99212 OFFICE O/P EST SF 10 MIN: CPT | Performed by: PHYSICIAN ASSISTANT

## 2020-09-10 PROCEDURE — 20610 DRAIN/INJ JOINT/BURSA W/O US: CPT | Performed by: PHYSICIAN ASSISTANT

## 2020-09-10 RX ORDER — TRIAMCINOLONE ACETONIDE 40 MG/ML
40 INJECTION, SUSPENSION INTRA-ARTICULAR; INTRAMUSCULAR
Status: COMPLETED | OUTPATIENT
Start: 2020-09-10 | End: 2020-09-10

## 2020-09-10 RX ORDER — LIDOCAINE HYDROCHLORIDE 10 MG/ML
4 INJECTION, SOLUTION INFILTRATION; PERINEURAL
Status: COMPLETED | OUTPATIENT
Start: 2020-09-10 | End: 2020-09-10

## 2020-09-10 RX ORDER — LIDOCAINE HYDROCHLORIDE 10 MG/ML
4 INJECTION, SOLUTION EPIDURAL; INFILTRATION; INTRACAUDAL; PERINEURAL
Status: COMPLETED | OUTPATIENT
Start: 2020-09-10 | End: 2020-09-10

## 2020-09-10 RX ADMIN — LIDOCAINE HYDROCHLORIDE 4 ML: 10 INJECTION, SOLUTION EPIDURAL; INFILTRATION; INTRACAUDAL; PERINEURAL at 13:23

## 2020-09-10 RX ADMIN — TRIAMCINOLONE ACETONIDE 40 MG: 40 INJECTION, SUSPENSION INTRA-ARTICULAR; INTRAMUSCULAR at 13:23

## 2020-09-10 RX ADMIN — LIDOCAINE HYDROCHLORIDE 4 ML: 10 INJECTION, SOLUTION INFILTRATION; PERINEURAL at 13:23

## 2020-09-10 NOTE — PROGRESS NOTES
[facility] Note    Subjective     Patient: Mecca Renee  : 1955    Primary Care Provider: Mg Sarkar MD    Requesting Provider: As above    Follow-up (3 month recheck - secondary osteoarthritis of both knees)      History    Chief Complaint: Bilateral knee pain    History of Present Illness: Patient returns today for follow-up of her bilateral knee arthritis.  She finished a series of Visco in 2020.  She reports it did improve her pain.  She has started water therapy and is having some increased stiffness and pain in the knee since beginning that.  She does feel that the Visco still is is helping to some point.  She is not interested in any surgery and would like repeat injections as well as repeat Visco once eligible.    Current Outpatient Medications on File Prior to Visit   Medication Sig Dispense Refill   • amitriptyline (ELAVIL) 10 MG tablet TAKE 1 TABLET BY MOUTH EVERY NIGHT. (Patient taking differently: Take 10 mg by mouth As Needed.) 30 tablet 5   • Apremilast (OTEZLA PO) Take  by mouth 2 (Two) Times a Day.     • aspirin 81 MG EC tablet Take 81 mg by mouth Daily.     • baclofen (LIORESAL) 10 MG tablet TAKE 1/2 TABLET BY MOUTH EVERY MORNING AND EVERY AFTERNOON, AND 1 TABLET AT BEDTIME  5   • bisoprolol (ZEBeta) 5 MG tablet TAKE 1/2 TABLET BY MOUTH TWICE DAILY 90 tablet 1   • Calcium Carbonate-Vit D-Min (CALCIUM 1200 PO) Take 1 tablet by mouth 2 (Two) Times a Day.     • cetirizine (ZYRTEC ALLERGY) 10 MG tablet Take 1 tablet by mouth.     • Cholecalciferol (VITAMIN D3) 5000 UNITS capsule capsule Take  by mouth Daily.     • Cholecalciferol 25 MCG (1000 UT) tablet Take  by mouth.     • Coenzyme Q10 (COQ-10) 100 MG capsule oral daily     • coenzyme Q10 100 MG capsule Take 100 mg by mouth Daily.     • COMBIVENT RESPIMAT  MCG/ACT inhaler Inhale 1 puff 4 (Four) Times a Day.  3   • denosumab (Prolia) 60 MG/ML solution prefilled syringe syringe Inject 1 mL  under the skin into the appropriate area as directed Every 6 (Six) Months.     • diclofenac (VOLTAREN) 1 % gel gel Apply 4 g topically As Needed.     • Dietary Management Product (RHEUMATE) capsule Take 1 capsule by mouth Daily. 90 capsule 1   • etodolac (LODINE) 400 MG tablet Take 400 mg by mouth 2 (Two) Times a Day.     • flecainide (TAMBOCOR) 100 MG tablet TAKE 1 TABLET EVERY 12 HOURS DAILY. 180 tablet 3   • fluticasone (FLONASE) 50 MCG/ACT nasal spray as needed     • guaiFENesin (MUCINEX) 600 MG 12 hr tablet as needed     • methocarbamol (ROBAXIN) 750 MG tablet Take 750 mg by mouth 3 (Three) Times a Day.     • MULTIPLE VITAMIN PO Take 1 tablet by mouth.     • naratriptan (AMERGE) 1 MG tablet      • pantoprazole (PROTONIX) 20 MG EC tablet once daily     • pregabalin (LYRICA) 75 MG capsule Take 75 mg by mouth 2 (Two) Times a Day.     • PREMPRO 0.3-1.5 MG per tablet Take 1 tablet by mouth Daily.  2   • SUMAtriptan (IMITREX) 100 MG tablet SUMATRIPTAN SUCCINATE 100 MG TABS     • tiZANidine (ZANAFLEX) 2 MG tablet Take half to 1 tablet three times a day as needed for muscle spasms. (Patient taking differently: As Needed. Take half to 1 tablet three times a day as needed for muscle spasms.) 90 tablet 3   • traMADol (ULTRAM) 50 MG tablet Take 50 mg by mouth Every 6 (Six) Hours As Needed for Moderate Pain .       No current facility-administered medications on file prior to visit.       Allergies   Allergen Reactions   • Sulfa Antibiotics       Past Medical History:   Diagnosis Date   • Asthma    • Chronic pain disorder    • Diverticulosis    • Fibromyalgia    • GERD (gastroesophageal reflux disease)    • Migraine    • Neck pain    • Osteoarthritis    • Palpitations     palpitations with near syncope   • Rheumatoid arthritis (CMS/HCC)      Past Surgical History:   Procedure Laterality Date   • CHOLECYSTECTOMY     • EYE SURGERY      eye lid lift   • EYE SURGERY  12/2019   • FOOT SURGERY Right    • SHOULDER SURGERY Right     "    Family History   Problem Relation Age of Onset   • Diabetes Mother    • Cancer Mother    • Heart disease Mother    • Hypertension Mother    • Cancer Father    • Cancer Brother    • Heart disease Maternal Grandmother    • Stroke Maternal Grandmother       Social History     Socioeconomic History   • Marital status:      Spouse name: Not on file   • Number of children: Not on file   • Years of education: Not on file   • Highest education level: Not on file   Tobacco Use   • Smoking status: Never Smoker   • Smokeless tobacco: Never Used   Substance and Sexual Activity   • Alcohol use: No   • Drug use: No   • Sexual activity: Defer        Review of Systems   Constitutional: Negative.    HENT: Negative.    Eyes: Negative.    Respiratory: Negative.    Cardiovascular: Negative.    Gastrointestinal: Negative.    Endocrine: Negative.    Genitourinary: Negative.    Musculoskeletal: Positive for arthralgias.   Skin: Negative.    Allergic/Immunologic: Negative.    Neurological: Negative.    Hematological: Negative.    Psychiatric/Behavioral: Negative.        The following portions of the patient's history were reviewed and updated as appropriate: allergies, current medications, past family history, past medical history, past social history, past surgical history and problem list.      Objective      Physical Exam  Pulse 62   Ht 165.1 cm (65\")   Wt 86.6 kg (191 lb)   LMP  (LMP Unknown)   SpO2 98%   BMI 31.78 kg/m²     Body mass index is 31.78 kg/m².    Patient is well developed, well nourished and in no acute distress.  Alert and oriented x 3.    Ortho Exam    Right Knee Exam  ----------  ALIGNMENT: Right: neutral----------  RANGE OF MOTION:  Right: 0-120  LIGAMENTOUS STABILITY:   Right: stable to valgus and varus stress----------  STRENGTH:  KNEE FLEXION Right 5/5  KNEE EXTENSION Right 5/5 ----------  PAIN WITH PALPATION: Right medial joint line  PAIN WITH KNEE ROM: Right no  PATELLAR CREPITUS: Right yes "   ----------    Left Knee Exam  ----------  ALIGNMENT: Left: neutral----------  RANGE OF MOTION:  Left: 0-120  LIGAMENTOUS STABILITY:   Left: stable to valgus and varus stress----------  STRENGTH:  KNEE FLEXION left 5/5  KNEE EXTENSION left 5/5 ----------  PAIN WITH PALPATION: Left medial joint line  PAIN WITH KNEE ROM: Left no  PATELLAR CREPITUS: Left yes         Medical Decision Making    Data Review:   none    Assessment:  1. Other secondary osteoarthritis of both knees        Plan:  Bilateral knee arthritis in the face of rheumatoid arthritis.  Patient reports Visco did not improve her pain.  She is having increasing pain since beginning water therapy.  Plan today is repeat steroid injection to bilateral knees.  I will put in an order for repeat Visco and she will return to begin the series once eligible.    Using sterile technique, the left knee was sterilely prepped with Hibiclens. Following a time out,  using a 22 gauge needle, the left knee was injected with 1cc (40mg) Kenalog, 4 cc lidocaine.  Patient tolerated the procedure well.  No complications.  Using sterile technique, the right knee was sterilely prepped with Hibiclens.  Following a time out,  using a 22 gauge needle, the right knee was injected with 1cc (40mg) Kenalog, 4 cc lidocaine.  Patient tolerated the procedure well.  No complications.          Leny Alcaraz PA-C  09/10/20  14:59

## 2020-09-10 NOTE — PROGRESS NOTES
Procedure   Large Joint Arthrocentesis: R knee  Date/Time: 9/10/2020 1:23 PM  Consent given by: patient  Site marked: site marked  Timeout: Immediately prior to procedure a time out was called to verify the correct patient, procedure, equipment, support staff and site/side marked as required   Supporting Documentation  Indications: pain   Procedure Details  Location: knee - R knee  Preparation: Patient was prepped and draped in the usual sterile fashion  Needle size: 22 G  Medications administered: 4 mL lidocaine 1 %; 40 mg triamcinolone acetonide 40 MG/ML  Patient tolerance: patient tolerated the procedure well with no immediate complications    Large Joint Arthrocentesis: L knee  Date/Time: 9/10/2020 1:23 PM  Consent given by: patient  Site marked: site marked  Timeout: Immediately prior to procedure a time out was called to verify the correct patient, procedure, equipment, support staff and site/side marked as required   Supporting Documentation  Indications: pain   Procedure Details  Location: knee - L knee  Preparation: Patient was prepped and draped in the usual sterile fashion  Needle size: 22 G  Approach: anterolateral  Medications administered: 4 mL lidocaine PF 1% 1 %; 40 mg triamcinolone acetonide 40 MG/ML  Patient tolerance: patient tolerated the procedure well with no immediate complications

## 2020-10-14 RX ORDER — FLECAINIDE ACETATE 100 MG/1
TABLET ORAL
Qty: 180 TABLET | Refills: 3 | Status: SHIPPED | OUTPATIENT
Start: 2020-10-14 | End: 2021-10-15

## 2020-11-12 ENCOUNTER — CLINICAL SUPPORT (OUTPATIENT)
Dept: ORTHOPEDIC SURGERY | Facility: CLINIC | Age: 65
End: 2020-11-12

## 2020-11-12 DIAGNOSIS — M17.0 PRIMARY OSTEOARTHRITIS OF BOTH KNEES: Primary | ICD-10-CM

## 2020-11-12 PROCEDURE — 20610 DRAIN/INJ JOINT/BURSA W/O US: CPT | Performed by: PHYSICIAN ASSISTANT

## 2020-11-12 NOTE — PROGRESS NOTES
Subjective     Follow-up (bilateral knee supartz #1)      Mecca Renee is a 65 y.o. female.     History of Present Illness   Patient presents for the first injection of Supartz in bilateral knees.    Allergies   Allergen Reactions   • Sulfa Antibiotics      Current Outpatient Medications on File Prior to Visit   Medication Sig Dispense Refill   • amitriptyline (ELAVIL) 10 MG tablet TAKE 1 TABLET BY MOUTH EVERY NIGHT. (Patient taking differently: Take 10 mg by mouth As Needed.) 30 tablet 5   • Apremilast (OTEZLA PO) Take  by mouth 2 (Two) Times a Day.     • aspirin 81 MG EC tablet Take 81 mg by mouth Daily.     • baclofen (LIORESAL) 10 MG tablet TAKE 1/2 TABLET BY MOUTH EVERY MORNING AND EVERY AFTERNOON, AND 1 TABLET AT BEDTIME  5   • bisoprolol (ZEBeta) 5 MG tablet TAKE 1/2 TABLET BY MOUTH TWICE DAILY 90 tablet 1   • Calcium Carbonate-Vit D-Min (CALCIUM 1200 PO) Take 1 tablet by mouth 2 (Two) Times a Day.     • cetirizine (ZYRTEC ALLERGY) 10 MG tablet Take 1 tablet by mouth.     • Cholecalciferol (VITAMIN D3) 5000 UNITS capsule capsule Take  by mouth Daily.     • Cholecalciferol 25 MCG (1000 UT) tablet Take  by mouth.     • Coenzyme Q10 (COQ-10) 100 MG capsule oral daily     • coenzyme Q10 100 MG capsule Take 100 mg by mouth Daily.     • COMBIVENT RESPIMAT  MCG/ACT inhaler Inhale 1 puff 4 (Four) Times a Day.  3   • denosumab (Prolia) 60 MG/ML solution prefilled syringe syringe Inject 1 mL under the skin into the appropriate area as directed Every 6 (Six) Months.     • diclofenac (VOLTAREN) 1 % gel gel Apply 4 g topically As Needed.     • Dietary Management Product (RHEUMATE) capsule Take 1 capsule by mouth Daily. 90 capsule 1   • etodolac (LODINE) 400 MG tablet Take 400 mg by mouth 2 (Two) Times a Day.     • flecainide (TAMBOCOR) 100 MG tablet TAKE 1 TABLET EVERY 12 HOURS DAILY. 180 tablet 3   • fluticasone (FLONASE) 50 MCG/ACT nasal spray as needed     • guaiFENesin (MUCINEX) 600 MG 12 hr tablet as  needed     • methocarbamol (ROBAXIN) 750 MG tablet Take 750 mg by mouth 3 (Three) Times a Day.     • MULTIPLE VITAMIN PO Take 1 tablet by mouth.     • naratriptan (AMERGE) 1 MG tablet      • pantoprazole (PROTONIX) 20 MG EC tablet once daily     • pregabalin (LYRICA) 75 MG capsule Take 75 mg by mouth 2 (Two) Times a Day.     • PREMPRO 0.3-1.5 MG per tablet Take 1 tablet by mouth Daily.  2   • SUMAtriptan (IMITREX) 100 MG tablet SUMATRIPTAN SUCCINATE 100 MG TABS     • tiZANidine (ZANAFLEX) 2 MG tablet Take half to 1 tablet three times a day as needed for muscle spasms. (Patient taking differently: As Needed. Take half to 1 tablet three times a day as needed for muscle spasms.) 90 tablet 3   • traMADol (ULTRAM) 50 MG tablet Take 50 mg by mouth Every 6 (Six) Hours As Needed for Moderate Pain .       No current facility-administered medications on file prior to visit.      Social History     Socioeconomic History   • Marital status:      Spouse name: Not on file   • Number of children: Not on file   • Years of education: Not on file   • Highest education level: Not on file   Tobacco Use   • Smoking status: Never Smoker   • Smokeless tobacco: Never Used   Substance and Sexual Activity   • Alcohol use: No   • Drug use: No   • Sexual activity: Defer     Past Surgical History:   Procedure Laterality Date   • CHOLECYSTECTOMY     • EYE SURGERY      eye lid lift   • EYE SURGERY  12/2019   • FOOT SURGERY Right    • SHOULDER SURGERY Right      Family History   Problem Relation Age of Onset   • Diabetes Mother    • Cancer Mother    • Heart disease Mother    • Hypertension Mother    • Cancer Father    • Cancer Brother    • Heart disease Maternal Grandmother    • Stroke Maternal Grandmother      Past Medical History:   Diagnosis Date   • Asthma    • Chronic pain disorder    • Diverticulosis    • Fibromyalgia    • GERD (gastroesophageal reflux disease)    • Migraine    • Neck pain    • Osteoarthritis    • Palpitations      palpitations with near syncope   • Rheumatoid arthritis (CMS/[facility])          Review of Systems    The following portions of the patient's history were reviewed and updated as appropriate: allergies, current medications, past family history, past medical history, past social history, past surgical history and problem list.    Ortho Exam      Medical Decision Making    Assessment and Plan/ Diagnosis/Treatment options:   Bilateral knee arthritis.  Proceed with first injection of Supartz in both knees.  She will return in 1 week for the second injection or sooner if needed.    .  Please see procedure note for details.

## 2020-11-12 NOTE — PROGRESS NOTES
Procedure   Large Joint Arthrocentesis: R knee  Date/Time: 11/12/2020 1:48 PM  Consent given by: patient  Site marked: site marked  Timeout: Immediately prior to procedure a time out was called to verify the correct patient, procedure, equipment, support staff and site/side marked as required   Supporting Documentation  Indications: pain   Procedure Details  Location: knee - R knee  Preparation: Patient was prepped and draped in the usual sterile fashion  Needle size: 22 G  Approach: superior  Medications administered: 25 mg sodium hyaluronate 25 MG/2.5ML  Aspirate: clear (to verify intraarticular placement of the needle)  Patient tolerance: patient tolerated the procedure well with no immediate complications    Large Joint Arthrocentesis: L knee  Date/Time: 11/12/2020 1:48 PM  Consent given by: patient  Site marked: site marked  Timeout: Immediately prior to procedure a time out was called to verify the correct patient, procedure, equipment, support staff and site/side marked as required   Supporting Documentation  Indications: pain   Procedure Details  Location: knee - L knee  Preparation: Patient was prepped and draped in the usual sterile fashion  Needle size: 22 G  Approach: superior  Medications administered: 25 mg sodium hyaluronate 25 MG/2.5ML  Aspirate: clear (to verify intraarticular placement of the needle)  Patient tolerance: patient tolerated the procedure well with no immediate complications

## 2020-11-19 ENCOUNTER — CLINICAL SUPPORT (OUTPATIENT)
Dept: ORTHOPEDIC SURGERY | Facility: CLINIC | Age: 65
End: 2020-11-19

## 2020-11-19 DIAGNOSIS — M17.0 PRIMARY OSTEOARTHRITIS OF BOTH KNEES: Primary | ICD-10-CM

## 2020-11-19 PROCEDURE — 20610 DRAIN/INJ JOINT/BURSA W/O US: CPT | Performed by: PHYSICIAN ASSISTANT

## 2020-11-19 NOTE — PROGRESS NOTES
Procedure   Large Joint Arthrocentesis: R knee  Date/Time: 11/19/2020 1:39 PM  Consent given by: patient  Site marked: site marked  Timeout: Immediately prior to procedure a time out was called to verify the correct patient, procedure, equipment, support staff and site/side marked as required   Supporting Documentation  Indications: pain   Procedure Details  Location: knee - R knee  Preparation: Patient was prepped and draped in the usual sterile fashion  Needle size: 22 G  Approach: anterolateral  Medications administered: 25 mg sodium hyaluronate 25 MG/2.5ML  Patient tolerance: patient tolerated the procedure well with no immediate complications    Large Joint Arthrocentesis: L knee  Date/Time: 11/19/2020 1:39 PM  Consent given by: patient  Site marked: site marked  Timeout: Immediately prior to procedure a time out was called to verify the correct patient, procedure, equipment, support staff and site/side marked as required   Supporting Documentation  Indications: pain   Procedure Details  Location: knee - L knee  Needle size: 22 G  Approach: anterolateral  Medications administered: 25 mg sodium hyaluronate 25 MG/2.5ML  Patient tolerance: patient tolerated the procedure well with no immediate complications

## 2020-11-19 NOTE — PROGRESS NOTES
Subjective     Injections (Bilateral Knee Supartz Injection #2)      Mecca Renee is a 65 y.o. female.     History of Present Illness   Patient presents for the second injection of Supartz in bilateral knees.  She is tolerated previous injections well.    Allergies   Allergen Reactions   • Sulfa Antibiotics      Current Outpatient Medications on File Prior to Visit   Medication Sig Dispense Refill   • amitriptyline (ELAVIL) 10 MG tablet TAKE 1 TABLET BY MOUTH EVERY NIGHT. (Patient taking differently: Take 10 mg by mouth As Needed.) 30 tablet 5   • Apremilast (OTEZLA PO) Take  by mouth 2 (Two) Times a Day.     • aspirin 81 MG EC tablet Take 81 mg by mouth Daily.     • baclofen (LIORESAL) 10 MG tablet TAKE 1/2 TABLET BY MOUTH EVERY MORNING AND EVERY AFTERNOON, AND 1 TABLET AT BEDTIME  5   • bisoprolol (ZEBeta) 5 MG tablet TAKE 1/2 TABLET BY MOUTH TWICE DAILY 90 tablet 1   • Calcium Carbonate-Vit D-Min (CALCIUM 1200 PO) Take 1 tablet by mouth 2 (Two) Times a Day.     • cetirizine (ZYRTEC ALLERGY) 10 MG tablet Take 1 tablet by mouth.     • Cholecalciferol (VITAMIN D3) 5000 UNITS capsule capsule Take  by mouth Daily.     • Cholecalciferol 25 MCG (1000 UT) tablet Take  by mouth.     • Coenzyme Q10 (COQ-10) 100 MG capsule oral daily     • coenzyme Q10 100 MG capsule Take 100 mg by mouth Daily.     • COMBIVENT RESPIMAT  MCG/ACT inhaler Inhale 1 puff 4 (Four) Times a Day.  3   • denosumab (Prolia) 60 MG/ML solution prefilled syringe syringe Inject 1 mL under the skin into the appropriate area as directed Every 6 (Six) Months.     • diclofenac (VOLTAREN) 1 % gel gel Apply 4 g topically As Needed.     • Dietary Management Product (RHEUMATE) capsule Take 1 capsule by mouth Daily. 90 capsule 1   • etodolac (LODINE) 400 MG tablet Take 400 mg by mouth 2 (Two) Times a Day.     • flecainide (TAMBOCOR) 100 MG tablet TAKE 1 TABLET EVERY 12 HOURS DAILY. 180 tablet 3   • fluticasone (FLONASE) 50 MCG/ACT nasal spray as needed      • guaiFENesin (MUCINEX) 600 MG 12 hr tablet as needed     • methocarbamol (ROBAXIN) 750 MG tablet Take 750 mg by mouth 3 (Three) Times a Day.     • MULTIPLE VITAMIN PO Take 1 tablet by mouth.     • naratriptan (AMERGE) 1 MG tablet      • pantoprazole (PROTONIX) 20 MG EC tablet once daily     • pregabalin (LYRICA) 75 MG capsule Take 75 mg by mouth 2 (Two) Times a Day.     • PREMPRO 0.3-1.5 MG per tablet Take 1 tablet by mouth Daily.  2   • SUMAtriptan (IMITREX) 100 MG tablet SUMATRIPTAN SUCCINATE 100 MG TABS     • tiZANidine (ZANAFLEX) 2 MG tablet Take half to 1 tablet three times a day as needed for muscle spasms. (Patient taking differently: As Needed. Take half to 1 tablet three times a day as needed for muscle spasms.) 90 tablet 3   • traMADol (ULTRAM) 50 MG tablet Take 50 mg by mouth Every 6 (Six) Hours As Needed for Moderate Pain .       No current facility-administered medications on file prior to visit.      Social History     Socioeconomic History   • Marital status:      Spouse name: Not on file   • Number of children: Not on file   • Years of education: Not on file   • Highest education level: Not on file   Tobacco Use   • Smoking status: Never Smoker   • Smokeless tobacco: Never Used   Substance and Sexual Activity   • Alcohol use: No   • Drug use: No   • Sexual activity: Defer     Past Surgical History:   Procedure Laterality Date   • CHOLECYSTECTOMY     • EYE SURGERY      eye lid lift   • EYE SURGERY  12/2019   • FOOT SURGERY Right    • SHOULDER SURGERY Right      Family History   Problem Relation Age of Onset   • Diabetes Mother    • Cancer Mother    • Heart disease Mother    • Hypertension Mother    • Cancer Father    • Cancer Brother    • Heart disease Maternal Grandmother    • Stroke Maternal Grandmother      Past Medical History:   Diagnosis Date   • Asthma    • Chronic pain disorder    • Diverticulosis    • Fibromyalgia    • GERD (gastroesophageal reflux disease)    • Migraine    • Neck  pain    • Osteoarthritis    • Palpitations     palpitations with near syncope   • Rheumatoid arthritis (CMS/[facility])          Review of Systems    The following portions of the patient's history were reviewed and updated as appropriate: allergies, current medications, past family history, past medical history, past social history, past surgical history and problem list.    Ortho Exam      Medical Decision Making    Assessment and Plan/ Diagnosis/Treatment options:   Bilateral knee arthritis.  Plan we will proceed with a second injection of Supartz in both knees.  She will return in 1 week for the third injection or sooner if needed.    See procedure notes for details

## 2020-12-03 ENCOUNTER — CLINICAL SUPPORT (OUTPATIENT)
Dept: ORTHOPEDIC SURGERY | Facility: CLINIC | Age: 65
End: 2020-12-03

## 2020-12-03 DIAGNOSIS — M17.4 OTHER SECONDARY OSTEOARTHRITIS OF BOTH KNEES: ICD-10-CM

## 2020-12-03 DIAGNOSIS — M17.0 PRIMARY OSTEOARTHRITIS OF BOTH KNEES: Primary | ICD-10-CM

## 2020-12-03 PROCEDURE — 20610 DRAIN/INJ JOINT/BURSA W/O US: CPT | Performed by: PHYSICIAN ASSISTANT

## 2020-12-03 RX ORDER — PREGABALIN 150 MG/1
150 CAPSULE ORAL 2 TIMES DAILY
COMMUNITY
Start: 2020-11-16

## 2020-12-03 NOTE — PROGRESS NOTES
Procedure   Large Joint Arthrocentesis: L knee  Date/Time: 12/3/2020 2:01 PM  Site marked: site marked  Timeout: Immediately prior to procedure a time out was called to verify the correct patient, procedure, equipment, support staff and site/side marked as required   Supporting Documentation  Indications: pain   Procedure Details  Location: knee - L knee  Preparation: Patient was prepped and draped in the usual sterile fashion  Needle size: 22 G  Approach: anterolateral  Medications administered: 25 mg sodium hyaluronate 25 MG/2.5ML      Large Joint Arthrocentesis: R knee  Date/Time: 12/3/2020 2:03 PM  Consent given by: patient  Site marked: site marked  Timeout: Immediately prior to procedure a time out was called to verify the correct patient, procedure, equipment, support staff and site/side marked as required   Supporting Documentation  Indications: pain   Procedure Details  Location: knee - R knee  Preparation: Patient was prepped and draped in the usual sterile fashion  Needle size: 20 G  Approach: anterolateral  Medications administered: 25 mg sodium hyaluronate 25 MG/2.5ML  Patient tolerance: patient tolerated the procedure well with no immediate complications

## 2020-12-08 NOTE — PROGRESS NOTES
Subjective     Injections (Bilateral knee injection Supartz #3 )      Mecca Renee is a 65 y.o. female.     History of Present Illness   Patient presents for the 3rd injection of Supartz in bilateral knees.  She has tolerated previous injections well.    Allergies   Allergen Reactions   • Sulfa Antibiotics      Current Outpatient Medications on File Prior to Visit   Medication Sig Dispense Refill   • amitriptyline (ELAVIL) 10 MG tablet TAKE 1 TABLET BY MOUTH EVERY NIGHT. (Patient taking differently: Take 10 mg by mouth As Needed.) 30 tablet 5   • Apremilast (OTEZLA PO) Take  by mouth 2 (Two) Times a Day.     • aspirin 81 MG EC tablet Take 81 mg by mouth Daily.     • baclofen (LIORESAL) 10 MG tablet TAKE 1/2 TABLET BY MOUTH EVERY MORNING AND EVERY AFTERNOON, AND 1 TABLET AT BEDTIME  5   • bisoprolol (ZEBeta) 5 MG tablet TAKE 1/2 TABLET BY MOUTH TWICE DAILY 90 tablet 1   • Calcium Carbonate-Vit D-Min (CALCIUM 1200 PO) Take 1 tablet by mouth 2 (Two) Times a Day.     • cetirizine (ZYRTEC ALLERGY) 10 MG tablet Take 1 tablet by mouth.     • Cholecalciferol (VITAMIN D3) 5000 UNITS capsule capsule Take  by mouth Daily.     • Cholecalciferol 25 MCG (1000 UT) tablet Take  by mouth.     • Coenzyme Q10 (COQ-10) 100 MG capsule oral daily     • coenzyme Q10 100 MG capsule Take 100 mg by mouth Daily.     • COMBIVENT RESPIMAT  MCG/ACT inhaler Inhale 1 puff 4 (Four) Times a Day.  3   • denosumab (Prolia) 60 MG/ML solution prefilled syringe syringe Inject 1 mL under the skin into the appropriate area as directed Every 6 (Six) Months.     • diclofenac (VOLTAREN) 1 % gel gel Apply 4 g topically As Needed.     • Dietary Management Product (RHEUMATE) capsule Take 1 capsule by mouth Daily. 90 capsule 1   • etodolac (LODINE) 400 MG tablet Take 400 mg by mouth 2 (Two) Times a Day.     • flecainide (TAMBOCOR) 100 MG tablet TAKE 1 TABLET EVERY 12 HOURS DAILY. 180 tablet 3   • fluticasone (FLONASE) 50 MCG/ACT nasal spray as needed      • guaiFENesin (MUCINEX) 600 MG 12 hr tablet as needed     • methocarbamol (ROBAXIN) 750 MG tablet Take 750 mg by mouth 3 (Three) Times a Day.     • MULTIPLE VITAMIN PO Take 1 tablet by mouth.     • naratriptan (AMERGE) 1 MG tablet      • pantoprazole (PROTONIX) 20 MG EC tablet once daily     • pregabalin (LYRICA) 150 MG capsule      • pregabalin (LYRICA) 75 MG capsule Take 75 mg by mouth 2 (Two) Times a Day.     • PREMPRO 0.3-1.5 MG per tablet Take 1 tablet by mouth Daily.  2   • SUMAtriptan (IMITREX) 100 MG tablet SUMATRIPTAN SUCCINATE 100 MG TABS     • tiZANidine (ZANAFLEX) 2 MG tablet Take half to 1 tablet three times a day as needed for muscle spasms. (Patient taking differently: As Needed. Take half to 1 tablet three times a day as needed for muscle spasms.) 90 tablet 3   • traMADol (ULTRAM) 50 MG tablet Take 50 mg by mouth Every 6 (Six) Hours As Needed for Moderate Pain .       No current facility-administered medications on file prior to visit.      Social History     Socioeconomic History   • Marital status:      Spouse name: Not on file   • Number of children: Not on file   • Years of education: Not on file   • Highest education level: Not on file   Tobacco Use   • Smoking status: Never Smoker   • Smokeless tobacco: Never Used   Substance and Sexual Activity   • Alcohol use: No   • Drug use: No   • Sexual activity: Defer     Past Surgical History:   Procedure Laterality Date   • CHOLECYSTECTOMY     • EYE SURGERY      eye lid lift   • EYE SURGERY  12/2019   • FOOT SURGERY Right    • SHOULDER SURGERY Right      Family History   Problem Relation Age of Onset   • Diabetes Mother    • Cancer Mother    • Heart disease Mother    • Hypertension Mother    • Cancer Father    • Cancer Brother    • Heart disease Maternal Grandmother    • Stroke Maternal Grandmother      Past Medical History:   Diagnosis Date   • Asthma    • Chronic pain disorder    • Diverticulosis    • Fibromyalgia    • GERD (gastroesophageal  reflux disease)    • Migraine    • Neck pain    • Osteoarthritis    • Palpitations     palpitations with near syncope   • Rheumatoid arthritis (CMS/[facility])          Review of Systems    The following portions of the patient's history were reviewed and updated as appropriate: allergies, current medications, past family history, past medical history, past social history, past surgical history and problem list.    Ortho Exam      Medical Decision Making    Assessment and Plan/ Diagnosis/Treatment options:   Bilateral knee arthritis.  Proceed with injections in both knees today. RTC in one week for the 4th injection or sooner if needed.    Please see procedure notes for details.

## 2020-12-10 ENCOUNTER — CLINICAL SUPPORT (OUTPATIENT)
Dept: ORTHOPEDIC SURGERY | Facility: CLINIC | Age: 65
End: 2020-12-10

## 2020-12-10 DIAGNOSIS — M17.0 PRIMARY OSTEOARTHRITIS OF BOTH KNEES: Primary | ICD-10-CM

## 2020-12-10 PROCEDURE — 20610 DRAIN/INJ JOINT/BURSA W/O US: CPT | Performed by: PHYSICIAN ASSISTANT

## 2020-12-10 NOTE — PROGRESS NOTES
Subjective     Follow-up (left knee supartz injection#4)      Mecca Renee is a 65 y.o. female.     History of Present Illness   Patient presents for the fourth injection of Supartz in the bilateral knees.  She is tolerated previous injection well.    Allergies   Allergen Reactions   • Sulfa Antibiotics      Current Outpatient Medications on File Prior to Visit   Medication Sig Dispense Refill   • amitriptyline (ELAVIL) 10 MG tablet TAKE 1 TABLET BY MOUTH EVERY NIGHT. (Patient taking differently: Take 10 mg by mouth As Needed.) 30 tablet 5   • Apremilast (OTEZLA PO) Take  by mouth 2 (Two) Times a Day.     • aspirin 81 MG EC tablet Take 81 mg by mouth Daily.     • baclofen (LIORESAL) 10 MG tablet TAKE 1/2 TABLET BY MOUTH EVERY MORNING AND EVERY AFTERNOON, AND 1 TABLET AT BEDTIME  5   • bisoprolol (ZEBeta) 5 MG tablet TAKE 1/2 TABLET BY MOUTH TWICE DAILY 90 tablet 1   • Calcium Carbonate-Vit D-Min (CALCIUM 1200 PO) Take 1 tablet by mouth 2 (Two) Times a Day.     • cetirizine (ZYRTEC ALLERGY) 10 MG tablet Take 1 tablet by mouth.     • Cholecalciferol (VITAMIN D3) 5000 UNITS capsule capsule Take  by mouth Daily.     • Cholecalciferol 25 MCG (1000 UT) tablet Take  by mouth.     • Coenzyme Q10 (COQ-10) 100 MG capsule oral daily     • coenzyme Q10 100 MG capsule Take 100 mg by mouth Daily.     • COMBIVENT RESPIMAT  MCG/ACT inhaler Inhale 1 puff 4 (Four) Times a Day.  3   • denosumab (Prolia) 60 MG/ML solution prefilled syringe syringe Inject 1 mL under the skin into the appropriate area as directed Every 6 (Six) Months.     • diclofenac (VOLTAREN) 1 % gel gel Apply 4 g topically As Needed.     • Dietary Management Product (RHEUMATE) capsule Take 1 capsule by mouth Daily. 90 capsule 1   • etodolac (LODINE) 400 MG tablet Take 400 mg by mouth 2 (Two) Times a Day.     • flecainide (TAMBOCOR) 100 MG tablet TAKE 1 TABLET EVERY 12 HOURS DAILY. 180 tablet 3   • fluticasone (FLONASE) 50 MCG/ACT nasal spray as needed      • guaiFENesin (MUCINEX) 600 MG 12 hr tablet as needed     • methocarbamol (ROBAXIN) 750 MG tablet Take 750 mg by mouth 3 (Three) Times a Day.     • MULTIPLE VITAMIN PO Take 1 tablet by mouth.     • naratriptan (AMERGE) 1 MG tablet      • pantoprazole (PROTONIX) 20 MG EC tablet once daily     • pregabalin (LYRICA) 150 MG capsule      • pregabalin (LYRICA) 75 MG capsule Take 75 mg by mouth 2 (Two) Times a Day.     • PREMPRO 0.3-1.5 MG per tablet Take 1 tablet by mouth Daily.  2   • tiZANidine (ZANAFLEX) 2 MG tablet Take half to 1 tablet three times a day as needed for muscle spasms. (Patient taking differently: As Needed. Take half to 1 tablet three times a day as needed for muscle spasms.) 90 tablet 3   • traMADol (ULTRAM) 50 MG tablet Take 50 mg by mouth Every 6 (Six) Hours As Needed for Moderate Pain .       No current facility-administered medications on file prior to visit.      Social History     Socioeconomic History   • Marital status:      Spouse name: Not on file   • Number of children: Not on file   • Years of education: Not on file   • Highest education level: Not on file   Tobacco Use   • Smoking status: Never Smoker   • Smokeless tobacco: Never Used   Substance and Sexual Activity   • Alcohol use: No   • Drug use: No   • Sexual activity: Defer     Past Surgical History:   Procedure Laterality Date   • CHOLECYSTECTOMY     • EYE SURGERY      eye lid lift   • EYE SURGERY  12/2019   • FOOT SURGERY Right    • SHOULDER SURGERY Right      Family History   Problem Relation Age of Onset   • Diabetes Mother    • Cancer Mother    • Heart disease Mother    • Hypertension Mother    • Cancer Father    • Cancer Brother    • Heart disease Maternal Grandmother    • Stroke Maternal Grandmother      Past Medical History:   Diagnosis Date   • Asthma    • Chronic pain disorder    • Diverticulosis    • Fibromyalgia    • GERD (gastroesophageal reflux disease)    • Migraine    • Neck pain    • Osteoarthritis    •  Palpitations     palpitations with near syncope   • Rheumatoid arthritis (CMS/[facility])          Review of Systems    The following portions of the patient's history were reviewed and updated as appropriate: allergies, current medications, past family history, past medical history, past social history, past surgical history and problem list.    Ortho Exam      Medical Decision Making    Assessment and Plan/ Diagnosis/Treatment options:   bilateral knee arthritis proceed with left fourth injection of Supartz in the left knee.  Return to clinic in 1 week for the final injection or sooner if needed.    See procedure notes for details.

## 2020-12-10 NOTE — PROGRESS NOTES
Procedure   Large Joint Arthrocentesis: L knee  Date/Time: 12/10/2020 1:47 PM  Consent given by: patient  Site marked: site marked  Timeout: Immediately prior to procedure a time out was called to verify the correct patient, procedure, equipment, support staff and site/side marked as required   Supporting Documentation  Indications: pain   Procedure Details  Location: knee - L knee  Preparation: Patient was prepped and draped in the usual sterile fashion  Needle size: 22 G  Approach: superior  Medications administered: 25 mg sodium hyaluronate 25 MG/2.5ML  Aspirate: clear (to verify intraarticular placement of the needle)  Patient tolerance: patient tolerated the procedure well with no immediate complications    Large Joint Arthrocentesis: R knee  Date/Time: 12/10/2020 1:47 PM  Consent given by: patient  Site marked: site marked  Timeout: Immediately prior to procedure a time out was called to verify the correct patient, procedure, equipment, support staff and site/side marked as required   Supporting Documentation  Indications: pain   Procedure Details  Location: knee - R knee  Preparation: Patient was prepped and draped in the usual sterile fashion  Needle size: 22 G  Approach: superior  Medications administered: 25 mg sodium hyaluronate 25 MG/2.5ML  Aspirate: clear (to verify intraarticular placement of the needle)  Patient tolerance: patient tolerated the procedure well with no immediate complications

## 2020-12-16 NOTE — PROGRESS NOTES
"Chief Complaint: \"I have a lot of trouble standing.\"      Brief History: Ms. Mecca Renee, 65 y.o. female returns to the clinic for postprocedure follow up and evaluation. Patient was last seen on August 5, 2020, when she underwent bilateral lumbar medical branch rhizotomies at L3, L4 and L5, from which she reports experiencing no significant pain relief. She has completed physical therapy as medically advised.  In addition, she receives Supartz injections with orthopedics in the bilateral knees. She reports more complaints with standing and walking.  She returns today for post procedure follow-up and evaluation.    Problem #1: Lower Back Pain  Pain Description: Constant lower back pain with intermittent exacerbation, described as aching, burning and throbbing sensation.   Radiation of pain: The lower back pain radiates to the hips.  Pain intensity today: 4/10  Pain intensity ranges from: 3/10 to 10/10  Aggravating factors: Pain increases with extension of the lumbar spine, lifting, ambulating more than 10-15 minutes, and standing 10 minutes.   Alleviating factors: Pain decreases with heat, rest and topical gel.   Associated symptoms:   Patient reports weakness in the upper and lower extremities. Patient reports intermittent and random episodes of numbness and weakness in her legs.  Patient denies any new bladder or bowel problems.   Patient reports difficulties with her balance but denies recent falls      Problem #2: Neck Pain and Headaches  Patient underwent cervical medial branch rhizotomies on February 8, 2017, with 80% ongoing pain relief of her chronic neck pain and headaches along and remarkable improvement in the range of motion of her cervical spine. Patient used to experience bilateral occipital headaches lasting 3-4 days and 3 times a month prior to her RFTC.    Review of previous therapies and additional medical records:  Mecca Renee has already failed the following measures, including: "   Conservative Measures: Oral analgesics, opioids and physical therapy   Interventional Measures:   Cervical medial branch rhizotomies on February 8, 2017  Bilateral lumbar medial branch rhizotomies on 11/28/2018  Bilateral lumbar medial branch rhizotomies on 08/05/2020  Surgical Measures: No history of previous cervical or lumbar spine surgery  Mecca Renee underwent neurological consultation with Dr. Díaz on 09/08/2016, and was referred for pain management consultation of cervicalgia. Patient underwent neurosurgical consultation with Dr. Ronnell Ramirez several years ago and was found to be a potential surgical candidate (records not available for review).  Mecca Renee presents with significant comorbidities including tachyarrhythmia  In terms of current analgesics, Mecca Renee takes: Etodolac, Lyrica, amitriptyline, tramadol, diclofenac gel.   I have reviewed [facility] Report #[id number] consistent to medication reconciliation.    Global Pain Scale 11-06 2018 04-17 2019 06-23 2020 12-17 2020       Pain 15 7 15 17       Feelings 2 10 10 5       Clinical outcomes 5 4 5 8       Activities 0 9 10 11       GPS Total: 22 30 40 41         Review of New Diagnostic Studies:  XR SPINE LUMBAR COMPLETE W FLEX EXT- 06/23/2020: Degenerative changes at the L4-L5 level with no inducible stability in flexion or extension views.     Diagnostic Studies:  XR SPINE LUMBAR COMPLETE W FLEX EXT- 12/20/2018: Mild lumbar scoliosis, and mild lumbar degenerative disc changes. No evidence of acute trauma, significant focal subluxation, or evidence of instability from flexion to extension.  MRI LUMBAR SPINE WO CONTRAST- 03/14/2017. Normal anatomic alignment of the lumbar spine is preserved. The conus medullaris terminates posterior to T12. There is no intrathecal mass. 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 The parasagittal T1-weighted sequences reveal the neuroforamina to be widely patent. STIR  sequences reveal no areas of significant osseous edema. Axial images:  L3-L4: Disc space is normal. Facet joints are grossly unremarkable. No neuroforaminal or central canal stenosis.  L4-L5: Disc space is normal. Mild bilateral facet hypertrophy. No neuroforaminal or central canal stenosis.  L5-S1: Disc space is normal. No neuroforaminal or central canal stenosis.  MRI, cervical, without contrast, 12/31/2008; The visualized craniocervical junction and spinal cord are normal.   C2-C3 through C4-C5 and C6-C7, C7-T1: The discs are normal.   C5-C6: Broad-based posterior disc protrusion which abuts the spinal cord but does not cause spinal stenosis.  CT, Head, w/o, 04/26/2011; Normal head CT without contrast.  X-rays of the cervical spine revealed degenerative disc disease and facet arthritis most impressive at C5-C6, 11/4/2015  X-rays of the lumbar spine 11/4/2015, revealed mild levoscoliosis. Lumbar facet arthropathy.     Review of Systems   Constitutional: Positive for fatigue.   HENT: Positive for sinus pressure.    Eyes: Positive for photophobia.   Cardiovascular: Positive for leg swelling.   Endocrine: Positive for cold intolerance.   Musculoskeletal: Positive for back pain and myalgias.   Neurological: Positive for weakness and light-headedness.   All other systems reviewed and are negative.     The following portions of the patient's history were reviewed and updated as appropriate: problem list, past medical history, past surgery history, social history, family history, medications, and allergies     LMP  (LMP Unknown)      Physical Exam   Constitutional: Patient is oriented to person, place, and time.   Patient appears well-developed and well-nourished.   HENT: Head: Normocephalic and atraumatic.   Eyes: Conjunctivae and lids are normal.   Pupils: Equal, round, reactive to light.   Neck: Trachea normal. Neck supple. No JVD present.   Lymphatic: No cervical adenopathy  Pulmonary Respiratory effort: No increased  work of breathing or signs of respiratory distress. Auscultation of lungs: Clear to auscultation.   Cardiovascular Auscultation of heart: Normal rate and rhythm, normal S1 and S2, no murmurs.   Peripheral vascular exam: Normal. No edema.   Abdomen: The abdomen was soft and nontender. Bowel sounds were normal.   Musculoskeletal   Gait and station: Gait evaluation demonstrated a normal gait.   Cervical spine: Passive and active range of motion full and without pain.  Extension, flexion, lateral flexion, rotation of the cervical spine did not increase or produce pain. Cervical facet joint loading maneuvers are negative at this time.   No active trigger points in the bilateral levator scapulae.  Shoulders: The range of motion of the glenohumeral joints is full and without pain. Rotator cuff strength is 5/5.   Lumbar spine: The range of motion of the lumbar spine is limited secondary to pain. Extension, lateral flexion, and rotation increased and reproduce her lower back pain. Lumbar facet joint loading maneuvers are positive.   Berto and Gaenslen's tests are negative.  Piriformis maneuvers are negative.    Palpation of the bilateral ischial tuberosities, unrevealing.    Palpation of the bilateral greater trochanter, reveals tenderness on the left.    Examination of the Iliotibial band: unrevealing.    Hip joints: The range of motion of the hip joints is full and without pain, limited to flexion and internal rotation on the left secondary to pain.   Neurological:   Patient is alert and oriented to person, place, and time.   Speech: speech is normal.   Cortical function: Normal mental status.   Cranial nerves: Cranial nerves 2-12 intact.   Reflex Scores:  Right brachioradialis: 1+  Left brachioradialis: 1+  Right biceps: 1+  Left biceps: 1+  Right triceps: 1+  Left triceps: 1+  Right patellar: 1+  Left patellar: 1+  Right achilles: 1+  Left achilles: 1+  Motor strength: 5/5  Motor Tone: normal tone.   Involuntary  movements: none.   Superficial/Primitive Reflexes: primitive reflexes were absent.   Right Villalta: absent  Left Villalta: absent  Right ankle clonus: absent  Left ankle clonus: absent   Spurling sign is negative. Lhermitte sign is negative. Negative long tract signs. Straight leg raising test elicits lower back pain. Femoral stretch sign is negative. Positive Tinel sign in both wrists.  Sensation: No sensory loss. Sensory exam: intact to light touch, intact pain and temperature sensation, intact vibration sensation and normal proprioception.   Coordination: Normal finger to nose and heel to shin. Normal balance and negative. Romberg's sign negative.   Skin and subcutaneous tissue: Skin is warm and intact. No rash noted. No cyanosis.   Psychiatric:   Judgment and insight: Normal.   Orientation to person, place and time: Normal.   Recent and remote memory: Intact.   Mood and affect: Normal.      ASSESSMENT:   1. Spondylosis of lumbar region without myelopathy or radiculopathy    2. Cervical disc disorder of mid-cervical region    3. Cervical spondylosis without myelopathy    4. Bilateral occipital neuralgia    5. Bilateral carpal tunnel syndrome    6. Fibromyalgia    7. Rheumatoid arthritis involving multiple sites with positive rheumatoid factor (CMS/formerly [facility])    8. Osteoporosis without current pathological fracture, unspecified osteoporosis type    9. Physical deconditioning         PLAN/MEDICAL DECISION MAKING: Patient continues to experience significant pain relief and functional improvement from interventional pain management measures.  Unfortunately, she reports with most recent lumbar rhizotomy she continues to experience lower back pain. A majority of her symptoms are attributed to an intolerance to standing and walking associated with weakness.  In addition, she has began to experience some intermittent episodes of numbness in her legs.  When reviewing her MRI of the lumbar spine, the study is from 2017.  She  appears to have some progression in her symptoms, with most recent modest response to interventional measures.  I will order a new MRI of the lumbar spine to assess for further progression of degeneration.  I have reviewed all available patient's medical records as well as previous therapies as referenced above. Therefore, I have proposed the following plan:  1. Interventional pain management measures:    A. Treatment of Chronic Lower Back Pain: None indicated at this time. We will review the results of her MRI and determine a proper course of treatment. I have discussed with her bilateral transforaminal epidurals.otherwise, if her mechanical pain recurs we may proceed with one set of diagnostic bilateral lumbar medial branch blocks at L3, L4, L5; for bilateral lumbar facet joints at L4-L5, L5-S1 to confirm the origin of her recurrent mechanical lower back pain. If patient experiences more than 80% relief along with significant improvement the range of motion of the lumbar spine, then, patient will be scheduled for bilateral lumbar medial branch rhizotomies  B. Treatment of Chronic Neck Pain: None indicated at this time. If pain recurs, then, patient will be scheduled for diagnostic bilateral cervical medial branch blocks at  C3, C4, C5; for bilateral cervical facet joints at C3-C4, C4-C5, to confirm the origin of her recurrent cervicalgia. If patient experiences more than 80% pain relief along with significant improvement in the range of motion of the cervical spine, then, patient will be scheduled for bilateral cervical medial branch rhizotomies   2. Diagnostics:  A. MRI of the lumbar spine without contrast due to progression of symptoms  B. Left hip x-rays  3. Long-term rehabilitation efforts:  A. Patient will start a comprehensive physical therapy program for water therapy, therapeutic exercise, upper body strengthening/posture correction, core strengthening, gait and balance training, myofascial release,  cupping and dry needling if pain recurs  B. Start an exercise program such as yoga, water therapy and daily walks for fitness  C. Wear bilateral wrist splints/braces for treatment of bilateral carpal tunnel syndrome  4. Pharmacological measures: Reviewed. Discussed.  A. Patient takes Etodolac, Lyrica, amitriptyline, tramadol, diclofenac gel.  B. Continue Rheumate twice daily  5. The patient has been instructed to contact my office with any questions or difficulties. The patient understands the plan and agrees to proceed accordingly.      Patient Care Team:  Mg Sarkar MD as PCP - General (General Practice)  Shadi Díaz MD (Inactive) as Consulting Physician (Neurology)  Ana Garcia MD as Consulting Physician (Rheumatology)  James Bruno MD as Consulting Physician (Pain Medicine)  William Brewster MD as Consulting Physician (Cardiology)  Pj Miranda PA-C as Physician Assistant (Physician Assistant)     No orders of the defined types were placed in this encounter.        Future Appointments   Date Time Provider Department Center   12/17/2020 10:00 AM Mackenzie Mc APRN [facility]   12/17/2020  1:30 PM Jhonatan Warren MD [facility]   2/12/2021 11:15 AM William Brewster MD [facility] None         ROBEL Mckee       EMR Dragon/Transcription disclaimer:  Much of this encounter note is an electronic transcription of spoken language to printed text. Electronic transcription of spoken language may permit erroneous, or at times, nonsensical words or phrases to be inadvertently transcribed. Although I have reviewed the note for such errors, some may still exist.

## 2020-12-17 ENCOUNTER — OFFICE VISIT (OUTPATIENT)
Dept: PAIN MEDICINE | Facility: CLINIC | Age: 65
End: 2020-12-17

## 2020-12-17 ENCOUNTER — CLINICAL SUPPORT (OUTPATIENT)
Dept: ORTHOPEDIC SURGERY | Facility: CLINIC | Age: 65
End: 2020-12-17

## 2020-12-17 VITALS
BODY MASS INDEX: 31.89 KG/M2 | HEIGHT: 65 IN | HEART RATE: 61 BPM | SYSTOLIC BLOOD PRESSURE: 105 MMHG | TEMPERATURE: 96.9 F | WEIGHT: 191.4 LBS | DIASTOLIC BLOOD PRESSURE: 67 MMHG | OXYGEN SATURATION: 97 %

## 2020-12-17 DIAGNOSIS — M79.7 FIBROMYALGIA: ICD-10-CM

## 2020-12-17 DIAGNOSIS — M17.0 PRIMARY OSTEOARTHRITIS OF BOTH KNEES: Primary | ICD-10-CM

## 2020-12-17 DIAGNOSIS — M81.0 OSTEOPOROSIS WITHOUT CURRENT PATHOLOGICAL FRACTURE, UNSPECIFIED OSTEOPOROSIS TYPE: ICD-10-CM

## 2020-12-17 DIAGNOSIS — M54.81 BILATERAL OCCIPITAL NEURALGIA: ICD-10-CM

## 2020-12-17 DIAGNOSIS — R53.81 PHYSICAL DECONDITIONING: ICD-10-CM

## 2020-12-17 DIAGNOSIS — M47.816 SPONDYLOSIS OF LUMBAR REGION WITHOUT MYELOPATHY OR RADICULOPATHY: ICD-10-CM

## 2020-12-17 DIAGNOSIS — M47.812 CERVICAL SPONDYLOSIS WITHOUT MYELOPATHY: ICD-10-CM

## 2020-12-17 DIAGNOSIS — M50.920 CERVICAL DISC DISORDER OF MID-CERVICAL REGION: ICD-10-CM

## 2020-12-17 DIAGNOSIS — M05.79 RHEUMATOID ARTHRITIS INVOLVING MULTIPLE SITES WITH POSITIVE RHEUMATOID FACTOR (HCC): ICD-10-CM

## 2020-12-17 DIAGNOSIS — G56.03 BILATERAL CARPAL TUNNEL SYNDROME: ICD-10-CM

## 2020-12-17 PROCEDURE — 20610 DRAIN/INJ JOINT/BURSA W/O US: CPT | Performed by: ORTHOPAEDIC SURGERY

## 2020-12-17 PROCEDURE — 99213 OFFICE O/P EST LOW 20 MIN: CPT | Performed by: NURSE PRACTITIONER

## 2020-12-17 NOTE — PROGRESS NOTES
Procedure   Large Joint Arthrocentesis: R knee  Date/Time: 12/17/2020 1:20 PM  Consent given by: patient  Site marked: site marked  Timeout: Immediately prior to procedure a time out was called to verify the correct patient, procedure, equipment, support staff and site/side marked as required   Supporting Documentation  Indications: pain   Procedure Details  Location: knee - R knee  Preparation: Patient was prepped and draped in the usual sterile fashion  Needle size: 22 G  Approach: anterolateral  Medications administered: 25 mg sodium hyaluronate 25 MG/2.5ML  Patient tolerance: patient tolerated the procedure well with no immediate complications    Large Joint Arthrocentesis: L knee  Date/Time: 12/17/2020 1:21 PM  Consent given by: patient  Site marked: site marked  Timeout: Immediately prior to procedure a time out was called to verify the correct patient, procedure, equipment, support staff and site/side marked as required   Supporting Documentation  Indications: pain   Procedure Details  Location: knee - L knee  Preparation: Patient was prepped and draped in the usual sterile fashion  Needle size: 22 G  Approach: anterolateral  Medications administered: 25 mg sodium hyaluronate 25 MG/2.5ML  Patient tolerance: patient tolerated the procedure well with no immediate complications

## 2020-12-17 NOTE — PROGRESS NOTES
Procedure Note:    I discussed with the patient the potential benefits of performing a therapeutic injections as well as potential risks including but not limited to infection, swelling, pain, bleeding, bruising, nerve/vessel damage, skin color changes, transient elevation in blood glucose levels, and fat atrophy. After informed consent and after the areas were prepped with chlorhexadine soap, ethyl chloride was used to numb the skin. Via the superiorlateral approach, 3mL of 1% lidocaine followed by Supartz No. 5 were each injected into the right and left knee joint. The patient tolerated the procedure well. There were no complications. A sterile dressing was placed over the injection sites.

## 2021-01-04 ENCOUNTER — APPOINTMENT (OUTPATIENT)
Dept: MRI IMAGING | Facility: HOSPITAL | Age: 66
End: 2021-01-04

## 2021-01-14 ENCOUNTER — TELEPHONE (OUTPATIENT)
Dept: PAIN MEDICINE | Facility: CLINIC | Age: 66
End: 2021-01-14

## 2021-01-14 DIAGNOSIS — M47.816 SPONDYLOSIS OF LUMBAR REGION WITHOUT MYELOPATHY OR RADICULOPATHY: ICD-10-CM

## 2021-01-14 RX ORDER — ME-TETRAHYDROFOLATE/B12/HRB236 1-1-500 MG
1 CAPSULE ORAL DAILY
Qty: 90 CAPSULE | Refills: 4 | Status: SHIPPED | OUTPATIENT
Start: 2021-01-14 | End: 2022-03-10

## 2021-01-14 NOTE — TELEPHONE ENCOUNTER
Called patient per Mackenzie's request and advised her that we had sent in the refill for the rhumate and as well re ordered the MRI and called the insurance to see why they weren't covering the MRI, they stated it was that they needed the PT forms sent so we have done that. I advised her to call her insurance to make sure what they tell her. She said she would give them a call.

## 2021-02-12 ENCOUNTER — APPOINTMENT (OUTPATIENT)
Dept: MRI IMAGING | Facility: HOSPITAL | Age: 66
End: 2021-02-12

## 2021-02-12 ENCOUNTER — HOSPITAL ENCOUNTER (OUTPATIENT)
Dept: CARDIOLOGY | Facility: HOSPITAL | Age: 66
Discharge: HOME | End: 2021-02-12

## 2021-02-12 ENCOUNTER — OFFICE VISIT (OUTPATIENT)
Dept: CARDIOLOGY | Facility: CLINIC | Age: 66
End: 2021-02-12

## 2021-02-12 ENCOUNTER — IMMUNIZATION (OUTPATIENT)
Dept: VACCINE CLINIC | Facility: HOSPITAL | Age: 66
End: 2021-02-12

## 2021-02-12 VITALS — BODY MASS INDEX: 32.32 KG/M2 | HEIGHT: 65 IN | WEIGHT: 194 LBS

## 2021-02-12 VITALS
HEIGHT: 65 IN | SYSTOLIC BLOOD PRESSURE: 110 MMHG | DIASTOLIC BLOOD PRESSURE: 70 MMHG | OXYGEN SATURATION: 95 % | TEMPERATURE: 97.6 F | WEIGHT: 194 LBS | BODY MASS INDEX: 32.32 KG/M2 | HEART RATE: 60 BPM

## 2021-02-12 DIAGNOSIS — R00.2 PALPITATIONS: ICD-10-CM

## 2021-02-12 DIAGNOSIS — R00.2 PALPITATIONS: Primary | ICD-10-CM

## 2021-02-12 DIAGNOSIS — R07.1 CHEST PAIN ON BREATHING: ICD-10-CM

## 2021-02-12 DIAGNOSIS — R06.09 DYSPNEA ON EXERTION: ICD-10-CM

## 2021-02-12 DIAGNOSIS — R06.09 DOE (DYSPNEA ON EXERTION): Primary | ICD-10-CM

## 2021-02-12 PROCEDURE — 93306 TTE W/DOPPLER COMPLETE: CPT | Performed by: INTERNAL MEDICINE

## 2021-02-12 PROCEDURE — 99214 OFFICE O/P EST MOD 30 MIN: CPT | Performed by: INTERNAL MEDICINE

## 2021-02-12 PROCEDURE — 0011A: CPT | Performed by: INTERNAL MEDICINE

## 2021-02-12 PROCEDURE — 91301 HC SARSCO02 VAC 100MCG/0.5ML IM: CPT | Performed by: INTERNAL MEDICINE

## 2021-02-12 PROCEDURE — 93306 TTE W/DOPPLER COMPLETE: CPT

## 2021-02-12 PROCEDURE — 93000 ELECTROCARDIOGRAM COMPLETE: CPT | Performed by: INTERNAL MEDICINE

## 2021-02-12 NOTE — PROGRESS NOTES
[facility] Cardiology  Office Progress Note  Mecca Renee  1955  [address] [place] [address]       Visit Date: 02/12/21    PCP: Mg Sarkar MD  [address]  [place] [address]    IDENTIFICATION: A 65 y.o. female   from [place].     PROBLEM LIST:   1. Palpitations with near syncope:  a. Echocardiogram, 09/13/2013: LVEF (55% to 60%), abnormal LV diastolic filling consistent with impaired relaxation, trace MR, mild TR with an RVSP of 31.  b. Event recorder, September through October 2013: Low frequency PACs with occasional short runs of nonsustained atrial tachycardia.   2. CP  a. 6/15 Lexiscan wnl  3. GERD.  4. Rheumatoid arthritis.   5. bilat carpal tunnel  6. Covid 11/20  7. Diverticulosis.   8. Surgical history:  a. Right shoulder surgery.  b. Right foot surgery.         CC:   Chief Complaint   Patient presents with   • Chest Pain       Allergies  Allergies   Allergen Reactions   • Sulfa Antibiotics        Current Medications    Current Outpatient Medications:   •  amitriptyline (ELAVIL) 10 MG tablet, TAKE 1 TABLET BY MOUTH EVERY NIGHT. (Patient taking differently: Take 10 mg by mouth As Needed.), Disp: 30 tablet, Rfl: 5  •  Apremilast (OTEZLA PO), Take  by mouth 2 (Two) Times a Day., Disp: , Rfl:   •  aspirin 81 MG EC tablet, Take 81 mg by mouth Daily., Disp: , Rfl:   •  bisoprolol (ZEBeta) 5 MG tablet, TAKE 1/2 TABLET BY MOUTH TWICE DAILY, Disp: 90 tablet, Rfl: 1  •  Calcium Carbonate-Vit D-Min (CALCIUM 1200 PO), Take 1 tablet by mouth Daily., Disp: , Rfl:   •  cetirizine (ZYRTEC ALLERGY) 10 MG tablet, Take 1 tablet by mouth., Disp: , Rfl:   •  Cholecalciferol (VITAMIN D3) 5000 UNITS capsule capsule, Take  by mouth Daily., Disp: , Rfl:   •  Coenzyme Q10 (COQ-10) 100 MG capsule, oral daily, Disp: , Rfl:   •  COMBIVENT RESPIMAT  MCG/ACT inhaler, Inhale 1 puff As Needed., Disp: , Rfl: 3  •  diclofenac (VOLTAREN) 1 % gel gel, Apply 4 g  "topically As Needed., Disp: , Rfl:   •  Dietary Management Product (Rheumate) capsule, Take 1 capsule by mouth Daily., Disp: 90 capsule, Rfl: 4  •  etodolac (LODINE) 400 MG tablet, Take 400 mg by mouth 2 (Two) Times a Day., Disp: , Rfl:   •  flecainide (TAMBOCOR) 100 MG tablet, TAKE 1 TABLET EVERY 12 HOURS DAILY., Disp: 180 tablet, Rfl: 3  •  fluticasone (FLONASE) 50 MCG/ACT nasal spray, as needed, Disp: , Rfl:   •  guaiFENesin (MUCINEX) 600 MG 12 hr tablet, as needed, Disp: , Rfl:   •  methocarbamol (ROBAXIN) 750 MG tablet, Take 750 mg by mouth 3 (Three) Times a Day., Disp: , Rfl:   •  naratriptan (AMERGE) 1 MG tablet, , Disp: , Rfl:   •  pantoprazole (PROTONIX) 20 MG EC tablet, once daily, Disp: , Rfl:   •  pregabalin (LYRICA) 150 MG capsule, 150 mg 2 (Two) Times a Day., Disp: , Rfl:   •  PREMPRO 0.3-1.5 MG per tablet, Take 1 tablet by mouth Daily., Disp: , Rfl: 2  •  traMADol (ULTRAM) 50 MG tablet, Take 50 mg by mouth Every 6 (Six) Hours As Needed for Moderate Pain ., Disp: , Rfl:   •  denosumab (Prolia) 60 MG/ML solution prefilled syringe syringe, Inject 1 mL under the skin into the appropriate area as directed Every 6 (Six) Months., Disp: , Rfl:       History of Present Illness   Mecca Renee is a 65 y.o. year old female here for follow up.  Patient notes that she had Covid approximately 2 months prior.  States that she was bedridden for essentially 1 week.  States she has had increased palpitations shortness of breath since that time.  She did notice one episode of palpitations with chest pressure radiation into her left arm  She continues to run her .            OBJECTIVE:  Vitals:    02/12/21 1116   BP: 110/70   BP Location: Right arm   Patient Position: Sitting   Pulse: 60   Temp: 97.6 °F (36.4 °C)   SpO2: 95%   Weight: 88 kg (194 lb)   Height: 165.1 cm (65\")     Body mass index is 32.28 kg/m².    Constitutional:       Appearance: Healthy appearance. Not in distress.   Neck:      Vascular: No " JVR. JVD normal.   Pulmonary:      Effort: Pulmonary effort is normal.      Breath sounds: Normal breath sounds. No wheezing. No rhonchi. No rales.   Chest:      Chest wall: Not tender to palpatation.   Cardiovascular:      PMI at left midclavicular line. Normal rate. Regular rhythm. Normal S1. Normal S2.      Murmurs: There is no murmur.      No gallop. No click. No rub.   Pulses:     Intact distal pulses.   Edema:     Peripheral edema absent.   Abdominal:      General: Bowel sounds are normal.      Palpations: Abdomen is soft.      Tenderness: There is no abdominal tenderness.   Musculoskeletal: Normal range of motion.         General: No tenderness.   Skin:     General: Skin is warm and dry.   Neurological:      General: No focal deficit present.      Mental Status: Alert and oriented to person, place and time.         Diagnostic Data:    ECG 12 Lead    Date/Time: 2/12/2021 11:39 AM  Performed by: William Brewster MD  Authorized by: William Brewster MD   Previous ECG: no previous ECG available  Rhythm: sinus rhythm  BPM: 59  Conduction: 1st degree AV block    Clinical impression: non-specific ECG              ASSESSMENT:   Diagnosis Plan   1. ALLEN (dyspnea on exertion)     2. Palpitations     3. Chest pain on breathing         PLAN:  Dyspnea multifactorial post Covid we will document echocardiogram for filling pressures LV dysfunction    Palpitations historically suppressed with flecainide we will follow-up echocardiogram continue flecainide    Chest pain atypical in the setting of palpitations we will document echo if any LV dysfunction ischemic evaluation would be warranted          William Brewster MD, [facility]

## 2021-02-13 ENCOUNTER — APPOINTMENT (OUTPATIENT)
Dept: GENERAL RADIOLOGY | Facility: HOSPITAL | Age: 66
End: 2021-02-13

## 2021-02-13 ENCOUNTER — HOSPITAL ENCOUNTER (EMERGENCY)
Facility: HOSPITAL | Age: 66
Discharge: HOME | End: 2021-02-13
Attending: EMERGENCY MEDICINE | Admitting: EMERGENCY MEDICINE

## 2021-02-13 ENCOUNTER — APPOINTMENT (OUTPATIENT)
Dept: CARDIOLOGY | Facility: HOSPITAL | Age: 66
End: 2021-02-13

## 2021-02-13 VITALS
TEMPERATURE: 98 F | HEIGHT: 65 IN | SYSTOLIC BLOOD PRESSURE: 101 MMHG | WEIGHT: 194 LBS | OXYGEN SATURATION: 94 % | RESPIRATION RATE: 16 BRPM | HEART RATE: 61 BPM | DIASTOLIC BLOOD PRESSURE: 61 MMHG | BODY MASS INDEX: 32.32 KG/M2

## 2021-02-13 DIAGNOSIS — R07.9 CHEST PAIN, UNSPECIFIED TYPE: Primary | ICD-10-CM

## 2021-02-13 DIAGNOSIS — R00.2 PALPITATIONS: ICD-10-CM

## 2021-02-13 DIAGNOSIS — K21.00 GASTROESOPHAGEAL REFLUX DISEASE WITH ESOPHAGITIS WITHOUT HEMORRHAGE: ICD-10-CM

## 2021-02-13 LAB
ALBUMIN SERPL-MCNC: 3.8 G/DL (ref 3.5–5.2)
ALBUMIN/GLOB SERPL: 1.4 G/DL
ALP SERPL-CCNC: 88 U/L (ref 39–117)
ALT SERPL W P-5'-P-CCNC: 9 U/L (ref 1–33)
ANION GAP SERPL CALCULATED.3IONS-SCNC: 6 MMOL/L (ref 5–15)
AST SERPL-CCNC: 13 U/L (ref 1–32)
BASOPHILS # BLD AUTO: 0.02 10*3/MM3 (ref 0–0.2)
BASOPHILS NFR BLD AUTO: 0.3 % (ref 0–1.5)
BILIRUB SERPL-MCNC: 0.3 MG/DL (ref 0–1.2)
BUN SERPL-MCNC: 12 MG/DL (ref 8–23)
BUN/CREAT SERPL: 16.7 (ref 7–25)
CALCIUM SPEC-SCNC: 8.2 MG/DL (ref 8.6–10.5)
CHLORIDE SERPL-SCNC: 104 MMOL/L (ref 98–107)
CO2 SERPL-SCNC: 26 MMOL/L (ref 22–29)
CREAT SERPL-MCNC: 0.72 MG/DL (ref 0.57–1)
DEPRECATED RDW RBC AUTO: 44.5 FL (ref 37–54)
EOSINOPHIL # BLD AUTO: 0.02 10*3/MM3 (ref 0–0.4)
EOSINOPHIL NFR BLD AUTO: 0.3 % (ref 0.3–6.2)
ERYTHROCYTE [DISTWIDTH] IN BLOOD BY AUTOMATED COUNT: 13.4 % (ref 12.3–15.4)
GFR SERPL CREATININE-BSD FRML MDRD: 82 ML/MIN/1.73
GLOBULIN UR ELPH-MCNC: 2.7 GM/DL
GLUCOSE SERPL-MCNC: 147 MG/DL (ref 65–99)
HCT VFR BLD AUTO: 39.3 % (ref 34–46.6)
HGB BLD-MCNC: 12.8 G/DL (ref 12–15.9)
HOLD SPECIMEN: NORMAL
IMM GRANULOCYTES # BLD AUTO: 0.01 10*3/MM3 (ref 0–0.05)
IMM GRANULOCYTES NFR BLD AUTO: 0.1 % (ref 0–0.5)
LIPASE SERPL-CCNC: 15 U/L (ref 13–60)
LYMPHOCYTES # BLD AUTO: 0.47 10*3/MM3 (ref 0.7–3.1)
LYMPHOCYTES NFR BLD AUTO: 6.1 % (ref 19.6–45.3)
MCH RBC QN AUTO: 29.1 PG (ref 26.6–33)
MCHC RBC AUTO-ENTMCNC: 32.6 G/DL (ref 31.5–35.7)
MCV RBC AUTO: 89.3 FL (ref 79–97)
MONOCYTES # BLD AUTO: 0.21 10*3/MM3 (ref 0.1–0.9)
MONOCYTES NFR BLD AUTO: 2.7 % (ref 5–12)
NEUTROPHILS NFR BLD AUTO: 6.99 10*3/MM3 (ref 1.7–7)
NEUTROPHILS NFR BLD AUTO: 90.5 % (ref 42.7–76)
NRBC BLD AUTO-RTO: 0 /100 WBC (ref 0–0.2)
NT-PROBNP SERPL-MCNC: 395.1 PG/ML (ref 0–900)
PLATELET # BLD AUTO: 157 10*3/MM3 (ref 140–450)
PMV BLD AUTO: 12.7 FL (ref 6–12)
POTASSIUM SERPL-SCNC: 4 MMOL/L (ref 3.5–5.2)
PROT SERPL-MCNC: 6.5 G/DL (ref 6–8.5)
RBC # BLD AUTO: 4.4 10*6/MM3 (ref 3.77–5.28)
SODIUM SERPL-SCNC: 136 MMOL/L (ref 136–145)
TROPONIN T SERPL-MCNC: <0.01 NG/ML (ref 0–0.03)
TROPONIN T SERPL-MCNC: <0.01 NG/ML (ref 0–0.03)
WBC # BLD AUTO: 7.72 10*3/MM3 (ref 3.4–10.8)
WHOLE BLOOD HOLD SPECIMEN: NORMAL
WHOLE BLOOD HOLD SPECIMEN: NORMAL

## 2021-02-13 PROCEDURE — 93306 TTE W/DOPPLER COMPLETE: CPT | Performed by: INTERNAL MEDICINE

## 2021-02-13 PROCEDURE — 93005 ELECTROCARDIOGRAM TRACING: CPT | Performed by: EMERGENCY MEDICINE

## 2021-02-13 PROCEDURE — 80053 COMPREHEN METABOLIC PANEL: CPT | Performed by: EMERGENCY MEDICINE

## 2021-02-13 PROCEDURE — 84484 ASSAY OF TROPONIN QUANT: CPT | Performed by: EMERGENCY MEDICINE

## 2021-02-13 PROCEDURE — 99285 EMERGENCY DEPT VISIT HI MDM: CPT

## 2021-02-13 PROCEDURE — 99284 EMERGENCY DEPT VISIT MOD MDM: CPT

## 2021-02-13 PROCEDURE — 93306 TTE W/DOPPLER COMPLETE: CPT

## 2021-02-13 PROCEDURE — 85025 COMPLETE CBC W/AUTO DIFF WBC: CPT | Performed by: EMERGENCY MEDICINE

## 2021-02-13 PROCEDURE — 83880 ASSAY OF NATRIURETIC PEPTIDE: CPT | Performed by: EMERGENCY MEDICINE

## 2021-02-13 PROCEDURE — 83690 ASSAY OF LIPASE: CPT | Performed by: EMERGENCY MEDICINE

## 2021-02-13 PROCEDURE — 71045 X-RAY EXAM CHEST 1 VIEW: CPT

## 2021-02-13 RX ORDER — ASPIRIN 81 MG/1
324 TABLET, CHEWABLE ORAL ONCE
Status: COMPLETED | OUTPATIENT
Start: 2021-02-13 | End: 2021-02-13

## 2021-02-13 RX ORDER — SODIUM CHLORIDE 0.9 % (FLUSH) 0.9 %
10 SYRINGE (ML) INJECTION AS NEEDED
Status: DISCONTINUED | OUTPATIENT
Start: 2021-02-13 | End: 2021-02-13 | Stop reason: HOSPADM

## 2021-02-13 RX ADMIN — SODIUM CHLORIDE 1000 ML: 9 INJECTION, SOLUTION INTRAVENOUS at 16:01

## 2021-02-13 RX ADMIN — ASPIRIN 324 MG: 81 TABLET, CHEWABLE ORAL at 14:36

## 2021-02-13 NOTE — ED PROVIDER NOTES
Subjective   Ms. Renee is a 65-year-old female was actually seen yesterday by Dr. Brewster her cardiologist.  She has been having episodes of syncope lightheadedness palpitations and chest pain for extended amount of time.  Dr. Brewster had a stress test done 2015 that was negative.  She is wearing a Holter monitor.  On his visit yesterday he wanted her to have an outpatient echo and if the echo showed no left ventricular problems he felt like she did not need further cardiac work-up.  She states that she had an outpatient echo done but does not have the results.  Nothing seems to exacerbate or alleviate the chest pain.  The dizziness does seem worse in your syncope it seems to get worse with standing up and sitting down.  She had no melena hematochezia or hematemesis.      History provided by:  Patient   used: No    Chest Pain  Pain location:  Substernal area  Pain quality: dull    Pain radiates to:  Does not radiate  Pain severity:  Mild  Onset quality:  Gradual  Timing:  Intermittent  Progression:  Waxing and waning  Chronicity:  Recurrent  Context: not breathing, not drug use, not eating, not lifting, not movement, not stress and not trauma    Relieved by:  Nothing  Worsened by:  Nothing  Ineffective treatments:  None tried  Associated symptoms: no abdominal pain, no anorexia, no back pain, no claudication, no cough, no diaphoresis, no dysphagia, no fever, no headache, no lower extremity edema, no nausea, no near-syncope, no orthopnea, no palpitations, no shortness of breath, no syncope, no vomiting and no weakness    Risk factors: no aortic disease, no coronary artery disease, no Mina-Danlos syndrome, no high cholesterol, no hypertension, no immobilization, not male, no Marfan's syndrome, not obese and no smoking        Review of Systems   Constitutional: Negative for diaphoresis and fever.   HENT: Negative for trouble swallowing.    Respiratory: Negative for cough, chest tightness,  shortness of breath and wheezing.    Cardiovascular: Positive for chest pain. Negative for palpitations, orthopnea, claudication, syncope and near-syncope.   Gastrointestinal: Negative for abdominal pain, anorexia, nausea and vomiting.   Genitourinary: Negative for enuresis, frequency and urgency.   Musculoskeletal: Negative for back pain and neck pain.   Skin: Negative for pallor and rash.   Neurological: Negative for weakness and headaches.   Psychiatric/Behavioral: Negative.    All other systems reviewed and are negative.      Past Medical History:   Diagnosis Date   • Asthma    • Chronic pain disorder    • COVID-19     positive   • Diverticulosis    • Fibromyalgia    • GERD (gastroesophageal reflux disease)    • Migraine    • Neck pain    • Osteoarthritis    • Palpitations     palpitations with near syncope   • Rheumatoid arthritis (CMS/HCC)        Allergies   Allergen Reactions   • Sulfa Antibiotics        Past Surgical History:   Procedure Laterality Date   • CHOLECYSTECTOMY     • EYE SURGERY      eye lid lift   • EYE SURGERY  12/2019   • FOOT SURGERY Right    • SHOULDER SURGERY Right        Family History   Problem Relation Age of Onset   • Diabetes Mother    • Cancer Mother    • Heart disease Mother    • Hypertension Mother    • Cancer Father    • Cancer Brother    • Heart disease Maternal Grandmother    • Stroke Maternal Grandmother        Social History     Socioeconomic History   • Marital status:      Spouse name: Not on file   • Number of children: Not on file   • Years of education: Not on file   • Highest education level: Not on file   Tobacco Use   • Smoking status: Never Smoker   • Smokeless tobacco: Never Used   Substance and Sexual Activity   • Alcohol use: No   • Drug use: No   • Sexual activity: Defer           Objective   Physical Exam  Vitals signs and nursing note reviewed.   Constitutional:       General: She is not in acute distress.     Appearance: She is well-developed. She is not  diaphoretic.   HENT:      Head: Normocephalic and atraumatic.      Nose: Nose normal.   Eyes:      General: No scleral icterus.     Conjunctiva/sclera: Conjunctivae normal.   Neck:      Musculoskeletal: Normal range of motion and neck supple.   Cardiovascular:      Rate and Rhythm: Normal rate and regular rhythm.      Heart sounds: Normal heart sounds. No murmur.   Pulmonary:      Effort: Pulmonary effort is normal. No respiratory distress.      Breath sounds: Normal breath sounds.   Abdominal:      General: Bowel sounds are normal.      Palpations: Abdomen is soft.      Tenderness: There is no abdominal tenderness.   Musculoskeletal: Normal range of motion.   Skin:     General: Skin is warm and dry.   Neurological:      Mental Status: She is alert and oriented to person, place, and time.   Psychiatric:         Behavior: Behavior normal.         Procedures           ED Course  ED Course as of Feb 14 2328   Sat Feb 13, 2021   1507 Spoke to Dr. Mendez.  He has asked to get the echocardiogram done at bedside.  Dr. Brewster laid out a good plan and we are awaiting these results.    [STACIE]   1658 Echocardiogram was negative and normal according to Dr. Mendez who read this.  According to Dr. Brewster's plan we will have her follow-up but no further ischemic work-up was warranted.    [STACIE]      ED Course User Index  [STACIE] Stephen Calhoun PA                                   No results found for this or any previous visit (from the past 24 hour(s)).  Note: In addition to lab results from this visit, the labs listed above may include labs taken at another facility or during a different encounter within the last 24 hours. Please correlate lab times with ED admission and discharge times for further clarification of the services performed during this visit.    XR Chest 1 View   Final Result   Mild chronic appearing lung changes as noted. No clearly   acute chest disease is identified.       DICTATED:   02/13/2021   EDITED/ls :    02/13/2021       This report was finalized on 2/13/2021 10:00 PM by Dr. Kunal Villa MD.            Vitals:    02/13/21 1630 02/13/21 1645 02/13/21 1700 02/13/21 1730   BP: 104/65 127/74 125/62 101/61   BP Location:  Right arm Right arm Right arm   Patient Position:  Lying Lying Lying   Pulse: 53 54 55 61   Resp:  16 16 16   Temp:       TempSrc:       SpO2: 96% 95% 95% 94%   Weight:       Height:         Medications   aspirin chewable tablet 324 mg (324 mg Oral Given 2/13/21 1436)   sodium chloride 0.9 % bolus 1,000 mL (0 mL Intravenous Stopped 2/13/21 1652)     ECG/EMG Results (last 24 hours)     Procedure Component Value Units Date/Time    ECG 12 Lead [[id number]] Collected: 02/13/21 1413     Updated: 02/13/21 1411    Adult Transthoracic Echo Complete w/ Color, Spectral and Contrast if Necessary Per Protocol [[id number]] Collected: 02/13/21 1500     Updated: 02/13/21 1601     BSA 2.0 m^2      RVIDd 2.8 cm      IVSd 0.75 cm      LVIDd 4.7 cm      LVIDs 3.1 cm      LVPWd 0.72 cm      IVS/LVPW 1.0     FS 33.8 %      EDV(Teich) 104.7 ml      ESV(Teich) 39.2 ml      EF(Teich) 62.5 %      EDV(cubed) 106.9 ml      ESV(cubed) 31.1 ml      EF(cubed) 70.9 %      LV mass(C)d 111.6 grams      LV mass(C)dI 57.1 grams/m^2      SV(Teich) 65.5 ml      SI(Teich) 33.5 ml/m^2      SV(cubed) 75.8 ml      SI(cubed) 38.8 ml/m^2      Ao root diam 3.1 cm      Ao root area 7.6 cm^2      LA dimension 3.8 cm      asc Aorta Diam 2.7 cm      LA/Ao 1.2     LVOT diam 2.0 cm      LVOT area 3.1 cm^2      LVOT area(traced) 3.1 cm^2      LAd major 5.2 cm      LVLd ap4 7.5 cm      EDV(MOD-sp4) 78.0 ml      LVLs ap4 6.7 cm      ESV(MOD-sp4) 29.0 ml      EF(MOD-sp4) 62.8 %      LVLd ap2 7.8 cm      EDV(MOD-sp2) 76.0 ml      LVLs ap2 6.3 cm      ESV(MOD-sp2) 30.0 ml      EF(MOD-sp2) 60.5 %      LA volume 54.0 ml      EF(MOD-bp) 62.0 %      SV(MOD-sp4) 49.0 ml      SI(MOD-sp4) 25.1 ml/m^2      SV(MOD-sp2) 46.0 ml      SI(MOD-sp2) 23.6 ml/m^2      Ao root  area (BSA corrected) 1.6     LV Nickerson Vol (BSA corrected) 39.9 ml/m^2      LV Sys Vol (BSA corrected) 14.9 ml/m^2      LA Volume Index 27.7 ml/m^2      MV E max theo 66.6 cm/sec      MV A max theo 88.4 cm/sec      MV E/A 0.75     MV P1/2t max theo 98.6 cm/sec      MV P1/2t 73.9 msec      MVA(P1/2t) 3.0 cm^2      MV dec slope 390.7 cm/sec^2      MV dec time 0.33 sec      Ao pk theo 168.7 cm/sec      Ao max PG 11.4 mmHg      Ao max PG (full) 4.6 mmHg      Ao V2 mean 106.9 cm/sec      Ao mean PG 5.3 mmHg      Ao mean PG (full) 2.3 mmHg      Ao V2 VTI 37.6 cm      JAMIE(I,A) 2.6 cm^2      JAMIE(I,D) 2.6 cm^2      JAMIE(V,A) 2.4 cm^2      JAMIE(V,D) 2.4 cm^2      LV V1 max PG 6.7 mmHg      LV V1 mean PG 3.0 mmHg      LV V1 max 129.8 cm/sec      LV V1 mean 78.7 cm/sec      LV V1 VTI 31.0 cm      SV(Ao) 284.8 ml      SI(Ao) 145.8 ml/m^2      SV(LVOT) 96.8 ml      SI(LVOT) 49.6 ml/m^2      PA acc slope 336.4 cm/sec^2      PA acc time 0.18 sec      TR max theo 245.8 cm/sec      TR max PG 24.2 mmHg      PA pr(Accel) -0.22 mmHg      MVA P1/2T LCG 2.2 cm^2      Lat E/e'  7.1     Med E/e' 10.2     Lat Peak E' Theo 9.2 cm/sec      Med Peak E' Theo 6.4 cm/sec       CV ECHO CALI - BZI_BMI 32.3 kilograms/m^2       CV ECHO CALI - BSA([facility]) 2.0 m^2       CV ECHO CALI - BZI_METRIC_WEIGHT 88.0 kg       CV ECHO CALI - BZI_METRIC_HEIGHT 165.1 cm      Avg E/e' ratio 8.54      CV VAS BP LEFT ARM 90/51 mmHg      RV S' 12.50 cm/sec      RV Base 3.80 cm      RV Length 7.60 cm      RV Mid 2.90 cm      RAP systole 3 mmHg      RVSP(TR) 27 mmHg      TAPSE (>1.6) 2.00 cm     ECG 12 Lead [[id number]] Collected: 02/13/21 1604     Updated: 02/13/21 1604        ECG 12 Lead         ECG 12 Lead                     MDM  Number of Diagnoses or Management Options  Chest pain, unspecified type: new and requires workup  Gastroesophageal reflux disease with esophagitis without hemorrhage: new and requires workup  Palpitations: new and requires workup     Amount  and/or Complexity of Data Reviewed  Clinical lab tests: reviewed and ordered  Tests in the radiology section of CPT®: ordered and reviewed  Tests in the medicine section of CPT®: reviewed and ordered  Discuss the patient with other providers: yes    Patient Progress  Patient progress: stable      Final diagnoses:   Chest pain, unspecified type   Palpitations   Gastroesophageal reflux disease with esophagitis without hemorrhage            Stephen Calhoun PA  02/14/21 7123

## 2021-02-14 LAB
BH CV ECHO MEAS - AO MAX PG (FULL): 4.6 MMHG
BH CV ECHO MEAS - AO MAX PG: 11.4 MMHG
BH CV ECHO MEAS - AO MEAN PG (FULL): 2.3 MMHG
BH CV ECHO MEAS - AO MEAN PG: 5.3 MMHG
BH CV ECHO MEAS - AO ROOT AREA (BSA CORRECTED): 1.6
BH CV ECHO MEAS - AO ROOT AREA: 7.6 CM^2
BH CV ECHO MEAS - AO ROOT DIAM: 3.1 CM
BH CV ECHO MEAS - AO V2 MAX: 168.7 CM/SEC
BH CV ECHO MEAS - AO V2 MEAN: 106.9 CM/SEC
BH CV ECHO MEAS - AO V2 VTI: 37.6 CM
BH CV ECHO MEAS - ASC AORTA: 2.7 CM
BH CV ECHO MEAS - AVA(I,A): 2.6 CM^2
BH CV ECHO MEAS - AVA(I,D): 2.6 CM^2
BH CV ECHO MEAS - AVA(V,A): 2.4 CM^2
BH CV ECHO MEAS - AVA(V,D): 2.4 CM^2
BH CV ECHO MEAS - BSA(HAYCOCK): 2 M^2
BH CV ECHO MEAS - BSA: 2 M^2
BH CV ECHO MEAS - BZI_BMI: 32.3 KILOGRAMS/M^2
BH CV ECHO MEAS - BZI_METRIC_HEIGHT: 165.1 CM
BH CV ECHO MEAS - BZI_METRIC_WEIGHT: 88 KG
BH CV ECHO MEAS - EDV(CUBED): 106.9 ML
BH CV ECHO MEAS - EDV(MOD-SP2): 76 ML
BH CV ECHO MEAS - EDV(MOD-SP4): 78 ML
BH CV ECHO MEAS - EDV(TEICH): 104.7 ML
BH CV ECHO MEAS - EF(CUBED): 70.9 %
BH CV ECHO MEAS - EF(MOD-BP): 62 %
BH CV ECHO MEAS - EF(MOD-SP2): 60.5 %
BH CV ECHO MEAS - EF(MOD-SP4): 62.8 %
BH CV ECHO MEAS - EF(TEICH): 62.5 %
BH CV ECHO MEAS - ESV(CUBED): 31.1 ML
BH CV ECHO MEAS - ESV(MOD-SP2): 30 ML
BH CV ECHO MEAS - ESV(MOD-SP4): 29 ML
BH CV ECHO MEAS - ESV(TEICH): 39.2 ML
BH CV ECHO MEAS - FS: 33.8 %
BH CV ECHO MEAS - IVS/LVPW: 1
BH CV ECHO MEAS - IVSD: 0.75 CM
BH CV ECHO MEAS - LA DIMENSION: 3.8 CM
BH CV ECHO MEAS - LA/AO: 1.2
BH CV ECHO MEAS - LAD MAJOR: 5.2 CM
BH CV ECHO MEAS - LAT PEAK E' VEL: 9.2 CM/SEC
BH CV ECHO MEAS - LATERAL E/E' RATIO: 7.1
BH CV ECHO MEAS - LV DIASTOLIC VOL/BSA (35-75): 39.9 ML/M^2
BH CV ECHO MEAS - LV MASS(C)D: 111.6 GRAMS
BH CV ECHO MEAS - LV MASS(C)DI: 57.1 GRAMS/M^2
BH CV ECHO MEAS - LV MAX PG: 6.7 MMHG
BH CV ECHO MEAS - LV MEAN PG: 3 MMHG
BH CV ECHO MEAS - LV SYSTOLIC VOL/BSA (12-30): 14.9 ML/M^2
BH CV ECHO MEAS - LV V1 MAX: 129.8 CM/SEC
BH CV ECHO MEAS - LV V1 MEAN: 78.7 CM/SEC
BH CV ECHO MEAS - LV V1 VTI: 31 CM
BH CV ECHO MEAS - LVIDD: 4.7 CM
BH CV ECHO MEAS - LVIDS: 3.1 CM
BH CV ECHO MEAS - LVLD AP2: 7.8 CM
BH CV ECHO MEAS - LVLD AP4: 7.5 CM
BH CV ECHO MEAS - LVLS AP2: 6.3 CM
BH CV ECHO MEAS - LVLS AP4: 6.7 CM
BH CV ECHO MEAS - LVOT AREA (M): 3.1 CM^2
BH CV ECHO MEAS - LVOT AREA: 3.1 CM^2
BH CV ECHO MEAS - LVOT DIAM: 2 CM
BH CV ECHO MEAS - LVPWD: 0.72 CM
BH CV ECHO MEAS - MED PEAK E' VEL: 6.4 CM/SEC
BH CV ECHO MEAS - MEDIAL E/E' RATIO: 10.2
BH CV ECHO MEAS - MV A MAX VEL: 88.4 CM/SEC
BH CV ECHO MEAS - MV DEC SLOPE: 390.7 CM/SEC^2
BH CV ECHO MEAS - MV DEC TIME: 0.33 SEC
BH CV ECHO MEAS - MV E MAX VEL: 66.6 CM/SEC
BH CV ECHO MEAS - MV E/A: 0.75
BH CV ECHO MEAS - MV P1/2T MAX VEL: 98.6 CM/SEC
BH CV ECHO MEAS - MV P1/2T: 73.9 MSEC
BH CV ECHO MEAS - MVA P1/2T LCG: 2.2 CM^2
BH CV ECHO MEAS - MVA(P1/2T): 3 CM^2
BH CV ECHO MEAS - PA ACC SLOPE: 336.4 CM/SEC^2
BH CV ECHO MEAS - PA ACC TIME: 0.18 SEC
BH CV ECHO MEAS - PA PR(ACCEL): -0.22 MMHG
BH CV ECHO MEAS - RAP SYSTOLE: 3 MMHG
BH CV ECHO MEAS - RVDD: 2.8 CM
BH CV ECHO MEAS - RVSP: 27 MMHG
BH CV ECHO MEAS - SI(AO): 145.8 ML/M^2
BH CV ECHO MEAS - SI(CUBED): 38.8 ML/M^2
BH CV ECHO MEAS - SI(LVOT): 49.6 ML/M^2
BH CV ECHO MEAS - SI(MOD-SP2): 23.6 ML/M^2
BH CV ECHO MEAS - SI(MOD-SP4): 25.1 ML/M^2
BH CV ECHO MEAS - SI(TEICH): 33.5 ML/M^2
BH CV ECHO MEAS - SV(AO): 284.8 ML
BH CV ECHO MEAS - SV(CUBED): 75.8 ML
BH CV ECHO MEAS - SV(LVOT): 96.8 ML
BH CV ECHO MEAS - SV(MOD-SP2): 46 ML
BH CV ECHO MEAS - SV(MOD-SP4): 49 ML
BH CV ECHO MEAS - SV(TEICH): 65.5 ML
BH CV ECHO MEAS - TAPSE (>1.6): 2 CM
BH CV ECHO MEAS - TR MAX PG: 24.2 MMHG
BH CV ECHO MEAS - TR MAX VEL: 245.8 CM/SEC
BH CV ECHO MEASUREMENTS AVERAGE E/E' RATIO: 8.54
BH CV VAS BP LEFT ARM: NORMAL MMHG
BH CV XLRA - RV BASE: 3.8 CM
BH CV XLRA - RV LENGTH: 7.6 CM
BH CV XLRA - RV MID: 2.9 CM
BH CV XLRA - TDI S': 12.5 CM/SEC
LEFT ATRIUM VOLUME INDEX: 27.7 ML/M^2
LEFT ATRIUM VOLUME: 54 ML
LV EF 2D ECHO EST: 60 %

## 2021-02-15 LAB
BH CV ECHO MEAS - AO MAX PG (FULL): 4.9 MMHG
BH CV ECHO MEAS - AO MAX PG: 9.5 MMHG
BH CV ECHO MEAS - AO MEAN PG (FULL): 1.9 MMHG
BH CV ECHO MEAS - AO MEAN PG: 4.2 MMHG
BH CV ECHO MEAS - AO ROOT AREA (BSA CORRECTED): 1.6
BH CV ECHO MEAS - AO ROOT AREA: 7.6 CM^2
BH CV ECHO MEAS - AO ROOT DIAM: 3.1 CM
BH CV ECHO MEAS - AO V2 MAX: 154.5 CM/SEC
BH CV ECHO MEAS - AO V2 MEAN: 93.3 CM/SEC
BH CV ECHO MEAS - AO V2 VTI: 37.4 CM
BH CV ECHO MEAS - AVA(I,A): 2.4 CM^2
BH CV ECHO MEAS - AVA(I,D): 2.4 CM^2
BH CV ECHO MEAS - AVA(V,A): 2.2 CM^2
BH CV ECHO MEAS - AVA(V,D): 2.2 CM^2
BH CV ECHO MEAS - BSA(HAYCOCK): 2 M^2
BH CV ECHO MEAS - BSA: 2 M^2
BH CV ECHO MEAS - BZI_BMI: 32.3 KILOGRAMS/M^2
BH CV ECHO MEAS - BZI_METRIC_HEIGHT: 165.1 CM
BH CV ECHO MEAS - BZI_METRIC_WEIGHT: 88 KG
BH CV ECHO MEAS - EDV(CUBED): 94.7 ML
BH CV ECHO MEAS - EDV(MOD-SP2): 82 ML
BH CV ECHO MEAS - EDV(MOD-SP4): 84 ML
BH CV ECHO MEAS - EDV(TEICH): 95.2 ML
BH CV ECHO MEAS - EF(CUBED): 67 %
BH CV ECHO MEAS - EF(MOD-BP): 73 %
BH CV ECHO MEAS - EF(MOD-SP2): 74.4 %
BH CV ECHO MEAS - EF(MOD-SP4): 69 %
BH CV ECHO MEAS - EF(TEICH): 58.7 %
BH CV ECHO MEAS - ESV(CUBED): 31.2 ML
BH CV ECHO MEAS - ESV(MOD-SP2): 21 ML
BH CV ECHO MEAS - ESV(MOD-SP4): 26 ML
BH CV ECHO MEAS - ESV(TEICH): 39.4 ML
BH CV ECHO MEAS - FS: 30.9 %
BH CV ECHO MEAS - IVS/LVPW: 1
BH CV ECHO MEAS - IVSD: 0.78 CM
BH CV ECHO MEAS - LA DIMENSION: 3 CM
BH CV ECHO MEAS - LA/AO: 0.95
BH CV ECHO MEAS - LAD MAJOR: 4.8 CM
BH CV ECHO MEAS - LAT PEAK E' VEL: 7.9 CM/SEC
BH CV ECHO MEAS - LATERAL E/E' RATIO: 6
BH CV ECHO MEAS - LV DIASTOLIC VOL/BSA (35-75): 43 ML/M^2
BH CV ECHO MEAS - LV MASS(C)D: 110.5 GRAMS
BH CV ECHO MEAS - LV MASS(C)DI: 56.6 GRAMS/M^2
BH CV ECHO MEAS - LV MAX PG: 4.6 MMHG
BH CV ECHO MEAS - LV MEAN PG: 2.3 MMHG
BH CV ECHO MEAS - LV SYSTOLIC VOL/BSA (12-30): 13.3 ML/M^2
BH CV ECHO MEAS - LV V1 MAX: 107.6 CM/SEC
BH CV ECHO MEAS - LV V1 MEAN: 71.6 CM/SEC
BH CV ECHO MEAS - LV V1 VTI: 28.2 CM
BH CV ECHO MEAS - LVIDD: 4.6 CM
BH CV ECHO MEAS - LVIDS: 3.1 CM
BH CV ECHO MEAS - LVLD AP2: 7.4 CM
BH CV ECHO MEAS - LVLD AP4: 7.9 CM
BH CV ECHO MEAS - LVLS AP2: 6.2 CM
BH CV ECHO MEAS - LVLS AP4: 6.2 CM
BH CV ECHO MEAS - LVOT AREA (M): 3.1 CM^2
BH CV ECHO MEAS - LVOT AREA: 3.2 CM^2
BH CV ECHO MEAS - LVOT DIAM: 2 CM
BH CV ECHO MEAS - LVPWD: 0.76 CM
BH CV ECHO MEAS - MED PEAK E' VEL: 5.4 CM/SEC
BH CV ECHO MEAS - MEDIAL E/E' RATIO: 8.6
BH CV ECHO MEAS - MV A MAX VEL: 65.5 CM/SEC
BH CV ECHO MEAS - MV DEC TIME: 0.32 SEC
BH CV ECHO MEAS - MV E MAX VEL: 48 CM/SEC
BH CV ECHO MEAS - MV E/A: 0.73
BH CV ECHO MEAS - PA ACC SLOPE: 527.2 CM/SEC^2
BH CV ECHO MEAS - PA ACC TIME: 0.16 SEC
BH CV ECHO MEAS - PA PR(ACCEL): 6.1 MMHG
BH CV ECHO MEAS - RAP SYSTOLE: 3 MMHG
BH CV ECHO MEAS - RVSP: 25 MMHG
BH CV ECHO MEAS - SI(AO): 146.3 ML/M^2
BH CV ECHO MEAS - SI(CUBED): 32.5 ML/M^2
BH CV ECHO MEAS - SI(LVOT): 46.7 ML/M^2
BH CV ECHO MEAS - SI(MOD-SP2): 31.2 ML/M^2
BH CV ECHO MEAS - SI(MOD-SP4): 29.7 ML/M^2
BH CV ECHO MEAS - SI(TEICH): 28.6 ML/M^2
BH CV ECHO MEAS - SV(AO): 285.7 ML
BH CV ECHO MEAS - SV(CUBED): 63.5 ML
BH CV ECHO MEAS - SV(LVOT): 91.1 ML
BH CV ECHO MEAS - SV(MOD-SP2): 61 ML
BH CV ECHO MEAS - SV(MOD-SP4): 58 ML
BH CV ECHO MEAS - SV(TEICH): 55.9 ML
BH CV ECHO MEAS - TAPSE (>1.6): 2.8 CM
BH CV ECHO MEAS - TR MAX PG: 22 MMHG
BH CV ECHO MEAS - TR MAX VEL: 233 CM/SEC
BH CV ECHO MEASUREMENTS AVERAGE E/E' RATIO: 7.22
BH CV VAS BP LEFT ARM: NORMAL MMHG
BH CV XLRA - RV BASE: 3.8 CM
BH CV XLRA - RV LENGTH: 8.2 CM
BH CV XLRA - RV MID: 3.4 CM
BH CV XLRA - TDI S': 11.8 CM/SEC
LEFT ATRIUM VOLUME INDEX: 15.9 ML/M^2
LEFT ATRIUM VOLUME: 31 ML
LV EF 2D ECHO EST: 60 %
MAXIMAL PREDICTED HEART RATE: 156 BPM
STRESS TARGET HR: 133 BPM

## 2021-02-17 ENCOUNTER — TELEPHONE (OUTPATIENT)
Dept: CARDIOLOGY | Facility: CLINIC | Age: 66
End: 2021-02-17

## 2021-02-18 LAB
QT INTERVAL: 450 MS
QT INTERVAL: 480 MS
QTC INTERVAL: 453 MS
QTC INTERVAL: 463 MS

## 2021-02-26 ENCOUNTER — TRANSCRIBE ORDERS (OUTPATIENT)
Dept: ADMINISTRATIVE | Facility: HOSPITAL | Age: 66
End: 2021-02-26

## 2021-02-26 DIAGNOSIS — M54.50 LOW BACK PAIN WITHOUT SCIATICA, UNSPECIFIED BACK PAIN LATERALITY, UNSPECIFIED CHRONICITY: Primary | ICD-10-CM

## 2021-02-26 DIAGNOSIS — M54.50 LOW BACK PAIN, UNSPECIFIED BACK PAIN LATERALITY, UNSPECIFIED CHRONICITY, UNSPECIFIED WHETHER SCIATICA PRESENT: ICD-10-CM

## 2021-03-12 ENCOUNTER — IMMUNIZATION (OUTPATIENT)
Dept: VACCINE CLINIC | Facility: HOSPITAL | Age: 66
End: 2021-03-12

## 2021-03-12 PROCEDURE — 91301 HC SARSCO02 VAC 100MCG/0.5ML IM: CPT | Performed by: PHYSICIAN ASSISTANT

## 2021-03-12 PROCEDURE — 0012A: CPT | Performed by: PHYSICIAN ASSISTANT

## 2021-03-16 ENCOUNTER — HOSPITAL ENCOUNTER (OUTPATIENT)
Dept: MRI IMAGING | Facility: HOSPITAL | Age: 66
Discharge: HOME | End: 2021-03-16

## 2021-03-16 ENCOUNTER — HOSPITAL ENCOUNTER (OUTPATIENT)
Dept: GENERAL RADIOLOGY | Facility: HOSPITAL | Age: 66
Discharge: HOME | End: 2021-03-16

## 2021-03-16 DIAGNOSIS — M05.79 RHEUMATOID ARTHRITIS INVOLVING MULTIPLE SITES WITH POSITIVE RHEUMATOID FACTOR (HCC): ICD-10-CM

## 2021-03-16 DIAGNOSIS — M54.50 LOW BACK PAIN, UNSPECIFIED BACK PAIN LATERALITY, UNSPECIFIED CHRONICITY, UNSPECIFIED WHETHER SCIATICA PRESENT: ICD-10-CM

## 2021-03-16 DIAGNOSIS — M47.816 SPONDYLOSIS OF LUMBAR REGION WITHOUT MYELOPATHY OR RADICULOPATHY: ICD-10-CM

## 2021-03-16 PROCEDURE — 72148 MRI LUMBAR SPINE W/O DYE: CPT

## 2021-03-16 PROCEDURE — 73502 X-RAY EXAM HIP UNI 2-3 VIEWS: CPT

## 2021-04-30 ENCOUNTER — TELEPHONE (OUTPATIENT)
Dept: ORTHOPEDIC SURGERY | Facility: CLINIC | Age: 66
End: 2021-04-30

## 2021-05-03 ENCOUNTER — OFFICE VISIT (OUTPATIENT)
Dept: ORTHOPEDIC SURGERY | Facility: CLINIC | Age: 66
End: 2021-05-03

## 2021-05-03 VITALS
WEIGHT: 189 LBS | BODY MASS INDEX: 31.49 KG/M2 | HEART RATE: 66 BPM | DIASTOLIC BLOOD PRESSURE: 66 MMHG | SYSTOLIC BLOOD PRESSURE: 144 MMHG | HEIGHT: 65 IN

## 2021-05-03 DIAGNOSIS — S93.402A MODERATE LEFT ANKLE SPRAIN, INITIAL ENCOUNTER: Primary | ICD-10-CM

## 2021-05-03 PROCEDURE — 99213 OFFICE O/P EST LOW 20 MIN: CPT | Performed by: ORTHOPAEDIC SURGERY

## 2021-05-03 RX ORDER — APREMILAST 30 MG/1
30 TABLET, FILM COATED ORAL 2 TIMES DAILY
COMMUNITY
Start: 2021-03-22 | End: 2022-10-10 | Stop reason: ALTCHOICE

## 2021-05-03 NOTE — PROGRESS NOTES
ESTABLISHED PATIENT    Patient: Mecca Renee  : 1955    Primary Care Provider: Mg Sarkar MD    Requesting Provider: As above    Pain of the Left Ankle      History    Chief Complaint: Left ankle injury    History of Present Illness: Ms. Renee returns with a new problem.  3 weeks ago she inverted her left ankle.  She had pain and swelling.  She has an x-ray on the disc dated 2021, I looked at the films and the report, the old fibular stress fracture is healed, no new fracture.  She has been wearing a short boot.  Its much more painful with activity she has been soaking it and elevating it.  The pain can be 6-8 out of 10.    Current Outpatient Medications on File Prior to Visit   Medication Sig Dispense Refill   • amitriptyline (ELAVIL) 10 MG tablet TAKE 1 TABLET BY MOUTH EVERY NIGHT. (Patient taking differently: Take 10 mg by mouth As Needed.) 30 tablet 5   • aspirin 81 MG EC tablet Take 81 mg by mouth Daily.     • bisoprolol (ZEBeta) 5 MG tablet TAKE 1/2 TABLET BY MOUTH TWICE DAILY 90 tablet 1   • Calcium Carbonate-Vit D-Min (CALCIUM 1200 PO) Take 1 tablet by mouth Daily.     • cetirizine (ZYRTEC ALLERGY) 10 MG tablet Take 1 tablet by mouth.     • Cholecalciferol (VITAMIN D3) 5000 UNITS capsule capsule Take  by mouth Daily.     • Coenzyme Q10 (COQ-10) 100 MG capsule oral daily     • COMBIVENT RESPIMAT  MCG/ACT inhaler Inhale 1 puff As Needed.  3   • denosumab (Prolia) 60 MG/ML solution prefilled syringe syringe Inject 1 mL under the skin into the appropriate area as directed Every 6 (Six) Months.     • diclofenac (VOLTAREN) 1 % gel gel Apply 4 g topically As Needed.     • Dietary Management Product (Rheumate) capsule Take 1 capsule by mouth Daily. 90 capsule 4   • etodolac (LODINE) 400 MG tablet Take 400 mg by mouth 2 (Two) Times a Day.     • flecainide (TAMBOCOR) 100 MG tablet TAKE 1 TABLET EVERY 12 HOURS DAILY. 180 tablet 3   • fluticasone (FLONASE) 50 MCG/ACT nasal spray as  needed     • guaiFENesin (MUCINEX) 600 MG 12 hr tablet as needed     • methocarbamol (ROBAXIN) 750 MG tablet Take 750 mg by mouth 3 (Three) Times a Day.     • naratriptan (AMERGE) 1 MG tablet      • Otezla 30 MG tablet      • pantoprazole (PROTONIX) 20 MG EC tablet once daily     • pregabalin (LYRICA) 150 MG capsule 150 mg 2 (Two) Times a Day.     • PREMPRO 0.3-1.5 MG per tablet Take 1 tablet by mouth Daily.  2   • traMADol (ULTRAM) 50 MG tablet Take 50 mg by mouth Every 6 (Six) Hours As Needed for Moderate Pain .     • [DISCONTINUED] Apremilast (OTEZLA PO) Take  by mouth 2 (Two) Times a Day.       No current facility-administered medications on file prior to visit.      Allergies   Allergen Reactions   • Sulfa Antibiotics       Past Medical History:   Diagnosis Date   • Asthma    • Chronic pain disorder    • COVID-19     positive   • Diverticulosis    • Fibromyalgia    • GERD (gastroesophageal reflux disease)    • Migraine    • Neck pain    • Osteoarthritis    • Palpitations     palpitations with near syncope   • Rheumatoid arthritis (CMS/HCC)      Past Surgical History:   Procedure Laterality Date   • CHOLECYSTECTOMY     • EYE SURGERY      eye lid lift   • EYE SURGERY  12/2019   • FOOT SURGERY Right    • SHOULDER SURGERY Right      Family History   Problem Relation Age of Onset   • Diabetes Mother    • Cancer Mother    • Heart disease Mother    • Hypertension Mother    • Cancer Father    • Cancer Brother    • Heart disease Maternal Grandmother    • Stroke Maternal Grandmother       Social History     Socioeconomic History   • Marital status:      Spouse name: Not on file   • Number of children: Not on file   • Years of education: Not on file   • Highest education level: Not on file   Tobacco Use   • Smoking status: Never Smoker   • Smokeless tobacco: Never Used   Substance and Sexual Activity   • Alcohol use: No   • Drug use: No   • Sexual activity: Defer        Review of Systems   Constitutional: Negative.  "   HENT: Negative.    Eyes: Negative.    Respiratory: Negative.    Cardiovascular: Negative.    Gastrointestinal: Negative.    Endocrine: Negative.    Genitourinary: Negative.    Musculoskeletal: Positive for arthralgias.   Skin: Negative.    Allergic/Immunologic: Negative.    Neurological: Negative.    Hematological: Negative.    Psychiatric/Behavioral: Negative.        The following portions of the patient's history were reviewed and updated as appropriate: allergies, current medications, past family history, past medical history, past social history, past surgical history and problem list.    Physical Exam:   /66   Pulse 66   Ht 165.1 cm (65\")   Wt 85.7 kg (189 lb)   LMP  (LMP Unknown)   BMI 31.45 kg/m²   GENERAL: Body habitus: overweight    Lower extremity edema: Left: 1+ pitting; Right: 1+ pitting    Gait: antalgic     Mental Status:  awake and alert; oriented to person, place, and time  MSK:  Tibia:  Right:  non tender; Left:  non tender        Ankle:  Right: non tender; Left:  Very tender over the ATFL and CFL, nontender over the tibia and fibula, mild lateral swelling        Foot:  Right:  non tender; Left:  non tender    NEURO Sensation:  intact    Medical Decision Making    Data Review:   reviewed radiology images and reviewed radiology results    Assessment/Plan/Diagnosis/Treatment Options:   1. Moderate left ankle sprain, initial encounter  Grade 2 ankle sprain.  I recommend a tall boot, the short one that she has is not going to help.  I recommend she wear the boot for 3 weeks, wean out of it after that and start PT.  I will see her again when PT is finished standing 3 views of the ankle at that time  - Ambulatory Referral to Physical Therapy        Catherine Hoang MD                      "

## 2021-05-03 NOTE — TELEPHONE ENCOUNTER
SPOKE TO PATIENT, SHE HAS BEEN SCHEDULED WITH DR. MARTINO TODAY. SHE WILL BRING XRAYS FROM PCP, OFFICE NOTE AND XRAY REPORT TO VISIT.

## 2021-06-08 RX ORDER — BISOPROLOL FUMARATE 5 MG/1
TABLET, FILM COATED ORAL
Qty: 30 TABLET | Refills: 11 | Status: SHIPPED | OUTPATIENT
Start: 2021-06-08 | End: 2022-06-13

## 2021-08-17 ENCOUNTER — OFFICE VISIT (OUTPATIENT)
Dept: NEUROSURGERY | Facility: CLINIC | Age: 66
End: 2021-08-17

## 2021-08-17 VITALS
HEIGHT: 65 IN | WEIGHT: 193.2 LBS | OXYGEN SATURATION: 98 % | DIASTOLIC BLOOD PRESSURE: 82 MMHG | RESPIRATION RATE: 16 BRPM | SYSTOLIC BLOOD PRESSURE: 124 MMHG | HEART RATE: 58 BPM | BODY MASS INDEX: 32.19 KG/M2

## 2021-08-17 DIAGNOSIS — M47.816 SPONDYLOSIS OF LUMBAR REGION WITHOUT MYELOPATHY OR RADICULOPATHY: Primary | ICD-10-CM

## 2021-08-17 PROCEDURE — 99204 OFFICE O/P NEW MOD 45 MIN: CPT | Performed by: PHYSICIAN ASSISTANT

## 2021-08-17 RX ORDER — NARATRIPTAN 2.5 MG/1
2.5 TABLET ORAL DAILY PRN
COMMUNITY
End: 2022-10-10

## 2021-08-17 NOTE — PROGRESS NOTES
"Patient: Mecca Renee  : 1955  Gender: female    Primary Care Provider: Mg Sarkar MD    Chief Complaint:   Chief Complaint   Patient presents with   • Back Pain     new patient-lower back pain       History of Present Illness:  Mecca Renee is a 66-year-old woman with a PMH significant for rheumatoid arthritis, psoriatic arthritis, osteoporosis, fibromyalgia presents today for evaluation of back pain.  She reports a longstanding history of back pain dating back 40 years ago.  Over the years her pain is progressed.  Presently, she notes pain in her low back that radiates to her posterior hips bilaterally.  Occasionally she will feel that her legs \"go weak\".  She otherwise denies radicular leg pain.  She denies focal sensory alteration in her lower extremities.  She denies saddle distribution numbness, bowel or bladder dysfunction.  She has been through extensive conservative therapy with physical therapy and injections with Dr. Bruno.  Ultimately these provided temporary relief.  She follows with Dr. Garcia for all of her rheumatologic issues.  She takes Lyrica, Robaxin.  She presents today with a lumbar MRI.      Past Medical and Surgical History:  Past Medical History:   Diagnosis Date   • Asthma    • Chronic pain disorder    • COVID-19     positive   • Diverticulosis    • Fibromyalgia    • GERD (gastroesophageal reflux disease)    • Migraine    • Neck pain    • Osteoarthritis    • Osteoporosis    • Palpitations     palpitations with near syncope   • Rheumatoid arthritis (CMS/HCC)      Past Surgical History:   Procedure Laterality Date   • CHOLECYSTECTOMY     • EYE SURGERY      eye lid lift   • EYE SURGERY  2019   • FOOT SURGERY Right    • SHOULDER SURGERY Right        Current Medications:    Current Outpatient Medications:   •  aspirin 81 MG EC tablet, Take 81 mg by mouth Daily., Disp: , Rfl:   •  bisoprolol (ZEBeta) 5 MG tablet, TAKE 1/2 TABLET BY MOUTH TWICE DAILY, Disp: 30 tablet, " Rfl: 11  •  Calcium Carbonate-Vit D-Min (CALCIUM 1200 PO), Take 1 tablet by mouth Daily., Disp: , Rfl:   •  cetirizine (ZYRTEC ALLERGY) 10 MG tablet, Take 1 tablet by mouth., Disp: , Rfl:   •  Cholecalciferol (VITAMIN D3) 5000 UNITS capsule capsule, Take  by mouth Daily., Disp: , Rfl:   •  Coenzyme Q10 (COQ-10) 100 MG capsule, oral daily, Disp: , Rfl:   •  COMBIVENT RESPIMAT  MCG/ACT inhaler, Inhale 1 puff As Needed., Disp: , Rfl: 3  •  denosumab (Prolia) 60 MG/ML solution prefilled syringe syringe, Inject 1 mL under the skin into the appropriate area as directed Every 6 (Six) Months., Disp: , Rfl:   •  diclofenac (VOLTAREN) 1 % gel gel, Apply 4 g topically As Needed., Disp: , Rfl:   •  Dietary Management Product (Rheumate) capsule, Take 1 capsule by mouth Daily., Disp: 90 capsule, Rfl: 4  •  etodolac (LODINE) 400 MG tablet, Take 400 mg by mouth 2 (Two) Times a Day., Disp: , Rfl:   •  flecainide (TAMBOCOR) 100 MG tablet, TAKE 1 TABLET EVERY 12 HOURS DAILY., Disp: 180 tablet, Rfl: 3  •  fluticasone (FLONASE) 50 MCG/ACT nasal spray, 2 sprays into the nostril(s) as directed by provider Daily., Disp: , Rfl:   •  guaiFENesin (MUCINEX) 600 MG 12 hr tablet, Take 600 mg by mouth 2 (Two) Times a Day., Disp: , Rfl:   •  methocarbamol (ROBAXIN) 750 MG tablet, Take 750 mg by mouth 3 (Three) Times a Day., Disp: , Rfl:   •  NARATRIPTAN HCL PO, Take  by mouth., Disp: , Rfl:   •  Otezla 30 MG tablet, 30 mg 2 (two) times a day., Disp: , Rfl:   •  pantoprazole (PROTONIX) 20 MG EC tablet, Take 20 mg by mouth Daily., Disp: , Rfl:   •  pregabalin (LYRICA) 150 MG capsule, 150 mg 2 (Two) Times a Day., Disp: , Rfl:   •  PREMPRO 0.3-1.5 MG per tablet, Take 1 tablet by mouth Daily., Disp: , Rfl: 2  •  traMADol (ULTRAM) 50 MG tablet, Take 50 mg by mouth Every 6 (Six) Hours As Needed for Moderate Pain ., Disp: , Rfl:   •  amitriptyline (ELAVIL) 10 MG tablet, TAKE 1 TABLET BY MOUTH EVERY NIGHT. (Patient taking differently: Take 10 mg by  "mouth As Needed.), Disp: 30 tablet, Rfl: 5  •  naratriptan (AMERGE) 1 MG tablet, Take 1 mg by mouth 1 (One) Time As Needed., Disp: , Rfl:     Allergies:  Allergies   Allergen Reactions   • Sulfa Antibiotics Hives and Itching       Review of Systems:  Review of Systems   Constitutional: Positive for chills and fatigue.   HENT: Positive for sore throat.    Respiratory: Positive for wheezing.    Endocrine: Positive for cold intolerance.   Musculoskeletal: Positive for back pain, joint swelling and neck pain.   Neurological: Positive for headache.         Physical Examination:  Vitals:    08/17/21 1045   BP: 124/82   Pulse: 58   Resp: 16   SpO2: 98%   Weight: 87.6 kg (193 lb 3.2 oz)   Height: 165.1 cm (65\")       Physical Exam  Constitutional:       Appearance: Normal appearance.   Musculoskeletal:         General: Normal range of motion.      Cervical back: Normal range of motion and neck supple.   Skin:     General: Skin is warm and dry.   Neurological:      Mental Status: She is alert and oriented to person, place, and time.      Sensory: Sensation is intact.      Motor: Motor function is intact.      Coordination: Coordination is intact.      Gait: Gait is intact.      Deep Tendon Reflexes:      Reflex Scores:       Bicep reflexes are 1+ on the right side and 1+ on the left side.       Brachioradialis reflexes are 1+ on the right side and 1+ on the left side.       Patellar reflexes are 1+ on the right side and 1+ on the left side.       Achilles reflexes are 1+ on the right side and 1+ on the left side.     Comments: Subjective pain with bilateral hip range of motion  Tenderness with palpation of lumbar spine  Casual gait is normal  Performs heel and toe walking without difficulty  Priyanka sign is negative bilaterally  Sensation is intact throughout lower extremities   Psychiatric:         Mood and Affect: Mood normal.         Behavior: Behavior normal.         Independent review of diagnostic imaging:  MRI lumbar " spine performed 3/16/2021 demonstrates diffuse facet arthropathy.  There is moderate foraminal narrowing L3-4.  There is no evidence of instability.  Flexion extension plain films performed 6/23/2020 are reviewed demonstrate degenerative changes throughout without evidence of inducible instability.    Assessment:  1.  Chronic low back pain  2.  Lumbar degenerative osteoarthritis  3.  Lumbar degenerative disc disease    Plan:  Ms. Renee is seen today for evaluation of chronic low back pain.  I reviewed her imaging with Dr. Aldana as detailed above.  Given the axial nature of her symptoms no surgical intervention is recommended at this time.  Should she develop worsening of lower extremity pain, weakness or symptoms of neurogenic claudication we may consider myelogram in the future.  At that time this is not warranted.  Discussed this with patient and her daughter.  She will continue care with as needed physical therapy and rheumatology.  I will be happy see her back if symptoms worsen.        Rachell Willams PA-C

## 2021-08-29 NOTE — PROGRESS NOTES
[facility]  Office Progress Note  Mecca Renee  1955  [phone]      Visit Date: 01/13/2017    PCP: Mg Sarkar MD  [address] [id number]    IDENTIFICATION: A 63 y.o. female female   from [place].     PROBLEM LIST:   1. Palpitations with near syncope:  a. Echocardiogram, 09/13/2013: LVEF (55% to 60%), abnormal LV diastolic filling consistent with impaired relaxation, trace MR, mild TR with an RVSP of 31.  b. Event recorder, September through October 2013: Low frequency PACs with occasional short runs of nonsustained atrial tachycardia.   2. CP  6/15 Lexiscan wnl  3. GERD.  4. Rheumatoid arthritis.   5. bilat carpal tunnel  6. Osteoarthritis.   7. Diverticulosis.   8. Surgical history:  a. Right shoulder surgery.  Right foot surgery.     Chief Complaint   Patient presents with   • Palpitations     once in awhile   • Fatigue           Allergies  Allergies   Allergen Reactions   • Sulfa Antibiotics        Current Medications    Current Outpatient Medications:   •  amitriptyline (ELAVIL) 10 MG tablet, TAKE 1 TABLET BY MOUTH EVERY NIGHT., Disp: 30 tablet, Rfl: 5  •  Apremilast (OTEZLA PO), Take  by mouth 2 (Two) Times a Day., Disp: , Rfl:   •  aspirin 81 MG EC tablet, Take 81 mg by mouth Daily., Disp: , Rfl:   •  bisoprolol (ZEBeta) 5 MG tablet, TAKE 1/2 TABLET BY MOUTH TWICE DAILY, Disp: 30 tablet, Rfl: 6  •  Calcium Carbonate-Vit D-Min (CALCIUM 1200 PO), Take 1 tablet by mouth 2 (Two) Times a Day., Disp: , Rfl:   •  cetirizine (zyrTEC) 10 MG tablet, Take 10 mg by mouth Daily., Disp: , Rfl:   •  Cholecalciferol (VITAMIN D3) 5000 UNITS capsule capsule, Take  by mouth Daily., Disp: , Rfl:   •  coenzyme Q10 100 MG capsule, Take 100 mg by mouth Daily., Disp: , Rfl:   •  diclofenac (VOLTAREN) 1 % gel gel, Apply 4 g topically As Needed., Disp: , Rfl:   •  Elastic Bandages & Supports (ACE WRIST BRACE/SPLINT) misc, 1 each Daily., Disp: 1  "each, Rfl: 0  •  etodolac (LODINE) 400 MG tablet, Take 400 mg by mouth 2 (Two) Times a Day., Disp: , Rfl:   •  flecainide (TAMBOCOR) 100 MG tablet, TAKE 1 TABLET EVERY 12 HOURS DAILY., Disp: 180 tablet, Rfl: 3  •  gabapentin (NEURONTIN) 100 MG capsule, Take 100 mg by mouth 2 (Two) Times a Day., Disp: , Rfl:   •  SUMAtriptan (IMITREX) 100 MG tablet, SUMATRIPTAN SUCCINATE 100 MG TABS, Disp: , Rfl:   •  tiZANidine (ZANAFLEX) 2 MG tablet, Take half to 1 tablet three times a day as needed for muscle spasms. (Patient taking differently: As Needed. Take half to 1 tablet three times a day as needed for muscle spasms.), Disp: 90 tablet, Rfl: 3  •  traMADol (ULTRAM) 50 MG tablet, Take 50 mg by mouth Every 6 (Six) Hours As Needed for Moderate Pain ., Disp: , Rfl:       History of Present Illness     Pt denies any chest pain, dyspnea, dyspnea on exertion, orthopnea, PND, palpitations, lower extremity edema.  Wore a walking boot several months last year.  ROS:  All systems have been reviewed and are negative with the exception of those mentioned in the HPI.    OBJECTIVE:  Vitals:    01/25/19 1114   BP: 112/68   BP Location: Left arm   Patient Position: Sitting   Pulse: 56   SpO2: 99%   Weight: 85.5 kg (188 lb 9.6 oz)   Height: 165.1 cm (65\")     Physical Exam   Constitutional: She appears well-developed and well-nourished.   Neck: Normal range of motion. Neck supple. No hepatojugular reflux and no JVD present. Carotid bruit is not present. No tracheal deviation present. No thyromegaly present.   Cardiovascular: Normal rate, regular rhythm, S1 normal, S2 normal, intact distal pulses and normal pulses. PMI is not displaced. Exam reveals no gallop, no distant heart sounds, no friction rub, no midsystolic click and no opening snap.   No murmur heard.  Pulses:       Radial pulses are 2+ on the right side, and 2+ on the left side.        Dorsalis pedis pulses are 2+ on the right side, and 2+ on the left side.        Posterior tibial " pulses are 2+ on the right side, and 2+ on the left side.   Pulmonary/Chest: Effort normal and breath sounds normal. She has no wheezes. She has no rales.   Abdominal: Soft. Bowel sounds are normal. She exhibits no mass. There is no tenderness. There is no guarding.       Diagnostic Data:    ECG 12 Lead  Date/Time: 1/25/2019 11:35 AM  Performed by: William Brewster MD  Authorized by: William Brewster MD   Comparison: not compared with previous ECG   Rhythm: sinus rhythm  Conduction: 1st degree  Clinical impression: non-specific ECG              ASSESSMENT:   Diagnosis Plan   1. Palpitations     2. Mixed hyperlipidemia         PLAN:  1. Premature atrial contractions. Suppress flecainide, beta blockade, tolerant of such at current. I would document TSH at this time.   2. Unknown recent lipid status. Documented FLP w pcp will obtain.  3. Exogenous obesity. I commended her on weight reduction. Otherwise, I will see her back in 1 year.    Mg Sarkar MD, thank you for referring Ms. Renee for evaluation.  I have forwarded my electronically generated recommendations to you for review.  Please do not hesitate to call with any questions.      William Brewster MD, FACC   Medications

## 2021-10-15 RX ORDER — FLECAINIDE ACETATE 100 MG/1
TABLET ORAL
Qty: 60 TABLET | Refills: 3 | Status: SHIPPED | OUTPATIENT
Start: 2021-10-15 | End: 2022-02-18

## 2021-11-11 NOTE — PROGRESS NOTES
[facility]  Office Progress Note  Mecca Renee  1955  [address] [place] [address]       Visit Date: 11/12/21    PCP: Mg Sarkar MD  [address]  [place] [address]    IDENTIFICATION: A 66 y.o. female   from [place].    PROBLEM LIST:   1. Palpitations with near syncope:  1. Echocardiogram, 09/13/2013: LVEF (55% to 60%), abnormal LV diastolic filling consistent with impaired relaxation, trace MR, mild TR with an RVSP of 31.  2. Event recorder, September through October 2013: Low frequency PACs with occasional short runs of nonsustained atrial tachycardia.   2. CP  1. 6/15 Lexiscan wnl  2. 2/21 2D echo: LVEF =60%.  Trace MR.  Trace TR.  3. GERD.  4. Rheumatoid arthritis.   5. bilat carpal tunnel  6. Covid 11/20  7. Diverticulosis  8. Chronic lo back pain  9. DDD  10. Surgical history:  1. Right shoulder surgery.  2. Right foot surgery.       CC: No chief complaint on file.      Allergies  Allergies   Allergen Reactions   • Sulfa Antibiotics Hives and Itching       Current Medications    Current Outpatient Medications:   •  aspirin 81 MG EC tablet, Take 81 mg by mouth Daily., Disp: , Rfl:   •  bisoprolol (ZEBeta) 5 MG tablet, TAKE 1/2 TABLET BY MOUTH TWICE DAILY, Disp: 30 tablet, Rfl: 11  •  Calcium Carbonate-Vit D-Min (CALCIUM 1200 PO), Take 1 tablet by mouth Daily., Disp: , Rfl:   •  cetirizine (ZYRTEC ALLERGY) 10 MG tablet, Take 1 tablet by mouth., Disp: , Rfl:   •  Cholecalciferol (VITAMIN D3) 5000 UNITS capsule capsule, Take  by mouth Daily., Disp: , Rfl:   •  Coenzyme Q10 (COQ-10) 100 MG capsule, oral daily, Disp: , Rfl:   •  COMBIVENT RESPIMAT  MCG/ACT inhaler, Inhale 1 puff As Needed., Disp: , Rfl: 3  •  denosumab (Prolia) 60 MG/ML solution prefilled syringe syringe, Inject 1 mL under the skin into the appropriate area as directed Every 6 (Six) Months., Disp: , Rfl:   •  diclofenac (VOLTAREN) 1 % gel gel, Apply 4 g  "topically As Needed., Disp: , Rfl:   •  Dietary Management Product (Rheumate) capsule, Take 1 capsule by mouth Daily., Disp: 90 capsule, Rfl: 4  •  etodolac (LODINE) 400 MG tablet, Take 400 mg by mouth 2 (Two) Times a Day., Disp: , Rfl:   •  flecainide (TAMBOCOR) 100 MG tablet, TAKE 1 TABLET BY MOUTH EVERY 12 HOURS DAILY., Disp: 60 tablet, Rfl: 3  •  fluticasone (FLONASE) 50 MCG/ACT nasal spray, 2 sprays into the nostril(s) as directed by provider Daily., Disp: , Rfl:   •  guaiFENesin (MUCINEX) 600 MG 12 hr tablet, Take 600 mg by mouth 2 (Two) Times a Day As Needed., Disp: , Rfl:   •  methocarbamol (ROBAXIN) 750 MG tablet, Take 750 mg by mouth 3 (Three) Times a Day., Disp: , Rfl:   •  naratriptan (AMERGE) 2.5 MG tablet, Take 2.5 mg by mouth Daily As Needed (headache)., Disp: , Rfl:   •  Otezla 30 MG tablet, 30 mg 2 (two) times a day., Disp: , Rfl:   •  pantoprazole (PROTONIX) 20 MG EC tablet, Take 20 mg by mouth Daily., Disp: , Rfl:   •  pregabalin (LYRICA) 150 MG capsule, 150 mg 2 (Two) Times a Day., Disp: , Rfl:   •  PREMPRO 0.3-1.5 MG per tablet, Take 1 tablet by mouth Daily., Disp: , Rfl: 2  •  traMADol (ULTRAM) 50 MG tablet, Take 50 mg by mouth Every 6 (Six) Hours As Needed for Moderate Pain ., Disp: , Rfl:       History of Present Illness   Mecca Renee is a 66 y.o. year old female here for follow up.    Pt denies any chest pain, dyspnea, dyspnea on exertion, orthopnea, PND, palpitations, lower extremity edema, or claudication.  Fell at a hairdresser injuring her ribs this summer and was in the hospital at [place] for 2 days. She continues to have some residual pain from such. No overt palpitations or cardiac issues in the interim      OBJECTIVE:  Vitals:    11/12/21 0944   BP: 98/64   BP Location: Right arm   Patient Position: Sitting   Pulse: 56   SpO2: 98%   Weight: 89 kg (196 lb 3.2 oz)   Height: 165.1 cm (65\")     Body mass index is 32.65 kg/m².    Constitutional:       Appearance: Healthy " appearance. Not in distress.   Neck:      Vascular: No JVR. JVD normal.   Pulmonary:      Effort: Pulmonary effort is normal.      Breath sounds: Normal breath sounds. No wheezing. No rhonchi. No rales.   Chest:      Chest wall: Not tender to palpatation.   Cardiovascular:      PMI at left midclavicular line. Normal rate. Regular rhythm. Normal S1. Normal S2.      Murmurs: There is no murmur.      No gallop. No click. No rub.   Pulses:     Intact distal pulses.   Edema:     Peripheral edema absent.   Abdominal:      General: Bowel sounds are normal.      Palpations: Abdomen is soft.      Tenderness: There is no abdominal tenderness.   Musculoskeletal: Normal range of motion.         General: No tenderness. Skin:     General: Skin is warm and dry.   Neurological:      General: No focal deficit present.      Mental Status: Alert and oriented to person, place and time.         Diagnostic Data:    ECG 12 Lead    Date/Time: 11/12/2021 10:02 AM  Performed by: William Brewster MD  Authorized by: William Brewster MD   Previous ECG: no previous ECG available  Rhythm: sinus rhythm  BPM: 55  Conduction: 1st degree AV block    Clinical impression: abnormal EKG              ASSESSMENT:   Diagnosis Plan   1. Chronic fatigue  CBC (No Diff)    Comprehensive Metabolic Panel   2. Chronic hypotension     3. Palpitations         PLAN:  Fatigue hypotension document CBC CMP acceptable EKG in the office today    Palpitations suppressed with cardioselective beta-blockade and flecainide    Documentation of lipid profile today          William Brewster MD, [facility]

## 2021-11-12 ENCOUNTER — LAB (OUTPATIENT)
Dept: LAB | Facility: HOSPITAL | Age: 66
End: 2021-11-12

## 2021-11-12 ENCOUNTER — OFFICE VISIT (OUTPATIENT)
Dept: CARDIOLOGY | Facility: CLINIC | Age: 66
End: 2021-11-12

## 2021-11-12 VITALS
DIASTOLIC BLOOD PRESSURE: 64 MMHG | BODY MASS INDEX: 32.69 KG/M2 | HEART RATE: 56 BPM | SYSTOLIC BLOOD PRESSURE: 98 MMHG | WEIGHT: 196.2 LBS | OXYGEN SATURATION: 98 % | HEIGHT: 65 IN

## 2021-11-12 DIAGNOSIS — R93.3 ABNORMAL FINDINGS ON DIAGNOSTIC IMAGING OF OTHER PARTS OF DIGESTIVE TRACT: ICD-10-CM

## 2021-11-12 DIAGNOSIS — R53.82 CHRONIC FATIGUE: ICD-10-CM

## 2021-11-12 DIAGNOSIS — R00.2 PALPITATIONS: ICD-10-CM

## 2021-11-12 DIAGNOSIS — R53.82 CHRONIC FATIGUE: Primary | ICD-10-CM

## 2021-11-12 DIAGNOSIS — I95.89 CHRONIC HYPOTENSION: ICD-10-CM

## 2021-11-12 LAB
ALBUMIN SERPL-MCNC: 4.2 G/DL (ref 3.5–5.2)
ALBUMIN/GLOB SERPL: 1.4 G/DL
ALP SERPL-CCNC: 59 U/L (ref 39–117)
ALT SERPL W P-5'-P-CCNC: 11 U/L (ref 1–33)
ANION GAP SERPL CALCULATED.3IONS-SCNC: 10.1 MMOL/L (ref 5–15)
AST SERPL-CCNC: 12 U/L (ref 1–32)
BILIRUB SERPL-MCNC: 0.3 MG/DL (ref 0–1.2)
BUN SERPL-MCNC: 11 MG/DL (ref 8–23)
BUN/CREAT SERPL: 14.9 (ref 7–25)
CALCIUM SPEC-SCNC: 9 MG/DL (ref 8.6–10.5)
CHLORIDE SERPL-SCNC: 106 MMOL/L (ref 98–107)
CHOLEST SERPL-MCNC: 155 MG/DL (ref 0–200)
CO2 SERPL-SCNC: 24.9 MMOL/L (ref 22–29)
CREAT SERPL-MCNC: 0.74 MG/DL (ref 0.57–1)
DEPRECATED RDW RBC AUTO: 40.4 FL (ref 37–54)
ERYTHROCYTE [DISTWIDTH] IN BLOOD BY AUTOMATED COUNT: 12.9 % (ref 12.3–15.4)
GFR SERPL CREATININE-BSD FRML MDRD: 79 ML/MIN/1.73
GLOBULIN UR ELPH-MCNC: 2.9 GM/DL
GLUCOSE SERPL-MCNC: 86 MG/DL (ref 65–99)
HCT VFR BLD AUTO: 38.4 % (ref 34–46.6)
HDLC SERPL-MCNC: 54 MG/DL (ref 40–60)
HGB BLD-MCNC: 13.4 G/DL (ref 12–15.9)
LDLC SERPL CALC-MCNC: 88 MG/DL (ref 0–100)
LDLC/HDLC SERPL: 1.63 {RATIO}
MCH RBC QN AUTO: 30.3 PG (ref 26.6–33)
MCHC RBC AUTO-ENTMCNC: 34.9 G/DL (ref 31.5–35.7)
MCV RBC AUTO: 86.9 FL (ref 79–97)
PLATELET # BLD AUTO: 204 10*3/MM3 (ref 140–450)
PMV BLD AUTO: 13.4 FL (ref 6–12)
POTASSIUM SERPL-SCNC: 4.8 MMOL/L (ref 3.5–5.2)
PROT SERPL-MCNC: 7.1 G/DL (ref 6–8.5)
RBC # BLD AUTO: 4.42 10*6/MM3 (ref 3.77–5.28)
SODIUM SERPL-SCNC: 141 MMOL/L (ref 136–145)
TRIGL SERPL-MCNC: 65 MG/DL (ref 0–150)
VLDLC SERPL-MCNC: 13 MG/DL (ref 5–40)
WBC # BLD AUTO: 6.57 10*3/MM3 (ref 3.4–10.8)

## 2021-11-12 PROCEDURE — 36415 COLL VENOUS BLD VENIPUNCTURE: CPT

## 2021-11-12 PROCEDURE — 99214 OFFICE O/P EST MOD 30 MIN: CPT | Performed by: INTERNAL MEDICINE

## 2021-11-12 PROCEDURE — 93000 ELECTROCARDIOGRAM COMPLETE: CPT | Performed by: INTERNAL MEDICINE

## 2021-11-12 PROCEDURE — 80053 COMPREHEN METABOLIC PANEL: CPT

## 2021-11-12 PROCEDURE — 85027 COMPLETE CBC AUTOMATED: CPT

## 2021-11-12 PROCEDURE — 80061 LIPID PANEL: CPT

## 2022-01-27 ENCOUNTER — OFFICE VISIT (OUTPATIENT)
Dept: UROLOGY | Facility: CLINIC | Age: 67
End: 2022-01-27

## 2022-01-27 VITALS — BODY MASS INDEX: 32.65 KG/M2 | HEIGHT: 65 IN | HEART RATE: 61 BPM | OXYGEN SATURATION: 98 % | WEIGHT: 196 LBS

## 2022-01-27 DIAGNOSIS — R33.9 INCOMPLETE EMPTYING OF BLADDER: ICD-10-CM

## 2022-01-27 DIAGNOSIS — R39.9 LOWER URINARY TRACT SYMPTOMS (LUTS): Primary | ICD-10-CM

## 2022-01-27 LAB
BILIRUB BLD-MCNC: ABNORMAL MG/DL
CLARITY, POC: CLEAR
COLOR UR: YELLOW
EXPIRATION DATE: ABNORMAL
GLUCOSE UR STRIP-MCNC: NEGATIVE MG/DL
KETONES UR QL: NEGATIVE
LEUKOCYTE EST, POC: NEGATIVE
Lab: ABNORMAL
NITRITE UR-MCNC: NEGATIVE MG/ML
PH UR: 6 [PH] (ref 5–8)
PROT UR STRIP-MCNC: NEGATIVE MG/DL
RBC # UR STRIP: NEGATIVE /UL
SP GR UR: 1.01 (ref 1–1.03)
UROBILINOGEN UR QL: NORMAL

## 2022-01-27 PROCEDURE — 99204 OFFICE O/P NEW MOD 45 MIN: CPT | Performed by: UROLOGY

## 2022-01-27 PROCEDURE — 81003 URINALYSIS AUTO W/O SCOPE: CPT | Performed by: UROLOGY

## 2022-01-27 PROCEDURE — 51798 US URINE CAPACITY MEASURE: CPT | Performed by: UROLOGY

## 2022-01-27 NOTE — PROGRESS NOTES
LUTS Female Office Visit      Patient Name: Mecca Renee  : 1955   MRN: [id number]     Chief Complaint:  Lower Urinary Tract Symptoms.      Referring Provider: Antonina Holliday MD    History of Present Illness: Mrs. Renee is a 66 y.o. female with history of lower urinary tract symptoms.  The patient has a past medical history significant for palpitations, tachyarrhythmia, GERD, diverticulosis, rheumatoid arthritis, fibromyalgia, osteoarthrosis, osteoporosis, cervical disc disease, lumbar disc disease, asthma.  She reports new onset lower urinary tract symptoms with feeling of incomplete emptying over the past 1 to 2 months.  She reports abdominal pain located in bilateral lower quadrants.  She is uncertain if pain is related to urinary symptoms.  She does have a history of diverticulosis and a significant history of constipation, she reports bowel movements every 3 to 4 days.  She does report significant caffeinated beverage intake and minimal water intake.  She denies significant dysuria.  He does report improvement of pain over the past few days.  She was seen by OB/GYN for evaluation.  She denies intermittency, hesitancy, weakened stream.  She denies any prior pelvic or  surgeries.  She reported history of recurrent urinary tract infection as an adult, improvement and resolution of infections in postmenopausal age.  She denies significant pelvic pain.  She denies flank pain.  She denies history of hematuria.  Denies prior urologic evaluation or instrumentation.      Subjective      Review of System: Review of Systems   Constitutional: Negative for chills, fatigue, fever and unexpected weight change.   HENT: Negative for sore throat.    Eyes: Negative for visual disturbance.   Respiratory: Negative for cough, chest tightness and shortness of breath.    Cardiovascular: Negative for chest pain and leg swelling.   Gastrointestinal: Negative for blood in stool, constipation, diarrhea, nausea,  rectal pain and vomiting.   Genitourinary: Positive for difficulty urinating, frequency and urgency. Negative for decreased urine volume, dysuria, enuresis, flank pain, genital sores and hematuria.   Musculoskeletal: Negative for back pain and joint swelling.   Skin: Negative for rash and wound.   Neurological: Negative for seizures, speech difficulty, weakness and headaches.   Psychiatric/Behavioral: Negative for confusion, sleep disturbance and suicidal ideas. The patient is not nervous/anxious.       I have reviewed the ROS documented by my clinical staff, updated appropriate and I agree. Raúl Christina MD    Past Medical History:  Past Medical History:   Diagnosis Date   • Asthma    • Chronic pain disorder    • COVID-19     positive   • Diverticulosis    • Fibromyalgia    • GERD (gastroesophageal reflux disease)    • Migraine    • Neck pain    • Osteoarthritis    • Osteoporosis    • Palpitations     palpitations with near syncope   • Rheumatoid arthritis (HCC)        Past Surgical History:  Past Surgical History:   Procedure Laterality Date   • CHOLECYSTECTOMY     • EYE SURGERY      eye lid lift   • EYE SURGERY  12/2019   • FOOT SURGERY Right    • SHOULDER SURGERY Right        Medications:    Current Outpatient Medications:   •  aspirin 81 MG EC tablet, Take 81 mg by mouth Daily., Disp: , Rfl:   •  bisoprolol (ZEBeta) 5 MG tablet, TAKE 1/2 TABLET BY MOUTH TWICE DAILY, Disp: 30 tablet, Rfl: 11  •  Calcium Carbonate-Vit D-Min (CALCIUM 1200 PO), Take 1 tablet by mouth Daily., Disp: , Rfl:   •  cetirizine (ZYRTEC ALLERGY) 10 MG tablet, Take 1 tablet by mouth., Disp: , Rfl:   •  Cholecalciferol (VITAMIN D3) 5000 UNITS capsule capsule, Take  by mouth Daily., Disp: , Rfl:   •  Coenzyme Q10 (COQ-10) 100 MG capsule, oral daily, Disp: , Rfl:   •  COMBIVENT RESPIMAT  MCG/ACT inhaler, Inhale 1 puff As Needed., Disp: , Rfl: 3  •  denosumab (Prolia) 60 MG/ML solution prefilled syringe syringe, Inject 1 mL under the skin  into the appropriate area as directed Every 6 (Six) Months., Disp: , Rfl:   •  diclofenac (VOLTAREN) 1 % gel gel, Apply 4 g topically As Needed., Disp: , Rfl:   •  etodolac (LODINE) 400 MG tablet, Take 400 mg by mouth 2 (Two) Times a Day., Disp: , Rfl:   •  flecainide (TAMBOCOR) 100 MG tablet, TAKE 1 TABLET BY MOUTH EVERY 12 HOURS DAILY., Disp: 60 tablet, Rfl: 3  •  fluticasone (FLONASE) 50 MCG/ACT nasal spray, 2 sprays into the nostril(s) as directed by provider Daily., Disp: , Rfl:   •  guaiFENesin (MUCINEX) 600 MG 12 hr tablet, Take 600 mg by mouth 2 (Two) Times a Day As Needed., Disp: , Rfl:   •  methocarbamol (ROBAXIN) 750 MG tablet, Take 750 mg by mouth 3 (Three) Times a Day., Disp: , Rfl:   •  naratriptan (AMERGE) 2.5 MG tablet, Take 2.5 mg by mouth Daily As Needed (headache)., Disp: , Rfl:   •  pantoprazole (PROTONIX) 20 MG EC tablet, Take 20 mg by mouth Daily., Disp: , Rfl:   •  pregabalin (LYRICA) 150 MG capsule, 150 mg 2 (Two) Times a Day., Disp: , Rfl:   •  PREMPRO 0.3-1.5 MG per tablet, Take 1 tablet by mouth Daily., Disp: , Rfl: 2  •  traMADol (ULTRAM) 50 MG tablet, Take 50 mg by mouth Every 6 (Six) Hours As Needed for Moderate Pain ., Disp: , Rfl:   •  Dietary Management Product (RHEUMATE PO), Daily., Disp: , Rfl:   •  Dietary Management Product (Rheumate) capsule, Take 1 capsule by mouth Daily., Disp: 90 capsule, Rfl: 4  •  Otezla 30 MG tablet, 30 mg 2 (two) times a day., Disp: , Rfl:     Allergies:  Allergies   Allergen Reactions   • Sulfa Antibiotics Hives and Itching       Social History:  Social History     Socioeconomic History   • Marital status:    Tobacco Use   • Smoking status: Never Smoker   • Smokeless tobacco: Never Used   Vaping Use   • Vaping Use: Never used   Substance and Sexual Activity   • Alcohol use: No   • Drug use: No   • Sexual activity: Defer       Family History:  Family History   Problem Relation Age of Onset   • Diabetes Mother    • Cancer Mother         kidney   •  "Heart disease Mother         CHF   • Hypertension Mother    • Cancer Father         bladder/kidney   • Cancer Brother         lung   • Heart disease Maternal Grandmother    • Stroke Maternal Grandmother          PVR:   100mL     Objective     Physical Exam:   Vital Signs:   Vitals:    01/27/22 1216   Pulse: 61   SpO2: 98%   Weight: 88.9 kg (196 lb)   Height: 165.1 cm (65\")   PainSc: 0-No pain     Body mass index is 32.62 kg/m².     Physical Exam  Vitals and nursing note reviewed.   Constitutional:       Appearance: Normal appearance. She is normal weight.   Cardiovascular:      Comments: Well perfused  Pulmonary:      Effort: Pulmonary effort is normal.   Abdominal:      General: Abdomen is flat.      Palpations: Abdomen is soft.   Musculoskeletal:         General: Normal range of motion.   Skin:     General: Skin is warm and dry.   Neurological:      General: No focal deficit present.      Mental Status: She is alert and oriented to person, place, and time. Mental status is at baseline.   Psychiatric:         Mood and Affect: Mood normal.         Behavior: Behavior normal.         Thought Content: Thought content normal.         Judgment: Judgment normal.         Labs:   Brief Urine Lab Results  (Last result in the past 365 days)      Color   Clarity   Blood   Leuk Est   Nitrite   Protein   CREAT   Urine HCG        01/27/22 1308 Yellow   Clear   Negative   Negative   Negative   Negative                      Lab Results   Component Value Date    GLUCOSE 86 11/12/2021    CALCIUM 9.0 11/12/2021     11/12/2021    K 4.8 11/12/2021    CO2 24.9 11/12/2021     11/12/2021    BUN 11 11/12/2021    CREATININE 0.74 11/12/2021    EGFRIFNONA 79 11/12/2021    BCR 14.9 11/12/2021    ANIONGAP 10.1 11/12/2021       Lab Results   Component Value Date    WBC 6.57 11/12/2021    HGB 13.4 11/12/2021    HCT 38.4 11/12/2021    MCV 86.9 11/12/2021     11/12/2021       Images:   No Images in the past 120 days " found..    Measures:   Tobacco:   Mecca Renee  reports that she has never smoked. She has never used smokeless tobacco.. I have educated her on the risk of diseases from using tobacco products.           Urine Incontinence: ( NOUI)  Patient reports that she is not currently experiencing any symptoms of urinary incontinence.      Assessment / Plan      Assessment:  Mrs. Renee is a 66 y.o. female who presents today with lower urinary tract symptoms.  Ports onset of lower urinary tract symptoms over the past 1 to 2-month.  She reports associated abdominal pain.  She denies intermittency, hesitancy, weakening of stream.  She does report subjective feeling of incomplete emptying.  Denies hematuria.  Denies frequency or urgency.  She does have a past history of recurrent UTI, resolution after menopause.  PVR today 100 mL.    Diagnoses and all orders for this visit:    1. Lower urinary tract symptoms (LUTS) (Primary)    2. Incomplete emptying of bladder  -     POC Urinalysis Dipstick, Automated  -     Urinalysis With Microscopic - Urine, Clean Catch; Future  -     Urine Culture - Urine, Urine, Random Void; Future        Patient Education:   Today we discussed etiology and management of lower inner tract symptoms.  We discussed work-up and history of physical exam.  We discussed her PVR is 100 and mL today.  We discussed conservative strategies to improve symptoms.  We discussed the role that irritative and caffeinated beverages can plan worsening urinary symptoms and encouraged her to decrease these while increasing her water intake.  We discussed the role that constipation can play in urinary symptoms.  We encouraged her to be on a bowel regimen based upon her history of constipation as well as diverticulosis.  Reported that her abdominal pain may be more likely related to constipation.  Will obtain a urinalysis and urine culture today to ensure no infection.  She will work on conservative symptoms and we  will have her return in 4 weeks for a symptom check and a PVR.  She is understanding agreeable plan of care.    Follow Up:   Return in about 4 weeks (around 2/24/2022) for Recheck.    I spent approximately 45 minutes providing clinical care for this patient; including review of patient's chart and provider documentation, face to face time spent with patient in examination room (obtaining history, performing physical exam, discussing diagnosis and management options), placing orders, and completing patient documentation.     Raúl Christina MD  [facility]

## 2022-01-28 ENCOUNTER — PATIENT ROUNDING (BHMG ONLY) (OUTPATIENT)
Dept: UROLOGY | Facility: CLINIC | Age: 67
End: 2022-01-28

## 2022-01-28 PROBLEM — R33.9 INCOMPLETE EMPTYING OF BLADDER: Status: ACTIVE | Noted: 2022-01-28

## 2022-01-28 PROBLEM — R39.9 LOWER URINARY TRACT SYMPTOMS (LUTS): Status: ACTIVE | Noted: 2022-01-28

## 2022-01-28 NOTE — PROGRESS NOTES
A [employer] message has been sent to the patient for PATIENT ROUNDING with [facility].

## 2022-02-18 RX ORDER — FLECAINIDE ACETATE 100 MG/1
TABLET ORAL
Qty: 180 TABLET | Refills: 2 | Status: SHIPPED | OUTPATIENT
Start: 2022-02-18 | End: 2022-11-16

## 2022-03-10 DIAGNOSIS — M47.816 SPONDYLOSIS OF LUMBAR REGION WITHOUT MYELOPATHY OR RADICULOPATHY: ICD-10-CM

## 2022-03-11 RX ORDER — ME-TETRAHYDROFOLATE/B12/HRB236 1-1-500 MG
1 CAPSULE ORAL DAILY
Qty: 90 CAPSULE | Refills: 4 | Status: SHIPPED | OUTPATIENT
Start: 2022-03-11

## 2022-03-17 ENCOUNTER — OFFICE VISIT (OUTPATIENT)
Dept: UROLOGY | Facility: CLINIC | Age: 67
End: 2022-03-17

## 2022-03-17 VITALS — BODY MASS INDEX: 32.65 KG/M2 | WEIGHT: 196 LBS | OXYGEN SATURATION: 100 % | HEART RATE: 61 BPM | HEIGHT: 65 IN

## 2022-03-17 DIAGNOSIS — R39.9 LOWER URINARY TRACT SYMPTOMS (LUTS): Primary | ICD-10-CM

## 2022-03-17 LAB
BILIRUB BLD-MCNC: NEGATIVE MG/DL
CLARITY, POC: CLEAR
COLOR UR: YELLOW
EXPIRATION DATE: ABNORMAL
GLUCOSE UR STRIP-MCNC: ABNORMAL MG/DL
KETONES UR QL: NEGATIVE
LEUKOCYTE EST, POC: NEGATIVE
Lab: ABNORMAL
NITRITE UR-MCNC: NEGATIVE MG/ML
PH UR: 6 [PH] (ref 5–8)
PROT UR STRIP-MCNC: NEGATIVE MG/DL
RBC # UR STRIP: NEGATIVE /UL
SP GR UR: 1.02 (ref 1–1.03)
UROBILINOGEN UR QL: NORMAL

## 2022-03-17 PROCEDURE — 99214 OFFICE O/P EST MOD 30 MIN: CPT | Performed by: UROLOGY

## 2022-03-17 PROCEDURE — 51798 US URINE CAPACITY MEASURE: CPT | Performed by: UROLOGY

## 2022-03-17 PROCEDURE — 81003 URINALYSIS AUTO W/O SCOPE: CPT | Performed by: UROLOGY

## 2022-03-17 NOTE — PROGRESS NOTES
Follow Up Office Visit      Patient Name: Mecca Renee  : 1955   MRN: [id number]     Chief Complaint:    Chief Complaint   Patient presents with   • lower urinary tract symptoms     History of Present Illness: Mecca Renee is a 66 y.o. female who presents today for follow up of lower urinary tract symptoms, feeling of incomplete emptying.  She reports improvement in symptoms since last follow-up, continues to report mild suprapubic discomfort.  She denies hematuria.  She denies significant dysuria.  She denies urinary urgency, frequency, urge associated incontinence.  She has been working on strategies we have previously discussed including decreasing caffeinated beverage intake, increasing fluid intake, improving constipation and bowel regimen.  She does continue to report feeling of incomplete emptying.  She denies significant straining to void, denies intermittency or hesitancy.  Denies weakening of stream.  IN today 169 mL.      Subjective      Review of System: Review of Systems   Constitutional: Negative for chills, fatigue, fever and unexpected weight change.   HENT: Negative for sore throat.    Eyes: Negative for visual disturbance.   Respiratory: Negative for cough, chest tightness and shortness of breath.    Cardiovascular: Negative for chest pain and leg swelling.   Gastrointestinal: Negative for blood in stool, constipation, diarrhea, nausea, rectal pain and vomiting.   Genitourinary: Negative for decreased urine volume, difficulty urinating, dysuria, enuresis, flank pain, frequency, genital sores, hematuria and urgency.   Musculoskeletal: Negative for back pain and joint swelling.   Skin: Negative for rash and wound.   Neurological: Negative for seizures, speech difficulty, weakness and headaches.   Psychiatric/Behavioral: Negative for confusion, sleep disturbance and suicidal ideas. The patient is not nervous/anxious.       I have reviewed the ROS documented by my  clinical staff, updated as appropriate and I agree. Raúl Christina MD    I have reviewed and the following portions of the patient's history were updated as appropriate: past family history, past medical history, past social history, past surgical history and problem list.    Medications:     Current Outpatient Medications:   •  aspirin 81 MG EC tablet, Take 81 mg by mouth Daily., Disp: , Rfl:   •  bisoprolol (ZEBeta) 5 MG tablet, TAKE 1/2 TABLET BY MOUTH TWICE DAILY, Disp: 30 tablet, Rfl: 11  •  Calcium Carbonate-Vit D-Min (CALCIUM 1200 PO), Take 1 tablet by mouth Daily., Disp: , Rfl:   •  cetirizine (zyrTEC) 10 MG tablet, Take 1 tablet by mouth., Disp: , Rfl:   •  Cholecalciferol (VITAMIN D3) 5000 UNITS capsule capsule, Take  by mouth Daily., Disp: , Rfl:   •  Coenzyme Q10 (COQ-10) 100 MG capsule, oral daily, Disp: , Rfl:   •  COMBIVENT RESPIMAT  MCG/ACT inhaler, Inhale 1 puff As Needed., Disp: , Rfl: 3  •  denosumab (PROLIA) 60 MG/ML solution prefilled syringe syringe, Inject 1 mL under the skin into the appropriate area as directed Every 6 (Six) Months., Disp: , Rfl:   •  diclofenac (VOLTAREN) 1 % gel gel, Apply 4 g topically As Needed., Disp: , Rfl:   •  Dietary Management Product (Rheumate) capsule, Take 1 capsule by mouth Daily., Disp: 90 capsule, Rfl: 4  •  etodolac (LODINE) 400 MG tablet, Take 400 mg by mouth 2 (Two) Times a Day., Disp: , Rfl:   •  flecainide (TAMBOCOR) 100 MG tablet, TAKE 1 TABLET BY MOUTH EVERY 12 HOURS DAILY., Disp: 180 tablet, Rfl: 2  •  fluticasone (FLONASE) 50 MCG/ACT nasal spray, 2 sprays into the nostril(s) as directed by provider Daily., Disp: , Rfl:   •  guaiFENesin (MUCINEX) 600 MG 12 hr tablet, Take 600 mg by mouth 2 (Two) Times a Day As Needed., Disp: , Rfl:   •  methocarbamol (ROBAXIN) 750 MG tablet, Take 750 mg by mouth 3 (Three) Times a Day., Disp: , Rfl:   •  naratriptan (AMERGE) 2.5 MG tablet, Take 2.5 mg by mouth Daily As Needed (headache)., Disp: , Rfl:   •  Otezla  "30 MG tablet, 30 mg 2 (two) times a day., Disp: , Rfl:   •  pantoprazole (PROTONIX) 20 MG EC tablet, Take 20 mg by mouth Daily., Disp: , Rfl:   •  pregabalin (LYRICA) 150 MG capsule, 150 mg 2 (Two) Times a Day., Disp: , Rfl:   •  PREMPRO 0.3-1.5 MG per tablet, Take 1 tablet by mouth Daily., Disp: , Rfl: 2  •  traMADol (ULTRAM) 50 MG tablet, Take 50 mg by mouth Every 6 (Six) Hours As Needed for Moderate Pain ., Disp: , Rfl:     Allergies:   Allergies   Allergen Reactions   • Sulfa Antibiotics Hives and Itching   • Shellfish-Derived Products Other (See Comments)         Post void residual bladder scan:   168mL     Objective     Physical Exam:   Vital Signs:   Vitals:    03/17/22 0940   Pulse: 61   SpO2: 100%   Weight: 88.9 kg (196 lb)   Height: 165.1 cm (65\")   PainSc: 0-No pain     Body mass index is 32.62 kg/m².     Physical Exam  Vitals and nursing note reviewed.   Constitutional:       Appearance: Normal appearance.   HENT:      Head: Normocephalic and atraumatic.   Cardiovascular:      Comments: Well perfused  Pulmonary:      Effort: Pulmonary effort is normal.   Abdominal:      General: Abdomen is flat.      Palpations: Abdomen is soft.   Musculoskeletal:         General: Normal range of motion.   Skin:     General: Skin is warm and dry.   Neurological:      General: No focal deficit present.      Mental Status: She is alert and oriented to person, place, and time. Mental status is at baseline.   Psychiatric:         Mood and Affect: Mood normal.         Behavior: Behavior normal.         Thought Content: Thought content normal.         Judgment: Judgment normal.           Labs:   Brief Urine Lab Results  (Last result in the past 365 days)      Color   Clarity   Blood   Leuk Est   Nitrite   Protein   CREAT   Urine HCG        03/17/22 0947 Yellow   Clear   Negative   Negative   Negative   Negative                      Lab Results   Component Value Date    GLUCOSE 86 11/12/2021    CALCIUM 9.0 11/12/2021     " 11/12/2021    K 4.8 11/12/2021    CO2 24.9 11/12/2021     11/12/2021    BUN 11 11/12/2021    CREATININE 0.74 11/12/2021    EGFRIFNONA 79 11/12/2021    BCR 14.9 11/12/2021    ANIONGAP 10.1 11/12/2021       Lab Results   Component Value Date    WBC 6.57 11/12/2021    HGB 13.4 11/12/2021    HCT 38.4 11/12/2021    MCV 86.9 11/12/2021     11/12/2021       Images:   No Images in the past 120 days found..    Measures:   Tobacco:   Mecca Renee  reports that she has never smoked. She has never used smokeless tobacco.. I have educated her on the risk of diseases from using tobacco products.  Assessment / Plan      Assessment/Plan:   66 y.o. female is seen today for follow up of lower urinary tract symptoms, feeling of incomplete emptying.  PVR today 169 mL.  We discussed continued work on conservative strategies to improve symptoms.  We discussed the indication for a bladder diary, she will complete this at next follow-up.  We will continue to monitor her symptoms as well as her bladder volumes.  We discussed that ultimately if she has having worsening of symptoms, incomplete emptying we may have to consider initiation of clean intermittent catheterization at time points during the day to ensure appropriate bladder emptying to reduce risk of infection, damage to urinary bladder.  We will have her follow-up in approximately 4 weeks for a symptom check.  She would like to avoid more aggressive therapies and reports that she will continue to work on conservative strategies.  We will also consider cystoscopy at next follow-up to ensure no anatomical abnormalities of the bladder and urethra as a cause for incomplete emptying..  We will continue to follow closely her bladder volumes.  She is understanding agreeable plan of care..     Diagnoses and all orders for this visit:    1. Lower urinary tract symptoms (LUTS) (Primary)  -     POC Urinalysis Dipstick, Automated         Follow Up:   Return in about 4  weeks (around 4/14/2022) for Follow up for Cystoscopy.     I spent approximately 30 minutes providing clinical care for this patient; including review of patient's chart and provider documentation, face to face time spent with patient in examination room (obtaining history, performing physical exam, discussing diagnosis and management options), placing orders, and completing patient documentation.     Raúl Christina MD  [facility]

## 2022-03-22 ENCOUNTER — TELEPHONE (OUTPATIENT)
Dept: UROLOGY | Facility: CLINIC | Age: 67
End: 2022-03-22

## 2022-03-22 NOTE — TELEPHONE ENCOUNTER
Patient stated she doesn't think she can do the cystoscopy if she has to be awake. She said she knows it painful because she knows people who have had this done.

## 2022-03-29 DIAGNOSIS — R39.9 LOWER URINARY TRACT SYMPTOMS (LUTS): Primary | ICD-10-CM

## 2022-03-30 DIAGNOSIS — R39.9 LOWER URINARY TRACT SYMPTOMS: Primary | ICD-10-CM

## 2022-04-07 ENCOUNTER — OUTSIDE FACILITY SERVICE (OUTPATIENT)
Dept: UROLOGY | Facility: CLINIC | Age: 67
End: 2022-04-07

## 2022-04-07 ENCOUNTER — TELEPHONE (OUTPATIENT)
Dept: NEUROSURGERY | Facility: CLINIC | Age: 67
End: 2022-04-07

## 2022-04-07 ENCOUNTER — LAB REQUISITION (OUTPATIENT)
Dept: LAB | Facility: HOSPITAL | Age: 67
End: 2022-04-07

## 2022-04-07 DIAGNOSIS — R39.9 UNSPECIFIED SYMPTOMS AND SIGNS INVOLVING THE GENITOURINARY SYSTEM: ICD-10-CM

## 2022-04-07 DIAGNOSIS — R30.0 DYSURIA: Primary | ICD-10-CM

## 2022-04-07 PROCEDURE — 87070 CULTURE OTHR SPECIMN AEROBIC: CPT | Performed by: UROLOGY

## 2022-04-07 PROCEDURE — 52318 REMOVE BLADDER STONE: CPT | Performed by: UROLOGY

## 2022-04-07 PROCEDURE — 82365 CALCULUS SPECTROSCOPY: CPT | Performed by: UROLOGY

## 2022-04-07 PROCEDURE — 87015 SPECIMEN INFECT AGNT CONCNTJ: CPT | Performed by: UROLOGY

## 2022-04-07 PROCEDURE — 87206 SMEAR FLUORESCENT/ACID STAI: CPT | Performed by: UROLOGY

## 2022-04-07 PROCEDURE — 87102 FUNGUS ISOLATION CULTURE: CPT | Performed by: UROLOGY

## 2022-04-07 PROCEDURE — 87116 MYCOBACTERIA CULTURE: CPT | Performed by: UROLOGY

## 2022-04-07 PROCEDURE — 87205 SMEAR GRAM STAIN: CPT | Performed by: UROLOGY

## 2022-04-07 NOTE — TELEPHONE ENCOUNTER
Attempted to contact Екатерина to relay DANI Lovett's message but no answer. LVM on her personal line relaying the PA's message. I stated to call our office with any questions.

## 2022-04-07 NOTE — TELEPHONE ENCOUNTER
Provider:  DANI Gage  Caller: Екатерина ([facility])  Phone #:  [phone]   Surgery:  N/A  Surgery Date:  N/A  Last visit:   Office Visit with Rachell Willams PA-C (08/17/2021)    Next visit: N/A    ROBERTO:     N/A    Reason for call:         Екатерина from the [facility] called stating Dr. Garcia wanted her to give us a call because the patient is not getting any better. She stated the patient is still having severe neck and back pain. Dr. Garcia was wanting to know if a spinal stimulator may benefit the patient to get some kind of relief from the pain.     Please advise

## 2022-04-07 NOTE — TELEPHONE ENCOUNTER
If the patient is only having mechanical type neck and back pain this would be a good option for her. If she has new weakness or radicular symptoms we can reevaluate.

## 2022-04-08 ENCOUNTER — TELEPHONE (OUTPATIENT)
Dept: UROLOGY | Facility: CLINIC | Age: 67
End: 2022-04-08

## 2022-04-08 LAB — GIE STN SPEC: NORMAL

## 2022-04-08 RX ORDER — PHENAZOPYRIDINE HYDROCHLORIDE 100 MG/1
100 TABLET, FILM COATED ORAL 3 TIMES DAILY PRN
Qty: 15 TABLET | Refills: 0 | Status: SHIPPED | OUTPATIENT
Start: 2022-04-08 | End: 2022-10-10

## 2022-04-08 NOTE — TELEPHONE ENCOUNTER
Can we call and check on her- dysuria is to be expected- she can take pyridium or Azo, increase fluid intake through the weekend. I will send pyridium to pharmacy

## 2022-04-08 NOTE — TELEPHONE ENCOUNTER
Patient states she had a cystoscopy done yesterday, pt is now having dysuria and cramps.      Pain is at a 5 and when pt urinates pain is at 8.     Dr. Christina, please advise.  Thank you.

## 2022-04-10 LAB
BACTERIA SPEC AEROBE CULT: NORMAL
GRAM STN SPEC: NORMAL

## 2022-04-12 LAB — BACTERIA SPEC ANAEROBE CULT: NORMAL

## 2022-04-14 ENCOUNTER — TELEPHONE (OUTPATIENT)
Dept: UROLOGY | Facility: CLINIC | Age: 67
End: 2022-04-14

## 2022-04-14 ENCOUNTER — OFFICE VISIT (OUTPATIENT)
Dept: UROLOGY | Facility: CLINIC | Age: 67
End: 2022-04-14

## 2022-04-14 VITALS — HEIGHT: 65 IN | WEIGHT: 196 LBS | BODY MASS INDEX: 32.65 KG/M2

## 2022-04-14 DIAGNOSIS — R39.9 LOWER URINARY TRACT SYMPTOMS (LUTS): Primary | ICD-10-CM

## 2022-04-14 LAB
COLOR STONE: NORMAL
COMPN STONE: NORMAL
LABORATORY COMMENT REPORT: NORMAL
LABORATORY COMMENT REPORT: NORMAL
Lab: NORMAL
Lab: NORMAL
PHOTO: NORMAL
SIZE STONE: <1 MM
SPEC SOURCE SUBJ: NORMAL
STONE ANALYSIS-IMP: NORMAL
WT STONE: <1 MG

## 2022-04-14 PROCEDURE — 99213 OFFICE O/P EST LOW 20 MIN: CPT | Performed by: UROLOGY

## 2022-04-14 NOTE — TELEPHONE ENCOUNTER
PT CAME IN TODAY THINKING THAT SHE WAS STILL SUPPOSE TO HAVE HER CYSTO. I ADVISED HER THAT IT WAS CANCELLED SINCE SHE HAD THE CYSTO AT THE [facility]. SHE WOULD LIKE TO KNOW IF SHE CAN GET A CALL WITH HER RESULTS FROM THE CYSTO.

## 2022-04-15 NOTE — PROGRESS NOTES
Follow Up Office Visit      Patient Name: Mecca Renee  : 1955   MRN: [id number]     Chief Complaint:  LUTS    History of Present Illness: Mecca Renee is a 66 y.o. female who presents today for follow up evaluation based upon history of lower urinary tract symptoms/pelvic pain.  She underwent recent outpatient cystoscopy for anatomical evaluation.  The patient was identified to have normal urinary bladder.  She did have mild debris within the bladder, possible foreign body.  This was sent for back arterial, yeast, anaerobic and a aerobic culture which have all returned no growth.  It was sent for stone analysis and was reported as organic material.  She reports she has been doing well since her cystoscopy.  She reports primarily suprapubic pain, pelvic pain.  We have discussed that at this time we do not have a potential urologic cause for her symptoms.  She is going to continue to follow with her primary care physician, discussed GI related symptoms.    Subjective      Review of System: Review of Systems   Constitutional: Negative for chills, fatigue, fever and unexpected weight change.   HENT: Negative for sore throat.    Eyes: Negative for visual disturbance.   Respiratory: Negative for cough, chest tightness and shortness of breath.    Cardiovascular: Negative for chest pain and leg swelling.   Gastrointestinal: Negative for blood in stool, constipation, diarrhea, nausea, rectal pain and vomiting.   Genitourinary: Negative for decreased urine volume, difficulty urinating, dysuria, enuresis, flank pain, frequency, genital sores, hematuria and urgency.   Musculoskeletal: Negative for back pain and joint swelling.   Skin: Negative for rash and wound.   Neurological: Negative for seizures, speech difficulty, weakness and headaches.   Psychiatric/Behavioral: Negative for confusion, sleep disturbance and suicidal ideas. The patient is not nervous/anxious.       I have reviewed the ROS  documented by my clinical staff, updated as appropriate and I agree. Raúl Christina MD    I have reviewed and the following portions of the patient's history were updated as appropriate: past family history, past medical history, past social history, past surgical history and problem list.    Medications:     Current Outpatient Medications:   •  aspirin 81 MG EC tablet, Take 81 mg by mouth Daily., Disp: , Rfl:   •  bisoprolol (ZEBeta) 5 MG tablet, TAKE 1/2 TABLET BY MOUTH TWICE DAILY, Disp: 30 tablet, Rfl: 11  •  Calcium Carbonate-Vit D-Min (CALCIUM 1200 PO), Take 1 tablet by mouth Daily., Disp: , Rfl:   •  cetirizine (zyrTEC) 10 MG tablet, Take 1 tablet by mouth., Disp: , Rfl:   •  Cholecalciferol (VITAMIN D3) 5000 UNITS capsule capsule, Take  by mouth Daily., Disp: , Rfl:   •  Coenzyme Q10 (COQ-10) 100 MG capsule, oral daily, Disp: , Rfl:   •  COMBIVENT RESPIMAT  MCG/ACT inhaler, Inhale 1 puff As Needed., Disp: , Rfl: 3  •  denosumab (PROLIA) 60 MG/ML solution prefilled syringe syringe, Inject 1 mL under the skin into the appropriate area as directed Every 6 (Six) Months., Disp: , Rfl:   •  diclofenac (VOLTAREN) 1 % gel gel, Apply 4 g topically As Needed., Disp: , Rfl:   •  Dietary Management Product (Rheumate) capsule, Take 1 capsule by mouth Daily., Disp: 90 capsule, Rfl: 4  •  etodolac (LODINE) 400 MG tablet, Take 400 mg by mouth 2 (Two) Times a Day., Disp: , Rfl:   •  flecainide (TAMBOCOR) 100 MG tablet, TAKE 1 TABLET BY MOUTH EVERY 12 HOURS DAILY., Disp: 180 tablet, Rfl: 2  •  fluticasone (FLONASE) 50 MCG/ACT nasal spray, 2 sprays into the nostril(s) as directed by provider Daily., Disp: , Rfl:   •  guaiFENesin (MUCINEX) 600 MG 12 hr tablet, Take 600 mg by mouth 2 (Two) Times a Day As Needed., Disp: , Rfl:   •  methocarbamol (ROBAXIN) 750 MG tablet, Take 750 mg by mouth 3 (Three) Times a Day., Disp: , Rfl:   •  naratriptan (AMERGE) 2.5 MG tablet, Take 2.5 mg by mouth Daily As Needed (headache)., Disp: ,  "Rfl:   •  Otezla 30 MG tablet, 30 mg 2 (two) times a day., Disp: , Rfl:   •  pantoprazole (PROTONIX) 20 MG EC tablet, Take 20 mg by mouth Daily., Disp: , Rfl:   •  phenazopyridine (Pyridium) 100 MG tablet, Take 1 tablet by mouth 3 (Three) Times a Day As Needed (or Dysuria/Bladder pain)., Disp: 15 tablet, Rfl: 0  •  pregabalin (LYRICA) 150 MG capsule, 150 mg 2 (Two) Times a Day., Disp: , Rfl:   •  PREMPRO 0.3-1.5 MG per tablet, Take 1 tablet by mouth Daily., Disp: , Rfl: 2  •  traMADol (ULTRAM) 50 MG tablet, Take 50 mg by mouth Every 6 (Six) Hours As Needed for Moderate Pain ., Disp: , Rfl:     Allergies:   Allergies   Allergen Reactions   • Sulfa Antibiotics Hives and Itching   • Shellfish-Derived Products Other (See Comments)         Objective     Physical Exam:   Vital Signs:   Vitals:    04/14/22 1516   Weight: 88.9 kg (196 lb)   Height: 165.1 cm (65\")     Body mass index is 32.62 kg/m².     Physical Exam  Vitals and nursing note reviewed.   Constitutional:       Appearance: Normal appearance.   HENT:      Head: Normocephalic and atraumatic.   Cardiovascular:      Comments: Well perfused  Pulmonary:      Effort: Pulmonary effort is normal.   Abdominal:      General: Abdomen is flat.      Palpations: Abdomen is soft.   Musculoskeletal:         General: Normal range of motion.   Skin:     General: Skin is warm and dry.   Neurological:      General: No focal deficit present.      Mental Status: She is alert and oriented to person, place, and time. Mental status is at baseline.   Psychiatric:         Mood and Affect: Mood normal.         Behavior: Behavior normal.         Thought Content: Thought content normal.         Judgment: Judgment normal.         Labs:   Brief Urine Lab Results  (Last result in the past 365 days)      Color   Clarity   Blood   Leuk Est   Nitrite   Protein   CREAT   Urine HCG        03/17/22 0947 Yellow   Clear   Negative   Negative   Negative   Negative                      Lab Results "   Component Value Date    GLUCOSE 86 11/12/2021    CALCIUM 9.0 11/12/2021     11/12/2021    K 4.8 11/12/2021    CO2 24.9 11/12/2021     11/12/2021    BUN 11 11/12/2021    CREATININE 0.74 11/12/2021    EGFRIFNONA 79 11/12/2021    BCR 14.9 11/12/2021    ANIONGAP 10.1 11/12/2021       Lab Results   Component Value Date    WBC 6.57 11/12/2021    HGB 13.4 11/12/2021    HCT 38.4 11/12/2021    MCV 86.9 11/12/2021     11/12/2021           Measures:   Tobacco:   Mecca Renee  reports that she has never smoked. She has never used smokeless tobacco.. I have educated her on the risk of diseases from using tobacco products.           Urine Incontinence: ( NOUI)  Patient reports that she is not currently experiencing any symptoms of urinary incontinence.    Assessment / Plan      Assessment/Plan:   66 y.o. female is seen today for follow up evaluation based upon history of lower urinary tract symptoms/pelvic pain.  She underwent recent outpatient cystoscopy for anatomical evaluation.  The patient was identified to have normal urinary bladder.  She did have mild debris within the bladder, possible foreign body.  This was sent for back arterial, yeast, anaerobic and a aerobic culture which have all returned no growth.  It was sent for stone analysis and was reported as organic material.  She reports she has been doing well since her cystoscopy.  She reports primarily suprapubic pain, pelvic pain.  We have discussed that at this time we do not have a potential urologic cause for her symptoms.  She is going to continue to follow with her primary care physician, discussed GI related symptoms.  We continue to stress importance of conservative strategies to reduce urinary symptoms including increasing fluid intake, decreasing caffeinated and irritative beverage intake, maintenance of a bowel regimen to prevent constipation.  She will follow-up in 6 months for a symptom check.    Diagnoses and all orders for  this visit:    1. Lower urinary tract symptoms (LUTS) (Primary)         Follow Up:   Return in about 6 months (around 10/14/2022).     I spent approximately 20 minutes providing clinical care for this patient; including review of patient's chart and provider documentation, face to face time spent with patient in examination room (obtaining history, performing physical exam, discussing diagnosis and management options), placing orders, and completing patient documentation.     Raúl Christina MD  [facility] [place]

## 2022-05-19 LAB
FUNGUS WND CULT: NORMAL
MYCOBACTERIUM SPEC CULT: NORMAL
NIGHT BLUE STAIN TISS: NORMAL

## 2022-06-13 RX ORDER — BISOPROLOL FUMARATE 5 MG/1
TABLET, FILM COATED ORAL
Qty: 30 TABLET | Refills: 6 | Status: SHIPPED | OUTPATIENT
Start: 2022-06-13 | End: 2023-03-10 | Stop reason: SDUPTHER

## 2022-08-08 ENCOUNTER — TELEPHONE (OUTPATIENT)
Dept: ORTHOPEDIC SURGERY | Facility: CLINIC | Age: 67
End: 2022-08-08

## 2022-08-08 DIAGNOSIS — M17.0 PRIMARY OSTEOARTHRITIS OF BOTH KNEES: Primary | ICD-10-CM

## 2022-08-25 NOTE — PROGRESS NOTES
Patient presents for the first injection of Orthovisc in bilateral knees    Using sterile technique, the left knee was sterilely prepped with Hibiclens.  Following time out, using a 22 gauge needle the left knee was aspirated and then injected with 2 ml Orthovisc. Approximately 0.5 mm of straw-colored fluid was obtained.  Patient tolerated the procedure well.  No complications.      Using sterile technique, the right knee was sterilely prepped with Hibiclens.  Following time out, using a 22 gauge needle the right knee was aspirated and then injected with 2 ml Orthovisc. Approximately 0.5 mm of straw-colored fluid was obtained.  Patient tolerated the procedure well.  No complications.      rtc 1 week

## 2022-08-26 ENCOUNTER — CLINICAL SUPPORT (OUTPATIENT)
Dept: ORTHOPEDIC SURGERY | Facility: CLINIC | Age: 67
End: 2022-08-26

## 2022-08-26 DIAGNOSIS — M17.0 PRIMARY OSTEOARTHRITIS OF BOTH KNEES: Primary | ICD-10-CM

## 2022-08-26 PROCEDURE — 20610 DRAIN/INJ JOINT/BURSA W/O US: CPT | Performed by: PHYSICIAN ASSISTANT

## 2022-08-26 RX ORDER — NORETHINDRONE ACETATE AND ETHINYL ESTRADIOL .005; 1 MG/1; MG/1
1 TABLET, FILM COATED ORAL DAILY
COMMUNITY
Start: 2022-07-29 | End: 2022-10-10

## 2022-08-26 RX ORDER — ADALIMUMAB 40MG/0.4ML
0.4 KIT SUBCUTANEOUS
COMMUNITY
Start: 2022-08-18

## 2022-08-26 NOTE — PROGRESS NOTES
Procedure   - Large Joint Arthrocentesis: bilateral knee on 8/26/2022 10:11 AM  Indications: pain  Details: 22 G needle, anterolateral approach  Medications (Right): 30 mg Hyaluronan 30 MG/2ML  Medications (Left): 30 mg Hyaluronan 30 MG/2ML  Outcome: tolerated well, no immediate complications  Procedure, treatment alternatives, risks and benefits explained, specific risks discussed. Consent was given by the patient. Immediately prior to procedure a time out was called to verify the correct patient, procedure, equipment, support staff and site/side marked as required. Patient was prepped and draped in the usual sterile fashion.

## 2022-09-01 NOTE — PROGRESS NOTES
Patient presents for the second injection of Orthovisc in bilateral knees    Using sterile technique, the left knee was sterilely prepped with Hibiclens.  Following time out, using a 22 gauge needle the left knee was aspirated and then injected with 2 ml Orthovisc. Approximately 0.5 mm of straw-colored fluid was obtained.  Patient tolerated the procedure well.  No complications.      Using sterile technique, the right knee was sterilely prepped with Hibiclens.  Following time out, using a 22 gauge needle the right knee was aspirated and then injected with 2 ml Orthovisc. Approximately 0.5 mm of straw-colored fluid was obtained.  Patient tolerated the procedure well.  No complications.      rtc 1 week

## 2022-09-02 ENCOUNTER — CLINICAL SUPPORT (OUTPATIENT)
Dept: ORTHOPEDIC SURGERY | Facility: CLINIC | Age: 67
End: 2022-09-02

## 2022-09-02 DIAGNOSIS — M17.0 PRIMARY OSTEOARTHRITIS OF BOTH KNEES: Primary | ICD-10-CM

## 2022-09-02 PROCEDURE — 20610 DRAIN/INJ JOINT/BURSA W/O US: CPT | Performed by: PHYSICIAN ASSISTANT

## 2022-09-02 NOTE — PROGRESS NOTES
Procedure   - Large Joint Arthrocentesis: bilateral knee on 9/2/2022 11:16 AM  Indications: pain  Details: 22 G needle, anterolateral approach  Medications (Right): 30 mg Hyaluronan 30 MG/2ML  Medications (Left): 30 mg Hyaluronan 30 MG/2ML  Outcome: tolerated well, no immediate complications  Procedure, treatment alternatives, risks and benefits explained, specific risks discussed. Consent was given by the patient. Immediately prior to procedure a time out was called to verify the correct patient, procedure, equipment, support staff and site/side marked as required. Patient was prepped and draped in the usual sterile fashion.

## 2022-09-09 ENCOUNTER — CLINICAL SUPPORT (OUTPATIENT)
Dept: ORTHOPEDIC SURGERY | Facility: CLINIC | Age: 67
End: 2022-09-09

## 2022-09-09 DIAGNOSIS — M17.0 PRIMARY OSTEOARTHRITIS OF BOTH KNEES: Primary | ICD-10-CM

## 2022-09-09 PROCEDURE — 20610 DRAIN/INJ JOINT/BURSA W/O US: CPT | Performed by: PHYSICIAN ASSISTANT

## 2022-09-09 NOTE — PROGRESS NOTES
Procedure   - Large Joint Arthrocentesis: bilateral knee on 9/9/2022 11:07 AM  Indications: pain  Details: 22 G needle, anterolateral approach  Medications (Right): 30 mg Hyaluronan 30 MG/2ML  Medications (Left): 30 mg Hyaluronan 30 MG/2ML  Outcome: tolerated well, no immediate complications  Procedure, treatment alternatives, risks and benefits explained, specific risks discussed. Consent was given by the patient. Immediately prior to procedure a time out was called to verify the correct patient, procedure, equipment, support staff and site/side marked as required. Patient was prepped and draped in the usual sterile fashion.

## 2022-09-13 NOTE — PROGRESS NOTES
Patient presents for the final injection of Orthovisc in bilateral knees    Using sterile technique, the left knee was sterilely prepped with Hibiclens.  Following time out, using a 22 gauge needle the left knee was aspirated and then injected with 2 ml Orthovisc. Approximately 0.5 mm of straw-colored fluid was obtained.  Patient tolerated the procedure well.  No complications.      Using sterile technique, the right knee was sterilely prepped with Hibiclens.  Following time out, using a 22 gauge needle the right knee was aspirated and then injected with 2 ml Orthovisc. Approximately 0.5 mm of straw-colored fluid was obtained.  Patient tolerated the procedure well.  No complications.      rtc prn.

## 2022-09-30 ENCOUNTER — APPOINTMENT (OUTPATIENT)
Dept: CT IMAGING | Facility: HOSPITAL | Age: 67
End: 2022-09-30

## 2022-09-30 ENCOUNTER — APPOINTMENT (OUTPATIENT)
Dept: GENERAL RADIOLOGY | Facility: HOSPITAL | Age: 67
End: 2022-09-30

## 2022-09-30 ENCOUNTER — HOSPITAL ENCOUNTER (EMERGENCY)
Facility: HOSPITAL | Age: 67
Discharge: HOME | End: 2022-09-30
Attending: EMERGENCY MEDICINE | Admitting: EMERGENCY MEDICINE

## 2022-09-30 VITALS
HEIGHT: 66 IN | DIASTOLIC BLOOD PRESSURE: 74 MMHG | HEART RATE: 52 BPM | WEIGHT: 194 LBS | TEMPERATURE: 97.5 F | SYSTOLIC BLOOD PRESSURE: 123 MMHG | OXYGEN SATURATION: 98 % | RESPIRATION RATE: 18 BRPM | BODY MASS INDEX: 31.18 KG/M2

## 2022-09-30 DIAGNOSIS — R07.9 CHEST PAIN, UNSPECIFIED TYPE: Primary | ICD-10-CM

## 2022-09-30 LAB
ALBUMIN SERPL-MCNC: 4.1 G/DL (ref 3.5–5.2)
ALBUMIN/GLOB SERPL: 1.6 G/DL
ALP SERPL-CCNC: 70 U/L (ref 39–117)
ALT SERPL W P-5'-P-CCNC: 10 U/L (ref 1–33)
ANION GAP SERPL CALCULATED.3IONS-SCNC: 5 MMOL/L (ref 5–15)
AST SERPL-CCNC: 17 U/L (ref 1–32)
BASOPHILS # BLD AUTO: 0.03 10*3/MM3 (ref 0–0.2)
BASOPHILS NFR BLD AUTO: 0.5 % (ref 0–1.5)
BILIRUB SERPL-MCNC: 0.3 MG/DL (ref 0–1.2)
BUN SERPL-MCNC: 14 MG/DL (ref 8–23)
BUN/CREAT SERPL: 23.7 (ref 7–25)
CALCIUM SPEC-SCNC: 8.5 MG/DL (ref 8.6–10.5)
CHLORIDE SERPL-SCNC: 106 MMOL/L (ref 98–107)
CO2 SERPL-SCNC: 28 MMOL/L (ref 22–29)
CREAT SERPL-MCNC: 0.59 MG/DL (ref 0.57–1)
DEPRECATED RDW RBC AUTO: 44.4 FL (ref 37–54)
EGFRCR SERPLBLD CKD-EPI 2021: 99.5 ML/MIN/1.73
EOSINOPHIL # BLD AUTO: 0.17 10*3/MM3 (ref 0–0.4)
EOSINOPHIL NFR BLD AUTO: 2.6 % (ref 0.3–6.2)
ERYTHROCYTE [DISTWIDTH] IN BLOOD BY AUTOMATED COUNT: 13.4 % (ref 12.3–15.4)
GLOBULIN UR ELPH-MCNC: 2.6 GM/DL
GLUCOSE SERPL-MCNC: 88 MG/DL (ref 65–99)
HCT VFR BLD AUTO: 43.2 % (ref 34–46.6)
HGB BLD-MCNC: 14.3 G/DL (ref 12–15.9)
HOLD SPECIMEN: NORMAL
IMM GRANULOCYTES # BLD AUTO: 0.02 10*3/MM3 (ref 0–0.05)
IMM GRANULOCYTES NFR BLD AUTO: 0.3 % (ref 0–0.5)
LIPASE SERPL-CCNC: 25 U/L (ref 13–60)
LYMPHOCYTES # BLD AUTO: 1.34 10*3/MM3 (ref 0.7–3.1)
LYMPHOCYTES NFR BLD AUTO: 20.6 % (ref 19.6–45.3)
MCH RBC QN AUTO: 29.8 PG (ref 26.6–33)
MCHC RBC AUTO-ENTMCNC: 33.1 G/DL (ref 31.5–35.7)
MCV RBC AUTO: 90 FL (ref 79–97)
MONOCYTES # BLD AUTO: 0.57 10*3/MM3 (ref 0.1–0.9)
MONOCYTES NFR BLD AUTO: 8.8 % (ref 5–12)
NEUTROPHILS NFR BLD AUTO: 4.37 10*3/MM3 (ref 1.7–7)
NEUTROPHILS NFR BLD AUTO: 67.2 % (ref 42.7–76)
NRBC BLD AUTO-RTO: 0 /100 WBC (ref 0–0.2)
NT-PROBNP SERPL-MCNC: 103.1 PG/ML (ref 0–900)
PLATELET # BLD AUTO: 203 10*3/MM3 (ref 140–450)
PMV BLD AUTO: 12.4 FL (ref 6–12)
POTASSIUM SERPL-SCNC: 4.2 MMOL/L (ref 3.5–5.2)
PROT SERPL-MCNC: 6.7 G/DL (ref 6–8.5)
QT INTERVAL: 476 MS
QT INTERVAL: 478 MS
QTC INTERVAL: 448 MS
QTC INTERVAL: 459 MS
RBC # BLD AUTO: 4.8 10*6/MM3 (ref 3.77–5.28)
SODIUM SERPL-SCNC: 139 MMOL/L (ref 136–145)
TROPONIN T SERPL-MCNC: <0.01 NG/ML (ref 0–0.03)
TROPONIN T SERPL-MCNC: <0.01 NG/ML (ref 0–0.03)
WBC NRBC COR # BLD: 6.5 10*3/MM3 (ref 3.4–10.8)
WHOLE BLOOD HOLD COAG: NORMAL
WHOLE BLOOD HOLD SPECIMEN: NORMAL

## 2022-09-30 PROCEDURE — 84484 ASSAY OF TROPONIN QUANT: CPT | Performed by: EMERGENCY MEDICINE

## 2022-09-30 PROCEDURE — 71275 CT ANGIOGRAPHY CHEST: CPT

## 2022-09-30 PROCEDURE — 83880 ASSAY OF NATRIURETIC PEPTIDE: CPT | Performed by: EMERGENCY MEDICINE

## 2022-09-30 PROCEDURE — 36415 COLL VENOUS BLD VENIPUNCTURE: CPT

## 2022-09-30 PROCEDURE — 93005 ELECTROCARDIOGRAM TRACING: CPT

## 2022-09-30 PROCEDURE — 71045 X-RAY EXAM CHEST 1 VIEW: CPT

## 2022-09-30 PROCEDURE — 85025 COMPLETE CBC W/AUTO DIFF WBC: CPT

## 2022-09-30 PROCEDURE — 93005 ELECTROCARDIOGRAM TRACING: CPT | Performed by: EMERGENCY MEDICINE

## 2022-09-30 PROCEDURE — 83690 ASSAY OF LIPASE: CPT | Performed by: EMERGENCY MEDICINE

## 2022-09-30 PROCEDURE — 99284 EMERGENCY DEPT VISIT MOD MDM: CPT

## 2022-09-30 PROCEDURE — 80053 COMPREHEN METABOLIC PANEL: CPT | Performed by: EMERGENCY MEDICINE

## 2022-09-30 PROCEDURE — 0 IOPAMIDOL PER 1 ML: Performed by: EMERGENCY MEDICINE

## 2022-09-30 RX ORDER — ASPIRIN 81 MG/1
324 TABLET, CHEWABLE ORAL ONCE
Status: DISCONTINUED | OUTPATIENT
Start: 2022-09-30 | End: 2022-09-30 | Stop reason: HOSPADM

## 2022-09-30 RX ORDER — SODIUM CHLORIDE 0.9 % (FLUSH) 0.9 %
10 SYRINGE (ML) INJECTION AS NEEDED
Status: DISCONTINUED | OUTPATIENT
Start: 2022-09-30 | End: 2022-09-30 | Stop reason: HOSPADM

## 2022-09-30 RX ORDER — FAMOTIDINE 20 MG/1
20 TABLET, FILM COATED ORAL 2 TIMES DAILY
Qty: 60 TABLET | Refills: 0 | Status: SHIPPED | OUTPATIENT
Start: 2022-09-30 | End: 2022-10-30

## 2022-09-30 RX ADMIN — IOPAMIDOL 85 ML: 755 INJECTION, SOLUTION INTRAVENOUS at 13:00

## 2022-10-07 NOTE — PROGRESS NOTES
[facility]  [facility]    Chief Complaint  Chest Pain and Establish Care    Subjective    History of Present Illness {CC  Problem List  Visit  Diagnosis   Encounters  Notes  Medications  Labs  Result Review Imaging  Media :23}     Mecca Renee is a 67 y.o. female with palpitations, GERD, venous stasis, fibromyalgia, asthma who presents today for evaluation of chest pain at the request of Dr. Merrill.    Patient presented to the ED on 9/30 with epigastric discomfort that radiated up into her neck to the top of her head. It also radiated to her back. Felt like a pressure and a numbness like feeling. Has some mild lightheadedness. No shortness of breath.  She reports she was sitting in her recliner at the time.  It lasted for about 30 minutes.  She did take an aspirin and pain improved shortly after that. Troponins and EKGS were negative.    Since the ED she has had some quick shooting pains across her chest and below her left breast.  She also has some intermittent chest pressure that occurred on the drive over here, however, seem to improve after she stopped driving with her left arm controlled with the right.  She also fell the other day when she went to lift a baby at her , so she wonders if it might be musculoskeletal.  She notes hx of intermittent chest discomfort. Denies exertional chest pain but notes exertional dyspnea..  Palpitations have been stable. Notes chronic exertional weakness/fatigue, but seems worse over the past year.    She reports a significant amount of stress recently.  She was just diagnosed with breast cancer roughly 2 weeks ago and is scheduled to begin therapy. Has lumpectomy in the morning. She also states that her  was just diagnosed with pancreatic cancer.    She is followed by Dr. Brewster for palpitations/AT and is on bisoprolol and flecainide     Cardiac risks: age, obesity    Objective     Vital Signs:   Vitals:    10/10/22  "1015 10/10/22 1017 10/10/22 1018   BP: 102/56 110/61 109/65   BP Location: Right arm Left arm Left arm   Patient Position: Sitting Standing Sitting   Cuff Size: Adult Adult Adult   Pulse: 60 65 60   Resp:   16   Temp: 97.2 °F (36.2 °C) 97.2 °F (36.2 °C) 97.2 °F (36.2 °C)   TempSrc: Temporal Temporal Temporal   SpO2: 97% 96% 96%   Weight:   89.4 kg (197 lb 3.2 oz)   Height:   167.6 cm (66\")     Body mass index is 31.83 kg/m².  Physical Exam  Vitals reviewed.   Constitutional:       Appearance: Normal appearance.   HENT:      Head: Normocephalic.   Neck:      Vascular: No carotid bruit.   Cardiovascular:      Rate and Rhythm: Regular rhythm. Bradycardia present.      Pulses: Normal pulses.      Heart sounds: Normal heart sounds, S1 normal and S2 normal. No murmur heard.  Pulmonary:      Effort: Pulmonary effort is normal. No respiratory distress.      Breath sounds: Normal breath sounds.   Chest:      Chest wall: No tenderness.   Abdominal:      General: Abdomen is flat.      Palpations: Abdomen is soft.   Musculoskeletal:      Cervical back: Neck supple.      Right lower leg: No edema.      Left lower leg: No edema.   Skin:     General: Skin is warm and dry.   Neurological:      General: No focal deficit present.      Mental Status: She is alert and oriented to person, place, and time. Mental status is at baseline.   Psychiatric:         Mood and Affect: Mood normal.         Behavior: Behavior normal.         Thought Content: Thought content normal.              Result Review  Data Reviewed:{ Labs  Result Review  Imaging  Med Tab  Media :23}   Troponin (09/30/2022 13:39)  BNP (09/30/2022 11:12)  Lipase (09/30/2022 11:12)  CBC & Differential (09/30/2022 11:12)  Troponin (09/30/2022 11:12)  Comprehensive Metabolic Panel (09/30/2022 11:12)  Lipid Panel (11/12/2021 10:43)  ECG 12 Lead (09/30/2022 11:05)  ECG 12 Lead (09/30/2022 13:38)  EKG today shows sinus bradycardia first-degree block, poor R wave " progression  Consultant notes Dr. Merrill            Assessment and Plan {CC Problem List  Visit Diagnosis  ROS  Review (Popup)  Health Maintenance  Quality  BestPractice  Medications  SmartSets  SnapShot Encounters  Media :23}   1. Chest pain, unspecified type  She has some atypical symptoms, but unclear etiology.  She notes exertional fatigue and dyspnea which could possibly be anginal equivalent.  Discussed this with her and her daughter.  Discussed risk versus benefits of further stress testing and they would like to proceed with stress test.   Patient unable to walk on treadmill secondary to physical deconditioning, arthritis.  Patient needs pet imaging secondary to known breast cancer and less radiation.   - ECG 12 Lead; Future  - Stress Test With Pet Myocardial Perfusion; Future    2. Dyspnea on exertion  Possible anginal equivalent  - Stress Test With Pet Myocardial Perfusion; Future    3. Palpitations  Stable symptoms   Continue bisoprolol      Keep scheduled appt with Dr. Brewster    Follow Up {Instructions Charge Capture  Follow-up Communications :23}   Return if symptoms worsen or fail to improve.    Patient was given instructions and counseling regarding her condition or for health maintenance advice. Please see specific information pulled into the AVS if appropriate.  Advised to call the [facility] with any questions, concerns, or worsening symptoms.

## 2022-10-10 ENCOUNTER — OFFICE VISIT (OUTPATIENT)
Dept: CARDIOLOGY | Facility: HOSPITAL | Age: 67
End: 2022-10-10

## 2022-10-10 ENCOUNTER — HOSPITAL ENCOUNTER (OUTPATIENT)
Dept: CARDIOLOGY | Facility: HOSPITAL | Age: 67
Discharge: HOME | End: 2022-10-10

## 2022-10-10 VITALS
SYSTOLIC BLOOD PRESSURE: 109 MMHG | OXYGEN SATURATION: 96 % | HEIGHT: 66 IN | DIASTOLIC BLOOD PRESSURE: 65 MMHG | BODY MASS INDEX: 31.69 KG/M2 | TEMPERATURE: 97.2 F | HEART RATE: 60 BPM | RESPIRATION RATE: 16 BRPM | WEIGHT: 197.2 LBS

## 2022-10-10 DIAGNOSIS — R00.2 PALPITATIONS: ICD-10-CM

## 2022-10-10 DIAGNOSIS — R07.9 CHEST PAIN, UNSPECIFIED TYPE: ICD-10-CM

## 2022-10-10 DIAGNOSIS — R07.9 CHEST PAIN, UNSPECIFIED TYPE: Primary | ICD-10-CM

## 2022-10-10 DIAGNOSIS — R06.09 DYSPNEA ON EXERTION: ICD-10-CM

## 2022-10-10 LAB
QT INTERVAL: 450 MS
QTC INTERVAL: 434 MS

## 2022-10-10 PROCEDURE — 93005 ELECTROCARDIOGRAM TRACING: CPT | Performed by: NURSE PRACTITIONER

## 2022-10-10 PROCEDURE — 93010 ELECTROCARDIOGRAM REPORT: CPT | Performed by: INTERNAL MEDICINE

## 2022-10-10 PROCEDURE — 99214 OFFICE O/P EST MOD 30 MIN: CPT | Performed by: NURSE PRACTITIONER

## 2022-10-10 RX ORDER — SILICONE ADHESIVE 1.5" X 3"
400 SHEET (EA) TOPICAL DAILY
COMMUNITY

## 2022-10-10 RX ORDER — VENLAFAXINE HYDROCHLORIDE 37.5 MG/1
75 CAPSULE, EXTENDED RELEASE ORAL DAILY
COMMUNITY
Start: 2022-09-22

## 2022-10-10 RX ORDER — LEVOCETIRIZINE DIHYDROCHLORIDE 5 MG/1
5 TABLET, FILM COATED ORAL AS NEEDED
COMMUNITY

## 2022-10-10 RX ORDER — RIZATRIPTAN BENZOATE 5 MG/1
5 TABLET ORAL 2 TIMES DAILY PRN
COMMUNITY

## 2022-10-11 ENCOUNTER — DOCUMENTATION (OUTPATIENT)
Dept: CARDIOLOGY | Facility: HOSPITAL | Age: 67
End: 2022-10-11

## 2022-10-11 ENCOUNTER — TELEPHONE (OUTPATIENT)
Dept: CARDIOLOGY | Facility: HOSPITAL | Age: 67
End: 2022-10-11

## 2022-10-11 NOTE — PROGRESS NOTES
Spoke with Dr. Brewster and anesthesiologist regarding patient's lumpectomy. Patient is low cardiac risk for low risk procedure and should be able to proceed without further testing. Discussed with patient and daughter yesterday in office

## 2022-10-17 ENCOUNTER — APPOINTMENT (OUTPATIENT)
Dept: CARDIOLOGY | Facility: HOSPITAL | Age: 67
End: 2022-10-17

## 2022-10-25 ENCOUNTER — HOSPITAL ENCOUNTER (OUTPATIENT)
Dept: CARDIOLOGY | Facility: HOSPITAL | Age: 67
Discharge: HOME | End: 2022-10-25
Admitting: NURSE PRACTITIONER

## 2022-10-25 VITALS
WEIGHT: 197.09 LBS | HEART RATE: 55 BPM | HEIGHT: 66 IN | SYSTOLIC BLOOD PRESSURE: 115 MMHG | DIASTOLIC BLOOD PRESSURE: 75 MMHG | BODY MASS INDEX: 31.68 KG/M2

## 2022-10-25 DIAGNOSIS — R06.09 DYSPNEA ON EXERTION: ICD-10-CM

## 2022-10-25 DIAGNOSIS — R07.9 CHEST PAIN, UNSPECIFIED TYPE: ICD-10-CM

## 2022-10-25 LAB
BH CV REST NUCLEAR ISOTOPE DOSE: 29.9 MCI
BH CV STRESS BP STAGE 1: NORMAL
BH CV STRESS BP STAGE 3: NORMAL
BH CV STRESS COMMENTS STAGE 1: NORMAL
BH CV STRESS DOSE REGADENOSON STAGE 1: 0.4
BH CV STRESS DURATION MIN STAGE 1: 1
BH CV STRESS DURATION MIN STAGE 2: 1
BH CV STRESS DURATION MIN STAGE 3: 1
BH CV STRESS DURATION MIN STAGE 4: 1
BH CV STRESS DURATION SEC STAGE 1: 0
BH CV STRESS DURATION SEC STAGE 2: 0
BH CV STRESS DURATION SEC STAGE 3: 0
BH CV STRESS DURATION SEC STAGE 4: 0
BH CV STRESS HR STAGE 1: 57
BH CV STRESS HR STAGE 2: 67
BH CV STRESS HR STAGE 3: 64
BH CV STRESS HR STAGE 4: 63
BH CV STRESS NUCLEAR ISOTOPE DOSE: 30 MCI
BH CV STRESS O2 STAGE 1: 98
BH CV STRESS O2 STAGE 2: 99
BH CV STRESS O2 STAGE 3: 100
BH CV STRESS O2 STAGE 4: 100
BH CV STRESS PROTOCOL 1: NORMAL
BH CV STRESS RECOVERY BP: NORMAL MMHG
BH CV STRESS RECOVERY HR: 61 BPM
BH CV STRESS RECOVERY O2: 99 %
BH CV STRESS STAGE 1: 1
BH CV STRESS STAGE 2: 2
BH CV STRESS STAGE 3: 3
BH CV STRESS STAGE 4: 4
LV EF NUC BP: 76 %
MAXIMAL PREDICTED HEART RATE: 154 BPM
PERCENT MAX PREDICTED HR: 43.51 %
STRESS BASELINE BP: NORMAL MMHG
STRESS BASELINE HR: 55 BPM
STRESS O2 SAT REST: 99 %
STRESS PERCENT HR: 51 %
STRESS POST ESTIMATED WORKLOAD: 1 METS
STRESS POST EXERCISE DUR MIN: 4 MIN
STRESS POST EXERCISE DUR SEC: 0 SEC
STRESS POST O2 SAT PEAK: 100 %
STRESS POST PEAK BP: NORMAL MMHG
STRESS POST PEAK HR: 67 BPM
STRESS TARGET HR: 131 BPM

## 2022-10-25 PROCEDURE — 0 RUBIDIUM CHLORIDE: Performed by: NURSE PRACTITIONER

## 2022-10-25 PROCEDURE — 78431 MYOCRD IMG PET RST&STRS CT: CPT | Performed by: INTERNAL MEDICINE

## 2022-10-25 PROCEDURE — 93017 CV STRESS TEST TRACING ONLY: CPT

## 2022-10-25 PROCEDURE — A9555 RB82 RUBIDIUM: HCPCS | Performed by: NURSE PRACTITIONER

## 2022-10-25 PROCEDURE — 78431 MYOCRD IMG PET RST&STRS CT: CPT

## 2022-10-25 PROCEDURE — 25010000002 REGADENOSON 0.4 MG/5ML SOLUTION: Performed by: NURSE PRACTITIONER

## 2022-10-25 PROCEDURE — 93018 CV STRESS TEST I&R ONLY: CPT | Performed by: INTERNAL MEDICINE

## 2022-10-25 RX ADMIN — REGADENOSON 0.4 MG: 0.08 INJECTION, SOLUTION INTRAVENOUS at 11:10

## 2022-10-25 RX ADMIN — RUBIDIUM CHLORIDE RB-82 1 DOSE: 150 INJECTION, SOLUTION INTRAVENOUS at 11:00

## 2022-10-25 RX ADMIN — RUBIDIUM CHLORIDE RB-82 1 DOSE: 150 INJECTION, SOLUTION INTRAVENOUS at 11:13

## 2022-10-25 NOTE — PROGRESS NOTES
Your stress test showed no evidence of significant heart blockage. Please let me know if you have questions or concerns

## 2022-11-16 RX ORDER — FLECAINIDE ACETATE 100 MG/1
TABLET ORAL
Qty: 180 TABLET | Refills: 1 | Status: SHIPPED | OUTPATIENT
Start: 2022-11-16 | End: 2023-03-10 | Stop reason: SDUPTHER

## 2022-12-14 ENCOUNTER — APPOINTMENT (OUTPATIENT)
Dept: GENERAL RADIOLOGY | Facility: HOSPITAL | Age: 67
End: 2022-12-14

## 2022-12-14 ENCOUNTER — HOSPITAL ENCOUNTER (EMERGENCY)
Facility: HOSPITAL | Age: 67
Discharge: HOME | End: 2022-12-14
Attending: EMERGENCY MEDICINE | Admitting: EMERGENCY MEDICINE

## 2022-12-14 ENCOUNTER — APPOINTMENT (OUTPATIENT)
Dept: CT IMAGING | Facility: HOSPITAL | Age: 67
End: 2022-12-14

## 2022-12-14 VITALS
TEMPERATURE: 98.4 F | HEART RATE: 63 BPM | OXYGEN SATURATION: 93 % | BODY MASS INDEX: 32.32 KG/M2 | WEIGHT: 194 LBS | SYSTOLIC BLOOD PRESSURE: 106 MMHG | RESPIRATION RATE: 17 BRPM | DIASTOLIC BLOOD PRESSURE: 57 MMHG | HEIGHT: 65 IN

## 2022-12-14 DIAGNOSIS — J40 BRONCHITIS: ICD-10-CM

## 2022-12-14 DIAGNOSIS — D70.9 NEUTROPENIC FEVER: Primary | ICD-10-CM

## 2022-12-14 DIAGNOSIS — R50.81 NEUTROPENIC FEVER: Primary | ICD-10-CM

## 2022-12-14 DIAGNOSIS — R10.12 LEFT UPPER QUADRANT ABDOMINAL PAIN: ICD-10-CM

## 2022-12-14 LAB
ALBUMIN SERPL-MCNC: 3.7 G/DL (ref 3.5–5.2)
ALBUMIN/GLOB SERPL: 1.4 G/DL
ALP SERPL-CCNC: 46 U/L (ref 39–117)
ALT SERPL W P-5'-P-CCNC: 26 U/L (ref 1–33)
ANION GAP SERPL CALCULATED.3IONS-SCNC: 9 MMOL/L (ref 5–15)
AST SERPL-CCNC: 18 U/L (ref 1–32)
BASOPHILS # BLD AUTO: 0 10*3/MM3 (ref 0–0.2)
BASOPHILS NFR BLD AUTO: 0 % (ref 0–1.5)
BILIRUB SERPL-MCNC: 0.6 MG/DL (ref 0–1.2)
BUN SERPL-MCNC: 18 MG/DL (ref 8–23)
BUN/CREAT SERPL: 43.9 (ref 7–25)
CALCIUM SPEC-SCNC: 8.4 MG/DL (ref 8.6–10.5)
CHLORIDE SERPL-SCNC: 103 MMOL/L (ref 98–107)
CO2 SERPL-SCNC: 26 MMOL/L (ref 22–29)
CREAT SERPL-MCNC: 0.41 MG/DL (ref 0.57–1)
D-LACTATE SERPL-SCNC: 1.7 MMOL/L (ref 0.5–2)
DEPRECATED RDW RBC AUTO: 46.6 FL (ref 37–54)
EGFRCR SERPLBLD CKD-EPI 2021: 108.7 ML/MIN/1.73
EOSINOPHIL # BLD AUTO: 0 10*3/MM3 (ref 0–0.4)
EOSINOPHIL NFR BLD AUTO: 0 % (ref 0.3–6.2)
ERYTHROCYTE [DISTWIDTH] IN BLOOD BY AUTOMATED COUNT: 14.4 % (ref 12.3–15.4)
FLUAV RNA RESP QL NAA+PROBE: NOT DETECTED
FLUBV RNA RESP QL NAA+PROBE: NOT DETECTED
GLOBULIN UR ELPH-MCNC: 2.6 GM/DL
GLUCOSE SERPL-MCNC: 122 MG/DL (ref 65–99)
HCT VFR BLD AUTO: 30.6 % (ref 34–46.6)
HGB BLD-MCNC: 10.3 G/DL (ref 12–15.9)
HOLD SPECIMEN: NORMAL
IMM GRANULOCYTES # BLD AUTO: 0 10*3/MM3 (ref 0–0.05)
IMM GRANULOCYTES NFR BLD AUTO: 0 % (ref 0–0.5)
LARGE PLATELETS: NORMAL
LIPASE SERPL-CCNC: 23 U/L (ref 13–60)
LYMPHOCYTES # BLD AUTO: 0.14 10*3/MM3 (ref 0.7–3.1)
LYMPHOCYTES NFR BLD AUTO: 51.9 % (ref 19.6–45.3)
MCH RBC QN AUTO: 30.2 PG (ref 26.6–33)
MCHC RBC AUTO-ENTMCNC: 33.7 G/DL (ref 31.5–35.7)
MCV RBC AUTO: 89.7 FL (ref 79–97)
MONOCYTES # BLD AUTO: 0.1 10*3/MM3 (ref 0.1–0.9)
MONOCYTES NFR BLD AUTO: 37 % (ref 5–12)
NEUTROPHILS NFR BLD AUTO: 0.03 10*3/MM3 (ref 1.7–7)
NEUTROPHILS NFR BLD AUTO: 11.1 % (ref 42.7–76)
NRBC BLD AUTO-RTO: 0 /100 WBC (ref 0–0.2)
NT-PROBNP SERPL-MCNC: 261.2 PG/ML (ref 0–900)
PLATELET # BLD AUTO: 128 10*3/MM3 (ref 140–450)
PMV BLD AUTO: 12.7 FL (ref 6–12)
POTASSIUM SERPL-SCNC: 3.5 MMOL/L (ref 3.5–5.2)
PROCALCITONIN SERPL-MCNC: 0.06 NG/ML (ref 0–0.25)
PROT SERPL-MCNC: 6.3 G/DL (ref 6–8.5)
QT INTERVAL: 400 MS
QT INTERVAL: 444 MS
QTC INTERVAL: 450 MS
QTC INTERVAL: 461 MS
RBC # BLD AUTO: 3.41 10*6/MM3 (ref 3.77–5.28)
RBC MORPH BLD: NORMAL
RSV RNA NPH QL NAA+NON-PROBE: NOT DETECTED
SARS-COV-2 RNA RESP QL NAA+PROBE: NOT DETECTED
SMALL PLATELETS BLD QL SMEAR: NORMAL
SODIUM SERPL-SCNC: 138 MMOL/L (ref 136–145)
TROPONIN T SERPL-MCNC: <0.01 NG/ML (ref 0–0.03)
TROPONIN T SERPL-MCNC: <0.01 NG/ML (ref 0–0.03)
WBC MORPH BLD: NORMAL
WBC NRBC COR # BLD: 0.27 10*3/MM3 (ref 3.4–10.8)
WHOLE BLOOD HOLD COAG: NORMAL
WHOLE BLOOD HOLD SPECIMEN: NORMAL

## 2022-12-14 PROCEDURE — 36415 COLL VENOUS BLD VENIPUNCTURE: CPT

## 2022-12-14 PROCEDURE — 87637 SARSCOV2&INF A&B&RSV AMP PRB: CPT | Performed by: EMERGENCY MEDICINE

## 2022-12-14 PROCEDURE — 93005 ELECTROCARDIOGRAM TRACING: CPT | Performed by: EMERGENCY MEDICINE

## 2022-12-14 PROCEDURE — 25010000002 IOPAMIDOL 61 % SOLUTION: Performed by: EMERGENCY MEDICINE

## 2022-12-14 PROCEDURE — 85025 COMPLETE CBC W/AUTO DIFF WBC: CPT | Performed by: EMERGENCY MEDICINE

## 2022-12-14 PROCEDURE — 96365 THER/PROPH/DIAG IV INF INIT: CPT

## 2022-12-14 PROCEDURE — 84484 ASSAY OF TROPONIN QUANT: CPT | Performed by: EMERGENCY MEDICINE

## 2022-12-14 PROCEDURE — 87040 BLOOD CULTURE FOR BACTERIA: CPT | Performed by: EMERGENCY MEDICINE

## 2022-12-14 PROCEDURE — 83880 ASSAY OF NATRIURETIC PEPTIDE: CPT | Performed by: EMERGENCY MEDICINE

## 2022-12-14 PROCEDURE — 83690 ASSAY OF LIPASE: CPT | Performed by: EMERGENCY MEDICINE

## 2022-12-14 PROCEDURE — 93005 ELECTROCARDIOGRAM TRACING: CPT

## 2022-12-14 PROCEDURE — 71275 CT ANGIOGRAPHY CHEST: CPT

## 2022-12-14 PROCEDURE — 80053 COMPREHEN METABOLIC PANEL: CPT | Performed by: EMERGENCY MEDICINE

## 2022-12-14 PROCEDURE — 71045 X-RAY EXAM CHEST 1 VIEW: CPT

## 2022-12-14 PROCEDURE — 85007 BL SMEAR W/DIFF WBC COUNT: CPT | Performed by: EMERGENCY MEDICINE

## 2022-12-14 PROCEDURE — 83605 ASSAY OF LACTIC ACID: CPT | Performed by: EMERGENCY MEDICINE

## 2022-12-14 PROCEDURE — 74177 CT ABD & PELVIS W/CONTRAST: CPT

## 2022-12-14 PROCEDURE — 25010000002 PIPERACILLIN SOD-TAZOBACTAM PER 1 G: Performed by: EMERGENCY MEDICINE

## 2022-12-14 PROCEDURE — 99284 EMERGENCY DEPT VISIT MOD MDM: CPT

## 2022-12-14 PROCEDURE — 96361 HYDRATE IV INFUSION ADD-ON: CPT

## 2022-12-14 PROCEDURE — 84145 PROCALCITONIN (PCT): CPT | Performed by: EMERGENCY MEDICINE

## 2022-12-14 RX ORDER — AMOXICILLIN AND CLAVULANATE POTASSIUM 875; 125 MG/1; MG/1
1 TABLET, FILM COATED ORAL 2 TIMES DAILY
Qty: 14 TABLET | Refills: 0 | Status: SHIPPED | OUTPATIENT
Start: 2022-12-14 | End: 2022-12-21

## 2022-12-14 RX ORDER — ASPIRIN 81 MG/1
324 TABLET, CHEWABLE ORAL ONCE
Status: COMPLETED | OUTPATIENT
Start: 2022-12-14 | End: 2022-12-14

## 2022-12-14 RX ORDER — SODIUM CHLORIDE 0.9 % (FLUSH) 0.9 %
10 SYRINGE (ML) INJECTION AS NEEDED
Status: DISCONTINUED | OUTPATIENT
Start: 2022-12-14 | End: 2022-12-14 | Stop reason: HOSPADM

## 2022-12-14 RX ORDER — ALBUTEROL SULFATE 90 UG/1
2 AEROSOL, METERED RESPIRATORY (INHALATION) EVERY 6 HOURS PRN
Qty: 18 G | Refills: 0 | Status: SHIPPED | OUTPATIENT
Start: 2022-12-14

## 2022-12-14 RX ORDER — GUAIFENESIN 200 MG/10ML
200 LIQUID ORAL EVERY 4 HOURS PRN
Qty: 118 ML | Refills: 0 | Status: SHIPPED | OUTPATIENT
Start: 2022-12-14

## 2022-12-14 RX ORDER — CIPROFLOXACIN 500 MG/1
500 TABLET, FILM COATED ORAL EVERY 12 HOURS
Qty: 14 TABLET | Refills: 0 | Status: SHIPPED | OUTPATIENT
Start: 2022-12-14 | End: 2022-12-21

## 2022-12-14 RX ORDER — PROMETHAZINE HYDROCHLORIDE, PHENYLEPHRINE HYDROCHLORIDE AND CODEINE PHOSPHATE 6.25; 5; 1 MG/5ML; MG/5ML; MG/5ML
5 SOLUTION ORAL EVERY 6 HOURS PRN
Qty: 118 ML | Refills: 0 | Status: SHIPPED | OUTPATIENT
Start: 2022-12-14

## 2022-12-14 RX ADMIN — IOPAMIDOL 85 ML: 612 INJECTION, SOLUTION INTRAVENOUS at 13:08

## 2022-12-14 RX ADMIN — ASPIRIN 324 MG: 81 TABLET, CHEWABLE ORAL at 09:51

## 2022-12-14 RX ADMIN — TAZOBACTAM SODIUM AND PIPERACILLIN SODIUM 4.5 G: 500; 4 INJECTION, SOLUTION INTRAVENOUS at 11:55

## 2022-12-14 RX ADMIN — SODIUM CHLORIDE, POTASSIUM CHLORIDE, SODIUM LACTATE AND CALCIUM CHLORIDE 1710 ML: 600; 310; 30; 20 INJECTION, SOLUTION INTRAVENOUS at 11:38

## 2022-12-14 NOTE — DISCHARGE INSTRUCTIONS
Stop the prednisone.  Be sure you drink plenty of fluids and return to the emergency department if worse in any way.

## 2022-12-14 NOTE — ED PROVIDER NOTES
Subjective   History of Present Illness  This is a pleasant 67-year-old female accompanied by her daughter and niece.  She has a history of breast cancer and is currently getting chemotherapy at [facility].  She lives in [facility].  She has been doing well with her chemo though after first round she developed neutropenia and required Neulasta which she had a reaction to and did not get any chemo with her second round which began on the fifth.  She has not had her radiation yet.    She comes the emergency room today with cough congestion chills chest pain and left upper quadrant pain.  This is been present for about 3 days and getting worse over left upper quadrant pain has been present for a few weeks.  She never had pain like this in the past.  She has no history of coronary disease.    She was seen at urgent treatment center in [place] on Saturday and started on steroids and penicillin without relief of her symptoms.  She has not had a measured fever at home.  P.o. intake has been decreased.  Her throat is sore but she can swallow.  Bowel movements and urination been normal for her.  She has had a slight runny nose.  Her cough is occasionally productive of white to gray sputum.    Last Friday she had a go in for IV fluids at her chemotherapy.    She has had her care for breast cancer at [facility] but her daughter works here and she prefers to be seen here        All other systems are reviewed and are negative except as noted above.        Review of Systems   All other systems reviewed and are negative.      Past Medical History:   Diagnosis Date   • Asthma    • Cancer (HCC)     breast   • Chronic pain disorder    • COVID-19     positive   • Diverticulosis    • Fibromyalgia    • GERD (gastroesophageal reflux disease)    • Migraine    • Neck pain    • Osteoarthritis    • Osteoporosis    • Palpitations     palpitations with near syncope   • Rheumatoid arthritis (HCC)        Allergies   Allergen  Reactions   • Sulfa Antibiotics Hives and Itching   • Shellfish-Derived Products Other (See Comments)       Past Surgical History:   Procedure Laterality Date   • CHOLECYSTECTOMY     • EYE SURGERY      eye lid lift   • EYE SURGERY  12/2019   • FOOT SURGERY Right    • SHOULDER SURGERY Right        Family History   Problem Relation Age of Onset   • Diabetes Mother    • Cancer Mother         kidney   • Heart disease Mother         CHF   • Hypertension Mother    • Kidney disease Mother    • Cancer Father         bladder/kidney   • Cancer Brother         lung   • Cancer Brother         lung   • Heart disease Maternal Grandmother    • Stroke Maternal Grandmother    • Arthritis Maternal Grandmother    • Asthma Paternal Grandmother        Social History     Socioeconomic History   • Marital status:    Tobacco Use   • Smoking status: Never   • Smokeless tobacco: Never   Vaping Use   • Vaping Use: Never used   Substance and Sexual Activity   • Alcohol use: No   • Drug use: No   • Sexual activity: Defer           Objective   Physical Exam  Vitals and nursing note reviewed.   Constitutional:       Comments: This is a pleasant 67-year-old alert and oriented GCS 15.  Systolic blood pressure noted to be 90.   HENT:      Head: Normocephalic and atraumatic.      Right Ear: External ear normal.      Left Ear: External ear normal.      Nose: Nose normal.      Mouth/Throat:      Comments: Red posteriorly without exudate.  Eyes:      Extraocular Movements: Extraocular movements intact.      Conjunctiva/sclera: Conjunctivae normal.      Pupils: Pupils are equal, round, and reactive to light.   Neck:      Vascular: No carotid bruit.   Cardiovascular:      Rate and Rhythm: Normal rate and regular rhythm.      Pulses: Normal pulses.      Heart sounds: Normal heart sounds.   Pulmonary:      Effort: Pulmonary effort is normal.      Breath sounds: Normal breath sounds.   Abdominal:      Comments: BMI 32 soft with mild left upper  quadrant tenderness without organomegaly, masses, or guarding no rash or mass.  Left flank is little tender   Musculoskeletal:         General: No swelling or tenderness. Normal range of motion.      Cervical back: Normal range of motion and neck supple. No rigidity or tenderness.   Lymphadenopathy:      Cervical: No cervical adenopathy.   Skin:     General: Skin is warm and dry.      Capillary Refill: Capillary refill takes less than 2 seconds.   Neurological:      Mental Status: She is alert.      Comments: Face symmetric, voice strong, tongue midline.  Vision, hearing, and speech are preserved.  No focal weakness.         Procedures           ED Course           Recent Results (from the past 24 hour(s))   ECG 12 Lead ED Triage Standing Order; Chest Pain    Collection Time: 12/14/22  9:35 AM   Result Value Ref Range    QT Interval 400 ms    QTC Interval 450 ms   Troponin    Collection Time: 12/14/22  9:46 AM    Specimen: Blood   Result Value Ref Range    Troponin T <0.010 0.000 - 0.030 ng/mL   Comprehensive Metabolic Panel    Collection Time: 12/14/22  9:46 AM    Specimen: Blood   Result Value Ref Range    Glucose 122 (H) 65 - 99 mg/dL    BUN 18 8 - 23 mg/dL    Creatinine 0.41 (L) 0.57 - 1.00 mg/dL    Sodium 138 136 - 145 mmol/L    Potassium 3.5 3.5 - 5.2 mmol/L    Chloride 103 98 - 107 mmol/L    CO2 26.0 22.0 - 29.0 mmol/L    Calcium 8.4 (L) 8.6 - 10.5 mg/dL    Total Protein 6.3 6.0 - 8.5 g/dL    Albumin 3.70 3.50 - 5.20 g/dL    ALT (SGPT) 26 1 - 33 U/L    AST (SGOT) 18 1 - 32 U/L    Alkaline Phosphatase 46 39 - 117 U/L    Total Bilirubin 0.6 0.0 - 1.2 mg/dL    Globulin 2.6 gm/dL    A/G Ratio 1.4 g/dL    BUN/Creatinine Ratio 43.9 (H) 7.0 - 25.0    Anion Gap 9.0 5.0 - 15.0 mmol/L    eGFR 108.7 >60.0 mL/min/1.73   Lipase    Collection Time: 12/14/22  9:46 AM    Specimen: Blood   Result Value Ref Range    Lipase 23 13 - 60 U/L   BNP    Collection Time: 12/14/22  9:46 AM    Specimen: Blood   Result Value Ref Range     proBNP 261.2 0.0 - 900.0 pg/mL   Green Top (Gel)    Collection Time: 12/14/22  9:46 AM   Result Value Ref Range    Extra Tube Hold for add-ons.    Lavender Top    Collection Time: 12/14/22  9:46 AM   Result Value Ref Range    Extra Tube hold for add-on    Gold Top - SST    Collection Time: 12/14/22  9:46 AM   Result Value Ref Range    Extra Tube Hold for add-ons.    Gray Top    Collection Time: 12/14/22  9:46 AM   Result Value Ref Range    Extra Tube Hold for add-ons.    Light Blue Top    Collection Time: 12/14/22  9:46 AM   Result Value Ref Range    Extra Tube Hold for add-ons.    CBC Auto Differential    Collection Time: 12/14/22  9:46 AM    Specimen: Blood   Result Value Ref Range    WBC 0.27 (C) 3.40 - 10.80 10*3/mm3    RBC 3.41 (L) 3.77 - 5.28 10*6/mm3    Hemoglobin 10.3 (L) 12.0 - 15.9 g/dL    Hematocrit 30.6 (L) 34.0 - 46.6 %    MCV 89.7 79.0 - 97.0 fL    MCH 30.2 26.6 - 33.0 pg    MCHC 33.7 31.5 - 35.7 g/dL    RDW 14.4 12.3 - 15.4 %    RDW-SD 46.6 37.0 - 54.0 fl    MPV 12.7 (H) 6.0 - 12.0 fL    Platelets 128 (L) 140 - 450 10*3/mm3    Neutrophil % 11.1 (L) 42.7 - 76.0 %    Lymphocyte % 51.9 (H) 19.6 - 45.3 %    Monocyte % 37.0 (H) 5.0 - 12.0 %    Eosinophil % 0.0 (L) 0.3 - 6.2 %    Basophil % 0.0 0.0 - 1.5 %    Immature Grans % 0.0 0.0 - 0.5 %    Neutrophils, Absolute 0.03 (L) 1.70 - 7.00 10*3/mm3    Lymphocytes, Absolute 0.14 (L) 0.70 - 3.10 10*3/mm3    Monocytes, Absolute 0.10 0.10 - 0.90 10*3/mm3    Eosinophils, Absolute 0.00 0.00 - 0.40 10*3/mm3    Basophils, Absolute 0.00 0.00 - 0.20 10*3/mm3    Immature Grans, Absolute 0.00 0.00 - 0.05 10*3/mm3    nRBC 0.0 0.0 - 0.2 /100 WBC   Lactic Acid, Plasma    Collection Time: 12/14/22  9:46 AM    Specimen: Blood   Result Value Ref Range    Lactate 1.7 0.5 - 2.0 mmol/L   Scan Slide    Collection Time: 12/14/22  9:46 AM    Specimen: Blood   Result Value Ref Range    RBC Morphology Normal Normal    WBC Morphology Normal Normal    Platelet Estimate Decreased Normal     Large Platelets Slight/1+ None Seen   Troponin    Collection Time: 12/14/22  9:49 AM    Specimen: Blood   Result Value Ref Range    Troponin T <0.010 0.000 - 0.030 ng/mL   Procalcitonin    Collection Time: 12/14/22  9:49 AM    Specimen: Blood   Result Value Ref Range    Procalcitonin 0.06 0.00 - 0.25 ng/mL   COVID-19, FLU A/B, RSV PCR - Swab, Nasopharynx    Collection Time: 12/14/22 10:30 AM    Specimen: Nasopharynx; Swab   Result Value Ref Range    COVID19 Not Detected Not Detected - Ref. Range    Influenza A PCR Not Detected Not Detected    Influenza B PCR Not Detected Not Detected    RSV, PCR Not Detected Not Detected   ECG 12 Lead ED Triage Standing Order; Chest Pain    Collection Time: 12/14/22 12:05 PM   Result Value Ref Range    QT Interval 444 ms    QTC Interval 461 ms     Note: In addition to lab results from this visit, the labs listed above may include labs taken at another facility or during a different encounter within the last 24 hours. Please correlate lab times with ED admission and discharge times for further clarification of the services performed during this visit.    CT Angiogram Chest   Final Result       1. No evidence of pulmonary embolus   2. Peripheral opacity medially at the right middle lobe likely   representing atelectasis. Developing pneumonia not excluded.                           CT ABDOMEN PELVIS W CONTRAST-           FINDINGS:       Abdomen/Pelvis:           Organs: There is a 1 cm cyst anteriorly within the left hepatic lobe. A   few scattered small low-attenuation lesions measuring less than 3 mm   noted inferiorly within the right hepatic lobe.       1.2 cm lesion noted on the left hepatic lobe compatible with this is   similar to the prior study. Hepatic vasculature is patent.       Mild dilation of the common duct is noted compatible with   postcholecystectomy ectasia.       Small low-attenuation lesion within the left kidney too small to   characterize likely cysts.        The right kidney, pancreas, spleen and adrenal glands are unremarkable       GI/Bowel: The stomach is unremarkable in appearance. A few air-fluid   levels noted within nondistended loops of small bowel are nonspecific.   No evidence of obstruction noted.       The no suspicious mesenteric adenopathy or fluid collection noted. The   appendix is not definitively identified no inflammatory changes are   noted at the base of the cecum. Colon demonstrates no acute abnormality.           Pelvis: The uterus, left ovary are unremarkable. The right ovary is not   well visualized. Urinary bladder demonstrates no acute abnormality.   Probable vesicocele noted. No suspicious adenopathy appreciated       Peritoneum/Retroperitoneum: The aorta is normal in caliber. Incidental   note of a circumaortic left renal vein. There is no suspicious   retroperitoneal adenopathy               Bones/Soft Tissues: Multilevel degenerative changes noted of the spine.   No destructive bone lesion.       IMPRESSION:       1. Some fluid-filled loops of small bowel with occasional air-fluid   levels without evidence of distention. Findings may represent a   nonspecific enteritis.   2. A few low-attenuation lesions within the liver which are too small to   characterize and likely represent cyst. The patient with a history of   malignancy follow-up should be determined based upon the patient's   oncologic protocol   3. No acute intra-abdominal or intrapelvic abnormality otherwise noted           This report was finalized on 12/14/2022 1:46 PM by Alexi Nieves.          CT Abdomen Pelvis With Contrast   Final Result       1. No evidence of pulmonary embolus   2. Peripheral opacity medially at the right middle lobe likely   representing atelectasis. Developing pneumonia not excluded.                           CT ABDOMEN PELVIS W CONTRAST-           FINDINGS:       Abdomen/Pelvis:           Organs: There is a 1 cm cyst anteriorly within the left  hepatic lobe. A   few scattered small low-attenuation lesions measuring less than 3 mm   noted inferiorly within the right hepatic lobe.       1.2 cm lesion noted on the left hepatic lobe compatible with this is   similar to the prior study. Hepatic vasculature is patent.       Mild dilation of the common duct is noted compatible with   postcholecystectomy ectasia.       Small low-attenuation lesion within the left kidney too small to   characterize likely cysts.       The right kidney, pancreas, spleen and adrenal glands are unremarkable       GI/Bowel: The stomach is unremarkable in appearance. A few air-fluid   levels noted within nondistended loops of small bowel are nonspecific.   No evidence of obstruction noted.       The no suspicious mesenteric adenopathy or fluid collection noted. The   appendix is not definitively identified no inflammatory changes are   noted at the base of the cecum. Colon demonstrates no acute abnormality.           Pelvis: The uterus, left ovary are unremarkable. The right ovary is not   well visualized. Urinary bladder demonstrates no acute abnormality.   Probable vesicocele noted. No suspicious adenopathy appreciated       Peritoneum/Retroperitoneum: The aorta is normal in caliber. Incidental   note of a circumaortic left renal vein. There is no suspicious   retroperitoneal adenopathy               Bones/Soft Tissues: Multilevel degenerative changes noted of the spine.   No destructive bone lesion.       IMPRESSION:       1. Some fluid-filled loops of small bowel with occasional air-fluid   levels without evidence of distention. Findings may represent a   nonspecific enteritis.   2. A few low-attenuation lesions within the liver which are too small to   characterize and likely represent cyst. The patient with a history of   malignancy follow-up should be determined based upon the patient's   oncologic protocol   3. No acute intra-abdominal or intrapelvic abnormality otherwise noted            This report was finalized on 12/14/2022 1:46 PM by Alexi Nieves.          XR Chest 1 View   Final Result   Stable chest, no active disease       This report was finalized on 12/14/2022 10:54 AM by Stephen Dean MD.            Vitals:    12/14/22 1158 12/14/22 1232 12/14/22 1332 12/14/22 1401   BP: 105/56 103/57 113/60 106/57   BP Location:       Patient Position:       Pulse: 64 62 63 63   Resp:       Temp:       TempSrc:       SpO2: 95% 95% 96% 93%   Weight:       Height:         Medications   sodium chloride 0.9 % flush 10 mL (has no administration in time range)   aspirin chewable tablet 324 mg (324 mg Oral Given 12/14/22 0951)   lactated ringers bolus 1,710 mL (0 mL Intravenous Stopped 12/14/22 1417)   piperacillin-tazobactam (ZOSYN) 4.5 g in iso-osmotic dextrose 100 mL IVPB (premix) (0 g Intravenous Stopped 12/14/22 1235)   iopamidol (ISOVUE-300) 61 % injection 85 mL (85 mL Intravenous Given 12/14/22 1308)     ECG/EMG Results (last 24 hours)     Procedure Component Value Units Date/Time    ECG 12 Lead ED Triage Standing Order; Chest Pain [[id number]] Collected: 12/14/22 0935     Updated: 12/14/22 1028     QT Interval 400 ms      QTC Interval 450 ms     Narrative:      Test Reason : ED Triage Standing Order~  Blood Pressure :   */*   mmHG  Vent. Rate :  76 BPM     Atrial Rate :  76 BPM     P-R Int : 200 ms          QRS Dur :  98 ms      QT Int : 400 ms       P-R-T Axes :  38 -34  62 degrees     QTc Int : 450 ms    Normal sinus rhythm  Left axis deviation  Incomplete right bundle branch block  Abnormal ECG  When compared with ECG of 10-OCT-2022 11:06,  MD interval has decreased  Incomplete right bundle branch block is now present  Borderline criteria for Anterior infarct are no longer present    Referred By: ERMD           Confirmed By:         ECG 12 Lead ED Triage Standing Order; Chest Pain   Final Result   Test Reason : ED Triage Standing Order~   Blood Pressure :   */*   mmHG   Vent. Rate :  65 BPM      Atrial Rate :  65 BPM      P-R Int : 224 ms          QRS Dur :  92 ms       QT Int : 444 ms       P-R-T Axes :  52 -27  37 degrees      QTc Int : 461 ms      Sinus rhythm with 1st degree AV block   Incomplete right bundle branch block   Borderline ECG   When compared with ECG of 14-DEC-2022 09:35, (Unconfirmed)   No significant change was found   qtc 416 msec by my calculation   Confirmed by SHAWN MAN MD ([id number]) on 12/14/2022 2:47:38 PM      Referred By: EDMD           Confirmed By: SHAWN MAN MD      ECG 12 Lead ED Triage Standing Order; Chest Pain   Final Result   Test Reason : ED Triage Standing Order~   Blood Pressure :   */*   mmHG   Vent. Rate :  76 BPM     Atrial Rate :  76 BPM      P-R Int : 200 ms          QRS Dur :  98 ms       QT Int : 400 ms       P-R-T Axes :  38 -34  62 degrees      QTc Int : 450 ms      Normal sinus rhythm   Left axis deviation   Incomplete right bundle branch block   Abnormal ECG   When compared with ECG of 10-OCT-2022 11:06,   ND interval has decreased   Incomplete right bundle branch block is now present   Borderline criteria for Anterior infarct are no longer present   Confirmed by SHAWN MAN MD ([id number]) on 12/14/2022 2:47:56 PM      Referred By: ERMD           Confirmed By: SHAWN MAN MD                                [facility] reviewed by Shawn Man MD       MDM  Number of Diagnoses or Management Options  Bronchitis  Left upper quadrant abdominal pain  Neutropenic fever (HCC)  Diagnosis management comments:       I have reviewed all available studies at the bedside with the patient and her family.  Patient absolute neutrophil count is quite low.  However her other labs are fairly unremarkable and CT scan of her chest and abdomen and pelvis with IV contrast are bland.    Blood cultures were drawn and she was treated with 1 dose of IV Zosyn.  We discussed risk and benefit of hospital admission she would like to go home and I think this is reasonable.  Patient  had a few low blood pressures here but felt dehydrated and was much better after IV fluids and her blood pressure was in a more normal range.    I will treat her with a combination of Augmentin and ciprofloxacin.  Encourage follow-up within the next 48 hours with her oncologist to follow her with this she may benefit from a white blood cell stimulant.  I written her medication to help with her cough as well.  She promises to return to the emergency department if worse in any way.    In regards to her abdominal discomfort she has a mild enteritis very nonspecific on her CT of the abdomen pelvis is of unclear etiology knee but need to be followed.    All are agreeable with the plan       Amount and/or Complexity of Data Reviewed  Clinical lab tests: reviewed  Tests in the radiology section of CPT®: reviewed  Tests in the medicine section of CPT®: reviewed        Final diagnoses:   Neutropenic fever (HCC)   Left upper quadrant abdominal pain   Bronchitis       ED Disposition  ED Disposition     ED Disposition   Discharge    Condition   Stable    Comment   --             Mg Sarkar MD  [id number]  [facility] [id number] [phone]    Schedule an appointment as soon as possible for a visit       Alejandra Thomas MD  [address]  [address]  [facility] [id number] [phone]    Schedule an appointment as soon as possible for a visit   follow up in 2 days         Medication List      New Prescriptions    albuterol sulfate  (90 Base) MCG/ACT inhaler  Commonly known as: PROVENTIL HFA;VENTOLIN HFA;PROAIR HFA  Inhale 2 puffs Every 6 (Six) Hours As Needed for Wheezing or Shortness of Air.     amoxicillin-clavulanate 875-125 MG per tablet  Commonly known as: AUGMENTIN  Take 1 tablet by mouth 2 (Two) Times a Day for 7 days.     ciprofloxacin 500 MG tablet  Commonly known as: CIPRO  Take 1 tablet by mouth Every 12 (Twelve) Hours for 7 days.     Promethazine-Phenyleph-Codeine 6.25-5-10 MG/5ML syrup  syrup  Take 5 mL by mouth Every 6 (Six) Hours As Needed (cough).        Changed    * guaiFENesin 600 MG 12 hr tablet  Commonly known as: MUCINEX  What changed: Another medication with the same name was added. Make sure you understand how and when to take each.     * guaifenesin 100 MG/5ML liquid  Commonly known as: ROBITUSSIN  Take 10 mL by mouth Every 4 (Four) Hours As Needed for Cough.  What changed: You were already taking a medication with the same name, and this prescription was added. Make sure you understand how and when to take each.         * This list has 2 medication(s) that are the same as other medications prescribed for you. Read the directions carefully, and ask your doctor or other care provider to review them with you.               Where to Get Your Medications      These medications were sent to [facility] - [address] - [phone]  - [phone] [place] [id number]    Phone: [phone]   · albuterol sulfate  (90 Base) MCG/ACT inhaler  · amoxicillin-clavulanate 875-125 MG per tablet  · ciprofloxacin 500 MG tablet  · guaifenesin 100 MG/5ML liquid  · Promethazine-Phenyleph-Codeine 6.25-5-10 MG/5ML syrup syrup          Shawn Man MD  12/14/22 2408

## 2022-12-19 LAB
BACTERIA SPEC AEROBE CULT: NORMAL
BACTERIA SPEC AEROBE CULT: NORMAL

## 2023-01-22 ENCOUNTER — HOSPITAL ENCOUNTER (EMERGENCY)
Facility: HOSPITAL | Age: 68
Discharge: HOME | End: 2023-01-22
Attending: EMERGENCY MEDICINE | Admitting: EMERGENCY MEDICINE
Payer: MEDICARE

## 2023-01-22 ENCOUNTER — APPOINTMENT (OUTPATIENT)
Dept: CT IMAGING | Facility: HOSPITAL | Age: 68
End: 2023-01-22
Payer: MEDICARE

## 2023-01-22 VITALS
RESPIRATION RATE: 20 BRPM | OXYGEN SATURATION: 100 % | HEART RATE: 70 BPM | TEMPERATURE: 98.8 F | BODY MASS INDEX: 32.32 KG/M2 | WEIGHT: 194 LBS | SYSTOLIC BLOOD PRESSURE: 100 MMHG | DIASTOLIC BLOOD PRESSURE: 55 MMHG | HEIGHT: 65 IN

## 2023-01-22 DIAGNOSIS — D70.1 CHEMOTHERAPY-INDUCED NEUTROPENIA: ICD-10-CM

## 2023-01-22 DIAGNOSIS — R10.84 DIFFUSE ABDOMINAL PAIN: Primary | ICD-10-CM

## 2023-01-22 DIAGNOSIS — T45.1X5A CHEMOTHERAPY-INDUCED NEUTROPENIA: ICD-10-CM

## 2023-01-22 DIAGNOSIS — Z92.21 HISTORY OF CHEMOTHERAPY: ICD-10-CM

## 2023-01-22 DIAGNOSIS — E86.0 MILD DEHYDRATION: ICD-10-CM

## 2023-01-22 LAB
ALBUMIN SERPL-MCNC: 3.9 G/DL (ref 3.5–5.2)
ALBUMIN/GLOB SERPL: 1.8 G/DL
ALP SERPL-CCNC: 61 U/L (ref 39–117)
ALT SERPL W P-5'-P-CCNC: 15 U/L (ref 1–33)
ANION GAP SERPL CALCULATED.3IONS-SCNC: 8 MMOL/L (ref 5–15)
AST SERPL-CCNC: 23 U/L (ref 1–32)
BACTERIA UR QL AUTO: ABNORMAL /HPF
BASOPHILS # BLD AUTO: 0.01 10*3/MM3 (ref 0–0.2)
BASOPHILS NFR BLD AUTO: 0.7 % (ref 0–1.5)
BILIRUB SERPL-MCNC: 0.7 MG/DL (ref 0–1.2)
BILIRUB UR QL STRIP: NEGATIVE
BUN SERPL-MCNC: 14 MG/DL (ref 8–23)
BUN/CREAT SERPL: 29.8 (ref 7–25)
CALCIUM SPEC-SCNC: 8.2 MG/DL (ref 8.6–10.5)
CHLORIDE SERPL-SCNC: 104 MMOL/L (ref 98–107)
CLARITY UR: CLEAR
CO2 SERPL-SCNC: 25 MMOL/L (ref 22–29)
COLOR UR: YELLOW
CREAT SERPL-MCNC: 0.47 MG/DL (ref 0.57–1)
D-LACTATE SERPL-SCNC: 1.1 MMOL/L (ref 0.5–2)
DEPRECATED RDW RBC AUTO: 58.4 FL (ref 37–54)
EGFRCR SERPLBLD CKD-EPI 2021: 105.1 ML/MIN/1.73
EOSINOPHIL # BLD AUTO: 0.02 10*3/MM3 (ref 0–0.4)
EOSINOPHIL NFR BLD AUTO: 1.5 % (ref 0.3–6.2)
ERYTHROCYTE [DISTWIDTH] IN BLOOD BY AUTOMATED COUNT: 17.2 % (ref 12.3–15.4)
GLOBULIN UR ELPH-MCNC: 2.2 GM/DL
GLUCOSE SERPL-MCNC: 116 MG/DL (ref 65–99)
GLUCOSE UR STRIP-MCNC: NEGATIVE MG/DL
HCT VFR BLD AUTO: 35.6 % (ref 34–46.6)
HGB BLD-MCNC: 11.3 G/DL (ref 12–15.9)
HGB UR QL STRIP.AUTO: NEGATIVE
HOLD SPECIMEN: NORMAL
HYALINE CASTS UR QL AUTO: ABNORMAL /LPF
IMM GRANULOCYTES # BLD AUTO: 0.01 10*3/MM3 (ref 0–0.05)
IMM GRANULOCYTES NFR BLD AUTO: 0.7 % (ref 0–0.5)
KETONES UR QL STRIP: NEGATIVE
LEUKOCYTE ESTERASE UR QL STRIP.AUTO: ABNORMAL
LIPASE SERPL-CCNC: 23 U/L (ref 13–60)
LYMPHOCYTES # BLD AUTO: 0.25 10*3/MM3 (ref 0.7–3.1)
LYMPHOCYTES NFR BLD AUTO: 18.5 % (ref 19.6–45.3)
MCH RBC QN AUTO: 29.7 PG (ref 26.6–33)
MCHC RBC AUTO-ENTMCNC: 31.7 G/DL (ref 31.5–35.7)
MCV RBC AUTO: 93.7 FL (ref 79–97)
MONOCYTES # BLD AUTO: 0.03 10*3/MM3 (ref 0.1–0.9)
MONOCYTES NFR BLD AUTO: 2.2 % (ref 5–12)
MUCOUS THREADS URNS QL MICRO: ABNORMAL /HPF
NEUTROPHILS NFR BLD AUTO: 1.03 10*3/MM3 (ref 1.7–7)
NEUTROPHILS NFR BLD AUTO: 76.4 % (ref 42.7–76)
NITRITE UR QL STRIP: NEGATIVE
NRBC BLD AUTO-RTO: 0 /100 WBC (ref 0–0.2)
PH UR STRIP.AUTO: 7 [PH] (ref 5–8)
PLATELET # BLD AUTO: 143 10*3/MM3 (ref 140–450)
PMV BLD AUTO: 11.5 FL (ref 6–12)
POTASSIUM SERPL-SCNC: 4.3 MMOL/L (ref 3.5–5.2)
PROT SERPL-MCNC: 6.1 G/DL (ref 6–8.5)
PROT UR QL STRIP: NEGATIVE
RBC # BLD AUTO: 3.8 10*6/MM3 (ref 3.77–5.28)
RBC # UR STRIP: ABNORMAL /HPF
REF LAB TEST METHOD: ABNORMAL
SODIUM SERPL-SCNC: 137 MMOL/L (ref 136–145)
SP GR UR STRIP: 1.02 (ref 1–1.03)
SQUAMOUS #/AREA URNS HPF: ABNORMAL /HPF
UROBILINOGEN UR QL STRIP: ABNORMAL
WBC # UR STRIP: ABNORMAL /HPF
WBC NRBC COR # BLD: 1.35 10*3/MM3 (ref 3.4–10.8)
WHOLE BLOOD HOLD COAG: NORMAL
WHOLE BLOOD HOLD SPECIMEN: NORMAL

## 2023-01-22 PROCEDURE — 36415 COLL VENOUS BLD VENIPUNCTURE: CPT

## 2023-01-22 PROCEDURE — 96374 THER/PROPH/DIAG INJ IV PUSH: CPT

## 2023-01-22 PROCEDURE — 85025 COMPLETE CBC W/AUTO DIFF WBC: CPT | Performed by: EMERGENCY MEDICINE

## 2023-01-22 PROCEDURE — 25010000002 ONDANSETRON PER 1 MG: Performed by: EMERGENCY MEDICINE

## 2023-01-22 PROCEDURE — 74176 CT ABD & PELVIS W/O CONTRAST: CPT

## 2023-01-22 PROCEDURE — 80053 COMPREHEN METABOLIC PANEL: CPT | Performed by: EMERGENCY MEDICINE

## 2023-01-22 PROCEDURE — 99284 EMERGENCY DEPT VISIT MOD MDM: CPT

## 2023-01-22 PROCEDURE — 83605 ASSAY OF LACTIC ACID: CPT | Performed by: EMERGENCY MEDICINE

## 2023-01-22 PROCEDURE — 83690 ASSAY OF LIPASE: CPT | Performed by: EMERGENCY MEDICINE

## 2023-01-22 PROCEDURE — 81001 URINALYSIS AUTO W/SCOPE: CPT | Performed by: EMERGENCY MEDICINE

## 2023-01-22 RX ORDER — ONDANSETRON 2 MG/ML
4 INJECTION INTRAMUSCULAR; INTRAVENOUS ONCE
Status: COMPLETED | OUTPATIENT
Start: 2023-01-22 | End: 2023-01-22

## 2023-01-22 RX ORDER — DICYCLOMINE HYDROCHLORIDE 10 MG/1
20 CAPSULE ORAL ONCE
Status: COMPLETED | OUTPATIENT
Start: 2023-01-22 | End: 2023-01-22

## 2023-01-22 RX ORDER — SUCRALFATE 1 G/1
1 TABLET ORAL 4 TIMES DAILY
Qty: 40 TABLET | Refills: 0 | Status: SHIPPED | OUTPATIENT
Start: 2023-01-22 | End: 2023-03-10

## 2023-01-22 RX ORDER — SODIUM CHLORIDE 0.9 % (FLUSH) 0.9 %
10 SYRINGE (ML) INJECTION AS NEEDED
Status: DISCONTINUED | OUTPATIENT
Start: 2023-01-22 | End: 2023-01-22 | Stop reason: HOSPADM

## 2023-01-22 RX ADMIN — ONDANSETRON 4 MG: 2 INJECTION INTRAMUSCULAR; INTRAVENOUS at 13:58

## 2023-01-22 RX ADMIN — SODIUM CHLORIDE 1000 ML: 9 INJECTION, SOLUTION INTRAVENOUS at 18:25

## 2023-01-22 RX ADMIN — DICYCLOMINE HYDROCHLORIDE 20 MG: 10 CAPSULE ORAL at 18:25

## 2023-01-22 RX ADMIN — SODIUM CHLORIDE 1000 ML: 9 INJECTION, SOLUTION INTRAVENOUS at 13:57

## 2023-01-22 NOTE — DISCHARGE INSTRUCTIONS
I have added Carafate/sucralfate to your medication regimen.    Please review the medications you are supposed to be taking according to prior physician recommendations. I have not changed your home medications during this visit. If your discharge instructions indicate that I have changed your home medications, this is not the case, and you should disregard. If you have any questions about the medication you should be taking at home, please call your physician.

## 2023-01-22 NOTE — ED PROVIDER NOTES
EMERGENCY DEPARTMENT ENCOUNTER    Pt Name: Mecca Renee  MRN: [id number]  Pt :   1955  Room Number:  15/15  Date of encounter:  2023  PCP: Mg Sarkar MD  ED Provider: Tucker Seo MD    Historian: Patient      HPI:  Chief Complaint: Nausea, abdominal pain, loose stools        Context: Mecca Renee is a 67 y.o. female who presents to the ED c/o symptoms which were preceded by chemotherapy session 5 days ago.  This is her fourth round of chemotherapy secondary to breast cancer which she believes has not become metastatic.  2 days ago she began with nausea and abdominal pain.  The night before she had taken milk of magnesia and stool softeners for what she felt was constipation coming on.  Now over the last 2 days she has had multiple bowel movements noting roughly 20 bowel movements in the last 24 hours.  She has seen no blood in her stools.  She has been nauseated and retched on occasion but has not actually vomited.  She reports a crampy abdominal pain which waxes and wanes and is currently not severe.  She denies fevers, chills, dysuria, hematuria.  Her only previous abdominal surgery is cholecystectomy.        PAST MEDICAL HISTORY  Past Medical History:   Diagnosis Date   • Asthma    • Cancer (HCC)     breast   • Chronic pain disorder    • COVID-19     positive   • Diverticulosis    • Fibromyalgia    • GERD (gastroesophageal reflux disease)    • Migraine    • Neck pain    • Osteoarthritis    • Osteoporosis    • Palpitations     palpitations with near syncope   • Rheumatoid arthritis (HCC)          PAST SURGICAL HISTORY  Past Surgical History:   Procedure Laterality Date   • CHOLECYSTECTOMY     • EYE SURGERY      eye lid lift   • EYE SURGERY  2019   • FOOT SURGERY Right    • SHOULDER SURGERY Right          FAMILY HISTORY  Family History   Problem Relation Age of Onset   • Diabetes Mother    • Cancer Mother         kidney   • Heart disease Mother         CHF   •  Hypertension Mother    • Kidney disease Mother    • Cancer Father         bladder/kidney   • Cancer Brother         lung   • Cancer Brother         lung   • Heart disease Maternal Grandmother    • Stroke Maternal Grandmother    • Arthritis Maternal Grandmother    • Asthma Paternal Grandmother          SOCIAL HISTORY  Social History     Socioeconomic History   • Marital status:    Tobacco Use   • Smoking status: Never   • Smokeless tobacco: Never   Vaping Use   • Vaping Use: Never used   Substance and Sexual Activity   • Alcohol use: No   • Drug use: No   • Sexual activity: Defer         ALLERGIES  Sulfa antibiotics and Shellfish-derived products        REVIEW OF SYSTEMS  Review of Systems       All systems reviewed and negative except for those discussed in HPI.       PHYSICAL EXAM    I have reviewed the triage vital signs and nursing notes.    ED Triage Vitals [01/22/23 1338]   Temp Heart Rate Resp BP SpO2   98.8 °F (37.1 °C) 84 20 104/66 97 %      Temp src Heart Rate Source Patient Position BP Location FiO2 (%)   Oral Monitor Sitting Left arm --       Physical Exam  GENERAL:   Appears uncomfortable but nontoxic..   HENT: Nares patent.  EYES: No scleral icterus.  CV: Regular rhythm, regular rate.  No murmurs gallops rubs  RESPIRATORY: Normal effort.  No audible wheezes, rales or rhonchi.  Clear to auscultation  ABDOMEN: Soft, diffusely tender to palpation with concentration of tenderness in the right upper quadrant more so than the remainder of the abdomen.  Bowel sounds are present but hypoactive.  MUSCULOSKELETAL: No deformities.   NEURO: Alert, moves all extremities, follows commands.  SKIN: Warm, dry, no rash visualized.      LAB RESULTS  Recent Results (from the past 24 hour(s))   Comprehensive Metabolic Panel    Collection Time: 01/22/23  1:55 PM    Specimen: Blood   Result Value Ref Range    Glucose 116 (H) 65 - 99 mg/dL    BUN 14 8 - 23 mg/dL    Creatinine 0.47 (L) 0.57 - 1.00 mg/dL    Sodium 137 136  - 145 mmol/L    Potassium 4.3 3.5 - 5.2 mmol/L    Chloride 104 98 - 107 mmol/L    CO2 25.0 22.0 - 29.0 mmol/L    Calcium 8.2 (L) 8.6 - 10.5 mg/dL    Total Protein 6.1 6.0 - 8.5 g/dL    Albumin 3.9 3.5 - 5.2 g/dL    ALT (SGPT) 15 1 - 33 U/L    AST (SGOT) 23 1 - 32 U/L    Alkaline Phosphatase 61 39 - 117 U/L    Total Bilirubin 0.7 0.0 - 1.2 mg/dL    Globulin 2.2 gm/dL    A/G Ratio 1.8 g/dL    BUN/Creatinine Ratio 29.8 (H) 7.0 - 25.0    Anion Gap 8.0 5.0 - 15.0 mmol/L    eGFR 105.1 >60.0 mL/min/1.73   Lipase    Collection Time: 01/22/23  1:55 PM    Specimen: Blood   Result Value Ref Range    Lipase 23 13 - 60 U/L   Lactic Acid, Plasma    Collection Time: 01/22/23  1:55 PM    Specimen: Blood   Result Value Ref Range    Lactate 1.1 0.5 - 2.0 mmol/L   Green Top (Gel)    Collection Time: 01/22/23  1:55 PM   Result Value Ref Range    Extra Tube Hold for add-ons.    Lavender Top    Collection Time: 01/22/23  1:55 PM   Result Value Ref Range    Extra Tube hold for add-on    Gold Top - SST    Collection Time: 01/22/23  1:55 PM   Result Value Ref Range    Extra Tube Hold for add-ons.    Gray Top    Collection Time: 01/22/23  1:55 PM   Result Value Ref Range    Extra Tube Hold for add-ons.    Light Blue Top    Collection Time: 01/22/23  1:55 PM   Result Value Ref Range    Extra Tube Hold for add-ons.    CBC Auto Differential    Collection Time: 01/22/23  1:55 PM    Specimen: Blood   Result Value Ref Range    WBC 1.35 (C) 3.40 - 10.80 10*3/mm3    RBC 3.80 3.77 - 5.28 10*6/mm3    Hemoglobin 11.3 (L) 12.0 - 15.9 g/dL    Hematocrit 35.6 34.0 - 46.6 %    MCV 93.7 79.0 - 97.0 fL    MCH 29.7 26.6 - 33.0 pg    MCHC 31.7 31.5 - 35.7 g/dL    RDW 17.2 (H) 12.3 - 15.4 %    RDW-SD 58.4 (H) 37.0 - 54.0 fl    MPV 11.5 6.0 - 12.0 fL    Platelets 143 140 - 450 10*3/mm3    Neutrophil % 76.4 (H) 42.7 - 76.0 %    Lymphocyte % 18.5 (L) 19.6 - 45.3 %    Monocyte % 2.2 (L) 5.0 - 12.0 %    Eosinophil % 1.5 0.3 - 6.2 %    Basophil % 0.7 0.0 - 1.5 %     Immature Grans % 0.7 (H) 0.0 - 0.5 %    Neutrophils, Absolute 1.03 (L) 1.70 - 7.00 10*3/mm3    Lymphocytes, Absolute 0.25 (L) 0.70 - 3.10 10*3/mm3    Monocytes, Absolute 0.03 (L) 0.10 - 0.90 10*3/mm3    Eosinophils, Absolute 0.02 0.00 - 0.40 10*3/mm3    Basophils, Absolute 0.01 0.00 - 0.20 10*3/mm3    Immature Grans, Absolute 0.01 0.00 - 0.05 10*3/mm3    nRBC 0.0 0.0 - 0.2 /100 WBC   Urinalysis With Microscopic If Indicated (No Culture) - Urine, Clean Catch    Collection Time: 01/22/23  2:50 PM    Specimen: Urine, Clean Catch   Result Value Ref Range    Color, UA Yellow Yellow, Straw    Appearance, UA Clear Clear    pH, UA 7.0 5.0 - 8.0    Specific Gravity, UA 1.016 1.001 - 1.030    Glucose, UA Negative Negative    Ketones, UA Negative Negative    Bilirubin, UA Negative Negative    Blood, UA Negative Negative    Protein, UA Negative Negative    Leuk Esterase, UA Trace (A) Negative    Nitrite, UA Negative Negative    Urobilinogen, UA 0.2 E.U./dL 0.2 - 1.0 E.U./dL   Urinalysis, Microscopic Only - Urine, Clean Catch    Collection Time: 01/22/23  2:50 PM    Specimen: Urine, Clean Catch   Result Value Ref Range    RBC, UA 0-2 None Seen, 0-2 /HPF    WBC, UA 6-12 (A) None Seen, 0-2 /HPF    Bacteria, UA None Seen None Seen, Trace /HPF    Squamous Epithelial Cells, UA 3-6 (A) None Seen, 0-2 /HPF    Hyaline Casts, UA 7-12 0 - 6 /LPF    Mucus, UA Small/1+ (A) None Seen, Trace /HPF    Methodology Manual Light Microscopy        If labs were ordered, I independently reviewed the results and considered them in treating the patient.        RADIOLOGY  CT Abdomen Pelvis Without Contrast    Result Date: 1/22/2023  CT ABDOMEN PELVIS WO CONTRAST Date of Exam: 1/22/2023 3:09 PM EST Indication: diffuse abd pain, s/p chemo for brst CA. Comparison: None available. Technique: Axial CT images were obtained of the abdomen and pelvis without the administration of contrast. Reconstructed coronal and sagittal images were also obtained. Automated  exposure control and iterative construction methods were used. FINDINGS: The lung bases are clear without evidence for focal consolidation or significant pleural effusion. A stable cyst is noted in the liver. The liver, spleen, and pancreas are otherwise unremarkable without contrast. The patient is status post prior cholecystectomy. The bilateral adrenal glands are symmetric and unremarkable without contrast. No definitive nephrolithiasis is seen bilaterally. There is no hydronephrosis or hydroureter. There is no evidence for obstructive uropathy. No stones are seen in the bladder. The bilateral kidneys are otherwise unremarkable without contrast. There is no bowel dilatation or obstruction. A normal-appearing appendix is observed. There is no evidence for acute appendicitis. No significant free fluid is observed. No abnormal fluid collections are identified. No significant lymphadenopathy is seen  throughout the abdomen or pelvis. The bladder is partially decompressed. No acute osseous abnormalities are observed.     1.No evidence for significant nephrolithiasis. There is no evidence for obstructive uropathy. 2.No evidence for acute abnormality throughout the abdomen or pelvis on this noncontrast exam. Electronically Signed: Margarito Tilley  1/22/2023 3:46 PM EST  Workstation ID: [id number]      I ordered and independently reviewed the above noted radiographic studies.      I viewed images of CT scan abdomen pelvis which showed no acute abnormalities per my independent interpretation.    See radiologist's dictation for official interpretation.        PROCEDURES    Procedures    No orders to display       MEDICATIONS GIVEN IN ER    Medications   sodium chloride 0.9 % flush 10 mL (has no administration in time range)   sodium chloride 0.9 % bolus 1,000 mL (0 mL Intravenous Stopped 1/22/23 1440)   ondansetron (ZOFRAN) injection 4 mg (4 mg Intravenous Given 1/22/23 1358)   dicyclomine (BENTYL) capsule 20 mg (20 mg Oral  Given 1/22/23 1825)   sodium chloride 0.9 % bolus 1,000 mL (1,000 mL Intravenous New Bag 1/22/23 1825)         MEDICAL DECISION MAKING, PROGRESS, and CONSULTS    All labs have been independently reviewed by me.  All radiology studies have been reviewed by me and the radiologist dictating the report.  All EKG's have been independently viewed and interpreted by me.      Discussion below represents my analysis of pertinent findings related to patient's condition, differential diagnosis, treatment plan and final disposition.      Differential diagnosis:    Partial small bowel obstruction versus viral gastroenteritis versus chemotherapy side effect, etc.      Additional sources:      - External (non-ED) record review: Epic records reviewed.    - Chronic or social conditions impacting care: Breast cancer    - Shared decision making: Patient is in full agreement with current plans for evaluation with laboratory as well as CT scan of the abdomen and pelvis.      Orders placed during this visit:  Orders Placed This Encounter   Procedures   • CT Abdomen Pelvis Without Contrast   • Fromberg Draw   • Comprehensive Metabolic Panel   • Lipase   • Urinalysis With Microscopic If Indicated (No Culture) - Urine, Clean Catch   • Lactic Acid, Plasma   • CBC Auto Differential   • Urinalysis, Microscopic Only - Urine, Clean Catch   • Insert Peripheral IV   • CBC & Differential   • Green Top (Gel)   • Lavender Top   • Gold Top - SST   • Gray Top   • Light Blue Top         Additional orders considered but not ordered:  MRI of the abdomen.    ED Course:    Consultants:      ED Course as of 01/22/23 1858   Sun Jan 22, 2023   1413 WBC(!!): 1.35 [MS]   1414 Neutrophil Rel %(!): 76.4  Absolute neutrophil count is currently roughly 1000.  Records review shows that 1 month ago the patient's absolute neutrophil count was much lower. [MS]   1855 Patient is feeling improved.  She was still feeling somewhat dehydrated after a liter of normal saline  and therefore we are giving her a second liter.  I spoke with her in detail about findings and recommendations and she is quite comfortable with outpatient follow-up.  We have discussed all of her laboratories in detail as well as her CT scan in detail. [MS]      ED Course User Index  [MS] Tucker Seo MD                  AS OF 18:58 EST VITALS:    BP - 103/56  HR - 69  TEMP - 98.8 °F (37.1 °C) (Oral)  O2 SATS - 100%                  DIAGNOSIS  Final diagnoses:   Diffuse abdominal pain   Mild dehydration   History of chemotherapy   Chemotherapy-induced neutropenia (HCC)         DISPOSITION  DISCHARGE    Patient discharged in stable condition.    Reviewed implications of results, diagnosis, meds, responsibility to follow up, warning signs and symptoms of possible worsening, potential complications and reasons to return to ER.    Patient/Family voiced understanding of above instructions.    Discussed plan for discharge, as there is no emergent indication for admission.  Pt/family is agreeable and understands need for follow up and possible repeat testing.  Pt/family is aware that discharge does not mean that nothing is wrong but that it indicates no emergency is currently present that requires admission and they must continue care with follow-up as given below or with a physician of their choice.     FOLLOW-UP  Mg Sarkar MD  [address] [address]  [phone]      NEXT AVAILABLE APPOINTMENT - RECHECK OF CONDITION    [facility] Emergency Department  [address] [facility] [address] [phone]    IF YOU HAVE ANY CONCERN OF WORSENING CONDITION         Medication List      New Prescriptions    sucralfate 1 g tablet  Commonly known as: CARAFATE  Take 1 tablet by mouth 4 (Four) Times a Day. Dissolve in small amount of liquid for 15 minutes prior to consuming.           Where to Get Your Medications      These medications were sent to [facility] -  [place], [place] - [address] - [phone]  - [phone] [facility] [address]    Phone: [phone]   · sucralfate 1 g tablet             Please note that portions of this document were completed with voice recognition software.      Tucker Seo MD  01/22/23 2054

## 2023-03-10 ENCOUNTER — OFFICE VISIT (OUTPATIENT)
Dept: CARDIOLOGY | Facility: CLINIC | Age: 68
End: 2023-03-10
Payer: MEDICARE

## 2023-03-10 VITALS
OXYGEN SATURATION: 95 % | HEART RATE: 69 BPM | BODY MASS INDEX: 31.82 KG/M2 | SYSTOLIC BLOOD PRESSURE: 88 MMHG | WEIGHT: 191 LBS | HEIGHT: 65 IN | DIASTOLIC BLOOD PRESSURE: 50 MMHG

## 2023-03-10 DIAGNOSIS — R00.2 PALPITATIONS: ICD-10-CM

## 2023-03-10 DIAGNOSIS — I95.89 CHRONIC HYPOTENSION: Primary | ICD-10-CM

## 2023-03-10 PROCEDURE — 93000 ELECTROCARDIOGRAM COMPLETE: CPT | Performed by: INTERNAL MEDICINE

## 2023-03-10 PROCEDURE — 99213 OFFICE O/P EST LOW 20 MIN: CPT | Performed by: INTERNAL MEDICINE

## 2023-03-10 RX ORDER — FLECAINIDE ACETATE 100 MG/1
50 TABLET ORAL 2 TIMES DAILY
Qty: 180 TABLET | Refills: 3 | Status: SHIPPED | OUTPATIENT
Start: 2023-03-10

## 2023-03-10 RX ORDER — FUROSEMIDE 40 MG/1
TABLET ORAL
COMMUNITY
Start: 2023-03-07

## 2023-03-10 RX ORDER — POTASSIUM CHLORIDE 750 MG/1
10 CAPSULE, EXTENDED RELEASE ORAL
COMMUNITY
Start: 2023-02-09 | End: 2024-02-05

## 2023-03-10 RX ORDER — ONDANSETRON HYDROCHLORIDE 8 MG/1
TABLET, FILM COATED ORAL AS NEEDED
COMMUNITY
Start: 2023-01-10

## 2023-03-10 RX ORDER — ANASTROZOLE 1 MG/1
1 TABLET ORAL DAILY
COMMUNITY
Start: 2023-03-07

## 2023-03-10 RX ORDER — BISOPROLOL FUMARATE 5 MG/1
2.5 TABLET, FILM COATED ORAL 2 TIMES DAILY
Qty: 30 TABLET | Refills: 6 | Status: SHIPPED | OUTPATIENT
Start: 2023-03-10

## 2023-03-10 RX ORDER — TIZANIDINE HYDROCHLORIDE 2 MG/1
2 CAPSULE, GELATIN COATED ORAL AS NEEDED
COMMUNITY

## 2023-03-10 NOTE — PROGRESS NOTES
[facility] Cardiology  Office Progress Note  Mecca Renee  1955  [address] [place] [place] [address]       Visit Date: 03/10/23    PCP: Mg Sarkar MD  [address]  [place] [address]    IDENTIFICATION: A 67 y.o. female   from [place].    PROBLEM LIST:   1. Palpitations with near syncope:  1. Echocardiogram, 09/13/2013: LVEF (55% to 60%), abnormal LV diastolic filling consistent with impaired relaxation, trace MR, mild TR with an RVSP of 31.  2. Event recorder, September through October 2013: Low frequency PACs with occasional short runs of nonsustained atrial tachycardia.   2. CP  1. 6/15 Lexiscan wnl  2. 2/21 2D echo: LVEF =60%.  Trace MR.  Trace TR.  3. 10/22 Anh PET scan within normal limits EF 76 no coronary calcium  3. GERD.  4. Rheumatoid arthritis.   5. bilat carpal tunnel  6. Covid 11/20  7. Diverticulosis  8. Chronic lo back pain  9. DDD  10. Surgical history:  1. Right shoulder surgery.  2. Right foot surgery.   3. Left mastectomy 10/22      CC: No chief complaint on file.      Allergies  Allergies   Allergen Reactions   • Sulfa Antibiotics Hives and Itching   • Shellfish-Derived Products Other (See Comments)   • Elemental Sulfur Rash       Current Medications    Current Outpatient Medications:   •  albuterol sulfate  (90 Base) MCG/ACT inhaler, Inhale 2 puffs Every 6 (Six) Hours As Needed for Wheezing or Shortness of Air., Disp: 18 g, Rfl: 0  •  anastrozole (ARIMIDEX) 1 MG tablet, Take 1 tablet by mouth Daily., Disp: , Rfl:   •  Biotin 5000 MCG capsule, Take 5,000 mcg by mouth Daily., Disp: , Rfl:   •  bisoprolol (ZEBeta) 5 MG tablet, Take 0.5 tablets by mouth 2 (Two) Times a Day., Disp: 30 tablet, Rfl: 6  •  Coenzyme Q10 (COQ-10) 100 MG capsule, Take 3 capsules by mouth Daily., Disp: , Rfl:   •  COMBIVENT RESPIMAT  MCG/ACT inhaler, Inhale 1 puff As Needed., Disp: , Rfl: 3  •  denosumab (PROLIA) 60 MG/ML solution prefilled  syringe syringe, Inject 1 mL under the skin into the appropriate area as directed Every 6 (Six) Months., Disp: , Rfl:   •  diclofenac (VOLTAREN) 1 % gel gel, Apply 4 g topically As Needed., Disp: , Rfl:   •  Dietary Management Product (Rheumate) capsule, Take 1 capsule by mouth Daily., Disp: 90 capsule, Rfl: 4  •  etodolac (LODINE) 400 MG tablet, Take 1 tablet by mouth 2 (Two) Times a Day., Disp: , Rfl:   •  flecainide (TAMBOCOR) 100 MG tablet, Take 0.5 tablets by mouth 2 (Two) Times a Day., Disp: 180 tablet, Rfl: 3  •  fluticasone (FLONASE) 50 MCG/ACT nasal spray, 2 sprays into the nostril(s) as directed by provider Daily As Needed., Disp: , Rfl:   •  Folic Acid (FOLATE PO), Take  by mouth Daily., Disp: , Rfl:   •  furosemide (LASIX) 40 MG tablet, TAKE 1 TABLET (40 MG TOTAL) BY MOUTH DAILY., Disp: , Rfl:   •  guaiFENesin (MUCINEX) 600 MG 12 hr tablet, Take 1 tablet by mouth As Needed., Disp: , Rfl:   •  guaifenesin (ROBITUSSIN) 100 MG/5ML liquid, Take 10 mL by mouth Every 4 (Four) Hours As Needed for Cough., Disp: 118 mL, Rfl: 0  •  levocetirizine (XYZAL) 5 MG tablet, Take 1 tablet by mouth As Needed., Disp: , Rfl:   •  methocarbamol (ROBAXIN) 750 MG tablet, Take 1 tablet by mouth 2 (Two) Times a Day., Disp: , Rfl:   •  pantoprazole (PROTONIX) 20 MG EC tablet, Take 1 tablet by mouth Daily., Disp: , Rfl:   •  potassium chloride (MICRO-K) 10 MEQ CR capsule, Take 1 capsule by mouth., Disp: , Rfl:   •  pregabalin (LYRICA) 150 MG capsule, 1 capsule 2 (Two) Times a Day., Disp: , Rfl:   •  Promethazine-Phenyleph-Codeine 6.25-5-10 MG/5ML syrup syrup, Take 5 mL by mouth Every 6 (Six) Hours As Needed (cough). (Patient taking differently: Take 5 mL by mouth As Needed (cough).), Disp: 118 mL, Rfl: 0  •  rizatriptan (MAXALT) 5 MG tablet, Take 1 tablet by mouth 2 (Two) Times a Day As Needed for Migraine. May repeat in 2 hours if needed, Disp: , Rfl:   •  S-Adenosylmethionine (DORIAN-e) 400 MG tablet, Take 400 mg by mouth Daily.,  "Disp: , Rfl:   •  TiZANidine (Zanaflex) 2 MG capsule, Take 1 capsule by mouth As Needed for Muscle Spasms., Disp: , Rfl:   •  traMADol (ULTRAM) 50 MG tablet, Take 1 tablet by mouth Every 6 (Six) Hours As Needed for Moderate Pain., Disp: , Rfl:   •  venlafaxine XR (EFFEXOR-XR) 37.5 MG 24 hr capsule, Take 2 capsules by mouth Daily., Disp: , Rfl:   •  Adalimumab (Humira Pen) 40 MG/0.4ML Pen-injector Kit, Inject 40 mg under the skin into the appropriate area as directed., Disp: , Rfl:   •  aspirin 81 MG EC tablet, Take 1 tablet by mouth Daily., Disp: , Rfl:   •  ondansetron (ZOFRAN) 8 MG tablet, As Needed., Disp: , Rfl:       History of Present Illness   Mecca Renee is a 67 y.o. year old female here for follow up.  She is going through mastectomy on the left in October and chemotherapy.  She is less than 6 weeks out.  She has had some swelling with such requiring diuretic  She also notes with chemo she was having occasional pulsatile tinnitus  Pt denies any chest pain, dyspnea, dyspnea on exertion, orthopnea, PND, palpitations,  or claudication.  Fell at a hairdresser injuring her ribs this summer and was in the hospital at [place] for 2 days. She continues to have some residual pain from such. No overt palpitations or cardiac issues in the interim      OBJECTIVE:  Vitals:    03/10/23 1156   BP: (!) 88/50   BP Location: Right arm   Patient Position: Sitting   Pulse: 69   SpO2: 95%   Weight: 86.6 kg (191 lb)   Height: 165.1 cm (65\")     Body mass index is 31.78 kg/m².    Constitutional:       Appearance: Healthy appearance. Not in distress.   Neck:      Vascular: No JVR. JVD normal.   Pulmonary:      Effort: Pulmonary effort is normal.      Breath sounds: Normal breath sounds. No wheezing. No rhonchi. No rales.   Chest:      Chest wall: Not tender to palpatation.   Cardiovascular:      PMI at left midclavicular line. Normal rate. Regular rhythm. Normal S1. Normal S2.      Murmurs: There is no murmur.      No " gallop. No click. No rub.   Pulses:     Intact distal pulses.   Edema:     Peripheral edema present.  Abdominal:      General: Bowel sounds are normal.      Palpations: Abdomen is soft.      Tenderness: There is no abdominal tenderness.   Musculoskeletal: Normal range of motion.         General: No tenderness. Skin:     General: Skin is warm and dry.   Neurological:      General: No focal deficit present.      Mental Status: Alert and oriented to person, place and time.         Diagnostic Data:    ECG 12 Lead    Date/Time: 3/10/2023 12:12 PM  Performed by: William Brewster MD  Authorized by: William Brewster MD   Comparison: compared with previous ECG from 10/10/2022  Similar to previous ECG  Rhythm: sinus rhythm  BPM: 67    Clinical impression: non-specific ECG              ASSESSMENT:   Diagnosis Plan   1. Chronic hypotension  ECG 12 Lead      2. Palpitations            PLAN:  Fatigue hypotension -consistent with chemotherapy-induced anemia.  She has completed chemotherapy we will continue observation as needed Lasix with 2 pounds in 24-hour weight gain    Palpitations suppressed with cardioselective beta-blockade and flecainide              William Brewster MD, FACC

## 2023-03-10 NOTE — PROGRESS NOTES
[facility]  Office Progress Note  Mecca Renee  1955  [address] [place] [address]       Visit Date: 03/10/23    PCP: Mg Sarkar MD  [address]  [place] [address]    IDENTIFICATION: A 67 y.o. female   from [place].       PROBLEM LIST:   1. Palpitations with near syncope:  1. Echocardiogram, 09/13/2013: LVEF (55% to 60%), abnormal LV diastolic filling consistent with impaired relaxation, trace MR, mild TR with an RVSP of 31.  2. Event recorder, September through October 2013: Low frequency PACs with occasional short runs of nonsustained atrial tachycardia.   2. CP  1. 6/15 Lexiscan wnl  2. 2/21 2D echo: LVEF =60%.  Trace MR.  Trace TR.  3. 10/22 stress: Anh PET scan within normal limits, EF hyperdynamic, no coronary calcifications  3. GERD.  4. Rheumatoid arthritis.   5. bilat carpal tunnel  6. Covid 11/20  7. Diverticulosis  8. Chronic lo back pain  9. DDD  10. Surgical history:  1. Right shoulder surgery.  2. Right foot surgery.         CC: No chief complaint on file.      Allergies  Allergies   Allergen Reactions   • Sulfa Antibiotics Hives and Itching   • Shellfish-Derived Products Other (See Comments)   • Elemental Sulfur Rash       Current Medications    Current Outpatient Medications:   •  albuterol sulfate  (90 Base) MCG/ACT inhaler, Inhale 2 puffs Every 6 (Six) Hours As Needed for Wheezing or Shortness of Air., Disp: 18 g, Rfl: 0  •  anastrozole (ARIMIDEX) 1 MG tablet, Take 1 tablet by mouth Daily., Disp: , Rfl:   •  Biotin 5000 MCG capsule, Take 5,000 mcg by mouth Daily., Disp: , Rfl:   •  bisoprolol (ZEBeta) 5 MG tablet, Take 0.5 tablets by mouth 2 (Two) Times a Day., Disp: 30 tablet, Rfl: 6  •  Coenzyme Q10 (COQ-10) 100 MG capsule, Take 3 capsules by mouth Daily., Disp: , Rfl:   •  COMBIVENT RESPIMAT  MCG/ACT inhaler, Inhale 1 puff As Needed., Disp: , Rfl: 3  •  denosumab (PROLIA) 60 MG/ML solution  prefilled syringe syringe, Inject 1 mL under the skin into the appropriate area as directed Every 6 (Six) Months., Disp: , Rfl:   •  diclofenac (VOLTAREN) 1 % gel gel, Apply 4 g topically As Needed., Disp: , Rfl:   •  Dietary Management Product (Rheumate) capsule, Take 1 capsule by mouth Daily., Disp: 90 capsule, Rfl: 4  •  etodolac (LODINE) 400 MG tablet, Take 1 tablet by mouth 2 (Two) Times a Day., Disp: , Rfl:   •  flecainide (TAMBOCOR) 100 MG tablet, Take 0.5 tablets by mouth 2 (Two) Times a Day., Disp: 180 tablet, Rfl: 3  •  fluticasone (FLONASE) 50 MCG/ACT nasal spray, 2 sprays into the nostril(s) as directed by provider Daily As Needed., Disp: , Rfl:   •  Folic Acid (FOLATE PO), Take  by mouth Daily., Disp: , Rfl:   •  furosemide (LASIX) 40 MG tablet, TAKE 1 TABLET (40 MG TOTAL) BY MOUTH DAILY., Disp: , Rfl:   •  guaiFENesin (MUCINEX) 600 MG 12 hr tablet, Take 1 tablet by mouth As Needed., Disp: , Rfl:   •  guaifenesin (ROBITUSSIN) 100 MG/5ML liquid, Take 10 mL by mouth Every 4 (Four) Hours As Needed for Cough., Disp: 118 mL, Rfl: 0  •  levocetirizine (XYZAL) 5 MG tablet, Take 1 tablet by mouth As Needed., Disp: , Rfl:   •  methocarbamol (ROBAXIN) 750 MG tablet, Take 1 tablet by mouth 2 (Two) Times a Day., Disp: , Rfl:   •  pantoprazole (PROTONIX) 20 MG EC tablet, Take 1 tablet by mouth Daily., Disp: , Rfl:   •  potassium chloride (MICRO-K) 10 MEQ CR capsule, Take 1 capsule by mouth., Disp: , Rfl:   •  pregabalin (LYRICA) 150 MG capsule, 1 capsule 2 (Two) Times a Day., Disp: , Rfl:   •  Promethazine-Phenyleph-Codeine 6.25-5-10 MG/5ML syrup syrup, Take 5 mL by mouth Every 6 (Six) Hours As Needed (cough). (Patient taking differently: Take 5 mL by mouth As Needed (cough).), Disp: 118 mL, Rfl: 0  •  rizatriptan (MAXALT) 5 MG tablet, Take 1 tablet by mouth 2 (Two) Times a Day As Needed for Migraine. May repeat in 2 hours if needed, Disp: , Rfl:   •  S-Adenosylmethionine (DORIAN-e) 400 MG tablet, Take 400 mg by mouth  "Daily., Disp: , Rfl:   •  TiZANidine (Zanaflex) 2 MG capsule, Take 1 capsule by mouth As Needed for Muscle Spasms., Disp: , Rfl:   •  traMADol (ULTRAM) 50 MG tablet, Take 1 tablet by mouth Every 6 (Six) Hours As Needed for Moderate Pain., Disp: , Rfl:   •  venlafaxine XR (EFFEXOR-XR) 37.5 MG 24 hr capsule, Take 2 capsules by mouth Daily., Disp: , Rfl:   •  Adalimumab (Humira Pen) 40 MG/0.4ML Pen-injector Kit, Inject 40 mg under the skin into the appropriate area as directed., Disp: , Rfl:   •  aspirin 81 MG EC tablet, Take 1 tablet by mouth Daily., Disp: , Rfl:   •  ondansetron (ZOFRAN) 8 MG tablet, As Needed., Disp: , Rfl:       History of Present Illness   Mecca Renee is a 67 y.o. year old female here for follow up.  Patient is status postmastectomy and finished chemotherapy 6 weeks prior.  She has had some lower extremity swelling with concomitant anemia  Pt denies any chest pain, dyspnea, dyspnea on exertion, orthopnea, PND, palpitations, lower extremity edema, or claudication.      OBJECTIVE:  Vitals:    03/10/23 1156   BP: (!) 88/50   BP Location: Right arm   Patient Position: Sitting   Pulse: 69   SpO2: 95%   Weight: 86.6 kg (191 lb)   Height: 165.1 cm (65\")     Body mass index is 31.78 kg/m².    Constitutional:       Appearance: Healthy appearance. Not in distress.   Neck:      Vascular: No JVR. JVD normal.   Pulmonary:      Effort: Pulmonary effort is normal.      Breath sounds: Normal breath sounds. No wheezing. No rhonchi. No rales.   Chest:      Chest wall: Not tender to palpatation.   Cardiovascular:      PMI at left midclavicular line. Normal rate. Regular rhythm. Normal S1. Normal S2.      Murmurs: There is no murmur.      No gallop. No click. No rub.   Pulses:     Intact distal pulses.   Edema:     Peripheral edema present.  Abdominal:      General: Bowel sounds are normal.      Palpations: Abdomen is soft.      Tenderness: There is no abdominal tenderness.   Musculoskeletal: Normal range " of motion.         General: No tenderness. Skin:     General: Skin is warm and dry.   Neurological:      General: No focal deficit present.      Mental Status: Alert and oriented to person, place and time.         Diagnostic Data:    ECG 12 Lead    Date/Time: 3/10/2023 1:03 PM  Performed by: William Brewster MD  Authorized by: William Brewster MD   Comparison: compared with previous ECG from 12/14/2022  Similar to previous ECG  Rhythm: sinus rhythm  BPM: 67    Clinical impression: normal ECG            ASSESSMENT:   Diagnosis Plan   1. Chronic hypotension  ECG 12 Lead      2. Palpitations            PLAN:  Chronic hypotension with concomitant anemia would use Lasix only with 2 pound 24-hour weight gain    Palpitations suppressed with flecainide, beta-blockade with septal EKG continue current          William Brewster MD, [facility]

## 2023-05-07 ENCOUNTER — APPOINTMENT (OUTPATIENT)
Dept: GENERAL RADIOLOGY | Facility: HOSPITAL | Age: 68
End: 2023-05-07
Payer: MEDICARE

## 2023-05-07 ENCOUNTER — HOSPITAL ENCOUNTER (INPATIENT)
Facility: HOSPITAL | Age: 68
LOS: 2 days | Discharge: HOME | End: 2023-05-09
Attending: EMERGENCY MEDICINE | Admitting: FAMILY MEDICINE
Payer: MEDICARE

## 2023-05-07 DIAGNOSIS — I95.9 HYPOTENSION, UNSPECIFIED HYPOTENSION TYPE: ICD-10-CM

## 2023-05-07 DIAGNOSIS — N39.0 URINARY TRACT INFECTION WITHOUT HEMATURIA, SITE UNSPECIFIED: Primary | ICD-10-CM

## 2023-05-07 DIAGNOSIS — N12 PYELONEPHRITIS: ICD-10-CM

## 2023-05-07 PROBLEM — A41.9 SEPSIS: Status: ACTIVE | Noted: 2023-05-07

## 2023-05-07 PROBLEM — C50.919 BREAST CANCER: Status: ACTIVE | Noted: 2023-05-07

## 2023-05-07 LAB
ALBUMIN SERPL-MCNC: 3.2 G/DL (ref 3.5–5.2)
ALBUMIN/GLOB SERPL: 1.1 G/DL
ALP SERPL-CCNC: 97 U/L (ref 39–117)
ALT SERPL W P-5'-P-CCNC: 16 U/L (ref 1–33)
ANION GAP SERPL CALCULATED.3IONS-SCNC: 9 MMOL/L (ref 5–15)
AST SERPL-CCNC: 16 U/L (ref 1–32)
BACTERIA UR QL AUTO: ABNORMAL /HPF
BASOPHILS # BLD AUTO: 0.01 10*3/MM3 (ref 0–0.2)
BASOPHILS NFR BLD AUTO: 0.1 % (ref 0–1.5)
BILIRUB SERPL-MCNC: 0.6 MG/DL (ref 0–1.2)
BILIRUB UR QL STRIP: NEGATIVE
BUN SERPL-MCNC: 17 MG/DL (ref 8–23)
BUN/CREAT SERPL: 21.5 (ref 7–25)
CALCIUM SPEC-SCNC: 8 MG/DL (ref 8.6–10.5)
CHLORIDE SERPL-SCNC: 103 MMOL/L (ref 98–107)
CLARITY UR: CLEAR
CO2 SERPL-SCNC: 22 MMOL/L (ref 22–29)
COLOR UR: YELLOW
CREAT SERPL-MCNC: 0.79 MG/DL (ref 0.57–1)
D-LACTATE SERPL-SCNC: 0.8 MMOL/L (ref 0.5–2)
DEPRECATED RDW RBC AUTO: 48 FL (ref 37–54)
EGFRCR SERPLBLD CKD-EPI 2021: 82.6 ML/MIN/1.73
EOSINOPHIL # BLD AUTO: 0.03 10*3/MM3 (ref 0–0.4)
EOSINOPHIL NFR BLD AUTO: 0.2 % (ref 0.3–6.2)
ERYTHROCYTE [DISTWIDTH] IN BLOOD BY AUTOMATED COUNT: 15.2 % (ref 12.3–15.4)
GLOBULIN UR ELPH-MCNC: 3 GM/DL
GLUCOSE SERPL-MCNC: 128 MG/DL (ref 65–99)
GLUCOSE UR STRIP-MCNC: NEGATIVE MG/DL
HCT VFR BLD AUTO: 33.6 % (ref 34–46.6)
HGB BLD-MCNC: 11.1 G/DL (ref 12–15.9)
HGB UR QL STRIP.AUTO: NEGATIVE
HYALINE CASTS UR QL AUTO: ABNORMAL /LPF
IMM GRANULOCYTES # BLD AUTO: 0.06 10*3/MM3 (ref 0–0.05)
IMM GRANULOCYTES NFR BLD AUTO: 0.5 % (ref 0–0.5)
KETONES UR QL STRIP: NEGATIVE
LEUKOCYTE ESTERASE UR QL STRIP.AUTO: ABNORMAL
LYMPHOCYTES # BLD AUTO: 0.38 10*3/MM3 (ref 0.7–3.1)
LYMPHOCYTES NFR BLD AUTO: 3 % (ref 19.6–45.3)
MCH RBC QN AUTO: 29.1 PG (ref 26.6–33)
MCHC RBC AUTO-ENTMCNC: 33 G/DL (ref 31.5–35.7)
MCV RBC AUTO: 88.2 FL (ref 79–97)
MONOCYTES # BLD AUTO: 0.94 10*3/MM3 (ref 0.1–0.9)
MONOCYTES NFR BLD AUTO: 7.5 % (ref 5–12)
NEUTROPHILS NFR BLD AUTO: 11.19 10*3/MM3 (ref 1.7–7)
NEUTROPHILS NFR BLD AUTO: 88.7 % (ref 42.7–76)
NITRITE UR QL STRIP: NEGATIVE
NRBC BLD AUTO-RTO: 0 /100 WBC (ref 0–0.2)
PH UR STRIP.AUTO: 8 [PH] (ref 5–8)
PLATELET # BLD AUTO: 144 10*3/MM3 (ref 140–450)
PMV BLD AUTO: 12 FL (ref 6–12)
POTASSIUM SERPL-SCNC: 3.6 MMOL/L (ref 3.5–5.2)
PROCALCITONIN SERPL-MCNC: 2.14 NG/ML (ref 0–0.25)
PROT SERPL-MCNC: 6.2 G/DL (ref 6–8.5)
PROT UR QL STRIP: ABNORMAL
RBC # BLD AUTO: 3.81 10*6/MM3 (ref 3.77–5.28)
RBC # UR STRIP: ABNORMAL /HPF
REF LAB TEST METHOD: ABNORMAL
SODIUM SERPL-SCNC: 134 MMOL/L (ref 136–145)
SP GR UR STRIP: 1.01 (ref 1–1.03)
SQUAMOUS #/AREA URNS HPF: ABNORMAL /HPF
UROBILINOGEN UR QL STRIP: ABNORMAL
WBC # UR STRIP: ABNORMAL /HPF
WBC NRBC COR # BLD: 12.61 10*3/MM3 (ref 3.4–10.8)

## 2023-05-07 PROCEDURE — 71045 X-RAY EXAM CHEST 1 VIEW: CPT

## 2023-05-07 PROCEDURE — 99222 1ST HOSP IP/OBS MODERATE 55: CPT | Performed by: INTERNAL MEDICINE

## 2023-05-07 PROCEDURE — 80053 COMPREHEN METABOLIC PANEL: CPT | Performed by: EMERGENCY MEDICINE

## 2023-05-07 PROCEDURE — 85025 COMPLETE CBC W/AUTO DIFF WBC: CPT | Performed by: EMERGENCY MEDICINE

## 2023-05-07 PROCEDURE — 84145 PROCALCITONIN (PCT): CPT | Performed by: EMERGENCY MEDICINE

## 2023-05-07 PROCEDURE — 99285 EMERGENCY DEPT VISIT HI MDM: CPT

## 2023-05-07 PROCEDURE — 25010000002 PIPERACILLIN SOD-TAZOBACTAM PER 1 G: Performed by: EMERGENCY MEDICINE

## 2023-05-07 PROCEDURE — 81001 URINALYSIS AUTO W/SCOPE: CPT | Performed by: EMERGENCY MEDICINE

## 2023-05-07 PROCEDURE — 87086 URINE CULTURE/COLONY COUNT: CPT | Performed by: INTERNAL MEDICINE

## 2023-05-07 PROCEDURE — 83605 ASSAY OF LACTIC ACID: CPT | Performed by: EMERGENCY MEDICINE

## 2023-05-07 PROCEDURE — 25010000002 PIPERACILLIN SOD-TAZOBACTAM PER 1 G: Performed by: INTERNAL MEDICINE

## 2023-05-07 PROCEDURE — 25010000002 VANCOMYCIN 10 G RECONSTITUTED SOLUTION: Performed by: EMERGENCY MEDICINE

## 2023-05-07 PROCEDURE — P9612 CATHETERIZE FOR URINE SPEC: HCPCS

## 2023-05-07 PROCEDURE — 36415 COLL VENOUS BLD VENIPUNCTURE: CPT

## 2023-05-07 PROCEDURE — 87040 BLOOD CULTURE FOR BACTERIA: CPT | Performed by: EMERGENCY MEDICINE

## 2023-05-07 RX ORDER — POLYETHYLENE GLYCOL 3350 17 G/17G
17 POWDER, FOR SOLUTION ORAL DAILY PRN
Status: DISCONTINUED | OUTPATIENT
Start: 2023-05-07 | End: 2023-05-09 | Stop reason: HOSPADM

## 2023-05-07 RX ORDER — ACETAMINOPHEN 650 MG/1
650 SUPPOSITORY RECTAL EVERY 4 HOURS PRN
Status: DISCONTINUED | OUTPATIENT
Start: 2023-05-07 | End: 2023-05-09 | Stop reason: HOSPADM

## 2023-05-07 RX ORDER — VENLAFAXINE HYDROCHLORIDE 75 MG/1
75 CAPSULE, EXTENDED RELEASE ORAL DAILY
Status: DISCONTINUED | OUTPATIENT
Start: 2023-05-07 | End: 2023-05-09 | Stop reason: HOSPADM

## 2023-05-07 RX ORDER — SODIUM CHLORIDE 9 MG/ML
100 INJECTION, SOLUTION INTRAVENOUS CONTINUOUS
Status: DISCONTINUED | OUTPATIENT
Start: 2023-05-07 | End: 2023-05-08

## 2023-05-07 RX ORDER — SODIUM CHLORIDE 0.9 % (FLUSH) 0.9 %
10 SYRINGE (ML) INJECTION AS NEEDED
Status: DISCONTINUED | OUTPATIENT
Start: 2023-05-07 | End: 2023-05-07 | Stop reason: SDUPTHER

## 2023-05-07 RX ORDER — IPRATROPIUM BROMIDE AND ALBUTEROL SULFATE 2.5; .5 MG/3ML; MG/3ML
3 SOLUTION RESPIRATORY (INHALATION) EVERY 4 HOURS PRN
Status: DISCONTINUED | OUTPATIENT
Start: 2023-05-07 | End: 2023-05-09 | Stop reason: HOSPADM

## 2023-05-07 RX ORDER — CALCIUM CARBONATE 200(500)MG
2 TABLET,CHEWABLE ORAL 2 TIMES DAILY PRN
Status: DISCONTINUED | OUTPATIENT
Start: 2023-05-07 | End: 2023-05-09 | Stop reason: HOSPADM

## 2023-05-07 RX ORDER — BISACODYL 5 MG/1
5 TABLET, DELAYED RELEASE ORAL DAILY PRN
Status: DISCONTINUED | OUTPATIENT
Start: 2023-05-07 | End: 2023-05-09 | Stop reason: HOSPADM

## 2023-05-07 RX ORDER — SODIUM CHLORIDE 9 MG/ML
40 INJECTION, SOLUTION INTRAVENOUS AS NEEDED
Status: DISCONTINUED | OUTPATIENT
Start: 2023-05-07 | End: 2023-05-09 | Stop reason: HOSPADM

## 2023-05-07 RX ORDER — AMOXICILLIN 250 MG
2 CAPSULE ORAL 2 TIMES DAILY
Status: DISCONTINUED | OUTPATIENT
Start: 2023-05-07 | End: 2023-05-09 | Stop reason: HOSPADM

## 2023-05-07 RX ORDER — ANASTROZOLE 1 MG/1
1 TABLET ORAL DAILY
Status: DISCONTINUED | OUTPATIENT
Start: 2023-05-07 | End: 2023-05-09 | Stop reason: HOSPADM

## 2023-05-07 RX ORDER — PANTOPRAZOLE SODIUM 40 MG/1
40 TABLET, DELAYED RELEASE ORAL DAILY
Status: DISCONTINUED | OUTPATIENT
Start: 2023-05-07 | End: 2023-05-09 | Stop reason: HOSPADM

## 2023-05-07 RX ORDER — BISACODYL 10 MG
10 SUPPOSITORY, RECTAL RECTAL DAILY PRN
Status: DISCONTINUED | OUTPATIENT
Start: 2023-05-07 | End: 2023-05-09 | Stop reason: HOSPADM

## 2023-05-07 RX ORDER — ACETAMINOPHEN 325 MG/1
650 TABLET ORAL EVERY 4 HOURS PRN
Status: DISCONTINUED | OUTPATIENT
Start: 2023-05-07 | End: 2023-05-09 | Stop reason: HOSPADM

## 2023-05-07 RX ORDER — SODIUM CHLORIDE 0.9 % (FLUSH) 0.9 %
10 SYRINGE (ML) INJECTION EVERY 12 HOURS SCHEDULED
Status: DISCONTINUED | OUTPATIENT
Start: 2023-05-07 | End: 2023-05-09 | Stop reason: HOSPADM

## 2023-05-07 RX ORDER — SODIUM CHLORIDE 0.9 % (FLUSH) 0.9 %
10 SYRINGE (ML) INJECTION AS NEEDED
Status: DISCONTINUED | OUTPATIENT
Start: 2023-05-07 | End: 2023-05-09 | Stop reason: HOSPADM

## 2023-05-07 RX ORDER — PANTOPRAZOLE SODIUM 20 MG/1
20 TABLET, DELAYED RELEASE ORAL DAILY
Status: DISCONTINUED | OUTPATIENT
Start: 2023-05-07 | End: 2023-05-07

## 2023-05-07 RX ORDER — PREGABALIN 75 MG/1
150 CAPSULE ORAL EVERY 12 HOURS SCHEDULED
Status: DISCONTINUED | OUTPATIENT
Start: 2023-05-07 | End: 2023-05-09 | Stop reason: HOSPADM

## 2023-05-07 RX ORDER — FLECAINIDE ACETATE 50 MG/1
50 TABLET ORAL 2 TIMES DAILY
Status: DISCONTINUED | OUTPATIENT
Start: 2023-05-07 | End: 2023-05-09 | Stop reason: HOSPADM

## 2023-05-07 RX ORDER — ENOXAPARIN SODIUM 100 MG/ML
40 INJECTION SUBCUTANEOUS DAILY
Status: DISCONTINUED | OUTPATIENT
Start: 2023-05-07 | End: 2023-05-09 | Stop reason: HOSPADM

## 2023-05-07 RX ORDER — ACETAMINOPHEN 160 MG/5ML
650 SOLUTION ORAL EVERY 4 HOURS PRN
Status: DISCONTINUED | OUTPATIENT
Start: 2023-05-07 | End: 2023-05-09 | Stop reason: HOSPADM

## 2023-05-07 RX ORDER — SUMATRIPTAN 50 MG/1
25 TABLET, FILM COATED ORAL ONCE
Status: COMPLETED | OUTPATIENT
Start: 2023-05-07 | End: 2023-05-07

## 2023-05-07 RX ADMIN — TAZOBACTAM SODIUM AND PIPERACILLIN SODIUM 3.38 G: 375; 3 INJECTION, SOLUTION INTRAVENOUS at 15:42

## 2023-05-07 RX ADMIN — FLECAINIDE ACETATE 50 MG: 50 TABLET ORAL at 20:18

## 2023-05-07 RX ADMIN — TAZOBACTAM SODIUM AND PIPERACILLIN SODIUM 3.38 G: 375; 3 INJECTION, SOLUTION INTRAVENOUS at 21:04

## 2023-05-07 RX ADMIN — PANTOPRAZOLE SODIUM 40 MG: 40 TABLET, DELAYED RELEASE ORAL at 20:43

## 2023-05-07 RX ADMIN — SODIUM CHLORIDE 1710 ML: 9 INJECTION, SOLUTION INTRAVENOUS at 15:43

## 2023-05-07 RX ADMIN — VENLAFAXINE HYDROCHLORIDE 75 MG: 75 CAPSULE, EXTENDED RELEASE ORAL at 20:18

## 2023-05-07 RX ADMIN — Medication 1750 MG: at 16:04

## 2023-05-07 RX ADMIN — SODIUM CHLORIDE 100 ML/HR: 9 INJECTION, SOLUTION INTRAVENOUS at 20:21

## 2023-05-07 RX ADMIN — Medication 10 ML: at 20:20

## 2023-05-07 RX ADMIN — PREGABALIN 150 MG: 75 CAPSULE ORAL at 20:43

## 2023-05-07 RX ADMIN — ACETAMINOPHEN 325MG 650 MG: 325 TABLET ORAL at 20:18

## 2023-05-07 RX ADMIN — SUMATRIPTAN SUCCINATE 25 MG: 50 TABLET ORAL at 20:43

## 2023-05-07 NOTE — ED PROVIDER NOTES
[facility]    EMERGENCY DEPARTMENT ENCOUNTER      Pt Name: Mecca Renee  MRN: [id number]  YOB: 1955  Date of evaluation: 5/7/2023  Provider: Melchor Meyers DO    CHIEF COMPLAINT       Chief Complaint   Patient presents with   • Urinary Frequency         HISTORY OF PRESENT ILLNESS  (Location/Symptom, Timing/Onset, Context/Setting, Quality, Duration, Modifying Factors, Severity.)   Mecca Renee is a 67 y.o. female who presents to the emergency department for evaluation of suprapubic discomfort, dysuria, polyuria with flank pain right greater than left worsening over the last 1 week.  She has a history of recurrent urinary tract infections, couple weeks ago was started on Macrobid, did not resolve her symptoms, went back was given a prescription for Augmentin.  She admits to not taking his medication as she was due for one of her cancer treatment therapies which they would not give her if she was actively taking antibiotic.  Patient notes intermittent chills, no documented fever.  She notes generalized weakness.  No chest pain, denies any bowel complaints.  She has no other acute systemic complaints at this time.  Finished up chemo and radiation therapy in February for breast cancer treatment.      Nursing notes were reviewed.      PAST MEDICAL HISTORY     Past Medical History:   Diagnosis Date   • Asthma    • Cancer     breast   • Chronic pain disorder    • COVID-19     positive   • Diverticulosis    • Fibromyalgia    • GERD (gastroesophageal reflux disease)    • Migraine    • Neck pain    • Osteoarthritis    • Osteoporosis    • Palpitations     palpitations with near syncope   • Rheumatoid arthritis          SURGICAL HISTORY       Past Surgical History:   Procedure Laterality Date   • CHOLECYSTECTOMY     • EYE SURGERY      eye lid lift   • EYE SURGERY  12/2019   • FOOT SURGERY Right    • MASTECTOMY PARTIAL / LUMPECTOMY Left 10/2022   • SHOULDER SURGERY Right          CURRENT  MEDICATIONS       Current Facility-Administered Medications:   •  [COMPLETED] Insert Peripheral IV, , , Once **AND** sodium chloride 0.9 % flush 10 mL, 10 mL, Intravenous, PRN, Melchor Meyers DO    ALLERGIES     Sulfa antibiotics, Shellfish-derived products, and Elemental sulfur    FAMILY HISTORY       Family History   Problem Relation Age of Onset   • Diabetes Mother    • Cancer Mother         kidney   • Heart disease Mother         CHF   • Hypertension Mother    • Kidney disease Mother    • Cancer Father         bladder/kidney   • Cancer Brother         lung   • Cancer Brother         lung   • Heart disease Maternal Grandmother    • Stroke Maternal Grandmother    • Arthritis Maternal Grandmother    • Asthma Paternal Grandmother           SOCIAL HISTORY       Social History     Socioeconomic History   • Marital status:    Tobacco Use   • Smoking status: Never   • Smokeless tobacco: Never   Vaping Use   • Vaping Use: Never used   Substance and Sexual Activity   • Alcohol use: No   • Drug use: No   • Sexual activity: Defer         PHYSICAL EXAM    (up to 7 for level 4, 8 or more for level 5)     Vitals:    05/07/23 1658 05/07/23 1713 05/07/23 1729 05/07/23 1745   BP: 91/56 91/55 90/58 97/66   Patient Position:       Pulse: 63 61 64 62   Resp:       Temp:       TempSrc:       SpO2: 97% 96% 99% 97%   Weight:       Height:           Physical Exam  General : Patient is awake, alert, oriented, GCS 15, in no acute distress, nontoxic appearing  HEENT: Pupils are equally round, EOMI, conjunctivae clear, there is no injection no icterus.  Oral mucosa is moist, uvula midline  Neck: Neck is supple, full range of motion, trachea midline  Cardiac: Heart regular rate, rhythm, no murmurs, rubs, or gallops  Lungs: Lungs are clear to auscultation, there is no wheezing, rhonchi, or rales. There is no use of accessory muscles  Chest wall: There is no tenderness to palpation over the chest wall or over ribs  Abdomen:  Abdomen is soft, nontender, nondistended.  No peritoneal signs examination, mild suprapubic discomfort, right CVA discomfort.  Bowel sounds are present throughout.  Musculoskeletal: 5 out of 5 strength in all 4 extremities.  No focal muscle deficits are appreciated  Neuro: Motor intact, sensory intact, level of consciousness is normal  Dermatology: Skin is warm and dry  Psych: Mentation is grossly normal, cognition is grossly normal. Affect is appropriate      DIAGNOSTIC RESULTS     EKG:  All EKGs are interpreted by the Emergency Department Physician who either signs or Co-signs this chart in the absence of a cardiologist.    No orders to display           ED BEDSIDE ULTRASOUND:   Performed by ED Physician - none    LABS:    I have reviewed and interpreted all of the currently available lab results from this visit (if applicable):  Results for orders placed or performed during the hospital encounter of 05/07/23   Comprehensive Metabolic Panel    Specimen: Blood   Result Value Ref Range    Glucose 128 (H) 65 - 99 mg/dL    BUN 17 8 - 23 mg/dL    Creatinine 0.79 0.57 - 1.00 mg/dL    Sodium 134 (L) 136 - 145 mmol/L    Potassium 3.6 3.5 - 5.2 mmol/L    Chloride 103 98 - 107 mmol/L    CO2 22.0 22.0 - 29.0 mmol/L    Calcium 8.0 (L) 8.6 - 10.5 mg/dL    Total Protein 6.2 6.0 - 8.5 g/dL    Albumin 3.2 (L) 3.5 - 5.2 g/dL    ALT (SGPT) 16 1 - 33 U/L    AST (SGOT) 16 1 - 32 U/L    Alkaline Phosphatase 97 39 - 117 U/L    Total Bilirubin 0.6 0.0 - 1.2 mg/dL    Globulin 3.0 gm/dL    A/G Ratio 1.1 g/dL    BUN/Creatinine Ratio 21.5 7.0 - 25.0    Anion Gap 9.0 5.0 - 15.0 mmol/L    eGFR 82.6 >60.0 mL/min/1.73   Urinalysis With Microscopic If Indicated (No Culture) - Urine, Catheter    Specimen: Urine, Catheter   Result Value Ref Range    Color, UA Yellow Yellow, Straw    Appearance, UA Clear Clear    pH, UA 8.0 5.0 - 8.0    Specific Gravity, UA 1.012 1.001 - 1.030    Glucose, UA Negative Negative    Ketones, UA Negative Negative     Bilirubin, UA Negative Negative    Blood, UA Negative Negative    Protein, UA 30 mg/dL (1+) (A) Negative    Leuk Esterase, UA Moderate (2+) (A) Negative    Nitrite, UA Negative Negative    Urobilinogen, UA 2.0 E.U./dL (A) 0.2 - 1.0 E.U./dL   Lactic Acid, Plasma    Specimen: Blood   Result Value Ref Range    Lactate 0.8 0.5 - 2.0 mmol/L   Procalcitonin    Specimen: Blood   Result Value Ref Range    Procalcitonin 2.14 (H) 0.00 - 0.25 ng/mL   CBC Auto Differential    Specimen: Blood   Result Value Ref Range    WBC 12.61 (H) 3.40 - 10.80 10*3/mm3    RBC 3.81 3.77 - 5.28 10*6/mm3    Hemoglobin 11.1 (L) 12.0 - 15.9 g/dL    Hematocrit 33.6 (L) 34.0 - 46.6 %    MCV 88.2 79.0 - 97.0 fL    MCH 29.1 26.6 - 33.0 pg    MCHC 33.0 31.5 - 35.7 g/dL    RDW 15.2 12.3 - 15.4 %    RDW-SD 48.0 37.0 - 54.0 fl    MPV 12.0 6.0 - 12.0 fL    Platelets 144 140 - 450 10*3/mm3    Neutrophil % 88.7 (H) 42.7 - 76.0 %    Lymphocyte % 3.0 (L) 19.6 - 45.3 %    Monocyte % 7.5 5.0 - 12.0 %    Eosinophil % 0.2 (L) 0.3 - 6.2 %    Basophil % 0.1 0.0 - 1.5 %    Immature Grans % 0.5 0.0 - 0.5 %    Neutrophils, Absolute 11.19 (H) 1.70 - 7.00 10*3/mm3    Lymphocytes, Absolute 0.38 (L) 0.70 - 3.10 10*3/mm3    Monocytes, Absolute 0.94 (H) 0.10 - 0.90 10*3/mm3    Eosinophils, Absolute 0.03 0.00 - 0.40 10*3/mm3    Basophils, Absolute 0.01 0.00 - 0.20 10*3/mm3    Immature Grans, Absolute 0.06 (H) 0.00 - 0.05 10*3/mm3    nRBC 0.0 0.0 - 0.2 /100 WBC   Urinalysis, Microscopic Only - Urine, Catheter    Specimen: Urine, Catheter   Result Value Ref Range    RBC, UA 3-6 (A) None Seen, 0-2 /HPF    WBC, UA 31-50 (A) None Seen, 0-2 /HPF    Bacteria, UA 1+ (A) None Seen, Trace /HPF    Squamous Epithelial Cells, UA 3-6 (A) None Seen, 0-2 /HPF    Hyaline Casts, UA 0-6 0 - 6 /LPF    Methodology Manual Light Microscopy         If labs were ordered, I independently reviewed the results and considered them in treating the patient.      EMERGENCY DEPARTMENT COURSE and DIFFERENTIAL  DIAGNOSIS/MDM:   Vitals:  AS OF 18:50 EDT    BP - 97/66  HR - 62  TEMP - 97.7 °F (36.5 °C) (Oral)  O2 SATS - 97%      Orders placed during this visit:  Orders Placed This Encounter   Procedures   • Blood Culture - Blood,   • Blood Culture - Blood,   • Urine Culture - Urine,   • Comprehensive Metabolic Panel   • Urinalysis With Microscopic If Indicated (No Culture) - Urine, Catheter   • Lactic Acid, Plasma   • Procalcitonin   • CBC Auto Differential   • Urinalysis, Microscopic Only - Urine, Clean Catch   • Cardiac Monitoring   • Code Status and Medical Interventions:   • Insert Peripheral IV   • Inpatient Admission   • ED Bed Request   • CBC & Differential       All labs have been independently reviewed by me.  All radiology studies have been reviewed by me and the radiologist dictating the report.  All EKG's have been independently viewed and interpreted by me.      Discussion below represents my analysis of pertinent findings related to patient's condition, differential diagnosis, treatment plan and final disposition.    Differential diagnosis:  The differential diagnosis associated with the patient's presentation includes: Urinary tract infection, cystitis, pyelonephritis, dehydration, electrolyte abnormality, sepsis    Additional sources  Discussed/ obtained information from independent historians:   [] Spouse  [] Parent  [x] Family member  [] Friend  [] EMS   [] Other:    External (non-ED) record review:   [] Inpatient record:   [] Office record:   [] Outpatient record:   [] Prior Outpatient labs:   [] Prior Outpatient radiology:   [] Primary Care record:   [] Outside ED record:   [] Other:     Patient's care impacted by:   [] Diabetes  [] Hypertension  [] Hyperlipidemia  [] Coronary Artery Disease   [] COPD   [] Cancer   [] Tobacco Abuse   [] Substance Abuse    [] Other:     Care significantly affected by Social Determinants of Health (housing and economic circumstances, unemployment)    [] Yes     [x] No   If  yes, Patient's care significantly limited by Social Determinants of Health including:   [] Inadequate housing   [] Low income   [] Alcoholism and drug addiction in family   [] Problems related to primary support group   [] Unemployment   [] Problems related to employment   [] Other Social Determinants of Health:       MEDICATIONS ADMINISTERED IN ED:  Medications   sodium chloride 0.9 % flush 10 mL (has no administration in time range)   sodium chloride 0.9% - IBW for BMI > 30 bolus 1,710 mL (0 mL Intravenous Stopped 5/7/23 1723)   vancomycin 1750 mg/500 mL 0.9% NS IVPB ([facility]) (0 mg Intravenous Stopped 5/7/23 1809)   piperacillin-tazobactam (ZOSYN) 3.375 g in iso-osmotic dextrose 50 ml (premix) (0 g Intravenous Stopped 5/7/23 1604)         ED Course as of 05/07/23 1850   Sun May 07, 2023   1704 Recheck blood pressure at 5:04 PM is 91/56 [AP]      ED Course User Index  [AP] Melchor Meyers,        67-year-old female with recurrent urinary tract infections presents with dysuria, right-sided flank pain, partially treated urinary tract infection as an outpatient.  She went through Macrobid, and then started on Augmentin but admits to noncompliance with this medication.  She does not have any peritoneal signs on examination this afternoon.  With her continued symptoms, partial treatments we did obtain IV, labs, urinalysis for further assessment.  Initial blood pressure on intake 82/43, we will recheck, start septic work-up, IV fluids.  Patient does have a long urinary tract infection, slight leukocytosis with a left shift.  She started on IV fluid bolus, broad-spectrum antibiotics.  On recheck blood pressure 88/50, patient is awake and alert, nontoxic-appearing.  We will continue with IV fluid resuscitation, pressors if needed.  After IV fluids patient's blood pressure improved to over 90 systolic, mentation intact, map of 69.  Patient has a history of lower blood pressures at baseline, normal lactic acid, will  plan for continued fluids monitoring, admission.  At this point we will plan for admission to the hospital for further work-up and assessment, case discussed with hospitalist, Dr. Avila, for admission.        PROCEDURES:  Procedures    CRITICAL CARE TIME    Total Critical Care time was 0 minutes, excluding separately reportable procedures.   There was a high probability of clinically significant/life threatening deterioration in the patient's condition which required my urgent intervention.      FINAL IMPRESSION      1. Urinary tract infection without hematuria, site unspecified    2. Pyelonephritis    3. Hypotension, unspecified hypotension type          DISPOSITION/PLAN     ED Disposition     ED Disposition   Decision to Admit    Condition   --    Comment   Level of Care: Telemetry [5]   Diagnosis: Urinary tract infection without hematuria, site unspecified [[id number]]   Certification: I Certify That Inpatient Hospital Services Are Medically Necessary For Greater Than 2 Midnights               Comment: Please note this report has been produced using speech recognition software.      Melchor Meyers DO  Attending Emergency Physician       Melchor Meyers DO  05/07/23 6309

## 2023-05-07 NOTE — Clinical Note
Level of Care: Telemetry [5]   Diagnosis: Urinary tract infection without hematuria, site unspecified [[id number]]

## 2023-05-07 NOTE — H&P
[facility] Medicine Services  HISTORY AND PHYSICAL    Patient Name: Mecca Renee  : 1955  MRN: [id number]  Primary Care Physician: Mg Sarkar MD  Date of admission: 2023      Subjective   Subjective     Chief Complaint:  Weakness, dysuria     HPI:  Mecca Renee is a 67 y.o. female with history of RA/OA, SVT on flecainide, breast cancer s/p chemo () and radiation () who presents with weakness, dizziness, subjective fevers and dysuria. States symptoms started about 2 weeks ago. She had a UCx at her PCP with ecoli and was treated with macrobid. Symptoms didn't resolve and she was started on augmentin which she has taken for 2 days. Progressive weakness, dizziness prompting ER visit. Still with occasional dysuria. Denies URI symptoms, cough/SOA. Denies abd pain. Denies diarrhea. States she has a history of incomplete bladder emptying and required I/O cath in the past.       Review of Systems   Gen- No fevers, chills  CV- No chest pain, palpitations  Resp- No cough, dyspnea  GI- No N/V/D, abd pain    Personal History     Past Medical History:   Diagnosis Date   • Asthma    • Cancer     breast   • Chronic pain disorder    • COVID-19     positive   • Diverticulosis    • Fibromyalgia    • GERD (gastroesophageal reflux disease)    • Migraine    • Neck pain    • Osteoarthritis    • Osteoporosis    • Palpitations     palpitations with near syncope   • Rheumatoid arthritis          Oncology Problem List:  Breast cancer (2023; Status: Active)       Past Surgical History:   Procedure Laterality Date   • CHOLECYSTECTOMY     • EYE SURGERY      eye lid lift   • EYE SURGERY  2019   • FOOT SURGERY Right    • MASTECTOMY PARTIAL / LUMPECTOMY Left 10/2022   • SHOULDER SURGERY Right        Family History: family history includes Arthritis in her maternal grandmother; Asthma in her paternal grandmother; Cancer in her brother, brother, father, and mother;  Diabetes in her mother; Heart disease in her maternal grandmother and mother; Hypertension in her mother; Kidney disease in her mother; Stroke in her maternal grandmother.     Social History:  reports that she has never smoked. She has never used smokeless tobacco. She reports that she does not drink alcohol and does not use drugs.  Social History     Social History Narrative   • Not on file       Medications:  Available home medication information reviewed.  Medications Prior to Admission   Medication Sig Dispense Refill Last Dose   • albuterol sulfate  (90 Base) MCG/ACT inhaler Inhale 2 puffs Every 6 (Six) Hours As Needed for Wheezing or Shortness of Air. 18 g 0 Past Month   • anastrozole (ARIMIDEX) 1 MG tablet Take 1 tablet by mouth Daily.   5/7/2023   • bisoprolol (ZEBeta) 5 MG tablet Take 0.5 tablets by mouth 2 (Two) Times a Day. 30 tablet 6 5/7/2023   • diclofenac (VOLTAREN) 1 % gel gel Apply 4 g topically As Needed.   5/7/2023   • Dietary Management Product (Rheumate) capsule Take 1 capsule by mouth Daily. 90 capsule 4 5/6/2023   • etodolac (LODINE) 400 MG tablet Take 1 tablet by mouth 2 (Two) Times a Day.   5/7/2023   • flecainide (TAMBOCOR) 100 MG tablet Take 0.5 tablets by mouth 2 (Two) Times a Day. 180 tablet 3 5/7/2023   • fluticasone (FLONASE) 50 MCG/ACT nasal spray 2 sprays into the nostril(s) as directed by provider Daily As Needed.   Past Month   • methocarbamol (ROBAXIN) 750 MG tablet Take 1 tablet by mouth 2 (Two) Times a Day.   5/7/2023   • ondansetron (ZOFRAN) 8 MG tablet As Needed.   5/7/2023   • pantoprazole (PROTONIX) 20 MG EC tablet Take 1 tablet by mouth Daily.   Past Week   • pregabalin (LYRICA) 150 MG capsule 1 capsule 2 (Two) Times a Day.   5/7/2023   • rizatriptan (MAXALT) 5 MG tablet Take 1 tablet by mouth 2 (Two) Times a Day As Needed for Migraine. May repeat in 2 hours if needed   Past Month   • TiZANidine (ZANAFLEX) 2 MG capsule Take 1 capsule by mouth As Needed for Muscle  Spasms.   Past Month   • traMADol (ULTRAM) 50 MG tablet Take 1 tablet by mouth Every 6 (Six) Hours As Needed for Moderate Pain.   Past Month   • venlafaxine XR (EFFEXOR-XR) 37.5 MG 24 hr capsule Take 2 capsules by mouth Daily.   5/6/2023   • Biotin 5000 MCG capsule Take 5,000 mcg by mouth Daily.   5/5/2023   • Coenzyme Q10 (COQ-10) 100 MG capsule Take 3 capsules by mouth Daily.   5/5/2023   • COMBIVENT RESPIMAT  MCG/ACT inhaler Inhale 1 puff As Needed.  3    • denosumab (PROLIA) 60 MG/ML solution prefilled syringe syringe Inject 1 mL under the skin into the appropriate area as directed Every 6 (Six) Months.   5/5/2023   • Folic Acid (FOLATE PO) Take  by mouth Daily.   5/5/2023   • furosemide (LASIX) 40 MG tablet 1 tablet Daily As Needed.   4/23/2023   • guaiFENesin (MUCINEX) 600 MG 12 hr tablet Take 1 tablet by mouth As Needed.   5/3/2023   • guaifenesin (ROBITUSSIN) 100 MG/5ML liquid Take 10 mL by mouth Every 4 (Four) Hours As Needed for Cough. 118 mL 0 More than a month   • levocetirizine (XYZAL) 5 MG tablet Take 1 tablet by mouth As Needed.   5/3/2023   • potassium chloride (MICRO-K) 10 MEQ CR capsule Take 1 capsule by mouth.   4/23/2023   • S-Adenosylmethionine (DORIAN-e) 400 MG tablet Take 400 mg by mouth Daily.   5/5/2023       Allergies   Allergen Reactions   • Sulfa Antibiotics Hives and Itching   • Shellfish-Derived Products Other (See Comments)   • Elemental Sulfur Rash       Objective   Objective     Vital Signs:   Temp:  [97.7 °F (36.5 °C)-98.1 °F (36.7 °C)] 98.1 °F (36.7 °C)  Heart Rate:  [60-70] 60  Resp:  [14-16] 16  BP: ()/(43-66) 103/63       Physical Exam   Constitutional: Awake, alert; frail  Eyes: PERRLA, sclerae anicteric, no conjunctival injection  HENT: NCAT, mucous membranes moist  Neck: Supple, no thyromegaly, no lymphadenopathy, trachea midline  Respiratory: Clear to auscultation bilaterally, nonlabored respirations   Cardiovascular: RRR, no murmurs, rubs, or gallops, palpable  pedal pulses bilaterally  Gastrointestinal: Positive bowel sounds, soft, nontender, nondistended  Musculoskeletal: No bilateral ankle edema, no clubbing or cyanosis to extremities  Psychiatric: Appropriate affect, cooperative  Neurologic: Oriented x 3, strength symmetric in all extremities, Cranial Nerves grossly intact to confrontation, speech clear  Skin: No rashes      Result Review:  I have personally reviewed the results from the time of this admission to 5/7/2023 19:39 EDT and agree with these findings:  []  Laboratory list / accordion  []  Microbiology  []  Radiology  []  EKG/Telemetry   []  Cardiology/Vascular   []  Pathology  []  Old records  []  Other:  Most notable findings include:         LAB RESULTS:      Lab 05/07/23  1532   WBC 12.61*   HEMOGLOBIN 11.1*   HEMATOCRIT 33.6*   PLATELETS 144   NEUTROS ABS 11.19*   IMMATURE GRANS (ABS) 0.06*   LYMPHS ABS 0.38*   MONOS ABS 0.94*   EOS ABS 0.03   MCV 88.2   PROCALCITONIN 2.14*   LACTATE 0.8         Lab 05/07/23  1532   SODIUM 134*   POTASSIUM 3.6   CHLORIDE 103   CO2 22.0   ANION GAP 9.0   BUN 17   CREATININE 0.79   EGFR 82.6   GLUCOSE 128*   CALCIUM 8.0*         Lab 05/07/23  1532   TOTAL PROTEIN 6.2   ALBUMIN 3.2*   GLOBULIN 3.0   ALT (SGPT) 16   AST (SGOT) 16   BILIRUBIN 0.6   ALK PHOS 97                     UA        1/22/2023    14:50 5/7/2023    15:33   Urinalysis   Squamous Epithelial Cells, UA 3-6   3-6     Specific Gravity, UA 1.016   1.012     Ketones, UA Negative   Negative     Blood, UA Negative   Negative     Leukocytes, UA Trace   Moderate (2+)     Nitrite, UA Negative   Negative     RBC, UA 0-2   3-6     WBC, UA 6-12   31-50     Bacteria, UA None Seen   1+         Microbiology Results (last 10 days)     ** No results found for the last 240 hours. **          No radiology results from the last 24 hrs    Results for orders placed during the hospital encounter of 02/13/21    Adult Transthoracic Echo Complete w/ Color, Spectral and Contrast if  Necessary Per Protocol    Interpretation Summary  · Normal left ventricular systolic function, estimated EF 60%.  · Trace mitral regurgitation, trace tricuspid regurgitation.      Assessment & Plan   Assessment & Plan     Active Hospital Problems    Diagnosis  POA   • **Urinary tract infection without hematuria, site unspecified [N39.0]  Yes   • Sepsis [A41.9]  Unknown   • Breast cancer [C50.919]  Unknown   • Spondylosis of lumbar region without myelopathy or radiculopathy [M47.816]  Yes   • Rheumatoid arthritis involving multiple sites with positive rheumatoid factor [M05.79]  Yes   • Fibromyalgia [M79.7]  Yes       Mecac Renee is a 67 y.o. female with history of RA/OA, SVT on flecainide, breast cancer s/p chemo (1/23) and radiation (2/23) who presents with weakness, dizziness, subjective fevers and dysuria.    Sepsis   UTI POA   Elevated procal   - Hypotension, leukocytosis, elevated procal; urinary source   - CXR pending   - UCx and BCx pending  - Continue zosyn; received 1 dose of vanc in the ER   - bladder scans   - IVF     SVT  - Patient unsure of diagnosis; denies afib   - Continue flecainide   - Holding bisoprolol with hypotension --     Breast cancer  - Diagnosis  9/22; surgery 10/22. Completed chemotherapy 1/23. Radiation completed 2/23.   - Continue anastrozole     RA  OA   Mood   - holding etodolac with sepsis concern for TARA  - Holding home supplement Rheumate  - Continue effexor   - Continue lyrica     Migraines   - Triptan PRN     DVT prophylaxis:  lovenox       CODE STATUS:    Code Status and Medical Interventions:   Ordered at: 05/07/23 1929     Level Of Support Discussed With:    Patient     Code Status (Patient has no pulse and is not breathing):    CPR (Attempt to Resuscitate)     Medical Interventions (Patient has pulse or is breathing):    Full Support       Expected Discharge   Expected discharge date/ time has not been documented.     Paola Avila,   05/07/23

## 2023-05-07 NOTE — PLAN OF CARE
Goal Outcome Evaluation:           Progress: no change  Outcome Evaluation: Pt arrive to 3E from the ED. Denies SOA, c/o headache 5/10.Will contact MD for orders.

## 2023-05-08 ENCOUNTER — APPOINTMENT (OUTPATIENT)
Dept: CT IMAGING | Facility: HOSPITAL | Age: 68
End: 2023-05-08
Payer: MEDICARE

## 2023-05-08 LAB
ANION GAP SERPL CALCULATED.3IONS-SCNC: 9 MMOL/L (ref 5–15)
BACTERIA SPEC AEROBE CULT: NO GROWTH
BASOPHILS # BLD AUTO: 0.01 10*3/MM3 (ref 0–0.2)
BASOPHILS NFR BLD AUTO: 0.1 % (ref 0–1.5)
BUN SERPL-MCNC: 9 MG/DL (ref 8–23)
BUN/CREAT SERPL: 17.3 (ref 7–25)
CALCIUM SPEC-SCNC: 7.1 MG/DL (ref 8.6–10.5)
CHLORIDE SERPL-SCNC: 110 MMOL/L (ref 98–107)
CO2 SERPL-SCNC: 20 MMOL/L (ref 22–29)
CREAT SERPL-MCNC: 0.52 MG/DL (ref 0.57–1)
DEPRECATED RDW RBC AUTO: 50.2 FL (ref 37–54)
EGFRCR SERPLBLD CKD-EPI 2021: 102 ML/MIN/1.73
EOSINOPHIL # BLD AUTO: 0.09 10*3/MM3 (ref 0–0.4)
EOSINOPHIL NFR BLD AUTO: 1 % (ref 0.3–6.2)
ERYTHROCYTE [DISTWIDTH] IN BLOOD BY AUTOMATED COUNT: 15.4 % (ref 12.3–15.4)
GLUCOSE SERPL-MCNC: 84 MG/DL (ref 65–99)
HCT VFR BLD AUTO: 32.2 % (ref 34–46.6)
HGB BLD-MCNC: 10.2 G/DL (ref 12–15.9)
IMM GRANULOCYTES # BLD AUTO: 0.05 10*3/MM3 (ref 0–0.05)
IMM GRANULOCYTES NFR BLD AUTO: 0.6 % (ref 0–0.5)
LYMPHOCYTES # BLD AUTO: 0.56 10*3/MM3 (ref 0.7–3.1)
LYMPHOCYTES NFR BLD AUTO: 6.5 % (ref 19.6–45.3)
MAGNESIUM SERPL-MCNC: 2.1 MG/DL (ref 1.6–2.4)
MCH RBC QN AUTO: 28.3 PG (ref 26.6–33)
MCHC RBC AUTO-ENTMCNC: 31.7 G/DL (ref 31.5–35.7)
MCV RBC AUTO: 89.4 FL (ref 79–97)
MONOCYTES # BLD AUTO: 0.74 10*3/MM3 (ref 0.1–0.9)
MONOCYTES NFR BLD AUTO: 8.6 % (ref 5–12)
NEUTROPHILS NFR BLD AUTO: 7.18 10*3/MM3 (ref 1.7–7)
NEUTROPHILS NFR BLD AUTO: 83.2 % (ref 42.7–76)
NRBC BLD AUTO-RTO: 0 /100 WBC (ref 0–0.2)
PLATELET # BLD AUTO: 141 10*3/MM3 (ref 140–450)
PMV BLD AUTO: 12 FL (ref 6–12)
POTASSIUM SERPL-SCNC: 3.8 MMOL/L (ref 3.5–5.2)
RBC # BLD AUTO: 3.6 10*6/MM3 (ref 3.77–5.28)
SODIUM SERPL-SCNC: 139 MMOL/L (ref 136–145)
WBC NRBC COR # BLD: 8.63 10*3/MM3 (ref 3.4–10.8)

## 2023-05-08 PROCEDURE — 85025 COMPLETE CBC W/AUTO DIFF WBC: CPT | Performed by: INTERNAL MEDICINE

## 2023-05-08 PROCEDURE — 80048 BASIC METABOLIC PNL TOTAL CA: CPT | Performed by: INTERNAL MEDICINE

## 2023-05-08 PROCEDURE — 25010000002 PIPERACILLIN SOD-TAZOBACTAM PER 1 G: Performed by: INTERNAL MEDICINE

## 2023-05-08 PROCEDURE — 25010000002 ONDANSETRON PER 1 MG: Performed by: PHYSICIAN ASSISTANT

## 2023-05-08 PROCEDURE — 83735 ASSAY OF MAGNESIUM: CPT | Performed by: INTERNAL MEDICINE

## 2023-05-08 PROCEDURE — 99233 SBSQ HOSP IP/OBS HIGH 50: CPT | Performed by: FAMILY MEDICINE

## 2023-05-08 PROCEDURE — 25010000002 CEFTRIAXONE PER 250 MG: Performed by: FAMILY MEDICINE

## 2023-05-08 PROCEDURE — 25010000002 ENOXAPARIN PER 10 MG: Performed by: INTERNAL MEDICINE

## 2023-05-08 PROCEDURE — 97161 PT EVAL LOW COMPLEX 20 MIN: CPT

## 2023-05-08 PROCEDURE — 70450 CT HEAD/BRAIN W/O DYE: CPT

## 2023-05-08 PROCEDURE — 97165 OT EVAL LOW COMPLEX 30 MIN: CPT

## 2023-05-08 RX ORDER — SUMATRIPTAN 50 MG/1
25 TABLET, FILM COATED ORAL ONCE
Status: COMPLETED | OUTPATIENT
Start: 2023-05-08 | End: 2023-05-08

## 2023-05-08 RX ORDER — ONDANSETRON 2 MG/ML
4 INJECTION INTRAMUSCULAR; INTRAVENOUS EVERY 6 HOURS PRN
Status: DISCONTINUED | OUTPATIENT
Start: 2023-05-08 | End: 2023-05-09 | Stop reason: HOSPADM

## 2023-05-08 RX ORDER — BISOPROLOL FUMARATE 5 MG/1
2.5 TABLET, FILM COATED ORAL 2 TIMES DAILY
Status: DISCONTINUED | OUTPATIENT
Start: 2023-05-08 | End: 2023-05-09 | Stop reason: HOSPADM

## 2023-05-08 RX ADMIN — FLECAINIDE ACETATE 50 MG: 50 TABLET ORAL at 09:21

## 2023-05-08 RX ADMIN — DOCUSATE SODIUM 50 MG AND SENNOSIDES 8.6 MG 2 TABLET: 8.6; 5 TABLET, FILM COATED ORAL at 21:51

## 2023-05-08 RX ADMIN — SODIUM CHLORIDE 1 G: 900 INJECTION INTRAVENOUS at 13:31

## 2023-05-08 RX ADMIN — ONDANSETRON 4 MG: 2 INJECTION INTRAMUSCULAR; INTRAVENOUS at 04:58

## 2023-05-08 RX ADMIN — Medication 10 ML: at 21:54

## 2023-05-08 RX ADMIN — TAZOBACTAM SODIUM AND PIPERACILLIN SODIUM 3.38 G: 375; 3 INJECTION, SOLUTION INTRAVENOUS at 02:14

## 2023-05-08 RX ADMIN — SUMATRIPTAN SUCCINATE 25 MG: 50 TABLET ORAL at 22:08

## 2023-05-08 RX ADMIN — PREGABALIN 150 MG: 75 CAPSULE ORAL at 09:21

## 2023-05-08 RX ADMIN — ENOXAPARIN SODIUM 40 MG: 40 INJECTION SUBCUTANEOUS at 09:21

## 2023-05-08 RX ADMIN — ACETAMINOPHEN 325MG 650 MG: 325 TABLET ORAL at 21:51

## 2023-05-08 RX ADMIN — TAZOBACTAM SODIUM AND PIPERACILLIN SODIUM 3.38 G: 375; 3 INJECTION, SOLUTION INTRAVENOUS at 09:20

## 2023-05-08 RX ADMIN — PREGABALIN 150 MG: 75 CAPSULE ORAL at 21:51

## 2023-05-08 RX ADMIN — VENLAFAXINE HYDROCHLORIDE 75 MG: 75 CAPSULE, EXTENDED RELEASE ORAL at 09:20

## 2023-05-08 RX ADMIN — ACETAMINOPHEN 325MG 650 MG: 325 TABLET ORAL at 04:58

## 2023-05-08 RX ADMIN — ANASTROZOLE 1 MG: 1 TABLET ORAL at 09:20

## 2023-05-08 RX ADMIN — DOCUSATE SODIUM 50 MG AND SENNOSIDES 8.6 MG 2 TABLET: 8.6; 5 TABLET, FILM COATED ORAL at 09:21

## 2023-05-08 RX ADMIN — FLECAINIDE ACETATE 50 MG: 50 TABLET ORAL at 21:52

## 2023-05-08 RX ADMIN — SODIUM CHLORIDE 100 ML/HR: 9 INJECTION, SOLUTION INTRAVENOUS at 07:10

## 2023-05-08 NOTE — PLAN OF CARE
Goal Outcome Evaluation:  Plan of Care Reviewed With: patient           Outcome Evaluation: Pt presents with balance below baseline contributing to impairments in ambulation. Pt will benefit from PT to address balance deficits and return to PLOF. PT rec home with assist upon dc.

## 2023-05-08 NOTE — THERAPY DISCHARGE NOTE
Acute Care - Occupational Therapy Discharge  [facility]    Patient Name: Mecca Renee  : 1955    MRN: [id number]                              Today's Date: 2023       Admit Date: 2023    Visit Dx:     ICD-10-CM ICD-9-CM   1. Urinary tract infection without hematuria, site unspecified  N39.0 599.0   2. Pyelonephritis  N12 590.80   3. Hypotension, unspecified hypotension type  I95.9 458.9     Patient Active Problem List   Diagnosis   • Palpitations   • Asthma   • GERD (gastroesophageal reflux disease)   • Rheumatoid arthritis involving multiple sites with positive rheumatoid factor   • Fibromyalgia   • Osteoarthritis   • Diverticulosis   • Cervical spondylosis without myelopathy   • Cervical disc disorder of mid-cervical region   • Bilateral carpal tunnel syndrome   • Tachyarrhythmia   • Osteoporosis   • Physical deconditioning   • Bilateral occipital neuralgia   • Spondylosis of lumbar region without myelopathy or radiculopathy   • Stress fracture of left tibia   • Posterior tibial tendinitis of left lower extremity   • Stress fracture of left fibula   • Venous stasis   • Incomplete emptying of bladder   • Lower urinary tract symptoms (LUTS)   • Sepsis   • Urinary tract infection without hematuria, site unspecified   • Breast cancer     Past Medical History:   Diagnosis Date   • Asthma    • Cancer     breast   • Chronic pain disorder    • COVID-19     positive   • Diverticulosis    • Fibromyalgia    • GERD (gastroesophageal reflux disease)    • Migraine    • Neck pain    • Osteoarthritis    • Osteoporosis    • Palpitations     palpitations with near syncope   • Rheumatoid arthritis      Past Surgical History:   Procedure Laterality Date   • CHOLECYSTECTOMY     • EYE SURGERY      eye lid lift   • EYE SURGERY  2019   • FOOT SURGERY Right    • MASTECTOMY PARTIAL / LUMPECTOMY Left 10/2022   • SHOULDER SURGERY Right       General Information     Row Name 23 1131          OT Time and  Intention    Document Type evaluation;discharge evaluation/summary  -MR     Mode of Treatment occupational therapy;individual therapy  -MR     Row Name 05/08/23 1131          General Information    Patient Profile Reviewed yes  -MR     Prior Level of Function independent:;all household mobility;community mobility;gait;transfer;bed mobility;ADL's  -MR     Existing Precautions/Restrictions no known precautions/restrictions  -MR     Barriers to Rehab none identified  -MR     Row Name 05/08/23 1131          Living Environment    People in Home spouse  -MR     Row Name 05/08/23 1131          Home Main Entrance    Number of Stairs, Main Entrance one  -MR     Stair Railings, Main Entrance none  -MR     Row Name 05/08/23 1131          Stairs Within Home, Primary    Number of Stairs, Within Home, Primary none  -MR     Row Name 05/08/23 1131          Cognition    Orientation Status (Cognition) oriented x 4  -MR           User Key  (r) = Recorded By, (t) = Taken By, (c) = Cosigned By    Initials Name Provider Type    MR Sirisha Blanc, OT Occupational Therapist               Mobility/ADL's     Row Name 05/08/23 1132          Bed Mobility    Comment, (Bed Mobility) Pt received UIC and left UIC.  -MR     Row Name 05/08/23 1132          Transfers    Transfers sit-stand transfer  -MR     Row Name 05/08/23 1132          Sit-Stand Transfer    Sit-Stand [place] (Transfers) independent  -MR     Row Name 05/08/23 1132          Functional Mobility    Functional Mobility- Ind. Level independent  -MR     Functional Mobility-Distance (Feet) --  HH distances w/in pt's room  -MR     Functional Mobility- Comment Pt requiring assist for IV pole management.  -MR     Row Name 05/08/23 1132          Activities of Daily Living    BADL Assessment/Intervention toileting;lower body dressing  -MR     Row Name 05/08/23 1132          Toileting Assessment/Training    [place] Level (Toileting) perform perineal hygiene;adjust/manage  clothing;independent  -MR     Position (Toileting) unsupported sitting;unsupported standing  -MR     Row Name 05/08/23 1132          Lower Body Dressing Assessment/Training    [place] Level (Lower Body Dressing) don;doff;shoes/slippers;independent  -MR     Position (Lower Body Dressing) supported sitting  -MR           User Key  (r) = Recorded By, (t) = Taken By, (c) = Cosigned By    Initials Name Provider Type    Sirisha Field OT Occupational Therapist               Obj/Interventions     Row Name 05/08/23 1136          Sensory Assessment (Somatosensory)    Sensory Assessment (Somatosensory) UE sensation intact  -MR     Row Name 05/08/23 1136          Range of Motion Comprehensive    General Range of Motion bilateral upper extremity ROM WFL  -MR     Row Name 05/08/23 1136          Strength Comprehensive (MMT)    Comment, General Manual Muscle Testing (MMT) Assessment BUE grossly 4+/5 in all functional planes.  -MR     Row Name 05/08/23 1136          Balance    Balance Assessment sitting static balance;sitting dynamic balance;standing static balance;standing dynamic balance  -MR     Static Sitting Balance independent  -MR     Dynamic Sitting Balance independent  -MR     Position, Sitting Balance unsupported;sitting in chair;other (see comments)  toilet  -MR     Static Standing Balance independent  -MR     Dynamic Standing Balance standby assist  -MR     Position/Device Used, Standing Balance unsupported  -MR     Balance Interventions sitting;sit to stand;standing;supported;static;dynamic;occupation based/functional task  -MR     Comment, Balance No overt LOB noted w/ mobility, transfers or ADL based tasks.  -MR           User Key  (r) = Recorded By, (t) = Taken By, (c) = Cosigned By    Initials Name Provider Type    Sirisha Field, JUSTA Occupational Therapist               Goals/Plan    No documentation.                Clinical Impression     Row Name 05/08/23 1137          Pain Assessment    Pretreatment  Pain Rating 0/10 - no pain  -MR     Posttreatment Pain Rating 0/10 - no pain  -MR     Pain Intervention(s) Ambulation/increased activity;Repositioned  -MR     Row Name 05/08/23 1137          Plan of Care Review    Plan of Care Reviewed With patient  -MR     Outcome Evaluation Pt presenting near baseline w/ mobility, transfers and ADL based task completion. No IP OT warranted at this time, OT signing off. Recommend home w/ assist when medically appropriate for d/c.  -MR Roy Name 05/08/23 1137          Therapy Assessment/Plan (OT)    Criteria for Skilled Therapeutic Interventions Met (OT) no;no problems identified which require skilled intervention  -MR     Therapy Frequency (OT) evaluation only  -MR     Manuela Name 05/08/23 1137          Therapy Plan Review/Discharge Plan (OT)    Anticipated Discharge Disposition (OT) home with assist  -MR Roy Name 05/08/23 1137          Vital Signs    Pre Systolic BP Rehab 121  -MR     Pre Treatment Diastolic BP 78  -MR     Post Systolic BP Rehab 99  -MR     Post Treatment Diastolic BP 68  -MR     Pretreatment Heart Rate (beats/min) 63  -MR     Posttreatment Heart Rate (beats/min) 65  -MR     Pre SpO2 (%) 94  -MR     O2 Delivery Pre Treatment room air  -MR     O2 Delivery Intra Treatment room air  -MR     O2 Delivery Post Treatment room air  -MR     Pre Patient Position Sitting  -MR     Intra Patient Position Standing  -MR     Post Patient Position Sitting  -MR     Manuela Name 05/08/23 1137          Positioning and Restraints    Pre-Treatment Position sitting in chair/recliner  -MR     Post Treatment Position chair  -MR     In Chair notified nsg;reclined;sitting;call light within reach;encouraged to call for assist;waffle cushion  RN deferred chair alarm  -MR           User Key  (r) = Recorded By, (t) = Taken By, (c) = Cosigned By    Initials Name Provider Type     Sirisha Blanc, OT Occupational Therapist               Outcome Measures     Row Name 05/08/23 1140          How  much help from another is currently needed...    Putting on and taking off regular lower body clothing? 4  -MR     Bathing (including washing, rinsing, and drying) 4  -MR     Toileting (which includes using toilet bed pan or urinal) 4  -MR     Putting on and taking off regular upper body clothing 4  -MR     Taking care of personal grooming (such as brushing teeth) 4  -MR     Eating meals 4  -MR     AM-PAC 6 Clicks Score (OT) 24  -MR     Row Name 05/08/23 0849          How much help from another person do you currently need...    Turning from your back to your side while in flat bed without using bedrails? 4  -KR     Moving from lying on back to sitting on the side of a flat bed without bedrails? 4  -KR     Moving to and from a bed to a chair (including a wheelchair)? 4  -KR     Standing up from a chair using your arms (e.g., wheelchair, bedside chair)? 4  -KR     Climbing 3-5 steps with a railing? 3  -KR     To walk in hospital room? 3  -KR     AM-PAC 6 Clicks Score (PT) 22  -KR     Highest level of mobility 7 --> Walked 25 feet or more  -KR     Row Name 05/08/23 1140 05/08/23 0849       Functional Assessment    Outcome Measure Options AM-PAC 6 Clicks Daily Activity (OT)  -MR AM-PAC 6 Clicks Basic Mobility (PT)  -KR          User Key  (r) = Recorded By, (t) = Taken By, (c) = Cosigned By    Initials Name Provider Type     Sirisha Blanc, OT Occupational Therapist    KR Whit Shaffer, PT Physical Therapist              Occupational Therapy Education     Title: PT OT SLP Therapies (Done)     Topic: Occupational Therapy (Done)     Point: ADL training (Done)     Description:   Instruct learner(s) on proper safety adaptation and remediation techniques during self care or transfers.   Instruct in proper use of assistive devices.              Learning Progress Summary           Patient Acceptance, E, VU by MR at 5/8/2023 1141                   Point: Precautions (Done)     Description:   Instruct learner(s) on  prescribed precautions during self-care and functional transfers.              Learning Progress Summary           Patient Acceptance, E, VU by MR at 5/8/2023 1141                   Point: Body mechanics (Done)     Description:   Instruct learner(s) on proper positioning and spine alignment during self-care, functional mobility activities and/or exercises.              Learning Progress Summary           Patient Acceptance, E, VU by MR at 5/8/2023 1141                               User Key     Initials Effective Dates Name Provider Type Discipline    MR 09/22/22 -  Sirisha Blanc OT Occupational Therapist OT              OT Recommendation and Plan  Therapy Frequency (OT): evaluation only  Plan of Care Review  Plan of Care Reviewed With: patient  Outcome Evaluation: Pt presenting near baseline w/ mobility, transfers and ADL based task completion. No IP OT warranted at this time, OT signing off. Recommend home w/ assist when medically appropriate for d/c.  Plan of Care Reviewed With: patient  Outcome Evaluation: Pt presenting near baseline w/ mobility, transfers and ADL based task completion. No IP OT warranted at this time, OT signing off. Recommend home w/ assist when medically appropriate for d/c.     Time Calculation:    Time Calculation- OT     Row Name 05/08/23 1141             Time Calculation- OT    OT Start Time 1031  -MR      OT Received On 05/08/23  -MR         Untimed Charges    OT Eval/Re-eval Minutes 46  -MR         Total Minutes    Untimed Charges Total Minutes 46  -MR       Total Minutes 46  -MR            User Key  (r) = Recorded By, (t) = Taken By, (c) = Cosigned By    Initials Name Provider Type    MR Sirisha Blanc OT Occupational Therapist              Therapy Charges for Today     Code Description Service Date Service Provider Modifiers Qty    [id number]  OT EVAL LOW COMPLEXITY 4 5/8/2023 Sirisha Blanc OT GO 1             OT Discharge Summary  Anticipated Discharge Disposition (OT): home  with assist    Sirisha Blanc, OT  5/8/2023

## 2023-05-08 NOTE — THERAPY EVALUATION
Patient Name: eMcca Renee  : 1955    MRN: [id number]                              Today's Date: 2023       Admit Date: 2023    Visit Dx:     ICD-10-CM ICD-9-CM   1. Urinary tract infection without hematuria, site unspecified  N39.0 599.0   2. Pyelonephritis  N12 590.80   3. Hypotension, unspecified hypotension type  I95.9 458.9     Patient Active Problem List   Diagnosis   • Palpitations   • Asthma   • GERD (gastroesophageal reflux disease)   • Rheumatoid arthritis involving multiple sites with positive rheumatoid factor   • Fibromyalgia   • Osteoarthritis   • Diverticulosis   • Cervical spondylosis without myelopathy   • Cervical disc disorder of mid-cervical region   • Bilateral carpal tunnel syndrome   • Tachyarrhythmia   • Osteoporosis   • Physical deconditioning   • Bilateral occipital neuralgia   • Spondylosis of lumbar region without myelopathy or radiculopathy   • Stress fracture of left tibia   • Posterior tibial tendinitis of left lower extremity   • Stress fracture of left fibula   • Venous stasis   • Incomplete emptying of bladder   • Lower urinary tract symptoms (LUTS)   • Sepsis   • Urinary tract infection without hematuria, site unspecified   • Breast cancer     Past Medical History:   Diagnosis Date   • Asthma    • Cancer     breast   • Chronic pain disorder    • COVID-19     positive   • Diverticulosis    • Fibromyalgia    • GERD (gastroesophageal reflux disease)    • Migraine    • Neck pain    • Osteoarthritis    • Osteoporosis    • Palpitations     palpitations with near syncope   • Rheumatoid arthritis      Past Surgical History:   Procedure Laterality Date   • CHOLECYSTECTOMY     • EYE SURGERY      eye lid lift   • EYE SURGERY  2019   • FOOT SURGERY Right    • MASTECTOMY PARTIAL / LUMPECTOMY Left 10/2022   • SHOULDER SURGERY Right       General Information     Row Name 23 0828          Physical Therapy Time and Intention    Document Type evaluation  -KR      Mode of Treatment physical therapy;individual therapy  -KR     Row Name 05/08/23 0828          General Information    Patient Profile Reviewed yes  -KR     Prior Level of Function independent:;all household mobility;community mobility;ADL's  -KR     Existing Precautions/Restrictions no known precautions/restrictions  -KR     Barriers to Rehab none identified  -KR     Row Name 05/08/23 0828          Living Environment    People in Home spouse  -KR     Row Name 05/08/23 0828          Home Main Entrance    Number of Stairs, Main Entrance one  -KR     Stair Railings, Main Entrance none  -KR     Row Name 05/08/23 0828          Stairs Within Home, Primary    Number of Stairs, Within Home, Primary none  -KR     Row Name 05/08/23 0828          Cognition    Orientation Status (Cognition) oriented x 4  -KR     Row Name 05/08/23 0828          Safety Issues, Functional Mobility    Impairments Affecting Function (Mobility) balance  -KR           User Key  (r) = Recorded By, (t) = Taken By, (c) = Cosigned By    Initials Name Provider Type    Whit Petersen PT Physical Therapist               Mobility     Row Name 05/08/23 0846          Sit-Stand Transfer    Sit-Stand Long Bottom (Transfers) independent  -KR     Comment, (Sit-Stand Transfer) 1x from chair, 1x from bed  -KR     Row Name 05/08/23 0846          Gait/Stairs (Locomotion)    Long Bottom Level (Gait) supervision  -KR     Distance in Feet (Gait) 200 + 10  -KR     Deviations/Abnormal Patterns (Gait) corby decreased;gait speed decreased  -KR     Comment, (Gait/Stairs) pt with occasional L lateral excursion, no LOB. Pt reporting stiffness from lying in bed which improved as ambulation progressed.  -KR           User Key  (r) = Recorded By, (t) = Taken By, (c) = Cosigned By    Initials Name Provider Type    Whit Petersen PT Physical Therapist               Obj/Interventions     Row Name 05/08/23 0898          Range of Motion Comprehensive    General Range of  Motion no range of motion deficits identified  -KR     Row Name 05/08/23 0847          Strength Comprehensive (MMT)    General Manual Muscle Testing (MMT) Assessment lower extremity strength deficits identified  -KR     Comment, General Manual Muscle Testing (MMT) Assessment BLEs grossly 4+/5  -KR     Row Name 05/08/23 0847          Balance    Balance Assessment sitting static balance;sitting dynamic balance;standing static balance;standing dynamic balance  -KR     Static Sitting Balance independent  -KR     Dynamic Sitting Balance independent  -KR     Position, Sitting Balance unsupported;sitting edge of bed;sitting in chair  -KR     Static Standing Balance independent  -KR     Dynamic Standing Balance supervision  -KR     Position/Device Used, Standing Balance unsupported  -KR     Row Name 05/08/23 0847          Sensory Assessment (Somatosensory)    Sensory Assessment (Somatosensory) LE sensation intact  -KR           User Key  (r) = Recorded By, (t) = Taken By, (c) = Cosigned By    Initials Name Provider Type    Whit Petersen, PT Physical Therapist               Goals/Plan     Row Name 05/08/23 0848          Gait Training Goal 1 (PT)    Activity/Assistive Device (Gait Training Goal 1, PT) gait (walking locomotion);improve balance and speed;increase endurance/gait distance  -KR     Coryell Level (Gait Training Goal 1, PT) independent  -KR     Distance (Gait Training Goal 1, PT) 500  -KR     Time Frame (Gait Training Goal 1, PT) long term goal (LTG);2 weeks  -KR     Row Name 05/08/23 0818          Stairs Goal 1 (PT)    Activity/Assistive Device (Stairs Goal 1, PT) stairs, all skills  -KR     Coryell Level/Cues Needed (Stairs Goal 1, PT) independent  -KR     Number of Stairs (Stairs Goal 1, PT) 1  -KR     Time Frame (Stairs Goal 1, PT) long term goal (LTG);2 weeks  -KR     Row Name 05/08/23 0848          Therapy Assessment/Plan (PT)    Planned Therapy Interventions (PT) balance training;bed mobility  training;gait training;home exercise program;manual therapy techniques;postural re-education;patient/family education;neuromuscular re-education;ROM (range of motion);stair training;strengthening;stretching  -KR           User Key  (r) = Recorded By, (t) = Taken By, (c) = Cosigned By    Initials Name Provider Type    KR Whit Shaffer, PT Physical Therapist               Clinical Impression     Row Name 05/08/23 0847          Pain    Pretreatment Pain Rating 0/10 - no pain  -KR     Posttreatment Pain Rating 0/10 - no pain  -KR     Row Name 05/08/23 08          Plan of Care Review    Plan of Care Reviewed With patient  -KR     Outcome Evaluation Pt presents with balance below baseline contributing to impairments in ambulation. Pt will benefit from PT to address balance deficits and return to PLOF. PT rec home with assist upon dc.  -KR     Row Name 05/08/23 0847          Therapy Assessment/Plan (PT)    Rehab Potential (PT) good, to achieve stated therapy goals  -KR     Criteria for Skilled Interventions Met (PT) yes;skilled treatment is necessary  -KR     Therapy Frequency (PT) daily  -KR     Predicted Duration of Therapy Intervention (PT) 2 weeks  -KR     Row Name 05/08/23 0847          Vital Signs    Pre Systolic BP Rehab 121  -KR     Pre Treatment Diastolic BP 78  -KR     Pretreatment Heart Rate (beats/min) 61  -KR     Pre SpO2 (%) 93  -KR     O2 Delivery Pre Treatment room air  -KR     O2 Delivery Intra Treatment room air  -KR     O2 Delivery Post Treatment room air  -KR     Pre Patient Position Sitting  -KR     Intra Patient Position Standing  -KR     Post Patient Position Sitting  -KR     Row Name 05/08/23 0847          Positioning and Restraints    Pre-Treatment Position sitting in chair/recliner  -KR     Post Treatment Position chair  -KR     In Chair notified nsg;sitting;call light within reach;encouraged to call for assist;waffle cushion  RN deferred chair alarm  -KR           User Key  (r) = Recorded By,  (t) = Taken By, (c) = Cosigned By    Initials Name Provider Type    Whit Petersen PT Physical Therapist               Outcome Measures     Row Name 05/08/23 0849          How much help from another person do you currently need...    Turning from your back to your side while in flat bed without using bedrails? 4  -KR     Moving from lying on back to sitting on the side of a flat bed without bedrails? 4  -KR     Moving to and from a bed to a chair (including a wheelchair)? 4  -KR     Standing up from a chair using your arms (e.g., wheelchair, bedside chair)? 4  -KR     Climbing 3-5 steps with a railing? 3  -KR     To walk in hospital room? 3  -KR     AM-PAC 6 Clicks Score (PT) 22  -KR     Highest level of mobility 7 --> Walked 25 feet or more  -KR     Row Name 05/08/23 0849          Functional Assessment    Outcome Measure Options AM-PAC 6 Clicks Basic Mobility (PT)  -KR           User Key  (r) = Recorded By, (t) = Taken By, (c) = Cosigned By    Initials Name Provider Type    Whit Petersen PT Physical Therapist                             Physical Therapy Education     Title: PT OT SLP Therapies (Done)     Topic: Physical Therapy (Done)     Point: Mobility training (Done)     Learning Progress Summary           Patient Acceptance, E, VU by BRAVO at 5/8/2023 0849                   Point: Home exercise program (Done)     Learning Progress Summary           Patient Acceptance, E, VU by BRAVO at 5/8/2023 0849                   Point: Body mechanics (Done)     Learning Progress Summary           Patient Acceptance, E, VU by BRAVO at 5/8/2023 0849                   Point: Precautions (Done)     Learning Progress Summary           Patient Acceptance, E, VU by KR at 5/8/2023 0849                               User Key     Initials Effective Dates Name Provider Type Discipline     12/30/22 -  Whit Shaffer PT Physical Therapist PT              PT Recommendation and Plan  Planned Therapy Interventions (PT): balance  training, bed mobility training, gait training, home exercise program, manual therapy techniques, postural re-education, patient/family education, neuromuscular re-education, ROM (range of motion), stair training, strengthening, stretching  Plan of Care Reviewed With: patient  Outcome Evaluation: Pt presents with balance below baseline contributing to impairments in ambulation. Pt will benefit from PT to address balance deficits and return to PLOF. PT rec home with assist upon dc.     Time Calculation:    PT Charges     Row Name 05/08/23 0828             Time Calculation    Start Time 0828  -KR      PT Received On 05/08/23  -KR      PT Goal Re-Cert Due Date 05/18/23  -KR         Untimed Charges    PT Eval/Re-eval Minutes 32  -KR         Total Minutes    Untimed Charges Total Minutes 32  -KR       Total Minutes 32  -KR            User Key  (r) = Recorded By, (t) = Taken By, (c) = Cosigned By    Initials Name Provider Type    Whit Petersen, PT Physical Therapist              Therapy Charges for Today     Code Description Service Date Service Provider Modifiers Qty    [id number] HC PT EVAL LOW COMPLEXITY 3 5/8/2023 Whit Shaffer, PT GP 1          PT G-Codes  Outcome Measure Options: AM-PAC 6 Clicks Basic Mobility (PT)  AM-PAC 6 Clicks Score (PT): 22  PT Discharge Summary  Anticipated Discharge Disposition (PT): home with assist    Whit Shaffer PT  5/8/2023

## 2023-05-08 NOTE — PROGRESS NOTES
[facility] Medicine Services  PROGRESS NOTE    Patient Name: Mecca Renee  : 1955  MRN: [id number]    Date of Admission: 2023  Primary Care Physician: Mg Sarkar MD    Subjective   Subjective     CC:  F/U UTI    HPI:  Patient seen and examined. No issues overnight. Up in chair this AM. Today [birth year] her birthday. Tolerating ABX.     ROS:  Gen- No fevers, chills  CV- No chest pain, palpitations  Resp- No cough, dyspnea  GI- No N/V/D, abd pain    Objective   Objective     Vital Signs:   Temp:  [97.7 °F (36.5 °C)-99 °F (37.2 °C)] 98.1 °F (36.7 °C)  Heart Rate:  [60-70] 65  Resp:  [14-18] 18  BP: ()/(43-78) 121/78     Physical Exam:  Constitutional: No acute distress, awake, alert  HENT: NCAT, mucous membranes moist  Respiratory: Clear to auscultation bilaterally, respiratory effort normal   Cardiovascular: RRR, no murmurs, rubs, or gallops  Gastrointestinal: Positive bowel sounds, soft, nontender, nondistended  Musculoskeletal: No bilateral ankle edema  Psychiatric: Appropriate affect, cooperative  Neurologic: Oriented x 3, LANIER, speech clear  Skin: No rashes    Results Reviewed:  LAB RESULTS:      Lab 23  0806 23  1532   WBC 8.63 12.61*   HEMOGLOBIN 10.2* 11.1*   HEMATOCRIT 32.2* 33.6*   PLATELETS 141 144   NEUTROS ABS 7.18* 11.19*   IMMATURE GRANS (ABS) 0.05 0.06*   LYMPHS ABS 0.56* 0.38*   MONOS ABS 0.74 0.94*   EOS ABS 0.09 0.03   MCV 89.4 88.2   PROCALCITONIN  --  2.14*   LACTATE  --  0.8         Lab 23  0806 23  1532   SODIUM 139 134*   POTASSIUM 3.8 3.6   CHLORIDE 110* 103   CO2 20.0* 22.0   ANION GAP 9.0 9.0   BUN 9 17   CREATININE 0.52* 0.79   EGFR 102.0 82.6   GLUCOSE 84 128*   CALCIUM 7.1* 8.0*   MAGNESIUM 2.1  --          Lab 23  1532   TOTAL PROTEIN 6.2   ALBUMIN 3.2*   GLOBULIN 3.0   ALT (SGPT) 16   AST (SGOT) 16   BILIRUBIN 0.6   ALK PHOS 97                     Brief Urine Lab Results  (Last result in the past 365  days)      Color   Clarity   Blood   Leuk Est   Nitrite   Protein   CREAT   Urine HCG        05/07/23 1533 Yellow   Clear   Negative   Moderate (2+)   Negative   30 mg/dL (1+)                 Microbiology Results Abnormal     None          XR Chest 1 View    Result Date: 5/7/2023  XR CHEST 1 VW Date of Exam: 5/7/2023 7:38 PM EDT Indication: sepsis Comparison: 12/14/2022 Findings: Heart shadow is normal in size. Vasculature is normal. Lungs appear well expanded and remain grossly clear. Trace linear scarring in the left lung base is stable. Clips are again noted in the left axillary region. Bony structures appear intact.     Impression: Impression: No new chest disease. Electronically Signed: Kunal Villa  5/7/2023 8:05 PM EDT  Workstation ID: [id number]      Results for orders placed during the hospital encounter of 02/13/21    Adult Transthoracic Echo Complete w/ Color, Spectral and Contrast if Necessary Per Protocol    Interpretation Summary  · Normal left ventricular systolic function, estimated EF 60%.  · Trace mitral regurgitation, trace tricuspid regurgitation.      Current medications:  Scheduled Meds:anastrozole, 1 mg, Oral, Daily  bisoprolol, 2.5 mg, Oral, BID  cefTRIAXone, 1 g, Intravenous, Q24H  enoxaparin, 40 mg, Subcutaneous, Daily  flecainide, 50 mg, Oral, BID  pantoprazole, 40 mg, Oral, Daily  pregabalin, 150 mg, Oral, Q12H  senna-docusate sodium, 2 tablet, Oral, BID  sodium chloride, 10 mL, Intravenous, Q12H  venlafaxine XR, 75 mg, Oral, Daily      Continuous Infusions:sodium chloride, 100 mL/hr, Last Rate: 100 mL/hr (05/08/23 0710)      PRN Meds:.•  acetaminophen **OR** acetaminophen **OR** acetaminophen  •  senna-docusate sodium **AND** polyethylene glycol **AND** bisacodyl **AND** bisacodyl  •  calcium carbonate  •  ipratropium-albuterol  •  ondansetron  •  sodium chloride  •  sodium chloride    Assessment & Plan   Assessment & Plan     Active Hospital Problems    Diagnosis  POA   • **Urinary tract  infection without hematuria, site unspecified [N39.0]  Yes   • Sepsis [A41.9]  Unknown   • Breast cancer [C50.919]  Unknown   • Spondylosis of lumbar region without myelopathy or radiculopathy [M47.816]  Yes   • Rheumatoid arthritis involving multiple sites with positive rheumatoid factor [M05.79]  Yes   • Fibromyalgia [M79.7]  Yes      Resolved Hospital Problems   No resolved problems to display.        Brief Hospital Course to date:  Mecca Renee is a 68 y.o. female with history of RA/OA, SVT on flecainide, breast cancer s/p chemo (1/23) and radiation (2/23) who presents with weakness, dizziness, subjective fevers and dysuria.    This patient's problems and plans were partially entered by my partner and updated as appropriate by me 05/08/23.  All problems are new to me today     Sepsis   UTI POA   Elevated procal   - Urine Cx pending  - Blood Cx pending   - Change Zosyn to Rocephin     Hx SVT  - Patient unsure of diagnosis; denies afib   - Continue flecainide   - Resume Bisoprolol with hold parameters      Breast cancer  - Diagnosis  9/22; surgery 10/22. Completed chemotherapy 1/23. Radiation completed 2/23.   - Continue anastrozole      RA  OA   Mood   - holding etodolac (not on formulary)   - Holding home supplement Rheumate  - Continue effexor   - Continue lyrica      Migraines   - Triptan PRN     Expected Discharge Location and Transportation: Home tomorrow if Urine Cx back  Expected Discharge   Expected Discharge Date: 5/9/2023; Expected Discharge Time:      DVT prophylaxis:  Medical DVT prophylaxis orders are present.     AM-PAC 6 Clicks Score (PT): 22 (05/08/23 0017)    CODE STATUS:   Code Status and Medical Interventions:   Ordered at: 05/07/23 1920     Level Of Support Discussed With:    Patient     Code Status (Patient has no pulse and is not breathing):    CPR (Attempt to Resuscitate)     Medical Interventions (Patient has pulse or is breathing):    Full Support       Lucina Howard,  DO  05/08/23

## 2023-05-08 NOTE — PLAN OF CARE
Goal Outcome Evaluation:  Plan of Care Reviewed With: patient           Outcome Evaluation: Pt presenting near baseline w/ mobility, transfers and ADL based task completion. No IP OT warranted at this time, OT signing off. Recommend home w/ assist when medically appropriate for d/c.

## 2023-05-08 NOTE — PROGRESS NOTES
Clinical Nutrition     Nutrition Support Assessment  Reason for Visit: Unintentional weight loss      Patient Name: Mecca Renee  YOB: 1955  MRN: [id number]  Date of Encounter: 05/08/23 10:41 EDT  Admission date: 5/7/2023      Nutrition Assessment   Admission Diagnosis:  Urinary tract infection without hematuria, site unspecified [N39.0]    Problem List:    Urinary tract infection without hematuria, site unspecified    Rheumatoid arthritis involving multiple sites with positive rheumatoid factor    Fibromyalgia    Spondylosis of lumbar region without myelopathy or radiculopathy    Sepsis    Breast cancer      PMH:   She  has a past medical history of Asthma, Cancer, Chronic pain disorder, COVID-19, Diverticulosis, Fibromyalgia, GERD (gastroesophageal reflux disease), Migraine, Neck pain, Osteoarthritis, Osteoporosis, Palpitations, and Rheumatoid arthritis.    PSH:  She  has a past surgical history that includes Shoulder surgery (Right); Foot surgery (Right); Cholecystectomy; Eye surgery; Eye surgery (12/2019); and Mastectomy partial / lumpectomy (Left, 10/2022).    Applicable Nutrition Concerns:   Skin:  Oral:  GI:    Applicable Interval History:     Reported/Observed/Food/Nutrition Related History:     Pt sitting up in chair with family at bedside at time of visit. Pt reports recent wt loss of 11# x 5 mo r/t radiation & chemotherapy treatment. Reports eating baseline (3 meals/day) most days. Reports recent appetite & intake change x 2 weeks r/t nausea 2/2 UTI. Reports daily intake fluctuating depending on symptoms during day, but intake of 2 meals/day minimum still. States intake & appetite improving, nausea improved & controlled with medicine. Awaiting measured wt to verify wt loss. Pt not interested in ONS at this time. Denies difficulty chewing/swallowing. Reports shellfish allergy, noted in EMR.     Labs    Labs Reviewed: Yes     Results from last 7 days   Lab Units  "05/08/23  0806 05/07/23  1532   GLUCOSE mg/dL 84 128*   BUN mg/dL 9 17   CREATININE mg/dL 0.52* 0.79   SODIUM mmol/L 139 134*   CHLORIDE mmol/L 110* 103   POTASSIUM mmol/L 3.8 3.6   MAGNESIUM mg/dL 2.1  --    ALT (SGPT) U/L  --  16       Results from last 7 days   Lab Units 05/07/23  1532   ALBUMIN g/dL 3.2*           No results found for: HGBA1C            Medications    Medications Reviewed: Yes  Pertinent: Protonix, Abx, Pericolace  Infusion: NS @ 100 mL/hr  PRN: Zofran    Intake/Ouptut 24 hrs (0701 - 0700)   I&O's Reviewed: Yes     Last BM not documented  Anthropometrics     Flowsheet Rows    Flowsheet Row First Filed Value   Admission Height 165.1 cm (65\") Documented at 05/07/2023 1427   Admission Weight 85.3 kg (188 lb) Documented at 05/07/2023 1427        Height: Height: 165.1 cm (65\")  Last Filed Weight: Weight: 85.3 kg (188 lb) (05/07/23 1427)  Method: Weight Method: Stated  BMI: BMI (Calculated): 31.3  BMI classification: Obese Class I: 30-34.9kg/m2  IBW:  125 lb    UBW:   Weight change:   Weight Weight (kg) Weight (lbs) Weight Method Visit Report                  5/7/2023     85.276 kg     188 lb     Stated            3/10/2023     86.637 kg     191 lb          Report       1/22/2023     87.998 kg     194 lb     Stated            12/14/2022     87.998 kg     194 lb     Stated            10/25/2022     89.4 kg     197 lb 1.5 oz                 10/10/2022     89.449 kg     197 lb 3.2 oz          Report       9/30/2022     87.998 kg     194 lb     Stated            4/14/2022     88.905 kg     196 lb          Report       3/17/2022     88.905 kg     196 lb          Report       1/27/2022     88.905 kg     196 lb          Report       11/12/2021     88.996 kg     196 lb 3.2 oz          Report       8/17/2021     87.635 kg     193 lb 3.2 oz          Report       5/3/2021     85.73 kg     189 lb          Report       2/13/2021     87.998 kg     87.998 kg     194 lb     194 lb     Stated            2/12/2021    "  87.998 kg     87.998 kg     194 lb     194 lb          Report        Pt reports WL of 11# x 5 mo r/t radiation & chemotherapy treatment  Awaiting measured wt     Nutrition Focused Physical Exam     Date: 5/8    Unable to perform exam due to: Defer pending indication    Pt with family at time of visit. Will follow up to complete NFPE as appropriate.     Current Nutrition Prescription     PO: Diet: Regular/House Diet; Texture: Regular Texture (IDDSI 7); Fluid Consistency: Thin (IDDSI 0)  Oral Nutrition Supplement:   Intake: 1 Day: 75% x 1 meal documented    Nutrition Diagnosis   Date: 5/8 Updated:   Problem Inadequate energy intake    Etiology Nausea 2/2 UTI   Signs/Symptoms Pt report of reduced intake x 2 wks r/t nausea & decreased appetite 2/2 clinical status   Status:     Goal:   General: Maintain nutrition  PO: Continue positive trend  EN/PN: N/A    Nutrition Intervention      Follow treatment progress, Care plan reviewed, Interview for preferences, Encourage intake, Supplement offered/refused     Denied ONS need at this time    Monitoring/Evaluation:   Per protocol, I&O, PO intake, Weight, Symptoms    Will complete NFPE as appropriate    Awaiting measured wt     Tanna Ba  Time Spent: 35 min

## 2023-05-08 NOTE — CASE MANAGEMENT/SOCIAL WORK
Discharge Planning Assessment  [facility]     Patient Name: Mecca Renee  MRN: [id number]  Today's Date: 5/8/2023    Admit Date: 5/7/2023    Plan: TBD   Discharge Needs Assessment    No documentation.                Discharge Plan     Row Name 05/08/23 1456       Plan    Plan TBD    Plan Comments Unable to speak with Ms. Renee for IDP. Visited room twice and she was unavailable both visits. Will follow up tomorrow.              Continued Care and Services - Admitted Since 5/7/2023    Coordination has not been started for this encounter.       Expected Discharge Date and Time     Expected Discharge Date Expected Discharge Time    May 9, 2023                 Catherine Locke RN

## 2023-05-09 ENCOUNTER — READMISSION MANAGEMENT (OUTPATIENT)
Dept: CALL CENTER | Facility: HOSPITAL | Age: 68
End: 2023-05-09
Payer: MEDICARE

## 2023-05-09 VITALS
WEIGHT: 188 LBS | BODY MASS INDEX: 31.32 KG/M2 | HEART RATE: 56 BPM | RESPIRATION RATE: 17 BRPM | SYSTOLIC BLOOD PRESSURE: 116 MMHG | TEMPERATURE: 97 F | DIASTOLIC BLOOD PRESSURE: 74 MMHG | OXYGEN SATURATION: 95 % | HEIGHT: 65 IN

## 2023-05-09 PROBLEM — A41.9 SEPSIS: Status: RESOLVED | Noted: 2023-05-07 | Resolved: 2023-05-09

## 2023-05-09 PROCEDURE — 25010000002 ENOXAPARIN PER 10 MG: Performed by: INTERNAL MEDICINE

## 2023-05-09 PROCEDURE — 99239 HOSP IP/OBS DSCHRG MGMT >30: CPT | Performed by: FAMILY MEDICINE

## 2023-05-09 RX ORDER — FLUCONAZOLE 150 MG/1
150 TABLET ORAL ONCE
Qty: 1 TABLET | Refills: 0 | Status: SHIPPED | OUTPATIENT
Start: 2023-05-09 | End: 2023-05-09

## 2023-05-09 RX ORDER — CEFUROXIME AXETIL 500 MG/1
500 TABLET ORAL 2 TIMES DAILY
Qty: 14 TABLET | Refills: 0 | Status: SHIPPED | OUTPATIENT
Start: 2023-05-09 | End: 2023-05-16

## 2023-05-09 RX ADMIN — ENOXAPARIN SODIUM 40 MG: 40 INJECTION SUBCUTANEOUS at 09:01

## 2023-05-09 RX ADMIN — DOCUSATE SODIUM 50 MG AND SENNOSIDES 8.6 MG 2 TABLET: 8.6; 5 TABLET, FILM COATED ORAL at 09:01

## 2023-05-09 RX ADMIN — VENLAFAXINE HYDROCHLORIDE 75 MG: 75 CAPSULE, EXTENDED RELEASE ORAL at 09:01

## 2023-05-09 RX ADMIN — PANTOPRAZOLE SODIUM 40 MG: 40 TABLET, DELAYED RELEASE ORAL at 09:01

## 2023-05-09 RX ADMIN — ANASTROZOLE 1 MG: 1 TABLET ORAL at 09:01

## 2023-05-09 RX ADMIN — FLECAINIDE ACETATE 50 MG: 50 TABLET ORAL at 09:01

## 2023-05-09 RX ADMIN — PREGABALIN 150 MG: 75 CAPSULE ORAL at 09:01

## 2023-05-09 NOTE — DISCHARGE SUMMARY
[facility] Medicine Services  DISCHARGE SUMMARY    Patient Name: Mecca Renee  : 1955  MRN: [id number]    Date of Admission: 2023  2:41 PM  Date of Discharge:  2023  Primary Care Physician: Mg Sarkar MD    Consults     No orders found from 2023 to 2023.          Hospital Course     Presenting Problem:   Urinary tract infection without hematuria, site unspecified [N39.0]    Active Hospital Problems    Diagnosis  POA   • **Urinary tract infection without hematuria, site unspecified [N39.0]  Yes   • Breast cancer [C50.919]  Yes   • Spondylosis of lumbar region without myelopathy or radiculopathy [M47.816]  Yes   • Rheumatoid arthritis involving multiple sites with positive rheumatoid factor [M05.79]  Yes   • Fibromyalgia [M79.7]  Yes      Resolved Hospital Problems    Diagnosis Date Resolved POA   • Sepsis [A41.9] 2023 Unknown          Hospital Course:  Mecca Renee is a 68 y.o. female with history of RA/OA, SVT on flecainide, breast cancer s/p chemo () and radiation () who presents with weakness, dizziness, subjective fevers and dysuria.     Sepsis-Resolved   UTI POA   - Urine Cx with no growth but likely antibiotic modified. Patient recently completed a 10 day course of Macrobid and then still had symptoms and was prescribed Amoxicillin which she has taken a few doses of.   -Received 3 days of IV Rocephin. DC on 7 more days of PO Ceftin + Fluconazole as patient gets vaginal candidiasis with ABX  - Blood Cx NGTD     Hx SVT  - Patient unsure of diagnosis; denies afib   - Continue flecainide, BB     Breast cancer  - Diagnosis  ; surgery 10/22. Completed chemotherapy . Radiation completed .   - Continue anastrozole      RA  OA   Mood   - Continue home meds at [place]     Migraines   - Triptan PRN     Discharge Follow Up Recommendations for outpatient labs/diagnostics:  -PCP 1 week     Day of Discharge     HPI:   Patient  seen and examined. Up in chair. Feels better. Discussed urine Cx.     Review of Systems  Gen- No fevers, chills  CV- No chest pain, palpitations  Resp- No cough, dyspnea  GI- No N/V/D, abd pain    Vital Signs:   Temp:  [97.2 °F (36.2 °C)-99.1 °F (37.3 °C)] 97.2 °F (36.2 °C)  Heart Rate:  [55-63] 63  Resp:  [16-18] 18  BP: ()/(53-75) 82/53      Physical Exam:  Constitutional: No acute distress, awake, alert  HENT: NCAT, mucous membranes moist  Respiratory: Clear to auscultation bilaterally, respiratory effort normal   Cardiovascular: RRR, no murmurs, rubs, or gallops  Gastrointestinal: Positive bowel sounds, soft, nontender, nondistended  Musculoskeletal: No bilateral ankle edema  Psychiatric: Appropriate affect, cooperative  Neurologic: Oriented x 3, strength symmetric in all extremities, Cranial Nerves grossly intact to confrontation, speech clear  Skin: No rashes    Pertinent  and/or Most Recent Results     LAB RESULTS:      Lab 05/08/23  0806 05/07/23  1532   WBC 8.63 12.61*   HEMOGLOBIN 10.2* 11.1*   HEMATOCRIT 32.2* 33.6*   PLATELETS 141 144   NEUTROS ABS 7.18* 11.19*   IMMATURE GRANS (ABS) 0.05 0.06*   LYMPHS ABS 0.56* 0.38*   MONOS ABS 0.74 0.94*   EOS ABS 0.09 0.03   MCV 89.4 88.2   PROCALCITONIN  --  2.14*   LACTATE  --  0.8         Lab 05/08/23  0806 05/07/23  1532   SODIUM 139 134*   POTASSIUM 3.8 3.6   CHLORIDE 110* 103   CO2 20.0* 22.0   ANION GAP 9.0 9.0   BUN 9 17   CREATININE 0.52* 0.79   EGFR 102.0 82.6   GLUCOSE 84 128*   CALCIUM 7.1* 8.0*   MAGNESIUM 2.1  --          Lab 05/07/23  1532   TOTAL PROTEIN 6.2   ALBUMIN 3.2*   GLOBULIN 3.0   ALT (SGPT) 16   AST (SGOT) 16   BILIRUBIN 0.6   ALK PHOS 97                     Brief Urine Lab Results  (Last result in the past 365 days)      Color   Clarity   Blood   Leuk Est   Nitrite   Protein   CREAT   Urine HCG        05/07/23 1533 Yellow   Clear   Negative   Moderate (2+)   Negative   30 mg/dL (1+)               Microbiology Results (last 10 days)      Procedure Component Value - Date/Time    Blood Culture - Blood, Arm, Left [[id number]]  (Normal) Collected: 05/07/23 1535    Lab Status: Preliminary result Specimen: Blood from Arm, Left Updated: 05/08/23 1600     Blood Culture No growth at 24 hours    Urine Culture - Urine, Urine, Catheter [[id number]]  (Normal) Collected: 05/07/23 1533    Lab Status: Final result Specimen: Urine, Catheter Updated: 05/08/23 1858     Urine Culture No growth    Blood Culture - Blood, Arm, Right [[id number]]  (Normal) Collected: 05/07/23 1530    Lab Status: Preliminary result Specimen: Blood from Arm, Right Updated: 05/08/23 1600     Blood Culture No growth at 24 hours          CT Head Without Contrast    Result Date: 5/8/2023  CT HEAD WO CONTRAST Date of Exam: 5/8/2023 2:47 PM EDT Indication: AMS. Comparison: None available. Technique: Axial CT images were obtained of the head without contrast administration.  Reconstructed coronal and sagittal images were also obtained. Automated exposure control and iterative construction methods were used. Findings: Minimal age-related volume loss is noted. Gray-white differentiation is maintained and there is no evidence of intracranial hemorrhage, mass or mass effect. The ventricles are normal in size and configuration. The orbits are normal. The paranasal sinuses  are grossly clear. The calvarium is intact.     Impression: No acute intracranial abnormality. Electronically Signed: Ruddy Bahena  5/8/2023 2:58 PM EDT  Workstation ID: [id number]    XR Chest 1 View    Result Date: 5/7/2023  XR CHEST 1 VW Date of Exam: 5/7/2023 7:38 PM EDT Indication: sepsis Comparison: 12/14/2022 Findings: Heart shadow is normal in size. Vasculature is normal. Lungs appear well expanded and remain grossly clear. Trace linear scarring in the left lung base is stable. Clips are again noted in the left axillary region. Bony structures appear intact.     Impression: No new chest disease. Electronically Signed: Kunal Villa   5/7/2023 8:05 PM EDT  Workstation ID: [id number]              Results for orders placed during the hospital encounter of 02/13/21    Adult Transthoracic Echo Complete w/ Color, Spectral and Contrast if Necessary Per Protocol    Interpretation Summary  · Normal left ventricular systolic function, estimated EF 60%.  · Trace mitral regurgitation, trace tricuspid regurgitation.      Plan for Follow-up of Pending Labs/Results: Inbox   Pending Labs     Order Current Status    Blood Culture - Blood, Arm, Left Preliminary result    Blood Culture - Blood, Arm, Right Preliminary result        Discharge Details        Discharge Medications      New Medications      Instructions Start Date   cefuroxime 500 MG tablet  Commonly known as: CEFTIN   500 mg, Oral, 2 Times Daily      fluconazole 150 MG tablet  Commonly known as: DIFLUCAN   150 mg, Oral, Once         Continue These Medications      Instructions Start Date   albuterol sulfate  (90 Base) MCG/ACT inhaler  Commonly known as: PROVENTIL HFA;VENTOLIN HFA;PROAIR HFA   2 puffs, Inhalation, Every 6 Hours PRN      anastrozole 1 MG tablet  Commonly known as: ARIMIDEX   1 mg, Oral, Daily      Biotin 5000 MCG capsule   5,000 mcg, Oral, Daily      bisoprolol 5 MG tablet  Commonly known as: ZEBeta   2.5 mg, Oral, 2 Times Daily      Combivent Respimat  MCG/ACT inhaler  Generic drug: ipratropium-albuterol   1 puff, Inhalation, As Needed      CoQ-10 100 MG capsule   Take 3 capsules by mouth Daily.      denosumab 60 MG/ML solution prefilled syringe syringe  Commonly known as: PROLIA   1 mL, Subcutaneous, Every 6 Months      diclofenac 1 % gel gel  Commonly known as: VOLTAREN   4 g, Topical, As Needed      etodolac 400 MG tablet  Commonly known as: LODINE   400 mg, Oral, 2 Times Daily      flecainide 100 MG tablet  Commonly known as: TAMBOCOR   50 mg, Oral, 2 Times Daily      fluticasone 50 MCG/ACT nasal spray  Commonly known as: FLONASE   2 sprays, Nasal, Daily PRN      FOLATE  PO   Oral, Daily      furosemide 40 MG tablet  Commonly known as: LASIX   1 tablet Daily As Needed.      guaiFENesin 600 MG 12 hr tablet  Commonly known as: MUCINEX   600 mg, Oral, As Needed      guaifenesin 100 MG/5ML liquid  Commonly known as: ROBITUSSIN   200 mg, Oral, Every 4 Hours PRN      levocetirizine 5 MG tablet  Commonly known as: XYZAL   5 mg, Oral, As Needed      methocarbamol 750 MG tablet  Commonly known as: ROBAXIN   750 mg, Oral, 2 Times Daily      ondansetron 8 MG tablet  Commonly known as: ZOFRAN   As Needed      pantoprazole 20 MG EC tablet  Commonly known as: PROTONIX   20 mg, Oral, Daily      potassium chloride 10 MEQ CR capsule  Commonly known as: MICRO-K   10 mEq, Oral      pregabalin 150 MG capsule  Commonly known as: LYRICA   150 mg, 2 Times Daily      Rheumate capsule   1 capsule, Oral, Daily      rizatriptan 5 MG tablet  Commonly known as: MAXALT   5 mg, Oral, 2 Times Daily PRN, May repeat in 2 hours if needed      DORIAN-e 400 MG tablet   400 mg, Oral, Daily      TiZANidine 2 MG capsule  Commonly known as: ZANAFLEX   2 mg, Oral, As Needed      traMADol 50 MG tablet  Commonly known as: ULTRAM   50 mg, Oral, Every 6 Hours PRN      venlafaxine XR 37.5 MG 24 hr capsule  Commonly known as: EFFEXOR-XR   75 mg, Oral, Daily             Allergies   Allergen Reactions   • Sulfa Antibiotics Hives and Itching   • Shellfish-Derived Products Other (See Comments)   • Elemental Sulfur Rash         Discharge Disposition:      Diet:  Hospital:  Diet Order   Procedures   • Diet: Regular/House Diet; Texture: Regular Texture (IDDSI 7); Fluid Consistency: Thin (IDDSI 0)       Activity:      Restrictions or Other Recommendations:       CODE STATUS:    Code Status and Medical Interventions:   Ordered at: 05/07/23 6319     Level Of Support Discussed With:    Patient     Code Status (Patient has no pulse and is not breathing):    CPR (Attempt to Resuscitate)     Medical Interventions (Patient has pulse or is  breathing):    Full Support       Future Appointments   Date Time Provider Department Center   4/19/2024 10:45 AM William Brewster MD [facility]       Additional Instructions for the Follow-ups that You Need to Schedule     Discharge Follow-up with PCP   As directed       Currently Documented PCP:    Mg Sarkar MD    PCP Phone Number:    [phone]     Follow Up Details: 1 week                     Lucina Howard DO  05/09/23      Time Spent on Discharge:  I spent  45  minutes on this discharge activity which included: face-to-face encounter with the patient, reviewing the data in the system, coordination of the care with the nursing staff as well as consultants, documentation, and entering orders.

## 2023-05-09 NOTE — OUTREACH NOTE
Prep Survey    Flowsheet Row Responses   [facility] patient discharged from? [facility]   Is LACE score < 7 ? No   Eligibility Readm Mgmt   Discharge diagnosis Urinary tract infection - Sepsis   Does the patient have one of the following disease processes/diagnoses(primary or secondary)? Sepsis   Does the patient have Home health ordered? No   Is there a DME ordered? No   Prep survey completed? Yes          Ana LOPEZ - Registered Nurse

## 2023-05-09 NOTE — CASE MANAGEMENT/SOCIAL WORK
Discharge Planning Assessment  [facility]     Patient Name: Mecca Renee  MRN: [id number]  Today's Date: 5/9/2023    Admit Date: 5/7/2023    Plan: Home   Discharge Needs Assessment     Row Name 05/09/23 1342       Living Environment    People in Home significant other    Current Living Arrangements home    Potentially Unsafe Housing Conditions none    Primary Care Provided by self    Provides Primary Care For no one    Family Caregiver if Needed spouse    Quality of Family Relationships supportive    Able to Return to Prior Arrangements yes       Resource/Environmental Concerns    Resource/Environmental Concerns none    Transportation Concerns none       Transition Planning    Patient/Family Anticipates Transition to home    Patient/Family Anticipated Services at Transition none    Transportation Anticipated family or friend will provide       Discharge Needs Assessment    Readmission Within the Last 30 Days no previous admission in last 30 days    Equipment Currently Used at Home wheelchair;cane, straight;walker, rolling    Anticipated Changes Related to Illness none               Discharge Plan     Row Name 05/09/23 1344       Plan    Plan Home    Patient/Family in Agreement with Plan yes    Plan Comments Spoke with Ms. Renee at the bedside. She lives with her significant other in [facility]. She is independent with ADL's and she has a walker, cane and wheelchair at home. She does not use oxygen or HH. Her PCP is Mg Sarkar and she has Medicare and [facility] insurance.    Final Discharge Disposition Code 01 - home or self-care              Continued Care and Services - Discharged on 5/9/2023 Admission date: 5/7/2023 - Discharge disposition: Home or Self Care   Coordination has not been started for this encounter.       Expected Discharge Date and Time     Expected Discharge Date Expected Discharge Time    May 9, 2023          Demographic Summary     Row Name 05/09/23 1341       General  Information    Admission Type inpatient    Arrived From home    Referral Source admission list    Reason for Consult discharge planning    Preferred Language English       Contact Information    Permission Granted to Share Info With                Functional Status     Row Name 05/09/23 1348       Functional Status    Usual Activity Tolerance good    Current Activity Tolerance --  See PT notes       Functional Status, IADL    Medications independent    Meal Preparation independent    Housekeeping independent    Laundry independent    Shopping independent       Mental Status    General Appearance WDL WDL       Mental Status Summary    Recent Changes in Mental Status/Cognitive Functioning no changes                      Catherine Locke RN

## 2023-05-12 LAB
BACTERIA SPEC AEROBE CULT: NORMAL
BACTERIA SPEC AEROBE CULT: NORMAL

## 2023-05-19 ENCOUNTER — READMISSION MANAGEMENT (OUTPATIENT)
Dept: CALL CENTER | Facility: HOSPITAL | Age: 68
End: 2023-05-19
Payer: MEDICARE

## 2023-05-19 NOTE — OUTREACH NOTE
Sepsis Week 2 Survey    Flowsheet Row Responses   [facility] patient discharged from? [place]   Does the patient have one of the following disease processes/diagnoses(primary or secondary)? Sepsis   Week 2 attempt successful? Yes   Call start time 0822   Call end time 0826   Discharge diagnosis Urinary tract infection - Sepsis   Meds reviewed with patient/caregiver? Yes   Is the patient having any side effects they believe may be caused by any medication additions or changes? No   Does the patient have all medications related to this admission filled (includes all antibiotics, inhalers, nebulizers,steroids,etc.) Yes   Is the patient taking all medications as directed (includes completed medication regime)? Yes   Medication comments Completed ABT   Does the patient have a primary care provider?  Yes   Comments regarding PCP 5/22/23   Does the patient have an appointment with their PCP within 7 days of discharge? Greater than 7 days   What is preventing the patient from scheduling follow up appointments within 7 days of discharge? Earlier appointment not available   Nursing Interventions Verified appointment date/time/provider   Has the patient kept scheduled appointments due by today? N/A   Has home health visited the patient within 72 hours of discharge? N/A   Psychosocial issues? No   Did the patient receive a copy of their discharge instructions? Yes   Nursing interventions Reviewed instructions with patient   What is the patient's perception of their health status since discharge? Improving   Nursing interventions Nurse provided patient education   Is the patient/caregiver able to teach back TIME? T emperature - higher or lower than normal, I nfection - may have signs and symptoms of an infection, M ental Decline - confused, sleepy, difficult to arouse, E xtremely Ill - severe pain, discomfort, shortness of breath   Nursing interventions Nurse provided patient education   Is patient/caregiver able to  teach back steps to recovery at home? Set small, achievable goals for return to baseline health, Rest and regain strength   Is the patient/caregiver able to teach back signs and symptoms of worsening condition: Fever, Hyperthermia, Altered mental status(confusion/coma)   Is the patient/caregiver able to teach back the hierarchy of who to call/visit for symptoms/problems? PCP, Specialist, Home health nurse, Urgent Care, ED, 911 Yes   Week 2 call completed? Yes   Wrap up additional comments Pt reports she is doing better. Still not 100% yet but improving.          MAURILIO PAYAN - Registered Nurse

## 2024-03-04 ENCOUNTER — TELEPHONE (OUTPATIENT)
Dept: ORTHOPEDIC SURGERY | Facility: CLINIC | Age: 69
End: 2024-03-04
Payer: MEDICARE

## 2024-03-04 ENCOUNTER — TELEPHONE (OUTPATIENT)
Dept: ORTHOPEDIC SURGERY | Facility: CLINIC | Age: 69
End: 2024-03-04

## 2024-03-04 NOTE — TELEPHONE ENCOUNTER
DELETE AFTER REVIEWING: Telephone encounter to be sent to the clinical pool     Hub staff attempted to follow warm transfer process and was unsuccessful     Caller:     Relationship to patient:     Best call back number: [phone] (home)       Patient is needing: PATIENT IS A PATIENT OF DR SEALS I INFORMED HER DR MARTINO WILL BE OUT UNTIL EARLY APRIL. PATIENT SAYS SHE IS HAVING SX AND WANTS DR MARTINO TO DO IT. SHOULD SHE SCHEDULE NOW WITH ANOTHER PROVIDER OR WAIT UNTIL DR MARTINO'S RETURN???    PLEASE HAVE CLINICAL TO FOLLOWUP WITH PATIENT TO ADVISE.           DELETE AFTER READING TO PATIENT: “I was unable to reach my scheduling contact. I will send a message to the scheduling team. Please allow 48 hours for the  staff to follow up on this request.”

## 2024-03-04 NOTE — TELEPHONE ENCOUNTER
DELETE AFTER REVIEWING: Telephone encounter to be sent to the clinical pool     Caller:     Relationship to patient:     Best call back number: [phone] (home)       Chief complaint: KNEE    Type of visit: GEL SHOT INJECTIONS    Requested date: NA     If rescheduling, when is the original appointment: NA     Additional notes:BILATERAL KNEE INJECTIONS

## 2024-03-04 NOTE — TELEPHONE ENCOUNTER
Patient has not seen Dr. Helm since 05/03/2021 and will need to be seen in clinic before discussing/scheduling surgery. Contacted patient and informed her that she needs to make an appoint with Dr. Helm when she is available in April. Patient understood and wanted to make an appointment.    Bettie Copeland MA

## 2024-03-08 DIAGNOSIS — M17.0 PRIMARY OSTEOARTHRITIS OF BOTH KNEES: Primary | ICD-10-CM

## 2024-03-12 ENCOUNTER — HOSPITAL ENCOUNTER (OUTPATIENT)
Dept: GENERAL RADIOLOGY | Facility: HOSPITAL | Age: 69
Discharge: HOME | End: 2024-03-12
Admitting: PHYSICIAN ASSISTANT
Payer: MEDICARE

## 2024-03-12 ENCOUNTER — OFFICE VISIT (OUTPATIENT)
Age: 69
End: 2024-03-12
Payer: MEDICARE

## 2024-03-12 VITALS
BODY MASS INDEX: 32.82 KG/M2 | DIASTOLIC BLOOD PRESSURE: 60 MMHG | SYSTOLIC BLOOD PRESSURE: 102 MMHG | WEIGHT: 197 LBS | HEIGHT: 65 IN

## 2024-03-12 DIAGNOSIS — M17.4 OTHER SECONDARY OSTEOARTHRITIS OF BOTH KNEES: Primary | ICD-10-CM

## 2024-03-12 DIAGNOSIS — M17.0 PRIMARY OSTEOARTHRITIS OF BOTH KNEES: ICD-10-CM

## 2024-03-12 PROCEDURE — 99214 OFFICE O/P EST MOD 30 MIN: CPT | Performed by: PHYSICIAN ASSISTANT

## 2024-03-12 PROCEDURE — 1160F RVW MEDS BY RX/DR IN RCRD: CPT | Performed by: PHYSICIAN ASSISTANT

## 2024-03-12 PROCEDURE — 1159F MED LIST DOCD IN RCRD: CPT | Performed by: PHYSICIAN ASSISTANT

## 2024-03-12 PROCEDURE — 20610 DRAIN/INJ JOINT/BURSA W/O US: CPT | Performed by: PHYSICIAN ASSISTANT

## 2024-03-12 PROCEDURE — 73564 X-RAY EXAM KNEE 4 OR MORE: CPT

## 2024-03-12 RX ORDER — PHENAZOPYRIDINE HYDROCHLORIDE 200 MG/1
TABLET, FILM COATED ORAL AS NEEDED
COMMUNITY
Start: 2024-01-24

## 2024-03-12 RX ORDER — LETROZOLE 2.5 MG/1
2.5 TABLET, FILM COATED ORAL DAILY
COMMUNITY

## 2024-03-12 RX ORDER — SECUKINUMAB 150 MG/ML
INJECTION SUBCUTANEOUS
COMMUNITY
Start: 2023-10-16

## 2024-03-12 RX ORDER — TRIAMCINOLONE ACETONIDE 1 MG/G
OINTMENT TOPICAL AS NEEDED
COMMUNITY
Start: 2023-11-20

## 2024-03-12 NOTE — PROGRESS NOTES
Procedure   - Large Joint Arthrocentesis: bilateral knee on 3/12/2024 11:22 AM  Indications: pain  Details: 21 G needle, anterolateral approach  Medications (Right): 30 mg Hyaluronan 30 MG/2ML  Medications (Left): 30 mg Hyaluronan 30 MG/2ML  Outcome: tolerated well, no immediate complications  Procedure, treatment alternatives, risks and benefits explained, specific risks discussed. Consent was given by the patient. Immediately prior to procedure a time out was called to verify the correct patient, procedure, equipment, support staff and site/side marked as required. Patient was prepped and draped in the usual sterile fashion.

## 2024-03-12 NOTE — PROGRESS NOTES
"        [facility] Note        Subjective     CC: Pain of the Left Knee and Pain of the Right Knee      HPI    Mecca Renee is a 68 y.o. female.  Patient returns today to discuss her bilateral knee pain.  She is well-known to me.  She has not been seen since September 2022.  Since that time she has been treated for breast cancer and is doing well.  She reports bilateral knee pain with weightbearing and walking with minimal rest pain and night pain.  She has been treated in the past with cortisone injection and multiple series of viscosupplementation with good relief.  She does have rheumatoid arthritis.  She would like to have repeat series of Visco.    Overall, patient's symptoms are worsening    ROS:    Constiutional:Pt denies fever, chills, nausea, or vomiting.  MSK:as above        Objective      Past Medical History  Past Medical History:   Diagnosis Date    Asthma     Cancer     breast    Chronic pain disorder     COVID-19     positive    Diverticulosis     Fibromyalgia     GERD (gastroesophageal reflux disease)     Migraine     Neck pain     Osteoarthritis     Osteoporosis     Palpitations     palpitations with near syncope    Rheumatoid arthritis          Physical Exam  /60   Ht 165.1 cm (65\")   Wt 89.4 kg (197 lb)   LMP  (LMP Unknown)   BMI 32.78 kg/m²     Body mass index is 32.78 kg/m².    Patient is well nourished and well developed.        Ortho Exam  Bilateral knee exam: Tender over the medial joint line bilaterally range of motion 0-1 20 ligament stable valgus varus stress neurovascular intact distally.  Mildly antalgic gait.    Imaging/Labs/EMG Reviewed:  4 views of bilateral knees today show moderate tricompartmental of right arthritis most pronounced in the medial compartment with periarticular osteophytes.  No acute findings.        Assessment    Assessment:  1. Other secondary osteoarthritis of both knees        Plan:  Recommend over the counter " anti-inflammatories for pain and/or swelling  Bilateral knee arthritis in the face of rheumatoid arthritis.  I personally viewed and interpreted today's x-rays, clinical findings past and current treatment the patient.  I think her pain and function limitations are secondary to knee arthritis.  She has done well in the past with intermittent viscosupplementation.  The last series was in September 2022.  Plan today is I will place an order for bilateral Visco and we will begin it today in bilateral knees.  I will see her back in 1 week, sooner if needed.    Using sterile technique, the left knee was sterilely prepped with Hibiclens.  Following time out, using a 22 gauge needle the left knee was aspirated and then injected with 2 ml Orthovisc. Approximately 0.5 mm of straw-colored fluid was obtained.  Patient tolerated the procedure well.  No complications.      Using sterile technique, the right knee was sterilely prepped with Hibiclens.  Following time out, using a 22 gauge needle the right knee was aspirated and then injected with 2 ml Orthovisc. Approximately 0.5 mm of straw-colored fluid was obtained.  Patient tolerated the procedure well.  No complications.          Leny Alcaraz PA-C  03/12/24  16:04 EDT

## 2024-03-18 ENCOUNTER — TELEPHONE (OUTPATIENT)
Age: 69
End: 2024-03-18
Payer: MEDICARE

## 2024-03-18 NOTE — TELEPHONE ENCOUNTER
Need to resc her 3/26/24 Inj. Leny will be out of the ofc.    LVM to call back to get her appointment rescheduled.

## 2024-03-19 ENCOUNTER — CLINICAL SUPPORT (OUTPATIENT)
Age: 69
End: 2024-03-19
Payer: MEDICARE

## 2024-03-19 DIAGNOSIS — M17.4 OTHER SECONDARY OSTEOARTHRITIS OF BOTH KNEES: Primary | ICD-10-CM

## 2024-03-19 NOTE — PROGRESS NOTES
Procedure Note:    I discussed with the patient the potential benefits of performing a therapeutic injections as well as potential risks including but not limited to infection, swelling, pain, bleeding, bruising, nerve/vessel damage, skin color changes, transient elevation in blood glucose levels, and fat atrophy. After informed consent and after the areas were prepped with chlorhexadine soap, ethyl chloride was used to numb the skin. Via the inferolateral approach, 2mL of Orthovisc, 4 cc of 1% lidocaine and 40 mg of Kenalog was each injected into the bilateral knee joint. The patient tolerated the procedure well. There were no complications. A sterile dressing was placed over the injection sites.      Follow-up: 1 week

## 2024-03-19 NOTE — PROGRESS NOTES
Procedure   - Large Joint Arthrocentesis: bilateral knee on 3/19/2024 10:25 AM  Indications: pain  Details: 21 G needle, anterolateral approach  Medications (Right): 30 mg Hyaluronan 30 MG/2ML  Medications (Left): 30 mg Hyaluronan 30 MG/2ML  Outcome: tolerated well, no immediate complications  Procedure, treatment alternatives, risks and benefits explained, specific risks discussed. Consent was given by the patient. Immediately prior to procedure a time out was called to verify the correct patient, procedure, equipment, support staff and site/side marked as required. Patient was prepped and draped in the usual sterile fashion.

## 2024-03-28 ENCOUNTER — CLINICAL SUPPORT (OUTPATIENT)
Age: 69
End: 2024-03-28
Payer: MEDICARE

## 2024-03-28 DIAGNOSIS — M17.4 OTHER SECONDARY OSTEOARTHRITIS OF BOTH KNEES: Primary | ICD-10-CM

## 2024-03-28 NOTE — PROGRESS NOTES
Procedure   - Large Joint Arthrocentesis: bilateral knee on 3/28/2024 10:24 AM  Indications: pain  Details: 21 G needle, anterolateral approach  Medications (Right): 30 mg Hyaluronan 30 MG/2ML  Medications (Left): 30 mg Hyaluronan 30 MG/2ML  Outcome: tolerated well, no immediate complications  Procedure, treatment alternatives, risks and benefits explained, specific risks discussed. Consent was given by the patient. Immediately prior to procedure a time out was called to verify the correct patient, procedure, equipment, support staff and site/side marked as required. Patient was prepped and draped in the usual sterile fashion.

## 2024-03-28 NOTE — PROGRESS NOTES
Procedure Note:    I discussed with the patient the potential benefits of performing a therapeutic injections as well as potential risks including but not limited to infection, swelling, pain, bleeding, bruising, nerve/vessel damage, skin color changes, transient elevation in blood glucose levels, and fat atrophy. After informed consent and after the areas were prepped with chlorhexadine soap, ethyl chloride was used to numb the skin. Via the inferolateral approach, 2mL of  orthovisc was each injected into the bilateral knee joint. The patient tolerated the procedure well. There were no complications. A sterile dressing was placed over the injection sites.      Follow-up:6 months

## 2024-04-08 ENCOUNTER — OFFICE VISIT (OUTPATIENT)
Dept: ORTHOPEDIC SURGERY | Facility: CLINIC | Age: 69
End: 2024-04-08
Payer: MEDICARE

## 2024-04-08 VITALS
HEIGHT: 65 IN | BODY MASS INDEX: 32.15 KG/M2 | DIASTOLIC BLOOD PRESSURE: 78 MMHG | WEIGHT: 193 LBS | SYSTOLIC BLOOD PRESSURE: 124 MMHG

## 2024-04-08 DIAGNOSIS — I87.8 VENOUS STASIS: ICD-10-CM

## 2024-04-08 DIAGNOSIS — M79.675 PAIN OF LEFT GREAT TOE: Primary | ICD-10-CM

## 2024-04-08 DIAGNOSIS — M05.79 RHEUMATOID ARTHRITIS INVOLVING MULTIPLE SITES WITH POSITIVE RHEUMATOID FACTOR: ICD-10-CM

## 2024-04-08 PROCEDURE — 1159F MED LIST DOCD IN RCRD: CPT | Performed by: ORTHOPAEDIC SURGERY

## 2024-04-08 PROCEDURE — 99214 OFFICE O/P EST MOD 30 MIN: CPT | Performed by: ORTHOPAEDIC SURGERY

## 2024-04-08 PROCEDURE — 1160F RVW MEDS BY RX/DR IN RCRD: CPT | Performed by: ORTHOPAEDIC SURGERY

## 2024-04-08 RX ORDER — KETOCONAZOLE 20 MG/G
CREAM TOPICAL
COMMUNITY
Start: 2024-03-28

## 2024-04-08 RX ORDER — NAPROXEN 500 MG/1
500 TABLET ORAL
COMMUNITY
Start: 2024-04-03

## 2024-04-08 NOTE — PROGRESS NOTES
NEW PATIENT    Patient: Mecca Renee  : 1955    Primary Care Provider: Mg Sarkar MD    Requesting Provider: As above    Pain of the Left Foot (Left great toe)      History    Chief Complaint: Left forefoot pain    History of Present Illness: This is an extremely pleasant 68-year-old woman who has seen a number of times over the years.  She has longstanding rheumatoid arthritis.  She recently started Cosentyx and she thinks it is helping.  Dr. Ana Garcia is her rheumatologist.  She has had chronic pain in both feet left worse than right.  She has very thin fat pads.  She is here with more pain between the first and second toes on the left foot.  She has tried orthotics in the past.  She does wear a silicone sleeve on the second toe.  The greatest area of pain is where the great toe pushes on the second toe PIP joint.      The patient does have a personal or family history of DVT, PE, hypercoagulable state or risk factors for clotting.  Her mother has a history of clots      Current Outpatient Medications on File Prior to Visit   Medication Sig Dispense Refill    albuterol sulfate  (90 Base) MCG/ACT inhaler Inhale 2 puffs Every 6 (Six) Hours As Needed for Wheezing or Shortness of Air. 18 g 0    Biotin 5000 MCG capsule Take 5,000 mcg by mouth Daily.      bisoprolol (ZEBeta) 5 MG tablet Take 0.5 tablets by mouth 2 (Two) Times a Day. 30 tablet 6    Calcium-Magnesium-Vitamin D (CALCIUM 1200+D3 PO) Calcium 1200+D3      Coenzyme Q10 (COQ-10) 100 MG capsule Take 3 capsules by mouth Daily.      COMBIVENT RESPIMAT  MCG/ACT inhaler Inhale 1 puff As Needed.  3    denosumab (PROLIA) 60 MG/ML solution prefilled syringe syringe Inject 1 mL under the skin into the appropriate area as directed Every 6 (Six) Months.      diclofenac (VOLTAREN) 1 % gel gel Apply 4 g topically As Needed.      Dietary Management Product (Rheumate) capsule Take 1 capsule by mouth Daily. 90 capsule 4    flecainide  (TAMBOCOR) 100 MG tablet Take 0.5 tablets by mouth 2 (Two) Times a Day. 180 tablet 3    fluticasone (FLONASE) 50 MCG/ACT nasal spray 2 sprays into the nostril(s) as directed by provider Daily As Needed.      Folic Acid (FOLATE PO) Take  by mouth Daily.      furosemide (LASIX) 40 MG tablet 1 tablet Daily As Needed.      guaiFENesin (MUCINEX) 600 MG 12 hr tablet Take 1 tablet by mouth As Needed.      guaifenesin (ROBITUSSIN) 100 MG/5ML liquid Take 10 mL by mouth Every 4 (Four) Hours As Needed for Cough. 118 mL 0    ketoconazole (NIZORAL) 2 % cream Apply  topically to the appropriate area as directed.      letrozole (FEMARA) 2.5 MG tablet Take 1 tablet by mouth Daily.      levocetirizine (XYZAL) 5 MG tablet Take 1 tablet by mouth As Needed.      methocarbamol (ROBAXIN) 750 MG tablet Take 1 tablet by mouth 2 (Two) Times a Day.      naproxen (NAPROSYN) 500 MG tablet 1 tablet.      ondansetron (ZOFRAN) 8 MG tablet As Needed.      pantoprazole (PROTONIX) 20 MG EC tablet Take 1 tablet by mouth Daily.      phenazopyridine (PYRIDIUM) 200 MG tablet As Needed.      pregabalin (LYRICA) 150 MG capsule 1 capsule 2 (Two) Times a Day.      Pseudoephedrine-guaiFENesin (MUCINEX D PO) As Needed.      rizatriptan (MAXALT) 5 MG tablet Take 1 tablet by mouth 2 (Two) Times a Day As Needed for Migraine. May repeat in 2 hours if needed      S-Adenosylmethionine (DORIAN-e) 400 MG tablet Take 400 mg by mouth Daily.      Secukinumab (Cosentyx Sensoready Pen) 150 MG/ML solution auto-injector Inject  under the skin into the appropriate area as directed Every 14 (Fourteen) Days.      TiZANidine (ZANAFLEX) 2 MG capsule Take 1 capsule by mouth As Needed for Muscle Spasms.      traMADol (ULTRAM) 50 MG tablet Take 1 tablet by mouth Every 6 (Six) Hours As Needed for Moderate Pain.      triamcinolone (KENALOG) 0.1 % ointment As Needed.      venlafaxine XR (EFFEXOR-XR) 37.5 MG 24 hr capsule Take 2 capsules by mouth Daily.      [DISCONTINUED] DICLOFENAC PO  As Needed.      [DISCONTINUED] etodolac (LODINE) 400 MG tablet Take 1 tablet by mouth 2 (Two) Times a Day.       No current facility-administered medications on file prior to visit.      Allergies   Allergen Reactions    Sulfa Antibiotics Hives and Itching    Shellfish-Derived Products Other (See Comments)    Elemental Sulfur Rash      Past Medical History:   Diagnosis Date    Asthma     Cancer     breast    Chronic pain disorder     COVID-19     positive    Diverticulosis     Fibromyalgia     GERD (gastroesophageal reflux disease)     Migraine     Neck pain     Osteoarthritis     Osteoporosis     Palpitations     palpitations with near syncope    Rheumatoid arthritis      Past Surgical History:   Procedure Laterality Date    CHOLECYSTECTOMY      EYE SURGERY      eye lid lift    EYE SURGERY  12/2019    FOOT SURGERY Right     MASTECTOMY PARTIAL / LUMPECTOMY Left 10/2022    SHOULDER SURGERY Right      Family History   Problem Relation Age of Onset    Diabetes Mother     Cancer Mother         kidney    Heart disease Mother         CHF    Hypertension Mother     Kidney disease Mother     Cancer Father         bladder/kidney    Cancer Brother         lung    Cancer Brother         lung    Heart disease Maternal Grandmother     Stroke Maternal Grandmother     Arthritis Maternal Grandmother     Asthma Paternal Grandmother       Social History     Socioeconomic History    Marital status:    Tobacco Use    Smoking status: Never     Passive exposure: Past    Smokeless tobacco: Never   Vaping Use    Vaping status: Never Used   Substance and Sexual Activity    Alcohol use: No    Drug use: No    Sexual activity: Defer        Review of Systems   Constitutional:  Negative for activity change, appetite change, chills, diaphoresis, fatigue, fever and unexpected weight change.   HENT:  Negative for congestion, dental problem, drooling, ear discharge, ear pain, facial swelling, hearing loss, mouth sores, nosebleeds, postnasal  "drip, rhinorrhea, sinus pressure, sneezing, sore throat, tinnitus, trouble swallowing and voice change.    Eyes:  Negative for photophobia, pain, discharge, redness, itching and visual disturbance.   Respiratory:  Negative for apnea, cough, choking, chest tightness, shortness of breath, wheezing and stridor.    Cardiovascular:  Negative for chest pain, palpitations and leg swelling.   Gastrointestinal:  Negative for abdominal distention, abdominal pain, anal bleeding, blood in stool, constipation, diarrhea, nausea, rectal pain and vomiting.   Endocrine: Negative for cold intolerance, heat intolerance, polydipsia, polyphagia and polyuria.   Genitourinary:  Negative for decreased urine volume, difficulty urinating, dysuria, enuresis, flank pain, frequency, genital sores, hematuria and urgency.   Musculoskeletal:  Positive for arthralgias. Negative for back pain, gait problem, joint swelling, myalgias, neck pain and neck stiffness.   Skin:  Negative for color change, pallor, rash and wound.   Allergic/Immunologic: Negative for environmental allergies, food allergies and immunocompromised state.   Neurological:  Negative for dizziness, tremors, seizures, syncope, facial asymmetry, speech difficulty, weakness, light-headedness, numbness and headaches.   Hematological:  Negative for adenopathy. Does not bruise/bleed easily.   Psychiatric/Behavioral:  Negative for agitation, behavioral problems, confusion, decreased concentration, dysphoric mood, hallucinations, self-injury, sleep disturbance and suicidal ideas. The patient is not nervous/anxious and is not hyperactive.        The following portions of the patient's history were reviewed and updated as appropriate: allergies, current medications, past family history, past medical history, past social history, past surgical history, and problem list.    Physical Exam:   /78   Ht 165.1 cm (65\")   Wt 87.5 kg (193 lb)   LMP  (LMP Unknown)   BMI 32.12 kg/m² "   GENERAL: Body habitus: trunkal obesity    Lower extremity edema: Right: 1+ pitting; Left: 1+ pitting    Varicose veins:  Right: severe; Left: severe    Gait: normal     Mental Status:  awake and alert; oriented to person, place, and time    Voice:  clear  SKIN:  Lower extremity: warm and dry    Hair Growth(lower extremity):  Right:diminished; Left:  diminished  NAILS: Toenails: thick  HEENT: Head: Normocephalic, atraumatic,  without obvious abnormality.  eye: normal external eye, no icterus  ears:normal external ears  nose: normal external nose  PULM:  Repiratory effort normal  CV:  Dorsalis Pedis:  Right: 2+; Left:2+    Posterior Tibial: Right:2+; Left:2+    Capillary Refill:  Brisk  MSK:  Hand:moderate arthritis      Tibia:  Right:  non tender; Left:  non tender      Ankle:  Right: non tender; Left:  non tender      Foot:  Right:   Milder hammertoes and hallux valgus ; Left:   Prominent hallux valgus metatarsals primus varus, significant hammering with flexion contracture of all the PIP joints of the small toes, there is a corn on the second toe medial PIP joint, no signs of infection, she is tender there, the toes are somewhat stiff, very thin fat pad with dorsal subluxation of the MTPJ's that is stiff      NEURO:       Lower extremity sensation: intact                 Motor Function: all 5/5         Medical Decision Making    Data Review:   ordered and reviewed x-rays today    Assessment and Plan/ Diagnosis/Treatment options:   1. Pain of left great toe  We discussed the options.  If it came to surgery I would do a rheumatoid forefoot reconstruction.  I explained this to her.  I explained the recovery.  She is not certain that she is ready for that.  She still works at least part-time.  She would like to try new orthotics.  I also gave her another silicone sleeve for the toe.  I will be happy to see her anytime.  I gave her prescription for the orthotics. Patient suffers from pain and foot/ankle instability.   I am ordering bilateral custom molded foot orthoses to stabilize and reduce pain.  Custom required for tri-plane control, deformity, lifetime need.      - XR Foot 2 View Left    2. Rheumatoid arthritis involving multiple sites with positive rheumatoid factor  As above    3. Venous stasis  She has been reluctant to wear compression socks she really needs them every day, her varicosities and swelling are significant            Part of this encounter note is an electronic transcription/translation of spoken language to printed text. The electronic translation of spoken language may permit erroneous, or at times, nonsensical words or phrases to be inadvertently transcribed; Although I have reviewed the note for such errors, some may still exist.    NOTE TO PATIENT: The 21st Century Cures Act makes medical notes like these available to patients in the interest of transparency. However, be advised this is a medical document. It is intended as peer to peer communication. It is written in medical language and may contain abbreviations or verbiage that are unfamiliar. It may appear blunt or direct. Medical documents are intended to carry relevant information, facts as evident, and the clinical opinion of the practitioner.       Catherine Hoang MD  Foot/Ankle Musculoskeletal Exam

## 2024-04-19 ENCOUNTER — OFFICE VISIT (OUTPATIENT)
Dept: CARDIOLOGY | Facility: CLINIC | Age: 69
End: 2024-04-19
Payer: MEDICARE

## 2024-04-19 VITALS
HEIGHT: 65 IN | DIASTOLIC BLOOD PRESSURE: 68 MMHG | WEIGHT: 196.6 LBS | SYSTOLIC BLOOD PRESSURE: 110 MMHG | HEART RATE: 62 BPM | BODY MASS INDEX: 32.76 KG/M2 | OXYGEN SATURATION: 97 %

## 2024-04-19 DIAGNOSIS — R00.2 PALPITATIONS: Primary | ICD-10-CM

## 2024-04-19 DIAGNOSIS — E78.2 MIXED HYPERLIPIDEMIA: ICD-10-CM

## 2024-04-19 PROCEDURE — 99214 OFFICE O/P EST MOD 30 MIN: CPT | Performed by: INTERNAL MEDICINE

## 2024-04-19 NOTE — PROGRESS NOTES
[facility] Cardiology  Office Progress Note  Mecca Renee  1955  [address] [place] [address]       Visit Date: 04/19/24    PCP: Mg Sarkar MD  [address]  [place] [address]    IDENTIFICATION: A 68 y.o. female   from [place].        PROBLEM LIST:   Palpitations with near syncope:  Echocardiogram, 09/13/2013: LVEF (55% to 60%), abnormal LV diastolic filling consistent with impaired relaxation, trace MR, mild TR with an RVSP of 31.  Event recorder, September through October 2013: Low frequency PACs with occasional short runs of nonsustained atrial tachycardia.   CP  6/15 Lexiscan wnl  2/21 2D echo: LVEF =60%.  Trace MR.  Trace TR.  10/22 stress: Anh PET scan within normal limits, EF hyperdynamic, no coronary calcifications  Lipid status  11/21  [id number]  GERD.  Rheumatoid arthritis.   bilat carpal tunnel  Covid 11/20  Diverticulosis  Chronic lo back pain  DDD  Breast cancer, 10/22 - chemo/radiation 2/23  Surgical history:  Right shoulder surgery.  Right foot surgery.   Lumpectomy         CC:   Chief Complaint   Patient presents with    Chronic hypotension       Allergies  Allergies   Allergen Reactions    Sulfa Antibiotics Hives and Itching    Shellfish-Derived Products Other (See Comments)    Elemental Sulfur Rash       Current Medications  Current Outpatient Medications   Medication Instructions    albuterol sulfate  (90 Base) MCG/ACT inhaler 2 puffs, Inhalation, Every 6 Hours PRN    Biotin 5,000 mcg, Oral, Daily    bisoprolol (ZEBETA) 2.5 mg, Oral, 2 Times Daily    Calcium-Magnesium-Vitamin D (CALCIUM 1200+D3 PO) Calcium 1200+D3    Coenzyme Q10 (COQ-10) 100 MG capsule Take 3 capsules by mouth Daily.    COMBIVENT RESPIMAT  MCG/ACT inhaler 1 puff, Inhalation, As Needed    denosumab (PROLIA) 60 MG/ML solution prefilled syringe syringe 1 mL, Subcutaneous, Every 6 Months    diclofenac (VOLTAREN) 4 g, Topical, As Needed     "Dietary Management Product (Rheumate) capsule 1 capsule, Oral, Daily    flecainide (TAMBOCOR) 50 mg, Oral, 2 Times Daily    fluticasone (FLONASE) 50 MCG/ACT nasal spray 2 sprays, Nasal, Daily PRN    Folic Acid (FOLATE PO) Oral, Daily    furosemide (LASIX) 40 MG tablet 1 tablet Daily As Needed.    guaiFENesin (MUCINEX) 600 mg, Oral, As Needed    guaifenesin (ROBITUSSIN) 200 mg, Oral, Every 4 Hours PRN    ketoconazole (NIZORAL) 2 % cream Topical    letrozole (FEMARA) 2.5 mg, Oral, Daily    levocetirizine (XYZAL) 5 mg, Oral, As Needed    methocarbamol (ROBAXIN) 750 mg, Oral, 2 Times Daily    ondansetron (ZOFRAN) 8 MG tablet As Needed    pantoprazole (PROTONIX) 20 mg, Oral, Daily    phenazopyridine (PYRIDIUM) 200 MG tablet As Needed    pregabalin (LYRICA) 150 mg, 2 Times Daily    Pseudoephedrine-guaiFENesin (MUCINEX D PO) As Needed    DORIAN-e 400 mg, Oral, Daily    Secukinumab (Cosentyx Sensoready Pen) 150 MG/ML solution auto-injector Subcutaneous, Every 14 Days    TiZANidine (ZANAFLEX) 2 mg, Oral, As Needed    traMADol (ULTRAM) 50 mg, Oral, Every 6 Hours PRN    triamcinolone (KENALOG) 0.1 % ointment As Needed    venlafaxine XR (EFFEXOR-XR) 75 mg, Oral, Daily        History of Present Illness   Mecca Renee is a 68 y.o. year old female here for follow up.    Pt denies any chest pain, dyspnea, dyspnea on exertion, orthopnea, PND, palpitations, lower extremity edema, or claudication.  She is scheduled to go on a cruise with her family later this summer    OBJECTIVE:  Vitals:    04/19/24 1053   BP: 110/68   BP Location: Right arm   Patient Position: Sitting   Pulse: 62   SpO2: 97%   Weight: 89.2 kg (196 lb 9.6 oz)   Height: 165.1 cm (65\")     Body mass index is 32.72 kg/m².    Constitutional:       Appearance: Healthy appearance. Not in distress.   Neck:      Vascular: No JVR. JVD normal.   Pulmonary:      Effort: Pulmonary effort is normal.      Breath sounds: Normal breath sounds. No wheezing. No rhonchi. No " rales.   Chest:      Chest wall: Not tender to palpatation.   Cardiovascular:      PMI at left midclavicular line. Normal rate. Regular rhythm. Normal S1. Normal S2.       Murmurs: There is no murmur.      No gallop.  No click. No rub.   Pulses:     Intact distal pulses.   Edema:     Peripheral edema absent.   Abdominal:      General: Bowel sounds are normal.      Palpations: Abdomen is soft.      Tenderness: There is no abdominal tenderness.   Musculoskeletal: Normal range of motion.         General: No tenderness. Skin:     General: Skin is warm and dry.   Neurological:      General: No focal deficit present.      Mental Status: Alert and oriented to person, place and time.         Diagnostic Data:    ECG 12 Lead    Date/Time: 4/19/2024 11:11 AM  Performed by: William Brewster MD    Authorized by: William Brewster MD  Comparison: compared with previous ECG from 3/10/2023  Rhythm: sinus rhythm  BPM: 62  Conduction: 1st degree AV block    Clinical impression: abnormal EKG              ASSESSMENT:   Diagnosis Plan   1. Palpitations        2. Mixed hyperlipidemia            PLAN:  Palpitations well-controlled on Zebeta/flecainide with preserved LV function continued observation    Mixed dyslipidemia controlled on dietary therapy with no coronary calcium noted on most recent stress test        William Brewster MD, [facility]

## 2024-05-08 PROBLEM — M19.90 INFLAMMATORY ARTHRITIS: Status: ACTIVE | Noted: 2024-05-08

## 2024-05-10 ENCOUNTER — OFFICE VISIT (OUTPATIENT)
Age: 69
End: 2024-05-10
Payer: MEDICARE

## 2024-05-10 VITALS
HEART RATE: 56 BPM | HEIGHT: 65 IN | SYSTOLIC BLOOD PRESSURE: 117 MMHG | WEIGHT: 197.7 LBS | DIASTOLIC BLOOD PRESSURE: 78 MMHG | TEMPERATURE: 97.3 F | BODY MASS INDEX: 32.94 KG/M2

## 2024-05-10 DIAGNOSIS — L40.50 PSORIATIC ARTHRITIS: Primary | ICD-10-CM

## 2024-05-10 DIAGNOSIS — M79.7 FIBROMYALGIA: ICD-10-CM

## 2024-05-10 DIAGNOSIS — R52 PAIN MANAGEMENT: ICD-10-CM

## 2024-05-10 DIAGNOSIS — Z79.899 HIGH RISK MEDICATION USE: ICD-10-CM

## 2024-05-10 DIAGNOSIS — L40.9 PSORIASIS: ICD-10-CM

## 2024-05-10 DIAGNOSIS — M81.0 OSTEOPOROSIS, UNSPECIFIED OSTEOPOROSIS TYPE, UNSPECIFIED PATHOLOGICAL FRACTURE PRESENCE: ICD-10-CM

## 2024-05-10 DIAGNOSIS — M54.2 NECK PAIN: ICD-10-CM

## 2024-05-10 DIAGNOSIS — M54.9 BACK PAIN, UNSPECIFIED BACK LOCATION, UNSPECIFIED BACK PAIN LATERALITY, UNSPECIFIED CHRONICITY: ICD-10-CM

## 2024-05-10 DIAGNOSIS — E55.9 VITAMIN D DEFICIENCY: ICD-10-CM

## 2024-05-10 RX ORDER — ERGOCALCIFEROL 1.25 MG/1
50000 CAPSULE ORAL WEEKLY
Qty: 12 CAPSULE | Refills: 0 | Status: SHIPPED | OUTPATIENT
Start: 2024-05-10

## 2024-05-10 RX ORDER — SECUKINUMAB 150 MG/ML
150 INJECTION SUBCUTANEOUS
Qty: 1 ML | Refills: 3 | Status: SHIPPED | OUTPATIENT
Start: 2024-05-10

## 2024-05-10 RX ORDER — METHOCARBAMOL 750 MG/1
750 TABLET, FILM COATED ORAL 3 TIMES DAILY
Qty: 90 TABLET | Refills: 3 | Status: SHIPPED | OUTPATIENT
Start: 2024-05-10

## 2024-05-10 RX ORDER — ETODOLAC 400 MG/1
400 TABLET, FILM COATED ORAL 2 TIMES DAILY
Qty: 60 TABLET | Refills: 3 | Status: SHIPPED | OUTPATIENT
Start: 2024-05-10

## 2024-05-10 RX ORDER — OXYBUTYNIN CHLORIDE 10 MG/1
10 TABLET, EXTENDED RELEASE ORAL DAILY
COMMUNITY
Start: 2024-05-03

## 2024-05-10 RX ORDER — PREGABALIN 150 MG/1
150 CAPSULE ORAL 2 TIMES DAILY
Qty: 60 CAPSULE | Refills: 3 | Status: SHIPPED | OUTPATIENT
Start: 2024-05-10

## 2024-05-10 RX ORDER — RIZATRIPTAN BENZOATE 5 MG/1
5 TABLET, ORALLY DISINTEGRATING ORAL ONCE AS NEEDED
COMMUNITY
Start: 2024-05-09

## 2024-05-10 RX ORDER — TRAMADOL HYDROCHLORIDE 50 MG/1
50 TABLET ORAL EVERY 8 HOURS PRN
Qty: 90 TABLET | Refills: 3 | Status: SHIPPED | OUTPATIENT
Start: 2024-05-10

## 2024-05-16 ENCOUNTER — TELEPHONE (OUTPATIENT)
Age: 69
End: 2024-05-16
Payer: MEDICARE

## 2024-05-16 NOTE — TELEPHONE ENCOUNTER
I received a message that we needed to see if this patient had been set up for Prolia somewhere. The chart note states that the last injection was 11/23. I checked our system and did not see a encounter for this so I tried calling the patient to see if she had this someplace else. The patient did not answer and it said her voicemail was full.

## 2024-05-20 ENCOUNTER — TELEPHONE (OUTPATIENT)
Age: 69
End: 2024-05-20

## 2024-05-20 NOTE — TELEPHONE ENCOUNTER
I don't see that she was set up anywhere else for Prolia. Does she need an order sent to [facility]?

## 2024-07-29 ENCOUNTER — TELEPHONE (OUTPATIENT)
Age: 69
End: 2024-07-29
Payer: MEDICARE

## 2024-07-29 DIAGNOSIS — M47.816 SPONDYLOSIS OF LUMBAR REGION WITHOUT MYELOPATHY OR RADICULOPATHY: ICD-10-CM

## 2024-07-30 DIAGNOSIS — L40.50 PSORIATIC ARTHRITIS: ICD-10-CM

## 2024-07-30 RX ORDER — SECUKINUMAB 150 MG/ML
150 INJECTION SUBCUTANEOUS
Qty: 1 ML | Refills: 3 | Status: CANCELLED | OUTPATIENT
Start: 2024-07-30

## 2024-07-30 NOTE — TELEPHONE ENCOUNTER
CALLED [facility], TO CONFIRM PT IS TO BE ON ASPIRIN 81 MG DAILY.
CONFIRMED AND ADDED TO HOME MEDICATION LIST. PT IS CALLING IN A REQUEST FOR A REFILL ON HER SECUKINUMAB (COSENTYX SENSOREADY PEN) 150 MG/ML SOLUTION AUTO-INJECTOR.     PTS PHARMACY IS ON FILE   PHARMACY PHONE# [phone]

## 2024-07-31 ENCOUNTER — SPECIALTY PHARMACY (OUTPATIENT)
Age: 69
End: 2024-07-31
Payer: MEDICARE

## 2024-07-31 DIAGNOSIS — L40.50 PSORIATIC ARTHRITIS: ICD-10-CM

## 2024-07-31 RX ORDER — SECUKINUMAB 150 MG/ML
150 INJECTION SUBCUTANEOUS
Qty: 1 ML | Refills: 5 | Status: SHIPPED | OUTPATIENT
Start: 2024-07-31

## 2024-07-31 RX ORDER — ME-TETRAHYDROFOLATE/B12/HRB236 1-1-500 MG
1 CAPSULE ORAL DAILY
Qty: 90 CAPSULE | Refills: 4 | Status: SHIPPED | OUTPATIENT
Start: 2024-07-31

## 2024-08-01 ENCOUNTER — SPECIALTY PHARMACY (OUTPATIENT)
Age: 69
End: 2024-08-01
Payer: MEDICARE

## 2024-08-12 ENCOUNTER — TELEPHONE (OUTPATIENT)
Age: 69
End: 2024-08-12
Payer: MEDICARE

## 2024-08-12 ENCOUNTER — SPECIALTY PHARMACY (OUTPATIENT)
Age: 69
End: 2024-08-12
Payer: MEDICARE

## 2024-08-12 DIAGNOSIS — L40.50 PSORIATIC ARTHRITIS: ICD-10-CM

## 2024-08-12 RX ORDER — SECUKINUMAB 150 MG/ML
150 INJECTION SUBCUTANEOUS
Qty: 1 ML | Refills: 5 | Status: SHIPPED | OUTPATIENT
Start: 2024-08-12

## 2024-08-12 NOTE — TELEPHONE ENCOUNTER
Pt sent message via [employer].  She'd like to know what's going on with her Cosentyx as she said that we need to call [facility] because they haven't rec'd prescription. Can you please look into this for her?  You can call or send me message to pass along to her via [employer]. -Leila Palencia, A

## 2024-08-12 NOTE — TELEPHONE ENCOUNTER
"Caller: Mecca Renee \"Gia\"    Relationship to patient: Self    Best call back number: [phone]    Patient is needing: PT IS CALLING BACK TO CHECK ON THE STATUS OF THIS SINCE SHE HADN'T HEARD BACK YET FROM [facility].    "

## 2024-09-13 ENCOUNTER — TELEPHONE (OUTPATIENT)
Age: 69
End: 2024-09-13

## 2024-09-13 ENCOUNTER — OFFICE VISIT (OUTPATIENT)
Age: 69
End: 2024-09-13
Payer: MEDICARE

## 2024-09-13 VITALS
SYSTOLIC BLOOD PRESSURE: 110 MMHG | HEART RATE: 69 BPM | BODY MASS INDEX: 31.49 KG/M2 | DIASTOLIC BLOOD PRESSURE: 70 MMHG | TEMPERATURE: 97.4 F | HEIGHT: 65 IN | WEIGHT: 189 LBS

## 2024-09-13 DIAGNOSIS — Z79.899 HIGH RISK MEDICATION USE: ICD-10-CM

## 2024-09-13 DIAGNOSIS — L40.50 PSORIATIC ARTHRITIS: ICD-10-CM

## 2024-09-13 DIAGNOSIS — M54.2 NECK PAIN: ICD-10-CM

## 2024-09-13 DIAGNOSIS — E55.9 VITAMIN D DEFICIENCY: Primary | ICD-10-CM

## 2024-09-13 DIAGNOSIS — M79.7 FIBROMYALGIA: ICD-10-CM

## 2024-09-13 DIAGNOSIS — M54.9 BACK PAIN, UNSPECIFIED BACK LOCATION, UNSPECIFIED BACK PAIN LATERALITY, UNSPECIFIED CHRONICITY: ICD-10-CM

## 2024-09-13 RX ORDER — ETODOLAC 400 MG/1
400 TABLET, FILM COATED ORAL 2 TIMES DAILY
Qty: 60 TABLET | Refills: 3 | Status: SHIPPED | OUTPATIENT
Start: 2024-09-13

## 2024-09-13 RX ORDER — METHOCARBAMOL 750 MG/1
750 TABLET, FILM COATED ORAL 3 TIMES DAILY
Qty: 90 TABLET | Refills: 3 | Status: SHIPPED | OUTPATIENT
Start: 2024-09-13

## 2024-09-13 RX ORDER — TRIAMCINOLONE ACETONIDE 1 MG/G
CREAM TOPICAL
COMMUNITY
Start: 2024-06-10

## 2024-09-13 RX ORDER — EXEMESTANE 25 MG/1
25 TABLET ORAL DAILY
COMMUNITY
Start: 2024-07-23 | End: 2024-10-21

## 2024-09-13 RX ORDER — VENLAFAXINE HYDROCHLORIDE 75 MG/1
75 CAPSULE, EXTENDED RELEASE ORAL DAILY
COMMUNITY
Start: 2024-09-03

## 2024-09-13 RX ORDER — PREGABALIN 150 MG/1
150 CAPSULE ORAL 2 TIMES DAILY
Qty: 60 CAPSULE | Refills: 3 | Status: SHIPPED | OUTPATIENT
Start: 2024-09-13 | End: 2025-02-14 | Stop reason: SDUPTHER

## 2024-09-13 NOTE — PROGRESS NOTES
[facility] [place] Visit     Office Follow Up      Date: 09/13/2024   Patient Name: Mecca Renee  MRN: [id number]  YOB: 1955    Referring Physician: Mg Sarkar MD     Chief Complaint   Patient presents with    Rheumatoid Arthritis    Fibromyalgia    Osteoarthritis    Osteoporosis    Psoriatic arthritis       History of Present Illness: Mecca Renee is a 69 y.o. female who is here today for follow up on   History of Present Illness  The patient presents for evaluation of multiple medical concerns.    She is currently on Cosentyx and experiences morning stiffness in her knees and feet for 2 to 3 hours. She also reports severe lower back pain. Despite some improvement in joint swelling, pain, and stiffness due to the injections she has been receiving, she still experiences some stiffness. She has an appointment with Dr. Jang, a back specialist, on Wednesday. Previous consultations with him indicated that surgery might be necessary. She has tried injections, with the first one providing relief but the second one proving ineffective. She experiences sciatic nerve pain radiating down her left leg, while the right leg is painful but does not have radiating pain. Her hips throb, and she has pain in the middle of her lower back. She has a screw in her right foot and owns a TENS unit, although she does not use it. She takes Lyrica instead of gabapentin due to its sedative effect. For severe pain, she uses tramadol, and for additional pain management, she takes etodolac and methocarbamol. She recently had a TB test at her local health center, which came back negative.    She has been treated for vitamin D deficiency and is currently taking 2000 units of vitamin D daily. She also receives Prolia injections, with the last one administered in either May or June of 2024. Additionally, she is on iron supplements.    She has psoriasis  on her arms and the bottom of her feet.       Result Review :        Results  Laboratory Studies  Vitamin D was 20.5. Blood counts showed mild anemia in May, but were normal when rechecked. Iron was low in May, but ferritin was normal.    Imaging  MRI showed osteoarthritis and degenerative disc disease, with narrowing where the old nerves come out and arthritic facet joints.          Subjective     Allergies   Allergen Reactions    Sulfa Antibiotics Hives and Itching    Shellfish-Derived Products Hives and Itching               Current Outpatient Medications:     albuterol sulfate  (90 Base) MCG/ACT inhaler, Inhale 2 puffs Every 6 (Six) Hours As Needed for Wheezing or Shortness of Air., Disp: 18 g, Rfl: 0    Biotin 5000 MCG capsule, Take 5,000 mcg by mouth Daily., Disp: , Rfl:     bisoprolol (ZEBeta) 5 MG tablet, Take 0.5 tablets by mouth 2 (Two) Times a Day., Disp: 30 tablet, Rfl: 6    Calcium Carbonate (Calcium 600) 1500 (600 Ca) MG tablet, Take 2 tablets by mouth Daily., Disp: , Rfl:     Calcium-Magnesium-Vitamin D (CALCIUM 1200+D3 PO), Calcium 1200+D3, Disp: , Rfl:     cetirizine (zyrTEC) 10 MG tablet, Take 1 tablet by mouth Daily., Disp: , Rfl:     Cholecalciferol 25 MCG (1000 UT) tablet, Take 1 tablet by mouth Daily., Disp: , Rfl:     Coenzyme Q10 (COQ-10) 100 MG capsule, Take 3 capsules by mouth Daily., Disp: , Rfl:     COMBIVENT RESPIMAT  MCG/ACT inhaler, Inhale 1 puff As Needed., Disp: , Rfl: 3    denosumab (PROLIA) 60 MG/ML solution prefilled syringe syringe, Inject 1 mL under the skin into the appropriate area as directed Every 6 (Six) Months., Disp: , Rfl:     diclofenac (VOLTAREN) 1 % gel gel, Apply 4 g topically As Needed., Disp: , Rfl:     Dietary Management Product (Rheumate) capsule, Take 1 capsule by mouth Daily., Disp: 90 capsule, Rfl: 4    etodolac (LODINE) 400 MG tablet, Take 1 tablet by mouth 2 (Two) Times a Day. WITH FOOD, Disp: 60 tablet, Rfl: 3    exemestane (AROMASIN) 25 MG  tablet, Take 1 tablet by mouth Daily., Disp: , Rfl:     flecainide (TAMBOCOR) 100 MG tablet, Take 0.5 tablets by mouth 2 (Two) Times a Day., Disp: 180 tablet, Rfl: 3    fluticasone (FLONASE) 50 MCG/ACT nasal spray, 2 sprays into the nostril(s) as directed by provider Daily As Needed., Disp: , Rfl:     Folic Acid (FOLATE PO), Take  by mouth Daily., Disp: , Rfl:     furosemide (LASIX) 40 MG tablet, 1 tablet Daily As Needed., Disp: , Rfl:     guaiFENesin (MUCINEX) 600 MG 12 hr tablet, Take 1 tablet by mouth As Needed., Disp: , Rfl:     guaifenesin (ROBITUSSIN) 100 MG/5ML liquid, Take 10 mL by mouth Every 4 (Four) Hours As Needed for Cough., Disp: 118 mL, Rfl: 0    ketoconazole (NIZORAL) 2 % cream, Apply  topically to the appropriate area as directed., Disp: , Rfl:     levocetirizine (XYZAL) 5 MG tablet, Take 1 tablet by mouth As Needed., Disp: , Rfl:     methocarbamol (ROBAXIN) 750 MG tablet, Take 1 tablet by mouth 3 (Three) Times a Day., Disp: 90 tablet, Rfl: 3    ondansetron (ZOFRAN) 8 MG tablet, As Needed., Disp: , Rfl:     oxybutynin XL (DITROPAN-XL) 10 MG 24 hr tablet, Take 1 tablet by mouth Daily., Disp: , Rfl:     pantoprazole (PROTONIX) 20 MG EC tablet, Take 1 tablet by mouth Daily., Disp: , Rfl:     phenazopyridine (PYRIDIUM) 200 MG tablet, As Needed., Disp: , Rfl:     pregabalin (LYRICA) 150 MG capsule, Take 1 capsule by mouth 2 (Two) Times a Day., Disp: 60 capsule, Rfl: 3    Pseudoephedrine-guaiFENesin (MUCINEX D PO), As Needed., Disp: , Rfl:     rizatriptan MLT (MAXALT-MLT) 5 MG disintegrating tablet, Place 1 tablet on the tongue 1 (One) Time As Needed for Migraine., Disp: , Rfl:     S-Adenosylmethionine (DORIAN-e) 400 MG tablet, Take 400 mg by mouth Daily., Disp: , Rfl:     Secukinumab (Cosentyx Sensoready Pen) 150 MG/ML solution auto-injector, Inject 150 mg under the skin into the appropriate area as directed Every 28 (Twenty-Eight) Days., Disp: 1 mL, Rfl: 5    SUMAtriptan (IMITREX) 100 MG tablet, Take 1  tablet by mouth. after onset of migraine; may repeat after 2 hours if headache returns,not to exceed 200mg in 24hrs, Disp: , Rfl:     TiZANidine (ZANAFLEX) 2 MG capsule, Take 1 capsule by mouth As Needed for Muscle Spasms., Disp: , Rfl:     traMADol (ULTRAM) 50 MG tablet, Take 1 tablet by mouth Every 8 (Eight) Hours As Needed for Moderate Pain., Disp: 90 tablet, Rfl: 3    triamcinolone (KENALOG) 0.1 % cream, APPLY TO RASH TWICE A DAY AS NEEDED - AVOID CONTACT WITH FACE, Disp: , Rfl:     venlafaxine XR (EFFEXOR-XR) 75 MG 24 hr capsule, Take 1 capsule by mouth Daily., Disp: , Rfl:     vitamin D (ERGOCALCIFEROL) 1.25 MG (41325 UT) capsule capsule, Take 1 capsule by mouth 1 (One) Time Per Week., Disp: 12 capsule, Rfl: 0    Past Medical History:   Diagnosis Date    Ankle fracture     11/15/2018 - Left Tibial stress fracture. May 2018. Dr. Helm.    Asthma     Breast cancer, stage 1     9/2022 Dr. Miranda    Cancer     breast    Chronic pain disorder     COVID-19     positive    Diverticulosis     Fatigue     Fibromyalgia     Foot fracture, left     Foot fracture, right     GERD (gastroesophageal reflux disease)     High risk medication use     History of degenerative disc disease     Inflammatory arthritis     Low back pain     Metatarsal stress fracture of left foot     Migraine     Neck pain     Osteoarthritis     Osteoporosis     Pain     ABNORMAL PAIN, EPI GASTRIC- Status is Resolved    Palpitations     palpitations with near syncope    Paresthesias     Posterior tibial tendonitis, left     11/15/2018 - Dr. Helm.2018.    Pre-syncope     Rheumatoid arthritis     Rib fracture     6/21 XR ; Chronic L 4th rib fracture. Age indeterminate. 5-7 anterolateral rib fractures.  12/03/2021 -Traumatic.    Right shoulder pain     Tachycardia     Vitamin D deficiency     Whiplash         Past Surgical History:   Procedure Laterality Date    BLEPHAROPLASTY  01/07/2020    Dr. Das.    CHOLECYSTECTOMY      EYE SURGERY      eye  "lid lift    EYE SURGERY  12/2019    FOOT SURGERY Right     MASTECTOMY PARTIAL / LUMPECTOMY Left 10/2022    SHOULDER SURGERY Right        Family History   Problem Relation Age of Onset    Diabetes Mother     Cancer Mother         kidney    Heart disease Mother         CHF    Hypertension Mother     Kidney disease Mother     Cancer Father         bladder/kidney    Cancer Brother         lung    Cancer Brother         lung    Lupus Brother     Heart disease Maternal Grandmother     Stroke Maternal Grandmother     Arthritis Maternal Grandmother     Asthma Paternal Grandmother         Social History     Socioeconomic History    Marital status:      Spouse name: 2   Tobacco Use    Smoking status: Never     Passive exposure: Past    Smokeless tobacco: Never   Vaping Use    Vaping status: Never Used   Substance and Sexual Activity    Alcohol use: No    Drug use: No    Sexual activity: Defer       Review of Systems   Constitutional:  Positive for unexpected weight loss.   HENT:  Positive for postnasal drip.    Eyes:  Positive for blurred vision.   Gastrointestinal:  Positive for constipation.   Musculoskeletal:  Positive for arthralgias, back pain, myalgias and neck pain.   Hematological:  Bruises/bleeds easily.      I have reviewed and updated the patient's chief complaint, history of present illness, review of systems, past medical history, surgical history, family history, social history, medications and allergy list as appropriate.     Objective    Vital Signs:   Vitals:    09/13/24 1117   BP: 110/70   BP Location: Right arm   Patient Position: Sitting   Cuff Size: Adult   Pulse: 69   Temp: 97.4 °F (36.3 °C)   Weight: 85.7 kg (189 lb)   Height: 165.1 cm (65\")   PainSc: 10-Worst pain ever     Mecca Renee reports a pain score of 10.  Given her pain assessment as noted, treatment options were discussed and the following options were decided upon as a follow-up plan to address the patient's pain: " .      Body mass index is 31.45 kg/m².        Physical Exam   Physical Exam  Patient appears alert and oriented, with no signs of acute distress.  Head is atraumatic and normocephalic. Pupils are equal and round. Extraocular muscles are intact. Oropharynx shows no abnormalities.  Lungs are clear upon auscultation.  Heart rhythm is regular, with no rubs, gallops, or murmurs detected.  Tenderness noted in the thoracic and lumbar spine. All 18 fibromyalgia points are tender. Tenderness observed in the shoulder, wrist, MCPs, and PIPs, but no synovitis present. Heberden's nodes and first CMC squaring are noted. Slight crepitus detected in the knees. No synovitis in the lower extremity joints. Varicose veins are present in the extremities.    Physical Exam  There is currently no information documented on the homunculus. Go to the Rheumatology activity and complete the homunculus joint exam.     Results Review:   Imaging Results (Last 24 Hours)       ** No results found for the last 24 hours. **            Procedures    Assessment / Plan    Assessment/Plan:   There are no diagnoses linked to this encounter.      Assessment & Plan  1. Osteoarthritis.  Her MRI results indicate severe osteoarthritis with notable narrowing at the nerve exit points. The facet joints are arthritic, and at one location, the narrowing is so severe that it is impinging on the nerve roots. She was advised to utilize her TENS unit for pain management. A request will be made to obtain her records from Dr. Jang. Prescriptions for Lyrica, methocarbamol, and tramadol will be sent to her pharmacy. If the pain becomes unmanageable, she may need to consider surgery or visit a pain clinic for stronger pain medication.    2. Degenerative Disc Disease.  The MRI also shows degenerative disc disease with narrowing where the nerves exit. She is currently managing the condition with Lyrica, methocarbamol, and tramadol. She was advised to continue these  medications and use the TENS unit. If symptoms worsen, further interventions such as surgery or stronger pain medication may be necessary.    3. Sciatica.  She reports pain radiating down her left leg, consistent with sciatica. This is likely due to the nerve impingement seen on the MRI. She was advised to continue her current medications and use the TENS unit. If symptoms persist, further evaluation and treatment options will be considered.    4. Psoriatic Arthritis.  She reports that her joint swelling, pain, and stiffness have improved with Cosentyx. She should continue with her current regimen of Cosentyx. A copy of her recent TB skin test will be needed to preauthorize the medication again.    5. Vitamin D Deficiency.  Her vitamin D level was 20.5 in 05/2024. She has been taking 2000 IU of vitamin D daily. A prescription for vitamin D will be sent to her pharmacy. Her kidney and liver function tests were normal as of 05/2024.    6. Mild Anemia.  She had mild anemia in 05/2024, but her blood counts were normal when rechecked. She should continue her current regimen, and her blood counts will be monitored.    Follow-up  She will follow up in 4 months.        Follow Up:   No follow-ups on file.       Ana Garcia MD  [facility]     Encounter Administratively Closed by [employer]

## 2024-09-27 ENCOUNTER — TELEPHONE (OUTPATIENT)
Age: 69
End: 2024-09-27
Payer: MEDICARE

## 2024-09-30 ENCOUNTER — CLINICAL SUPPORT (OUTPATIENT)
Age: 69
End: 2024-09-30
Payer: MEDICARE

## 2024-09-30 VITALS
DIASTOLIC BLOOD PRESSURE: 78 MMHG | HEIGHT: 65 IN | BODY MASS INDEX: 31.65 KG/M2 | SYSTOLIC BLOOD PRESSURE: 118 MMHG | WEIGHT: 190 LBS

## 2024-09-30 DIAGNOSIS — M17.0 PRIMARY OSTEOARTHRITIS OF BOTH KNEES: Primary | ICD-10-CM

## 2024-09-30 PROCEDURE — 20610 DRAIN/INJ JOINT/BURSA W/O US: CPT

## 2024-09-30 RX ORDER — LETROZOLE 2.5 MG/1
TABLET, FILM COATED ORAL
COMMUNITY

## 2024-09-30 NOTE — PROGRESS NOTES
Procedure   - Large Joint Arthrocentesis: bilateral knee on 9/30/2024 11:07 AM  Indications: pain  Details: 21 G needle, anterolateral approach  Medications (Right): 30 mg Hyaluronan 30 MG/2ML  Medications (Left): 30 mg Hyaluronan 30 MG/2ML  Outcome: tolerated well, no immediate complications  Procedure, treatment alternatives, risks and benefits explained, specific risks discussed. Consent was given by the patient. Immediately prior to procedure a time out was called to verify the correct patient, procedure, equipment, support staff and site/side marked as required. Patient was prepped and draped in the usual sterile fashion.        Patient here today for follow-up of bilateral knee pain.    Finished viscosupplementation injection series with [facility]y 6 months ago. Responded well. She would like to start new series today.    Recommend Orthovisc injection series for each right and left knee today.    Procedure Note:  I discussed with the patient the potential benefits of performing a therapeutic injection of the bilateral knee as well as potential risks including but not limited to infection, swelling, pain, bleeding, bruising, nerve/vessel damage, pseudoseptic reaction, and worsening joint pain. After informed consent and verifying correct patient, procedure site, and type of procedure, the area was prepped with alcohol, ethyl chloride was used to numb the skin. Via the anterolateral approach, the viscosupplementation syringe contents were injected into each right and left knee. The patient tolerated the procedure well. There were no complications. A sterile dressing was placed over the injection site.    Patient will return in 1 week.

## 2024-10-08 ENCOUNTER — CLINICAL SUPPORT (OUTPATIENT)
Age: 69
End: 2024-10-08
Payer: MEDICARE

## 2024-10-08 DIAGNOSIS — M17.0 PRIMARY OSTEOARTHRITIS OF BOTH KNEES: Primary | ICD-10-CM

## 2024-10-08 PROCEDURE — 20610 DRAIN/INJ JOINT/BURSA W/O US: CPT | Performed by: PHYSICIAN ASSISTANT

## 2024-10-08 RX ORDER — LIDOCAINE HYDROCHLORIDE 10 MG/ML
3 INJECTION, SOLUTION EPIDURAL; INFILTRATION; INTRACAUDAL; PERINEURAL
Status: COMPLETED | OUTPATIENT
Start: 2024-10-08 | End: 2024-10-08

## 2024-10-08 RX ORDER — NITROFURANTOIN 25; 75 MG/1; MG/1
CAPSULE ORAL
COMMUNITY
Start: 2024-10-02 | End: 2024-10-08

## 2024-10-08 RX ADMIN — LIDOCAINE HYDROCHLORIDE 3 ML: 10 INJECTION, SOLUTION EPIDURAL; INFILTRATION; INTRACAUDAL; PERINEURAL at 10:14

## 2024-10-08 NOTE — PROGRESS NOTES
Procedure Note:    I discussed with the patient the potential benefits of performing a therapeutic injections as well as potential risks including but not limited to infection, swelling, pain, bleeding, bruising, nerve/vessel damage, skin color changes, transient elevation in blood glucose levels, and fat atrophy. After informed consent and after the areas were prepped with chlorhexadine soap, ethyl chloride was used to numb the skin. Via the inferolateral approach, 2mL of  Orthovisc was each injected into the bilateral knee joint. The patient tolerated the procedure well. There were no complications. A sterile dressing was placed over the injection sites.      Follow-up:1 week

## 2024-10-08 NOTE — PROGRESS NOTES
Procedure   - Large Joint Arthrocentesis: bilateral knee on 10/8/2024 10:14 AM  Indications: pain  Details: 21 G needle, anterolateral approach  Medications (Right): 30 mg Hyaluronan 30 MG/2ML; 3 mL lidocaine PF 1% 1 %  Medications (Left): 30 mg Hyaluronan 30 MG/2ML; 3 mL lidocaine PF 1% 1 %  Outcome: tolerated well, no immediate complications  Procedure, treatment alternatives, risks and benefits explained, specific risks discussed. Consent was given by the patient. Immediately prior to procedure a time out was called to verify the correct patient, procedure, equipment, support staff and site/side marked as required. Patient was prepped and draped in the usual sterile fashion.

## 2024-10-15 ENCOUNTER — CLINICAL SUPPORT (OUTPATIENT)
Age: 69
End: 2024-10-15
Payer: MEDICARE

## 2024-10-15 DIAGNOSIS — M25.572 LEFT ANKLE PAIN, UNSPECIFIED CHRONICITY: Primary | ICD-10-CM

## 2024-10-15 DIAGNOSIS — S93.402A SPRAIN OF LEFT ANKLE, UNSPECIFIED LIGAMENT, INITIAL ENCOUNTER: ICD-10-CM

## 2024-10-15 DIAGNOSIS — M17.0 PRIMARY OSTEOARTHRITIS OF BOTH KNEES: ICD-10-CM

## 2024-10-15 DIAGNOSIS — S93.402A SPRAIN OF LEFT ANKLE, UNSPECIFIED LIGAMENT, INITIAL ENCOUNTER: Primary | ICD-10-CM

## 2024-10-15 RX ADMIN — LIDOCAINE HYDROCHLORIDE 3 ML: 10 INJECTION, SOLUTION EPIDURAL; INFILTRATION; INTRACAUDAL; PERINEURAL at 10:15

## 2024-10-15 NOTE — PROGRESS NOTES
Procedure   - Large Joint Arthrocentesis: bilateral knee on 10/15/2024 10:15 AM  Indications: pain  Details: 21 G needle, superolateral approach  Medications (Right): 3 mL lidocaine PF 1% 1 %; 30 mg Hyaluronan 30 MG/2ML  Medications (Left): 3 mL lidocaine PF 1% 1 %; 30 mg Hyaluronan 30 MG/2ML  Outcome: tolerated well, no immediate complications  Procedure, treatment alternatives, risks and benefits explained, specific risks discussed. Consent was given by the patient. Immediately prior to procedure a time out was called to verify the correct patient, procedure, equipment, support staff and site/side marked as required. Patient was prepped and draped in the usual sterile fashion.

## 2024-10-15 NOTE — PROGRESS NOTES
[facility] [place] Up       Office Follow Up Visit       Patient Name: Mecca Renee    Chief Complaint:   Chief Complaint   Patient presents with    Injections     Bilateral knee Orthovisc #3 injections        Referring Physician: Leny Alcaraz P*    History of Present Illness:   Mecca Renee returns to clinic today for ***      Subjective     Review of Systems     I have reviewed and updated the following portions of the patient's history and review of systems: allergies, current medications, past family history, past medical history, past social history, past surgical history and problem list.    Medications:   Current Outpatient Medications:     albuterol sulfate  (90 Base) MCG/ACT inhaler, Inhale 2 puffs Every 6 (Six) Hours As Needed for Wheezing or Shortness of Air., Disp: 18 g, Rfl: 0    Biotin 5000 MCG capsule, Take 5,000 mcg by mouth Daily., Disp: , Rfl:     bisoprolol (ZEBeta) 5 MG tablet, Take 0.5 tablets by mouth 2 (Two) Times a Day., Disp: 30 tablet, Rfl: 6    Calcium Carbonate (Calcium 600) 1500 (600 Ca) MG tablet, Take 2 tablets by mouth Daily., Disp: , Rfl:     Calcium-Magnesium-Vitamin D (CALCIUM 1200+D3 PO), Calcium 1200+D3, Disp: , Rfl:     cetirizine (zyrTEC) 10 MG tablet, Take 1 tablet by mouth Daily., Disp: , Rfl:     Coenzyme Q10 (COQ-10) 100 MG capsule, Take 3 capsules by mouth Daily., Disp: , Rfl:     COMBIVENT RESPIMAT  MCG/ACT inhaler, Inhale 1 puff As Needed., Disp: , Rfl: 3    denosumab (PROLIA) 60 MG/ML solution prefilled syringe syringe, Inject 1 mL under the skin into the appropriate area as directed Every 6 (Six) Months., Disp: , Rfl:     diclofenac (VOLTAREN) 1 % gel gel, Apply 4 g topically As Needed., Disp: , Rfl:     Dietary Management Product (Rheumate) capsule, Take 1 capsule by mouth Daily., Disp: 90 capsule, Rfl: 4    etodolac (LODINE) 400 MG tablet, Take 1 tablet by mouth  2 (Two) Times a Day. WITH FOOD, Disp: 60 tablet, Rfl: 3    exemestane (AROMASIN) 25 MG tablet, Take 1 tablet by mouth Daily., Disp: , Rfl:     flecainide (TAMBOCOR) 100 MG tablet, Take 0.5 tablets by mouth 2 (Two) Times a Day., Disp: 180 tablet, Rfl: 3    fluticasone (FLONASE) 50 MCG/ACT nasal spray, Administer 2 sprays into the nostril(s) as directed by provider Daily As Needed., Disp: , Rfl:     Folic Acid (FOLATE PO), Take  by mouth Daily., Disp: , Rfl:     furosemide (LASIX) 40 MG tablet, 1 tablet Daily As Needed., Disp: , Rfl:     guaiFENesin (MUCINEX) 600 MG 12 hr tablet, Take 1 tablet by mouth As Needed., Disp: , Rfl:     guaifenesin (ROBITUSSIN) 100 MG/5ML liquid, Take 10 mL by mouth Every 4 (Four) Hours As Needed for Cough., Disp: 118 mL, Rfl: 0    ketoconazole (NIZORAL) 2 % cream, Apply  topically to the appropriate area as directed., Disp: , Rfl:     letrozole (FEMARA) 2.5 MG tablet, TAKE 1 TABLET (2.5 MG TOTAL) BY MOUTH DAILY., Disp: , Rfl:     levocetirizine (XYZAL) 5 MG tablet, Take 1 tablet by mouth As Needed., Disp: , Rfl:     methocarbamol (ROBAXIN) 750 MG tablet, Take 1 tablet by mouth 3 (Three) Times a Day., Disp: 90 tablet, Rfl: 3    ondansetron (ZOFRAN) 8 MG tablet, As Needed., Disp: , Rfl:     oxybutynin XL (DITROPAN-XL) 10 MG 24 hr tablet, Take 1 tablet by mouth Daily., Disp: , Rfl:     pantoprazole (PROTONIX) 20 MG EC tablet, Take 1 tablet by mouth Daily., Disp: , Rfl:     phenazopyridine (PYRIDIUM) 200 MG tablet, As Needed., Disp: , Rfl:     pregabalin (LYRICA) 150 MG capsule, Take 1 capsule by mouth 2 (Two) Times a Day., Disp: 60 capsule, Rfl: 3    Pseudoephedrine-guaiFENesin (MUCINEX D PO), As Needed., Disp: , Rfl:     rizatriptan MLT (MAXALT-MLT) 5 MG disintegrating tablet, Place 1 tablet on the tongue 1 (One) Time As Needed for Migraine., Disp: , Rfl:     S-Adenosylmethionine (DORIAN-e) 400 MG tablet, Take 400 mg by mouth Daily., Disp: , Rfl:     Secukinumab (Cosentyx Sensoready Pen) 150  MG/ML solution auto-injector, Inject 150 mg under the skin into the appropriate area as directed Every 28 (Twenty-Eight) Days., Disp: 1 mL, Rfl: 5    SUMAtriptan (IMITREX) 100 MG tablet, Take 1 tablet by mouth. after onset of migraine; may repeat after 2 hours if headache returns,not to exceed 200mg in 24hrs, Disp: , Rfl:     traMADol (ULTRAM) 50 MG tablet, Take 1 tablet by mouth Every 8 (Eight) Hours As Needed for Moderate Pain., Disp: 90 tablet, Rfl: 3    triamcinolone (KENALOG) 0.1 % cream, APPLY TO RASH TWICE A DAY AS NEEDED - AVOID CONTACT WITH FACE, Disp: , Rfl:     venlafaxine XR (EFFEXOR-XR) 75 MG 24 hr capsule, Take 1 capsule by mouth Daily., Disp: , Rfl:     vitamin D (ERGOCALCIFEROL) 1.25 MG (41605 UT) capsule capsule, Take 1 capsule by mouth 1 (One) Time Per Week., Disp: 12 capsule, Rfl: 0    Cholecalciferol 100 MCG (4000 UT) tablet, Take 4,000 Units by mouth Daily., Disp: , Rfl:     Allergies:   Allergies   Allergen Reactions    Sulfa Antibiotics Hives and Itching    Shellfish-Derived Products Hives and Itching               Objective      Vital Signs: There were no vitals filed for this visit.    Ortho Exam:  ***    Results Review:  XR Foot 2 View Left  Standing AP lateral left foot ordered for pain, shows fairly significant   hallux valgus metatarsals primus varus, hammertoes 2 through 5, the great   toe crosses the second toe, narrowing of the second through fifth TMT   joints, no acute fractures, no comparison       Mammo Diagnostic Digital Tomosynthesis Bilateral With CAD    Result Date: 10/10/2024  FINAL IMPRESSION: ACR BI-RADS 2: Benign findings. RECOMMENDATIONS: Bilateral diagnostic mammogram in 12 months. This report will serve as the order for recommended imaging studies. The results and recommendations were discussed with the patient on the day of the appointment. In addition, a written report in lay terms, including density notification, was given to the patient. At our facility, a [place]  "marker is positioned over a visible skin lesion and a linear marker is used to indicate a scar. A triangular marker is placed on a palpable finding.    MRI BRAIN STEALTH W WO CONTRAST    Result Date: 7/23/2024  No acute intracranial process. Images reviewed, interpreted, and dictated by Dr. CONNIE Broderick. Transcribed by Stella Lopez PA-C.        Assessment / Plan      Assessment:   Diagnoses and all orders for this visit:    1. Left ankle pain, unspecified chronicity (Primary)  -     XR Ankle 3+ View Left    Other orders  -     - Large Joint Arthrocentesis: bilateral knee        Quality Metrics:   BMI:   {BMI is >= 30 and <35. (Class 1 Obesity). The following options were offered after discussion; (Optional):69193}       Tobacco:   Mecca Renee  reports that she has never smoked. She has been exposed to tobacco smoke. She has never used smokeless tobacco. I have educated her on the risk of diseases from using tobacco products such as {Tobacco Cessation Diseases:77735::\"cancer\",\"COPD\",\"heart disease\"}.     I advised her to quit and she is {Willing/Not Willing to Quit Tobacco Products:41893}.    I spent {Time Spent Tobacco :23157} minutes counseling the patient.            Plan:  ***      Leny Alcaraz PA-C  [facility] [place] [facility]    Dictated using Dragon Speech Recognition.        "

## 2024-10-18 RX ORDER — LIDOCAINE HYDROCHLORIDE 10 MG/ML
3 INJECTION, SOLUTION EPIDURAL; INFILTRATION; INTRACAUDAL; PERINEURAL
Status: COMPLETED | OUTPATIENT
Start: 2024-10-15 | End: 2024-10-15

## 2024-10-18 NOTE — PROGRESS NOTES
[facility] [place] [facility] [place]       Office Follow Up Visit       Patient Name: Mecca Renee    Chief Complaint:   Chief Complaint   Patient presents with    Injections     Bilateral knee Orthovisc #3 injections    Left ankle pain    Referring Physician: Leny Alcaraz P*    History of Present Illness:   Mecca Renee returns to clinic today for final injection of Orthovisc in bilateral knees and left ankle pain.  She reports she inverted her ankle several weeks ago with persisting lateral ankle pain.  She has had multiple problems with this ankle in the past including peroneal tendonitis, tibial stress fracture.  She reports intermittent pain and swelling.  Wants to make sure nothing worrisome is going on.      Subjective     Review of Systems     I have reviewed and updated the following portions of the patient's history and review of systems: allergies, current medications, past family history, past medical history, past social history, past surgical history and problem list.    Medications:   Current Outpatient Medications:     albuterol sulfate  (90 Base) MCG/ACT inhaler, Inhale 2 puffs Every 6 (Six) Hours As Needed for Wheezing or Shortness of Air., Disp: 18 g, Rfl: 0    Biotin 5000 MCG capsule, Take 5,000 mcg by mouth Daily., Disp: , Rfl:     bisoprolol (ZEBeta) 5 MG tablet, Take 0.5 tablets by mouth 2 (Two) Times a Day., Disp: 30 tablet, Rfl: 6    Calcium Carbonate (Calcium 600) 1500 (600 Ca) MG tablet, Take 2 tablets by mouth Daily., Disp: , Rfl:     Calcium-Magnesium-Vitamin D (CALCIUM 1200+D3 PO), Calcium 1200+D3, Disp: , Rfl:     cetirizine (zyrTEC) 10 MG tablet, Take 1 tablet by mouth Daily., Disp: , Rfl:     Coenzyme Q10 (COQ-10) 100 MG capsule, Take 3 capsules by mouth Daily., Disp: , Rfl:     COMBIVENT RESPIMAT  MCG/ACT inhaler, Inhale 1 puff As Needed., Disp: , Rfl: 3    denosumab (PROLIA) 60 MG/ML solution  prefilled syringe syringe, Inject 1 mL under the skin into the appropriate area as directed Every 6 (Six) Months., Disp: , Rfl:     diclofenac (VOLTAREN) 1 % gel gel, Apply 4 g topically As Needed., Disp: , Rfl:     Dietary Management Product (Rheumate) capsule, Take 1 capsule by mouth Daily., Disp: 90 capsule, Rfl: 4    etodolac (LODINE) 400 MG tablet, Take 1 tablet by mouth 2 (Two) Times a Day. WITH FOOD, Disp: 60 tablet, Rfl: 3    exemestane (AROMASIN) 25 MG tablet, Take 1 tablet by mouth Daily., Disp: , Rfl:     flecainide (TAMBOCOR) 100 MG tablet, Take 0.5 tablets by mouth 2 (Two) Times a Day., Disp: 180 tablet, Rfl: 3    fluticasone (FLONASE) 50 MCG/ACT nasal spray, Administer 2 sprays into the nostril(s) as directed by provider Daily As Needed., Disp: , Rfl:     Folic Acid (FOLATE PO), Take  by mouth Daily., Disp: , Rfl:     furosemide (LASIX) 40 MG tablet, 1 tablet Daily As Needed., Disp: , Rfl:     guaiFENesin (MUCINEX) 600 MG 12 hr tablet, Take 1 tablet by mouth As Needed., Disp: , Rfl:     guaifenesin (ROBITUSSIN) 100 MG/5ML liquid, Take 10 mL by mouth Every 4 (Four) Hours As Needed for Cough., Disp: 118 mL, Rfl: 0    ketoconazole (NIZORAL) 2 % cream, Apply  topically to the appropriate area as directed., Disp: , Rfl:     letrozole (FEMARA) 2.5 MG tablet, TAKE 1 TABLET (2.5 MG TOTAL) BY MOUTH DAILY., Disp: , Rfl:     levocetirizine (XYZAL) 5 MG tablet, Take 1 tablet by mouth As Needed., Disp: , Rfl:     methocarbamol (ROBAXIN) 750 MG tablet, Take 1 tablet by mouth 3 (Three) Times a Day., Disp: 90 tablet, Rfl: 3    ondansetron (ZOFRAN) 8 MG tablet, As Needed., Disp: , Rfl:     oxybutynin XL (DITROPAN-XL) 10 MG 24 hr tablet, Take 1 tablet by mouth Daily., Disp: , Rfl:     pantoprazole (PROTONIX) 20 MG EC tablet, Take 1 tablet by mouth Daily., Disp: , Rfl:     phenazopyridine (PYRIDIUM) 200 MG tablet, As Needed., Disp: , Rfl:     pregabalin (LYRICA) 150 MG capsule, Take 1 capsule by mouth 2 (Two) Times a  Day., Disp: 60 capsule, Rfl: 3    Pseudoephedrine-guaiFENesin (MUCINEX D PO), As Needed., Disp: , Rfl:     rizatriptan MLT (MAXALT-MLT) 5 MG disintegrating tablet, Place 1 tablet on the tongue 1 (One) Time As Needed for Migraine., Disp: , Rfl:     S-Adenosylmethionine (DORIAN-e) 400 MG tablet, Take 400 mg by mouth Daily., Disp: , Rfl:     Secukinumab (Cosentyx Sensoready Pen) 150 MG/ML solution auto-injector, Inject 150 mg under the skin into the appropriate area as directed Every 28 (Twenty-Eight) Days., Disp: 1 mL, Rfl: 5    SUMAtriptan (IMITREX) 100 MG tablet, Take 1 tablet by mouth. after onset of migraine; may repeat after 2 hours if headache returns,not to exceed 200mg in 24hrs, Disp: , Rfl:     traMADol (ULTRAM) 50 MG tablet, Take 1 tablet by mouth Every 8 (Eight) Hours As Needed for Moderate Pain., Disp: 90 tablet, Rfl: 3    triamcinolone (KENALOG) 0.1 % cream, APPLY TO RASH TWICE A DAY AS NEEDED - AVOID CONTACT WITH FACE, Disp: , Rfl:     venlafaxine XR (EFFEXOR-XR) 75 MG 24 hr capsule, Take 1 capsule by mouth Daily., Disp: , Rfl:     vitamin D (ERGOCALCIFEROL) 1.25 MG (79268 UT) capsule capsule, Take 1 capsule by mouth 1 (One) Time Per Week., Disp: 12 capsule, Rfl: 0    Cholecalciferol 100 MCG (4000 UT) tablet, Take 4,000 Units by mouth Daily., Disp: , Rfl:     Allergies:   Allergies   Allergen Reactions    Sulfa Antibiotics Hives and Itching    Shellfish-Derived Products Hives and Itching               Objective      Vital Signs: There were no vitals filed for this visit.    Ortho Exam:  Left ankle exam: Mildly tender over the ATFL.  Remainder the foot and ankle are nontender.  No swelling.  Normal range of motion.  5/5 motor strength.  Neurovascular intact distally.  Pulses 2+.    Results Review:  XR Ankle 3+ View Left  Left Ankle X-Ray    Indication: Pain    Views: AP, oblique stress and Lateral weightbearing    Comparison:  Left foot xrays 4/8/2024    Findings:  No fracture or dislocation. No bony  lesions. Normal soft tissues.    Maintained joint spaces.  No evidence of medial joint space widening or   syndesmotic disruption.                Assessment / Plan      Assessment:   Diagnoses and all orders for this visit:    1. Left ankle pain, unspecified chronicity (Primary)  -     XR Ankle 3+ View Left    2. Primary osteoarthritis of both knees    3. Sprain of left ankle, unspecified ligament, initial encounter    Other orders  -     - Large Joint Arthrocentesis: bilateral knee        Quality Metrics:   BMI:   BMI is >= 30 and <35. (Class 1 Obesity). The following options were offered after discussion;: Information on healthy weight added to patient's after visit summary.       Tobacco:   Mecca Renee  reports that she has never smoked. She has been exposed to tobacco smoke. She has never used smokeless tobacco.      Plan:  Left ankle sprain.  I reviewed today's x-rays clinical findings with the patient.  I reassured her I do not see any evidence of fracture or anything more worrisome.  We discussed further treatment including round of physical therapy versus at home exercises from her prior therapy.  Patient would like to proceed with formal therapy have given her prescription.  She will return to see me if pain persists.  At that time, we will consider MRI.  Bilateral knee arthritis proceed with final injection of Orthovisc in both knees.  I will see her back in 6 months, sooner if needed.    Procedure Note:    I discussed with the patient the potential benefits of performing a therapeutic injections as well as potential risks including but not limited to infection, swelling, pain, bleeding, bruising, nerve/vessel damage, skin color changes, transient elevation in blood glucose levels, and fat atrophy. After informed consent and after the areas were prepped with chlorhexadine soap, ethyl chloride was used to numb the skin. Via the inferolateral approach, 2mL of  Orthovisc was each injected into the  bilateral knee joint. The patient tolerated the procedure well. There were no complications. A sterile dressing was placed over the injection sites.      Follow-up:6 months        Leny Alcaraz PA-C  [facility]    Dictated using Dragon Speech Recognition.

## 2024-11-07 ENCOUNTER — TELEPHONE (OUTPATIENT)
Age: 69
End: 2024-11-07
Payer: MEDICARE

## 2024-11-07 NOTE — TELEPHONE ENCOUNTER
I have sent pt a message letting her know Gayle needs updated labs. I mailed these to her as well.

## 2024-12-03 ENCOUNTER — TELEPHONE (OUTPATIENT)
Age: 69
End: 2024-12-03
Payer: MEDICARE

## 2024-12-04 ENCOUNTER — HOSPITAL ENCOUNTER (EMERGENCY)
Facility: HOSPITAL | Age: 69
Discharge: HOME | End: 2024-12-04
Attending: STUDENT IN AN ORGANIZED HEALTH CARE EDUCATION/TRAINING PROGRAM | Admitting: STUDENT IN AN ORGANIZED HEALTH CARE EDUCATION/TRAINING PROGRAM
Payer: MEDICARE

## 2024-12-04 ENCOUNTER — APPOINTMENT (OUTPATIENT)
Facility: HOSPITAL | Age: 69
End: 2024-12-04
Payer: MEDICARE

## 2024-12-04 VITALS
HEART RATE: 75 BPM | DIASTOLIC BLOOD PRESSURE: 82 MMHG | RESPIRATION RATE: 16 BRPM | WEIGHT: 189 LBS | OXYGEN SATURATION: 98 % | TEMPERATURE: 97.7 F | SYSTOLIC BLOOD PRESSURE: 112 MMHG | BODY MASS INDEX: 31.49 KG/M2 | HEIGHT: 65 IN

## 2024-12-04 DIAGNOSIS — R11.2 NAUSEA AND VOMITING, UNSPECIFIED VOMITING TYPE: ICD-10-CM

## 2024-12-04 DIAGNOSIS — N39.0 ACUTE URINARY TRACT INFECTION: Primary | ICD-10-CM

## 2024-12-04 DIAGNOSIS — K52.9 COLITIS: ICD-10-CM

## 2024-12-04 LAB
ALBUMIN SERPL-MCNC: 4.2 G/DL (ref 3.5–5.2)
ALBUMIN/GLOB SERPL: 1.6 G/DL
ALP SERPL-CCNC: 73 U/L (ref 39–117)
ALT SERPL W P-5'-P-CCNC: <5 U/L (ref 1–33)
ANION GAP SERPL CALCULATED.3IONS-SCNC: 10 MMOL/L (ref 5–15)
AST SERPL-CCNC: 18 U/L (ref 1–32)
B PARAPERT DNA SPEC QL NAA+PROBE: NOT DETECTED
B PERT DNA SPEC QL NAA+PROBE: NOT DETECTED
BACTERIA UR QL AUTO: ABNORMAL /HPF
BASOPHILS # BLD AUTO: 0.01 10*3/MM3 (ref 0–0.2)
BASOPHILS NFR BLD AUTO: 0.1 % (ref 0–1.5)
BILIRUB SERPL-MCNC: 0.5 MG/DL (ref 0–1.2)
BILIRUB UR QL STRIP: ABNORMAL
BUN SERPL-MCNC: 14 MG/DL (ref 8–23)
BUN/CREAT SERPL: 23.7 (ref 7–25)
C PNEUM DNA NPH QL NAA+NON-PROBE: NOT DETECTED
CALCIUM SPEC-SCNC: 9.7 MG/DL (ref 8.6–10.5)
CHLORIDE SERPL-SCNC: 102 MMOL/L (ref 98–107)
CLARITY UR: CLEAR
CO2 SERPL-SCNC: 27 MMOL/L (ref 22–29)
COLOR UR: YELLOW
CREAT SERPL-MCNC: 0.59 MG/DL (ref 0.57–1)
D-LACTATE SERPL-SCNC: 0.7 MMOL/L (ref 0.5–2)
DEPRECATED RDW RBC AUTO: 44.9 FL (ref 37–54)
EGFRCR SERPLBLD CKD-EPI 2021: 98.3 ML/MIN/1.73
EOSINOPHIL # BLD AUTO: 0.04 10*3/MM3 (ref 0–0.4)
EOSINOPHIL NFR BLD AUTO: 0.6 % (ref 0.3–6.2)
ERYTHROCYTE [DISTWIDTH] IN BLOOD BY AUTOMATED COUNT: 13.4 % (ref 12.3–15.4)
FLUAV SUBTYP SPEC NAA+PROBE: NOT DETECTED
FLUBV RNA ISLT QL NAA+PROBE: NOT DETECTED
GLOBULIN UR ELPH-MCNC: 2.6 GM/DL
GLUCOSE SERPL-MCNC: 95 MG/DL (ref 65–99)
GLUCOSE UR STRIP-MCNC: NEGATIVE MG/DL
HADV DNA SPEC NAA+PROBE: NOT DETECTED
HCOV 229E RNA SPEC QL NAA+PROBE: NOT DETECTED
HCOV HKU1 RNA SPEC QL NAA+PROBE: NOT DETECTED
HCOV NL63 RNA SPEC QL NAA+PROBE: NOT DETECTED
HCOV OC43 RNA SPEC QL NAA+PROBE: NOT DETECTED
HCT VFR BLD AUTO: 42.7 % (ref 34–46.6)
HGB BLD-MCNC: 13.9 G/DL (ref 12–15.9)
HGB UR QL STRIP.AUTO: ABNORMAL
HMPV RNA NPH QL NAA+NON-PROBE: NOT DETECTED
HOLD SPECIMEN: NORMAL
HPIV1 RNA ISLT QL NAA+PROBE: NOT DETECTED
HPIV2 RNA SPEC QL NAA+PROBE: NOT DETECTED
HPIV3 RNA NPH QL NAA+PROBE: NOT DETECTED
HPIV4 P GENE NPH QL NAA+PROBE: NOT DETECTED
HYALINE CASTS UR QL AUTO: ABNORMAL /LPF
IMM GRANULOCYTES # BLD AUTO: 0.01 10*3/MM3 (ref 0–0.05)
IMM GRANULOCYTES NFR BLD AUTO: 0.1 % (ref 0–0.5)
KETONES UR QL STRIP: ABNORMAL
LEUKOCYTE ESTERASE UR QL STRIP.AUTO: ABNORMAL
LIPASE SERPL-CCNC: 11 U/L (ref 13–60)
LYMPHOCYTES # BLD AUTO: 0.49 10*3/MM3 (ref 0.7–3.1)
LYMPHOCYTES NFR BLD AUTO: 7.2 % (ref 19.6–45.3)
M PNEUMO IGG SER IA-ACNC: NOT DETECTED
MCH RBC QN AUTO: 29.1 PG (ref 26.6–33)
MCHC RBC AUTO-ENTMCNC: 32.6 G/DL (ref 31.5–35.7)
MCV RBC AUTO: 89.3 FL (ref 79–97)
MONOCYTES # BLD AUTO: 0.43 10*3/MM3 (ref 0.1–0.9)
MONOCYTES NFR BLD AUTO: 6.3 % (ref 5–12)
MUCOUS THREADS URNS QL MICRO: ABNORMAL /HPF
NEUTROPHILS NFR BLD AUTO: 5.82 10*3/MM3 (ref 1.7–7)
NEUTROPHILS NFR BLD AUTO: 85.7 % (ref 42.7–76)
NITRITE UR QL STRIP: NEGATIVE
PH UR STRIP.AUTO: 6 [PH] (ref 5–8)
PLATELET # BLD AUTO: 175 10*3/MM3 (ref 140–450)
PMV BLD AUTO: 12.2 FL (ref 6–12)
POTASSIUM SERPL-SCNC: 4.3 MMOL/L (ref 3.5–5.2)
PROT SERPL-MCNC: 6.8 G/DL (ref 6–8.5)
PROT UR QL STRIP: NEGATIVE
RBC # BLD AUTO: 4.78 10*6/MM3 (ref 3.77–5.28)
RBC # UR STRIP: ABNORMAL /HPF
REF LAB TEST METHOD: ABNORMAL
RHINOVIRUS RNA SPEC NAA+PROBE: NOT DETECTED
RSV RNA NPH QL NAA+NON-PROBE: NOT DETECTED
SARS-COV-2 RNA RESP QL NAA+PROBE: NOT DETECTED
SODIUM SERPL-SCNC: 139 MMOL/L (ref 136–145)
SP GR UR STRIP: 1.02 (ref 1–1.03)
SQUAMOUS #/AREA URNS HPF: ABNORMAL /HPF
UROBILINOGEN UR QL STRIP: ABNORMAL
WBC # UR STRIP: ABNORMAL /HPF
WBC NRBC COR # BLD AUTO: 6.8 10*3/MM3 (ref 3.4–10.8)
WHOLE BLOOD HOLD COAG: NORMAL
WHOLE BLOOD HOLD SPECIMEN: NORMAL

## 2024-12-04 PROCEDURE — 74177 CT ABD & PELVIS W/CONTRAST: CPT

## 2024-12-04 PROCEDURE — 80053 COMPREHEN METABOLIC PANEL: CPT | Performed by: STUDENT IN AN ORGANIZED HEALTH CARE EDUCATION/TRAINING PROGRAM

## 2024-12-04 PROCEDURE — 96375 TX/PRO/DX INJ NEW DRUG ADDON: CPT

## 2024-12-04 PROCEDURE — 83690 ASSAY OF LIPASE: CPT | Performed by: STUDENT IN AN ORGANIZED HEALTH CARE EDUCATION/TRAINING PROGRAM

## 2024-12-04 PROCEDURE — 25010000002 ONDANSETRON PER 1 MG: Performed by: PHYSICIAN ASSISTANT

## 2024-12-04 PROCEDURE — 25510000001 IOPAMIDOL 61 % SOLUTION: Performed by: STUDENT IN AN ORGANIZED HEALTH CARE EDUCATION/TRAINING PROGRAM

## 2024-12-04 PROCEDURE — 25810000003 SODIUM CHLORIDE 0.9 % SOLUTION: Performed by: PHYSICIAN ASSISTANT

## 2024-12-04 PROCEDURE — 85025 COMPLETE CBC W/AUTO DIFF WBC: CPT | Performed by: STUDENT IN AN ORGANIZED HEALTH CARE EDUCATION/TRAINING PROGRAM

## 2024-12-04 PROCEDURE — 63710000001 PROMETHAZINE PER 25 MG: Performed by: PHYSICIAN ASSISTANT

## 2024-12-04 PROCEDURE — 96374 THER/PROPH/DIAG INJ IV PUSH: CPT

## 2024-12-04 PROCEDURE — 81001 URINALYSIS AUTO W/SCOPE: CPT | Performed by: STUDENT IN AN ORGANIZED HEALTH CARE EDUCATION/TRAINING PROGRAM

## 2024-12-04 PROCEDURE — 99285 EMERGENCY DEPT VISIT HI MDM: CPT

## 2024-12-04 PROCEDURE — 0202U NFCT DS 22 TRGT SARS-COV-2: CPT | Performed by: PHYSICIAN ASSISTANT

## 2024-12-04 PROCEDURE — 83605 ASSAY OF LACTIC ACID: CPT | Performed by: STUDENT IN AN ORGANIZED HEALTH CARE EDUCATION/TRAINING PROGRAM

## 2024-12-04 PROCEDURE — 25010000002 KETOROLAC TROMETHAMINE PER 15 MG: Performed by: PHYSICIAN ASSISTANT

## 2024-12-04 RX ORDER — NITROFURANTOIN 25; 75 MG/1; MG/1
100 CAPSULE ORAL 2 TIMES DAILY
Qty: 14 CAPSULE | Refills: 0 | Status: SHIPPED | OUTPATIENT
Start: 2024-12-04 | End: 2024-12-11

## 2024-12-04 RX ORDER — PROMETHAZINE HYDROCHLORIDE 25 MG/1
25 TABLET ORAL ONCE
Status: COMPLETED | OUTPATIENT
Start: 2024-12-04 | End: 2024-12-04

## 2024-12-04 RX ORDER — SODIUM CHLORIDE 9 MG/ML
10 INJECTION, SOLUTION INTRAMUSCULAR; INTRAVENOUS; SUBCUTANEOUS AS NEEDED
Status: DISCONTINUED | OUTPATIENT
Start: 2024-12-04 | End: 2024-12-04 | Stop reason: HOSPADM

## 2024-12-04 RX ORDER — KETOROLAC TROMETHAMINE 15 MG/ML
15 INJECTION, SOLUTION INTRAMUSCULAR; INTRAVENOUS ONCE
Status: COMPLETED | OUTPATIENT
Start: 2024-12-04 | End: 2024-12-04

## 2024-12-04 RX ORDER — IOPAMIDOL 612 MG/ML
100 INJECTION, SOLUTION INTRAVASCULAR
Status: COMPLETED | OUTPATIENT
Start: 2024-12-04 | End: 2024-12-04

## 2024-12-04 RX ORDER — ONDANSETRON 2 MG/ML
4 INJECTION INTRAMUSCULAR; INTRAVENOUS ONCE
Status: COMPLETED | OUTPATIENT
Start: 2024-12-04 | End: 2024-12-04

## 2024-12-04 RX ORDER — ONDANSETRON 4 MG/1
4 TABLET, FILM COATED ORAL EVERY 4 HOURS PRN
Qty: 15 TABLET | Refills: 0 | Status: SHIPPED | OUTPATIENT
Start: 2024-12-04

## 2024-12-04 RX ADMIN — SODIUM CHLORIDE 1000 ML: 9 INJECTION, SOLUTION INTRAVENOUS at 14:12

## 2024-12-04 RX ADMIN — PROMETHAZINE HYDROCHLORIDE 25 MG: 25 TABLET ORAL at 17:20

## 2024-12-04 RX ADMIN — KETOROLAC TROMETHAMINE 15 MG: 15 INJECTION, SOLUTION INTRAMUSCULAR; INTRAVENOUS at 15:32

## 2024-12-04 RX ADMIN — IOPAMIDOL 85 ML: 612 INJECTION, SOLUTION INTRAVENOUS at 14:54

## 2024-12-04 RX ADMIN — ONDANSETRON 4 MG: 2 INJECTION INTRAMUSCULAR; INTRAVENOUS at 14:13

## 2024-12-04 NOTE — FSED PROVIDER NOTE
Subjective   History of Present Illness  Patient is a 69-year-old female who presents the emergency room complaining of nausea, vomiting, diarrhea over the last 24 hours.  Patient reports that she ate a hamburger around noon yesterday.  States that last night she vomited 3-4 times.  She then proceeded to have diarrhea throughout the evening.  Patient states that she has not been able to eat or drink anything due to her severe nausea.  Patient reports that she has a history of RA and has been unable to take any of her RA medications.  Patient denies fever, but has had chills.  Patient denies chest pain, difficulty breathing, headache, neck pain, neck stiffness, back pain, numbness, tingling, loss of sensation.  Patient reports diffuse abdominal pain.  Patient is not taking any medications for symptoms.  Patient has a significant past medical history of breast cancer several years ago and is now on remission, diverticulosis, fibromyalgia, GERD, arthritis, migraines, chronic pain.    History provided by:  Patient   used: No        Review of Systems   Gastrointestinal:  Positive for abdominal pain, diarrhea, nausea and vomiting.   All other systems reviewed and are negative.      Past Medical History:   Diagnosis Date    Ankle fracture     11/15/2018 - Left Tibial stress fracture. May 2018. Dr. Helm.    Asthma     Breast cancer, stage 1     9/2022 Dr. Miranda    Cancer     breast    Chronic pain disorder     COVID-19     positive    Diverticulosis     Fatigue     Fibromyalgia     Foot fracture, left     Foot fracture, right     GERD (gastroesophageal reflux disease)     High risk medication use     History of degenerative disc disease     Inflammatory arthritis     Low back pain     Metatarsal stress fracture of left foot     Migraine     Neck pain     Osteoarthritis     Osteoporosis     Pain     ABNORMAL PAIN, EPI GASTRIC- Status is Resolved    Palpitations     palpitations with near syncope     Paresthesias     Posterior tibial tendonitis, left     11/15/2018 - Dr. Helm.2018.    Pre-syncope     Rheumatoid arthritis     Rib fracture     6/21 XR ; Chronic L 4th rib fracture. Age indeterminate. 5-7 anterolateral rib fractures.  12/03/2021 -Traumatic.    Right shoulder pain     Tachycardia     Vitamin D deficiency     Whiplash        Allergies   Allergen Reactions    Sulfa Antibiotics Hives and Itching    Shellfish-Derived Products Hives and Itching             Past Surgical History:   Procedure Laterality Date    BLEPHAROPLASTY  01/07/2020    Dr. Das.    CHOLECYSTECTOMY      EYE SURGERY      eye lid lift    EYE SURGERY  12/2019    FOOT SURGERY Right     MASTECTOMY PARTIAL / LUMPECTOMY Left 10/2022    SHOULDER SURGERY Right        Family History   Problem Relation Age of Onset    Diabetes Mother     Cancer Mother         kidney    Heart disease Mother         CHF    Hypertension Mother     Kidney disease Mother     Cancer Father         bladder/kidney    Cancer Brother         lung    Cancer Brother         lung    Lupus Brother     Heart disease Maternal Grandmother     Stroke Maternal Grandmother     Arthritis Maternal Grandmother     Asthma Paternal Grandmother        Social History     Socioeconomic History    Marital status:      Spouse name: 2   Tobacco Use    Smoking status: Never     Passive exposure: Past    Smokeless tobacco: Never   Vaping Use    Vaping status: Never Used   Substance and Sexual Activity    Alcohol use: No    Drug use: No    Sexual activity: Defer           Objective   Physical Exam  Vitals and nursing note reviewed.   Constitutional:       Appearance: Normal appearance. She is normal weight.   HENT:      Head: Normocephalic.      Nose: Nose normal.   Eyes:      Pupils: Pupils are equal, round, and reactive to light.   Cardiovascular:      Rate and Rhythm: Normal rate and regular rhythm.   Pulmonary:      Effort: Pulmonary effort is normal.      Breath sounds: Normal  breath sounds.   Abdominal:      General: Abdomen is flat.      Tenderness: There is generalized abdominal tenderness.   Musculoskeletal:         General: Normal range of motion.   Skin:     General: Skin is warm and dry.   Neurological:      General: No focal deficit present.      Mental Status: She is alert and oriented to person, place, and time. Mental status is at baseline.   Psychiatric:         Mood and Affect: Mood normal.         Behavior: Behavior normal.         Thought Content: Thought content normal.         Judgment: Judgment normal.         Procedures           ED Course  ED Course as of 12/04/24 1707   Wed Dec 04, 2024   1449 Leukocytes, UA(!): Small (1+) [WB]   1450 Nitrite, UA: Negative [WB]   1450 Bacteria, UA(!): 1+ [WB]   1450 WBC, UA(!): 11-20 [WB]   1450 WBC: 6.80 [WB]   1450 Hemoglobin: 13.9 [WB]   1450 Hematocrit: 42.7 [WB]   1450 Lipase(!): 11 [WB]   1450 Lactate: 0.7 [WB]   1450 Glucose: 95 [WB]   1450 BUN: 14 [WB]   1450 Creatinine: 0.59 [WB]   1450 Sodium: 139 [WB]   1450 Chloride: 102 [WB]   1658 ADENOVIRUS, PCR: Not Detected [WB]   1658 Coronavirus 229E: Not Detected [WB]   1658 Coronavirus HKU1: Not Detected [WB]   1658 Coronavirus NL63: Not Detected [WB]   1659 Coronavirus OC43: Not Detected [WB]   1659 COVID19: Not Detected [WB]   1659 Human Metapneumovirus: Not Detected [WB]   1659 Human Rhinovirus/Enterovirus: Not Detected [WB]   1659 Influenza A PCR: Not Detected [WB]   1659 Influenza B PCR: Not Detected [WB]   1659 Parainfluenza Virus 1: Not Detected [WB]   1659 Parainfluenza Virus 2: Not Detected [WB]   1659 Parainfluenza Virus 3: Not Detected [WB]   1659 Parainfluenza Virus 4: Not Detected [WB]   1659 RSV, PCR: Not Detected [WB]   1659 Bordetella pertussis pcr: Not Detected [WB]   1659 Bordetella parapertussis PCR: Not Detected [WB]   1659 Chlamydophila pneumoniae PCR: Not Detected [WB]   1659 Mycoplasma pneumo by PCR: Not Detected [WB]   1659 Lactate: 0.7 [WB]      ED Course  User Index  [WB] Breeding, Linda Y, PA-C                                         WBC   Date Value Ref Range Status   12/04/2024 6.80 3.40 - 10.80 10*3/mm3 Final     RBC   Date Value Ref Range Status   12/04/2024 4.78 3.77 - 5.28 10*6/mm3 Final     Hemoglobin   Date Value Ref Range Status   12/04/2024 13.9 12.0 - 15.9 g/dL Final     Hematocrit   Date Value Ref Range Status   12/04/2024 42.7 34.0 - 46.6 % Final     MCV   Date Value Ref Range Status   12/04/2024 89.3 79.0 - 97.0 fL Final     MCH   Date Value Ref Range Status   12/04/2024 29.1 26.6 - 33.0 pg Final     MCHC   Date Value Ref Range Status   12/04/2024 32.6 31.5 - 35.7 g/dL Final     RDW   Date Value Ref Range Status   12/04/2024 13.4 12.3 - 15.4 % Final     RDW-SD   Date Value Ref Range Status   12/04/2024 44.9 37.0 - 54.0 fl Final     MPV   Date Value Ref Range Status   12/04/2024 12.2 (H) 6.0 - 12.0 fL Final     Platelets   Date Value Ref Range Status   12/04/2024 175 140 - 450 10*3/mm3 Final     Neutrophil %   Date Value Ref Range Status   12/04/2024 85.7 (H) 42.7 - 76.0 % Final     Lymphocyte %   Date Value Ref Range Status   12/04/2024 7.2 (L) 19.6 - 45.3 % Final     Monocyte %   Date Value Ref Range Status   12/04/2024 6.3 5.0 - 12.0 % Final     Eosinophil %   Date Value Ref Range Status   12/04/2024 0.6 0.3 - 6.2 % Final     Basophil %   Date Value Ref Range Status   12/04/2024 0.1 0.0 - 1.5 % Final     Immature Grans %   Date Value Ref Range Status   12/04/2024 0.1 0.0 - 0.5 % Final     Neutrophils, Absolute   Date Value Ref Range Status   12/04/2024 5.82 1.70 - 7.00 10*3/mm3 Final     Lymphocytes, Absolute   Date Value Ref Range Status   12/04/2024 0.49 (L) 0.70 - 3.10 10*3/mm3 Final     Monocytes, Absolute   Date Value Ref Range Status   12/04/2024 0.43 0.10 - 0.90 10*3/mm3 Final     Eosinophils, Absolute   Date Value Ref Range Status   12/04/2024 0.04 0.00 - 0.40 10*3/mm3 Final     Basophils, Absolute   Date Value Ref Range Status   12/04/2024  0.01 0.00 - 0.20 10*3/mm3 Final     Immature Grans, Absolute   Date Value Ref Range Status   12/04/2024 0.01 0.00 - 0.05 10*3/mm3 Final     Lab Results   Component Value Date    GLUCOSE 95 12/04/2024    BUN 14 12/04/2024    CREATININE 0.59 12/04/2024     12/04/2024    K 4.3 12/04/2024     12/04/2024    CALCIUM 9.7 12/04/2024    PROTEINTOT 6.8 12/04/2024    ALBUMIN 4.2 12/04/2024    ALT <5 12/04/2024    AST 18 12/04/2024    ALKPHOS 73 12/04/2024    BILITOT 0.5 12/04/2024    GLOB 2.6 12/04/2024    AGRATIO 1.6 12/04/2024    BCR 23.7 12/04/2024    ANIONGAP 10.0 12/04/2024    EGFR 98.3 12/04/2024     CT Abdomen Pelvis With Contrast    Result Date: 12/4/2024  Impression: 1.Decompressed appearance of the colon, which may be slightly thick-walled, but no pericolonic inflammatory changes are seen, nonspecific except for diarrhea. Mild/early colitis might have this appearance. 2.No evidence of acute intra-abdominal or intrapelvic disease elsewhere. Electronically Signed: Kunal Villa MD  12/4/2024 3:19 PM EST  Workstation ID: [id number]     Medical Decision Making  69-year-old female with abdominal pain, nausea, running, diarrhea.  Differential diagnosis includes gastritis, gastroenteritis, foodborne illness, nausea, vomiting, diarrhea, diverticulitis, bowel obstruction, colitis, diverticulosis, appendicitis, general abdominal pain, viral illness.  Pertinent physical exam findings include diffuse abdominal pain.  Diagnostic evaluation includes labs as noted in charting and CT scan.  Labs returned significant for urinary tract infection, other labs nonactionable.  CT scan showing early colitis.  Acute intervention emergency room included fluids and nausea medications.  Also give Toradol for headache.  Respiratory viral panel also returned negative.  Patient will be discharged home to follow-up appropriately outpatient with her primary care physician.  No acute findings at this time.  Verbalized understanding today's  information need for appropriate follow-up.  Patient is nontoxic-appearing and able to tolerate p.o. at time of discharge. Will provide nausea medications and antibiotics for home.     Amount and/or Complexity of Data Reviewed  Labs: ordered. Decision-making details documented in ED Course.  Radiology: ordered. Decision-making details documented in ED Course.    Risk  Prescription drug management.        Final diagnoses:   Colitis   Acute urinary tract infection   Nausea and vomiting, unspecified vomiting type       ED Disposition  ED Disposition       ED Disposition   Discharge    Condition   Stable    Comment   --               [facility] EMERGENCY DEPARTMENT [place]  [address]  [facility] [id number] [phone]  Go to   As needed, If symptoms worsen    Mg Sarkar MD  [address]  [facility] [id number] [phone]    Go to   As needed, If symptoms worsen         Medication List        New Prescriptions      nitrofurantoin (macrocrystal-monohydrate) 100 MG capsule  Commonly known as: MACROBID  Take 1 capsule by mouth 2 (Two) Times a Day for 7 days.            Changed      * ondansetron 8 MG tablet  Commonly known as: ZOFRAN  What changed: Another medication with the same name was added. Make sure you understand how and when to take each.     * ondansetron 4 MG tablet  Commonly known as: ZOFRAN  Take 1 tablet by mouth Every 4 (Four) Hours As Needed for Nausea or Vomiting.  What changed: You were already taking a medication with the same name, and this prescription was added. Make sure you understand how and when to take each.           * This list has 2 medication(s) that are the same as other medications prescribed for you. Read the directions carefully, and ask your doctor or other care provider to review them with you.                   Where to Get Your Medications        These medications were sent to [place] [address]  [facility] [phone] [facility] [phone] [facility] [address]      Phone: [phone]   nitrofurantoin (macrocrystal-monohydrate) 100 MG capsule  ondansetron 4 MG tablet

## 2024-12-04 NOTE — DISCHARGE INSTRUCTIONS
Please do not double your Zofran dose, it appears that he has not had this prescribed in several months.  Please take antiemetics as prescribed.  Please also take antibiotics as prescribed.

## 2025-01-07 ENCOUNTER — TELEPHONE (OUTPATIENT)
Dept: ORTHOPEDIC SURGERY | Facility: CLINIC | Age: 70
End: 2025-01-07
Payer: MEDICARE

## 2025-01-07 NOTE — TELEPHONE ENCOUNTER
PRYOR    Received a call from the patient stating her left ankle and the top of her left ankle is still causing her pain. On a scale of 1 out of 10, she states her pain has increased to about an 8 to 9, depending on how often or long she is on it. Patient is kindly requesting an order / referral to be placed for a CT scan to be completed. Patient is requesting a call at someone's convenience. Please advise.     Thank you kindly!

## 2025-01-08 DIAGNOSIS — M25.572 LEFT ANKLE PAIN, UNSPECIFIED CHRONICITY: Primary | ICD-10-CM

## 2025-01-08 DIAGNOSIS — S93.402A SPRAIN OF LEFT ANKLE, UNSPECIFIED LIGAMENT, INITIAL ENCOUNTER: ICD-10-CM

## 2025-01-08 NOTE — TELEPHONE ENCOUNTER
Called patient and informed her of MRI appointment and Tori scheduled her a f/u with Leny following the MRI.  Diana Glez RT (R), ROT

## 2025-01-09 ENCOUNTER — HOSPITAL ENCOUNTER (OUTPATIENT)
Facility: HOSPITAL | Age: 70
Discharge: HOME | End: 2025-01-09
Admitting: PHYSICIAN ASSISTANT
Payer: MEDICARE

## 2025-01-09 DIAGNOSIS — S93.402A SPRAIN OF LEFT ANKLE, UNSPECIFIED LIGAMENT, INITIAL ENCOUNTER: ICD-10-CM

## 2025-01-09 DIAGNOSIS — M25.572 LEFT ANKLE PAIN, UNSPECIFIED CHRONICITY: ICD-10-CM

## 2025-01-09 PROCEDURE — 73721 MRI JNT OF LWR EXTRE W/O DYE: CPT

## 2025-01-13 ENCOUNTER — OFFICE VISIT (OUTPATIENT)
Age: 70
End: 2025-01-13
Payer: MEDICARE

## 2025-01-13 VITALS
BODY MASS INDEX: 30.94 KG/M2 | DIASTOLIC BLOOD PRESSURE: 70 MMHG | WEIGHT: 185.7 LBS | HEIGHT: 65 IN | SYSTOLIC BLOOD PRESSURE: 122 MMHG

## 2025-01-13 DIAGNOSIS — M17.4 OTHER SECONDARY OSTEOARTHRITIS OF BOTH KNEES: ICD-10-CM

## 2025-01-13 DIAGNOSIS — M25.572 LEFT ANKLE PAIN, UNSPECIFIED CHRONICITY: Primary | ICD-10-CM

## 2025-01-13 DIAGNOSIS — S93.402A SPRAIN OF LEFT ANKLE, UNSPECIFIED LIGAMENT, INITIAL ENCOUNTER: ICD-10-CM

## 2025-01-13 RX ADMIN — TRIAMCINOLONE ACETONIDE 40 MG: 40 INJECTION, SUSPENSION INTRA-ARTICULAR; INTRAMUSCULAR at 11:31

## 2025-01-13 RX ADMIN — LIDOCAINE HYDROCHLORIDE 4 ML: 10 INJECTION, SOLUTION EPIDURAL; INFILTRATION; INTRACAUDAL; PERINEURAL at 11:31

## 2025-01-13 NOTE — PROGRESS NOTES
[facility] Office Follow Up       Office Follow Up Visit       Patient Name: Mecca Renee    Chief Complaint:   Chief Complaint   Patient presents with    Follow-up     3 month MRI follow up -- MRI done 1/9/25; Left ankle pain       Referring Physician: Mg Sarkar MD    History of Present Illness:   Mecca Renee returns to clinic today for f/up left ankle MRI.  She reports no change in her ankle pain.  She did not do PT because they never contacted her.  She continues to complain of mainly medial based ankle pain.  No new symptoms      Subjective     Review of Systems   Constitutional:  Negative for chills, fever, unexpected weight gain and unexpected weight loss.   HENT:  Negative for congestion, postnasal drip and rhinorrhea.    Eyes:  Negative for blurred vision.   Respiratory:  Negative for shortness of breath.    Cardiovascular:  Negative for leg swelling.   Gastrointestinal:  Negative for abdominal pain, nausea and vomiting.   Genitourinary:  Negative for difficulty urinating.   Musculoskeletal:  Positive for arthralgias. Negative for gait problem, joint swelling and myalgias.   Skin:  Negative for skin lesions and wound.   Neurological:  Negative for dizziness, weakness, light-headedness and numbness.   Hematological:  Does not bruise/bleed easily.   Psychiatric/Behavioral:  Negative for depressed mood.         I have reviewed and updated the following portions of the patient's history and review of systems: allergies, current medications, past family history, past medical history, past social history, past surgical history and problem list.    Medications:   Current Outpatient Medications:     albuterol sulfate  (90 Base) MCG/ACT inhaler, Inhale 2 puffs Every 6 (Six) Hours As Needed for Wheezing or Shortness of Air., Disp: 18 g, Rfl: 0    Biotin 5000 MCG capsule, Take 5,000 mcg by mouth Daily., Disp: , Rfl:      bisoprolol (ZEBeta) 5 MG tablet, Take 0.5 tablets by mouth 2 (Two) Times a Day., Disp: 30 tablet, Rfl: 6    Calcium Carbonate (Calcium 600) 1500 (600 Ca) MG tablet, Take 2 tablets by mouth Daily., Disp: , Rfl:     Calcium-Magnesium-Vitamin D (CALCIUM 1200+D3 PO), Calcium 1200+D3, Disp: , Rfl:     cetirizine (zyrTEC) 10 MG tablet, Take 1 tablet by mouth Daily., Disp: , Rfl:     Cholecalciferol 100 MCG (4000 UT) tablet, Take 4,000 Units by mouth Daily., Disp: , Rfl:     Coenzyme Q10 (COQ-10) 100 MG capsule, Take 3 capsules by mouth Daily., Disp: , Rfl:     COMBIVENT RESPIMAT  MCG/ACT inhaler, Inhale 1 puff As Needed., Disp: , Rfl: 3    denosumab (PROLIA) 60 MG/ML solution prefilled syringe syringe, Inject 1 mL under the skin into the appropriate area as directed Every 6 (Six) Months., Disp: , Rfl:     diclofenac (VOLTAREN) 1 % gel gel, Apply 4 g topically As Needed., Disp: , Rfl:     Dietary Management Product (Rheumate) capsule, Take 1 capsule by mouth Daily., Disp: 90 capsule, Rfl: 4    etodolac (LODINE) 400 MG tablet, Take 1 tablet by mouth 2 (Two) Times a Day. WITH FOOD, Disp: 60 tablet, Rfl: 3    flecainide (TAMBOCOR) 100 MG tablet, Take 0.5 tablets by mouth 2 (Two) Times a Day., Disp: 180 tablet, Rfl: 3    fluticasone (FLONASE) 50 MCG/ACT nasal spray, Administer 2 sprays into the nostril(s) as directed by provider Daily As Needed., Disp: , Rfl:     Folic Acid (FOLATE PO), Take  by mouth Daily., Disp: , Rfl:     furosemide (LASIX) 40 MG tablet, 1 tablet Daily As Needed., Disp: , Rfl:     guaiFENesin (MUCINEX) 600 MG 12 hr tablet, Take 1 tablet by mouth As Needed., Disp: , Rfl:     guaifenesin (ROBITUSSIN) 100 MG/5ML liquid, Take 10 mL by mouth Every 4 (Four) Hours As Needed for Cough., Disp: 118 mL, Rfl: 0    ketoconazole (NIZORAL) 2 % cream, Apply  topically to the appropriate area as directed., Disp: , Rfl:     letrozole (FEMARA) 2.5 MG tablet, TAKE 1 TABLET (2.5 MG TOTAL) BY MOUTH DAILY., Disp: , Rfl:      levocetirizine (XYZAL) 5 MG tablet, Take 1 tablet by mouth As Needed., Disp: , Rfl:     methocarbamol (ROBAXIN) 750 MG tablet, Take 1 tablet by mouth 3 (Three) Times a Day., Disp: 90 tablet, Rfl: 3    ondansetron (ZOFRAN) 4 MG tablet, Take 1 tablet by mouth Every 4 (Four) Hours As Needed for Nausea or Vomiting., Disp: 15 tablet, Rfl: 0    ondansetron (ZOFRAN) 8 MG tablet, As Needed., Disp: , Rfl:     oxybutynin XL (DITROPAN-XL) 10 MG 24 hr tablet, Take 1 tablet by mouth Daily., Disp: , Rfl:     pantoprazole (PROTONIX) 20 MG EC tablet, Take 1 tablet by mouth Daily., Disp: , Rfl:     phenazopyridine (PYRIDIUM) 200 MG tablet, As Needed., Disp: , Rfl:     pregabalin (LYRICA) 150 MG capsule, Take 1 capsule by mouth 2 (Two) Times a Day., Disp: 60 capsule, Rfl: 3    Pseudoephedrine-guaiFENesin (MUCINEX D PO), As Needed., Disp: , Rfl:     rizatriptan MLT (MAXALT-MLT) 5 MG disintegrating tablet, Place 1 tablet on the tongue 1 (One) Time As Needed for Migraine., Disp: , Rfl:     S-Adenosylmethionine (DORIAN-e) 400 MG tablet, Take 400 mg by mouth Daily., Disp: , Rfl:     Secukinumab (Cosentyx Sensoready Pen) 150 MG/ML solution auto-injector, Inject 150 mg under the skin into the appropriate area as directed Every 28 (Twenty-Eight) Days., Disp: 1 mL, Rfl: 5    SUMAtriptan (IMITREX) 100 MG tablet, Take 1 tablet by mouth. after onset of migraine; may repeat after 2 hours if headache returns,not to exceed 200mg in 24hrs, Disp: , Rfl:     traMADol (ULTRAM) 50 MG tablet, Take 1 tablet by mouth Every 8 (Eight) Hours As Needed for Moderate Pain., Disp: 90 tablet, Rfl: 3    triamcinolone (KENALOG) 0.1 % cream, APPLY TO RASH TWICE A DAY AS NEEDED - AVOID CONTACT WITH FACE, Disp: , Rfl:     venlafaxine XR (EFFEXOR-XR) 75 MG 24 hr capsule, Take 1 capsule by mouth Daily., Disp: , Rfl:     vitamin D (ERGOCALCIFEROL) 1.25 MG (82660 UT) capsule capsule, Take 1 capsule by mouth 1 (One) Time Per Week., Disp: 12 capsule, Rfl: 0    Allergies:  "  Allergies   Allergen Reactions    Sulfa Antibiotics Hives and Itching    Shellfish-Derived Products Hives and Itching               Objective      Vital Signs:   Vitals:    01/13/25 1106   BP: 122/70   Weight: 84.2 kg (185 lb 11.2 oz)   Height: 165.1 cm (65\")       Ortho Exam:  V:  Dorsalis Pedis:   Left:2+    Posterior Tibial:Left:2+    Capillary Refill:  Brisk  MSK:  Tibia:   Left:  non tender      Ankle:   Left:  tender around the lateral malleolus with significant varicose veins and no swelling., ROM  normal, and motor function  normal      Foot:   Left:  non tender      NEURO: [place]-Eva 5.07 monofilament test: not evaluated    Lower extremity sensation: intact     Calf Atrophy:none    Motor Function: all 5/5          Results Review:  MRI Ankle Left Without Contrast  Narrative: MRI ANKLE LEFT  WO CONTRAST    Date of Exam: 1/9/2025 11:50 AM EST    Indication: Persisting left ankle pain, sprain, failed at home exercises activity modification.     Comparison: 10/15/2024 ankle radiographs    Technique:  Routine multiplanar/multisequence images of the left ankle were obtained without contrast administration.    Findings:  Ligaments: Medial and lateral ankle ligaments appear intact. Lisfranc ligament appears intact.    Tendons: Flexor tendons appear intact. Extensor tendons appear intact. Peroneal tendons appear intact. Retinacula appear intact. Achilles tendon is intact.    Plantar fascia: Plantar fascia appears normal in size and signal.    Bones: Patchy edema like signal at the angle of Gissane. No evidence of fracture. Small amount of subchondral cystic change at the medial talar dome. No substantial joint effusions. No suspicious osseous lesions.    Soft tissues: Edema within the sinus Tarsi suggestive of sinus Tarsi syndrome. Small amount of Gruberi bursitis. Neurovascular bundles appear grossly intact. No soft tissue mass or fluid collection.  Impression: Impression:  1.Edema within the sinus Tarsi " suggestive of sinus Tarsi syndrome.  2.Small amount of subchondral cystic change at the medial talar dome.  3.Small amount of Gruberi bursitis.    Electronically Signed: Mich Matson MD    1/9/2025 1:07 PM EST    Workstation ID: [id number]       MRI Ankle Left Without Contrast    Result Date: 1/9/2025  Impression: 1.Edema within the sinus Tarsi suggestive of sinus Tarsi syndrome. 2.Small amount of subchondral cystic change at the medial talar dome. 3.Small amount of Gruberi bursitis. Electronically Signed: Mich Matson MD  1/9/2025 1:07 PM EST  Workstation ID: [id number]    CT Abdomen Pelvis With Contrast    Result Date: 12/4/2024  Impression: 1.Decompressed appearance of the colon, which may be slightly thick-walled, but no pericolonic inflammatory changes are seen, nonspecific except for diarrhea. Mild/early colitis might have this appearance. 2.No evidence of acute intra-abdominal or intrapelvic disease elsewhere. Electronically Signed: Kunal Villa MD  12/4/2024 3:19 PM EST  Workstation ID: [id number]    MAMMO DIAGNOSTIC DIGITAL TOMOSYNTHESIS BILATERAL W CAD    Result Date: 10/10/2024  FINAL IMPRESSION: ACR BI-RADS 2: Benign findings. RECOMMENDATIONS: Bilateral diagnostic mammogram in 12 months. This report will serve as the order for recommended imaging studies. The results and recommendations were discussed with the patient on the day of the appointment. In addition, a written report in lay terms, including density notification, was given to the patient. At our facility, a [place] marker is positioned over a visible skin lesion and a linear marker is used to indicate a scar. A triangular marker is placed on a palpable finding.        Assessment / Plan      Assessment:   Diagnoses and all orders for this visit:    1. Left ankle pain, unspecified chronicity (Primary)  -     Ambulatory Referral to Physical Therapy for Evaluation & Treatment    2. Other secondary osteoarthritis of both knees    3. Sprain of left  ankle, unspecified ligament, initial encounter  -     Ambulatory Referral to Physical Therapy for Evaluation & Treatment    Other orders  -     - Large Joint Arthrocentesis: bilateral knee        Quality Metrics:   BMI:   BMI is >= 30 and <35. (Class 1 Obesity). The following options were offered after discussion;: Information on healthy weight added to patient's after visit summary.       Tobacco:   Mecca Renee  reports that she has never smoked. She has been exposed to tobacco smoke. She has never used smokeless tobacco.   Plan:  Left ankle sprain.  I reviewed MRI from 1/9/25, clinical findings, past and current treatment with the patient.  MRI shows nothing worrisome.  No evidence of stress fracture or tendonopathy which she has been treated for before in this ankle.  I think her pain is likely from an ankle sprain that has not been rehabilitated.  There may be a component of pain from the varicose veins as well.   I will give her a prescription for PT and I will see her back in 2 months, sooner if needed.  Bilateral knee arthritis.  Patient has done well in the past with viscosupplementation.  Last series finished October 2024.  She is having increased knee pain.  She does feel the Visco still does improve her pain.  Plan today is cortisone injection into bilateral knees.     I discussed with the patient the potential benefits of performing a therapeutic injection of the left knee as well as potential risks including but not limited to infection, swelling, pain, bleeding, bruising, nerve/vessel damage, skin color changes, transient elevation in blood glucose levels, and fat atrophy. After informed consent and verifying correct patient, procedure site, and type of procedure, the area was prepped with Hibiclens, ethyl chloride was used to numb the skin. Via the inferior lateral approach, 4cc of 1% lidocaine and  40mg/ml of Kenalog were injected into the left knee. The patient tolerated the procedure  well. There were no complications.     I discussed with the patient the potential benefits of performing a therapeutic injection of the right knee as well as potential risks including but not limited to infection, swelling, pain, bleeding, bruising, nerve/vessel damage, skin color changes, transient elevation in blood glucose levels, and fat atrophy. After informed consent and verifying correct patient, procedure site, and type of procedure, the area was prepped with Hibiclens, ethyl chloride was used to numb the skin. Via the inferior lateral approach, 4cc of 1% lidocaine and  40mg/ml of Kenalog were injected into the right knee. The patient tolerated the procedure well. There were no complications.     History, diagnosis and treatment plan discussed with Dr. Cortez.           Leny Alcaraz PA-C  [facility]    Dictated using Dragon Speech Recognition.

## 2025-01-13 NOTE — PROGRESS NOTES
Procedure   - Large Joint Arthrocentesis: bilateral knee on 1/13/2025 11:31 AM  Indications: pain  Details: 21 G needle, anterolateral approach  Medications (Right): 40 mg triamcinolone acetonide 40 MG/ML; 4 mL lidocaine PF 1% 1 %  Medications (Left): 40 mg triamcinolone acetonide 40 MG/ML; 4 mL lidocaine PF 1% 1 %  Outcome: tolerated well, no immediate complications  Procedure, treatment alternatives, risks and benefits explained, specific risks discussed. Consent was given by the patient. Immediately prior to procedure a time out was called to verify the correct patient, procedure, equipment, support staff and site/side marked as required. Patient was prepped and draped in the usual sterile fashion.

## 2025-01-14 RX ORDER — LIDOCAINE HYDROCHLORIDE 10 MG/ML
4 INJECTION, SOLUTION EPIDURAL; INFILTRATION; INTRACAUDAL; PERINEURAL
Status: COMPLETED | OUTPATIENT
Start: 2025-01-13 | End: 2025-01-13

## 2025-01-14 RX ORDER — TRIAMCINOLONE ACETONIDE 40 MG/ML
40 INJECTION, SUSPENSION INTRA-ARTICULAR; INTRAMUSCULAR
Status: COMPLETED | OUTPATIENT
Start: 2025-01-13 | End: 2025-01-13

## 2025-02-14 DIAGNOSIS — M79.7 FIBROMYALGIA: ICD-10-CM

## 2025-02-17 RX ORDER — PREGABALIN 150 MG/1
150 CAPSULE ORAL 2 TIMES DAILY
Qty: 60 CAPSULE | Refills: 3 | Status: SHIPPED | OUTPATIENT
Start: 2025-02-17

## 2025-03-06 ENCOUNTER — TELEPHONE (OUTPATIENT)
Age: 70
End: 2025-03-06
Payer: MEDICARE

## 2025-03-07 ENCOUNTER — SPECIALTY PHARMACY (OUTPATIENT)
Age: 70
End: 2025-03-07
Payer: MEDICARE

## 2025-03-11 ENCOUNTER — SPECIALTY PHARMACY (OUTPATIENT)
Age: 70
End: 2025-03-11
Payer: MEDICARE

## 2025-03-26 ENCOUNTER — SPECIALTY PHARMACY (OUTPATIENT)
Age: 70
End: 2025-03-26
Payer: MEDICARE

## 2025-04-15 DIAGNOSIS — M17.4 OTHER SECONDARY OSTEOARTHRITIS OF BOTH KNEES: Primary | ICD-10-CM

## 2025-04-28 ENCOUNTER — CLINICAL SUPPORT (OUTPATIENT)
Age: 70
End: 2025-04-28
Payer: MEDICARE

## 2025-04-28 DIAGNOSIS — M17.4 OTHER SECONDARY OSTEOARTHRITIS OF BOTH KNEES: Primary | ICD-10-CM

## 2025-04-28 PROCEDURE — 20610 DRAIN/INJ JOINT/BURSA W/O US: CPT | Performed by: PHYSICIAN ASSISTANT

## 2025-04-28 RX ORDER — LORATADINE 10 MG/1
10 TABLET ORAL DAILY
COMMUNITY
Start: 2024-12-12

## 2025-04-28 RX ORDER — EXEMESTANE 25 MG/1
TABLET ORAL
COMMUNITY

## 2025-04-28 NOTE — PROGRESS NOTES
Procedure   - Large Joint Arthrocentesis: bilateral knee on 4/28/2025 10:11 AM  Indications: pain  Details: 21 G needle, anterolateral approach  Medications (Right): 30 mg Hyaluronan 30 MG/2ML  Medications (Left): 30 mg Hyaluronan 30 MG/2ML  Outcome: tolerated well, no immediate complications  Procedure, treatment alternatives, risks and benefits explained, specific risks discussed. Consent was given by the patient. Immediately prior to procedure a time out was called to verify the correct patient, procedure, equipment, support staff and site/side marked as required. Patient was prepped and draped in the usual sterile fashion.

## 2025-04-28 NOTE — PROGRESS NOTES
[facility] Office Follow Up       Office Follow Up Visit       Patient Name: Mecca Renee    Chief Complaint:   Chief Complaint   Patient presents with    Follow-up     Bilateral knee Orthovisc injection #1       Referring Physician: Leny Alcaraz P*    History of Present Illness:   Mecca Renee returns to clinic today for ***      Subjective     Review of Systems     I have reviewed and updated the following portions of the patient's history and review of systems: allergies, current medications, past family history, past medical history, past social history, past surgical history and problem list.    Medications:   Current Outpatient Medications:     loratadine (CLARITIN) 10 MG tablet, Take 1 tablet by mouth Daily., Disp: , Rfl:     albuterol sulfate  (90 Base) MCG/ACT inhaler, Inhale 2 puffs Every 6 (Six) Hours As Needed for Wheezing or Shortness of Air., Disp: 18 g, Rfl: 0    Biotin 5000 MCG capsule, Take 5,000 mcg by mouth Daily., Disp: , Rfl:     bisoprolol (ZEBeta) 5 MG tablet, Take 0.5 tablets by mouth 2 (Two) Times a Day., Disp: 30 tablet, Rfl: 6    Calcium Carbonate (Calcium 600) 1500 (600 Ca) MG tablet, Take 2 tablets by mouth Daily., Disp: , Rfl:     Calcium-Magnesium-Vitamin D (CALCIUM 1200+D3 PO), Calcium 1200+D3, Disp: , Rfl:     Cholecalciferol 100 MCG (4000 UT) tablet, Take 4,000 Units by mouth Daily., Disp: , Rfl:     Coenzyme Q10 (COQ-10) 100 MG capsule, Take 3 capsules by mouth Daily., Disp: , Rfl:     COMBIVENT RESPIMAT  MCG/ACT inhaler, Inhale 1 puff As Needed., Disp: , Rfl: 3    denosumab (PROLIA) 60 MG/ML solution prefilled syringe syringe, Inject 1 mL under the skin into the appropriate area as directed Every 6 (Six) Months., Disp: , Rfl:     diclofenac (VOLTAREN) 1 % gel gel, Apply 4 g topically As Needed., Disp: , Rfl:     Dietary Management Product (Rheumate) capsule, Take 1 capsule by mouth  Daily., Disp: 90 capsule, Rfl: 4    etodolac (LODINE) 400 MG tablet, Take 1 tablet by mouth 2 (Two) Times a Day. WITH FOOD, Disp: 60 tablet, Rfl: 3    exemestane (AROMASIN) 25 MG tablet, take 1 tablet (25 mg total) by mouth daily for 90 days., Disp: , Rfl:     flecainide (TAMBOCOR) 100 MG tablet, Take 0.5 tablets by mouth 2 (Two) Times a Day., Disp: 180 tablet, Rfl: 3    fluticasone (FLONASE) 50 MCG/ACT nasal spray, Administer 2 sprays into the nostril(s) as directed by provider Daily As Needed., Disp: , Rfl:     Folic Acid (FOLATE PO), Take  by mouth Daily., Disp: , Rfl:     furosemide (LASIX) 40 MG tablet, 1 tablet Daily As Needed., Disp: , Rfl:     guaiFENesin (MUCINEX) 600 MG 12 hr tablet, Take 1 tablet by mouth As Needed., Disp: , Rfl:     guaifenesin (ROBITUSSIN) 100 MG/5ML liquid, Take 10 mL by mouth Every 4 (Four) Hours As Needed for Cough., Disp: 118 mL, Rfl: 0    ketoconazole (NIZORAL) 2 % cream, Apply  topically to the appropriate area as directed., Disp: , Rfl:     levocetirizine (XYZAL) 5 MG tablet, Take 1 tablet by mouth As Needed., Disp: , Rfl:     methocarbamol (ROBAXIN) 750 MG tablet, Take 1 tablet by mouth 3 (Three) Times a Day., Disp: 90 tablet, Rfl: 3    ondansetron (ZOFRAN) 4 MG tablet, Take 1 tablet by mouth Every 4 (Four) Hours As Needed for Nausea or Vomiting., Disp: 15 tablet, Rfl: 0    ondansetron (ZOFRAN) 8 MG tablet, As Needed., Disp: , Rfl:     oxybutynin XL (DITROPAN-XL) 10 MG 24 hr tablet, Take 1 tablet by mouth Daily., Disp: , Rfl:     pantoprazole (PROTONIX) 20 MG EC tablet, Take 1 tablet by mouth Daily., Disp: , Rfl:     phenazopyridine (PYRIDIUM) 200 MG tablet, As Needed., Disp: , Rfl:     pregabalin (LYRICA) 150 MG capsule, Take 1 capsule by mouth 2 (Two) Times a Day., Disp: 60 capsule, Rfl: 3    Pseudoephedrine-guaiFENesin (MUCINEX D PO), As Needed., Disp: , Rfl:     rizatriptan MLT (MAXALT-MLT) 5 MG disintegrating tablet, Place 1 tablet on the tongue 1 (One) Time As Needed for  Migraine., Disp: , Rfl:     S-Adenosylmethionine (DORIAN-e) 400 MG tablet, Take 400 mg by mouth Daily., Disp: , Rfl:     Secukinumab (Cosentyx Sensoready Pen) 150 MG/ML solution auto-injector, Inject 150 mg under the skin into the appropriate area as directed Every 28 (Twenty-Eight) Days., Disp: 1 mL, Rfl: 5    SUMAtriptan (IMITREX) 100 MG tablet, Take 1 tablet by mouth. after onset of migraine; may repeat after 2 hours if headache returns,not to exceed 200mg in 24hrs, Disp: , Rfl:     traMADol (ULTRAM) 50 MG tablet, Take 1 tablet by mouth Every 8 (Eight) Hours As Needed for Moderate Pain., Disp: 90 tablet, Rfl: 3    triamcinolone (KENALOG) 0.1 % cream, APPLY TO RASH TWICE A DAY AS NEEDED - AVOID CONTACT WITH FACE, Disp: , Rfl:     venlafaxine XR (EFFEXOR-XR) 75 MG 24 hr capsule, Take 1 capsule by mouth Daily., Disp: , Rfl:     vitamin D (ERGOCALCIFEROL) 1.25 MG (39109 UT) capsule capsule, Take 1 capsule by mouth 1 (One) Time Per Week., Disp: 12 capsule, Rfl: 0    Allergies:   Allergies   Allergen Reactions    Sulfa Antibiotics Hives and Itching    Shellfish-Derived Products Hives and Itching               Objective      Vital Signs: There were no vitals filed for this visit.    Ortho Exam:  ***    Results Review:  MRI Ankle Left Without Contrast  Narrative: MRI ANKLE LEFT  WO CONTRAST    Date of Exam: 1/9/2025 11:50 AM EST    Indication: Persisting left ankle pain, sprain, failed at home exercises activity modification.     Comparison: 10/15/2024 ankle radiographs    Technique:  Routine multiplanar/multisequence images of the left ankle were obtained without contrast administration.    Findings:  Ligaments: Medial and lateral ankle ligaments appear intact. Lisfranc ligament appears intact.    Tendons: Flexor tendons appear intact. Extensor tendons appear intact. Peroneal tendons appear intact. Retinacula appear intact. Achilles tendon is intact.    Plantar fascia: Plantar fascia appears normal in size and  "signal.    Bones: Patchy edema like signal at the angle of Gissane. No evidence of fracture. Small amount of subchondral cystic change at the medial talar dome. No substantial joint effusions. No suspicious osseous lesions.    Soft tissues: Edema within the sinus Tarsi suggestive of sinus Tarsi syndrome. Small amount of Gruberi bursitis. Neurovascular bundles appear grossly intact. No soft tissue mass or fluid collection.  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  2.Small amount of subchondral cystic change at the medial talar dome.  3.Small amount of Gruberi bursitis.    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 2.Small amount of subchondral cystic change at the medial talar dome. 3.Small amount of Gruberi bursitis. Electronically Signed: Mich Matson MD  1/9/2025 1:07 PM EST  Workstation ID: [id number]        Assessment / Plan      Assessment:   Diagnoses and all orders for this visit:    1. Other secondary osteoarthritis of both knees (Primary)    Other orders  -     - Large Joint Arthrocentesis: bilateral knee        Quality Metrics:   BMI:   {BMI is >= 30 and <35. (Class 1 Obesity). The following options were offered after discussion; (Optional):74662}       Tobacco:   Mecca Renee  reports that she has never smoked. She has been exposed to tobacco smoke. She has never used smokeless tobacco. I have educated her on the risk of diseases from using tobacco products such as {Tobacco Cessation Diseases:16071::\"cancer\",\"COPD\",\"heart disease\"}.     I advised her to quit and she is {Willing/Not Willing to Quit Tobacco Products:95571}.    I spent {Time Spent Tobacco :88633} minutes counseling the patient.            Plan:   ***    Leny Alcaraz PA-C  [facility] [place] " Orthopedic Surgery    Dictated using Dragon Speech Recognition.

## 2025-05-05 ENCOUNTER — CLINICAL SUPPORT (OUTPATIENT)
Age: 70
End: 2025-05-05
Payer: MEDICARE

## 2025-05-05 VITALS
WEIGHT: 185 LBS | SYSTOLIC BLOOD PRESSURE: 135 MMHG | BODY MASS INDEX: 30.82 KG/M2 | DIASTOLIC BLOOD PRESSURE: 82 MMHG | HEIGHT: 65 IN

## 2025-05-05 DIAGNOSIS — M17.4 OTHER SECONDARY OSTEOARTHRITIS OF BOTH KNEES: Primary | ICD-10-CM

## 2025-05-05 PROCEDURE — 20610 DRAIN/INJ JOINT/BURSA W/O US: CPT | Performed by: PHYSICIAN ASSISTANT

## 2025-05-05 NOTE — PROGRESS NOTES
Procedure   - Large Joint Arthrocentesis: bilateral knee on 5/5/2025 10:46 AM  Indications: pain  Details: 21 G needle, anterolateral approach  Medications (Right): 30 mg Hyaluronan 30 MG/2ML  Medications (Left): 30 mg Hyaluronan 30 MG/2ML  Outcome: tolerated well, no immediate complications  Procedure, treatment alternatives, risks and benefits explained, specific risks discussed. Immediately prior to procedure a time out was called to verify the correct patient, procedure, equipment, support staff and site/side marked as required.

## 2025-05-07 ENCOUNTER — RESULTS FOLLOW-UP (OUTPATIENT)
Age: 70
End: 2025-05-07
Payer: MEDICARE

## 2025-05-07 RX ORDER — ERGOCALCIFEROL 1.25 MG/1
50000 CAPSULE, LIQUID FILLED ORAL WEEKLY
Qty: 12 CAPSULE | Refills: 0 | Status: SHIPPED | OUTPATIENT
Start: 2025-05-07

## 2025-05-12 ENCOUNTER — CLINICAL SUPPORT (OUTPATIENT)
Age: 70
End: 2025-05-12
Payer: MEDICARE

## 2025-05-12 DIAGNOSIS — M17.4 OTHER SECONDARY OSTEOARTHRITIS OF BOTH KNEES: Primary | ICD-10-CM

## 2025-05-12 PROCEDURE — 20610 DRAIN/INJ JOINT/BURSA W/O US: CPT | Performed by: PHYSICIAN ASSISTANT

## 2025-05-12 RX ORDER — AMOXICILLIN 875 MG/1
TABLET, COATED ORAL
COMMUNITY
Start: 2025-05-06

## 2025-05-12 NOTE — PROGRESS NOTES
Procedure Note:    I discussed with the patient the potential benefits of performing a therapeutic injections as well as potential risks including but not limited to infection, swelling, pain, bleeding, bruising, nerve/vessel damage, skin color changes, transient elevation in blood glucose levels, and fat atrophy. After informed consent and after the areas were prepped with chlorhexadine soap, ethyl chloride was used to numb the skin. Via the inferolateral approach, 2mL of  Orthovisc was each injected into the bilateral knee joint. The patient tolerated the procedure well. There were no complications. A sterile dressing was placed over the injection sites.      Follow-up:prn

## 2025-05-12 NOTE — PROGRESS NOTES
Procedure   - Large Joint Arthrocentesis: bilateral knee on 5/12/2025 10:38 AM  Indications: pain  Details: 21 G needle, superolateral approach  Medications (Right): 30 mg Hyaluronan 30 MG/2ML  Medications (Left): 30 mg Hyaluronan 30 MG/2ML  Outcome: tolerated well, no immediate complications  Procedure, treatment alternatives, risks and benefits explained, specific risks discussed. Consent was given by the patient. Immediately prior to procedure a time out was called to verify the correct patient, procedure, equipment, support staff and site/side marked as required. Patient was prepped and draped in the usual sterile fashion.         52

## 2025-05-14 DIAGNOSIS — M79.7 FIBROMYALGIA: ICD-10-CM

## 2025-05-15 RX ORDER — METHOCARBAMOL 750 MG/1
750 TABLET, FILM COATED ORAL 3 TIMES DAILY
Qty: 90 TABLET | Refills: 4 | Status: SHIPPED | OUTPATIENT
Start: 2025-05-15

## 2025-06-02 ENCOUNTER — TELEPHONE (OUTPATIENT)
Dept: CARDIOLOGY | Facility: CLINIC | Age: 70
End: 2025-06-02
Payer: MEDICARE

## 2025-06-02 RX ORDER — FLECAINIDE ACETATE 100 MG/1
50 TABLET ORAL 2 TIMES DAILY
Qty: 180 TABLET | Refills: 3 | Status: CANCELLED | OUTPATIENT
Start: 2025-06-02

## 2025-06-02 RX ORDER — FLECAINIDE ACETATE 100 MG/1
50 TABLET ORAL 2 TIMES DAILY
Qty: 30 TABLET | Refills: 0 | Status: SHIPPED | OUTPATIENT
Start: 2025-06-02

## 2025-06-02 RX ORDER — BISOPROLOL FUMARATE 5 MG/1
2.5 TABLET, FILM COATED ORAL 2 TIMES DAILY
Qty: 30 TABLET | Refills: 0 | Status: SHIPPED | OUTPATIENT
Start: 2025-06-02

## 2025-06-02 NOTE — TELEPHONE ENCOUNTER
"    Caller: Mecca Renee Floyd \"Gia\"    Relationship: Self    Best call back number: [phone]     Requested Prescriptions:   Requested Prescriptions     Pending Prescriptions Disp Refills    bisoprolol (ZEBeta) 5 MG tablet 30 tablet 6     Sig: Take 0.5 tablets by mouth 2 (Two) Times a Day.    flecainide (TAMBOCOR) 100 MG tablet 180 tablet 3     Sig: Take 0.5 tablets by mouth 2 (Two) Times a Day.        Pharmacy where request should be sent: [facility]    Last office visit with prescribing clinician: 4/19/2024   Last telemedicine visit with prescribing clinician: Visit date not found   Next office visit with prescribing clinician: 8/1/2025     Additional details provided by patient:  PT IS OUT OF BOTH MEDICATIONS.     Does the patient have less than a 3 day supply:  [x] Yes  [] No    Would you like a call back once the refill request has been completed: [x] Yes [] No    If the office needs to give you a call back, can they leave a voicemail: [x] Yes [] No    Lala May, Tami Rep   06/02/25 13:40 EDT         "

## 2025-06-02 NOTE — TELEPHONE ENCOUNTER
LVM with patient regarding refills on medications, pt needs to come in for EKG. Will send 30 day supply.

## 2025-06-06 DIAGNOSIS — M54.9 BACK PAIN, UNSPECIFIED BACK LOCATION, UNSPECIFIED BACK PAIN LATERALITY, UNSPECIFIED CHRONICITY: ICD-10-CM

## 2025-06-06 DIAGNOSIS — L40.50 PSORIATIC ARTHRITIS: ICD-10-CM

## 2025-06-06 DIAGNOSIS — M54.2 NECK PAIN: ICD-10-CM

## 2025-06-06 RX ORDER — ETODOLAC 400 MG/1
400 TABLET, FILM COATED ORAL 2 TIMES DAILY
Qty: 30 TABLET | Refills: 0 | Status: SHIPPED | OUTPATIENT
Start: 2025-06-06

## 2025-06-06 NOTE — TELEPHONE ENCOUNTER
Rx Refill Note  Requested Prescriptions     Pending Prescriptions Disp Refills    etodolac (LODINE) 400 MG tablet [Pharmacy Med Name: ETODOLAC 400 MG TABS 400 Tablet] 60 tablet 3     Sig: TAKE 1 TABLET BY MOUTH 2 (TWO) TIMES A DAY. WITH FOOD      Last office visit with prescribing clinician: Visit date not found   Last telemedicine visit with prescribing clinician: Visit date not found   Next office visit with prescribing clinician: 6/25/2025                         Jessika Oakley MA  06/06/25, 13:43 EDT    Please advise. Contradiction notice/warning.

## 2025-06-09 ENCOUNTER — TELEPHONE (OUTPATIENT)
Dept: PAIN MEDICINE | Facility: CLINIC | Age: 70
End: 2025-06-09
Payer: MEDICARE

## 2025-06-09 NOTE — TELEPHONE ENCOUNTER
Informed patient that switching between providers is generally not allowed in this office. In addition, told her she will need a new referral since it has been 5 years since she was last seen in the office.

## 2025-06-09 NOTE — TELEPHONE ENCOUNTER
"    Caller: Mecca Renee \"Gia\"    Relationship: Self    Best call back number: [phone]    Who is your current provider: ERIC    Is your current provider offboarding? NO     Who would you like your new provider to be:      What are your reasons for transferring care: DAUGHTER SEES    "

## 2025-06-20 DIAGNOSIS — M79.7 FIBROMYALGIA: ICD-10-CM

## 2025-06-23 DIAGNOSIS — M79.7 FIBROMYALGIA: ICD-10-CM

## 2025-06-23 RX ORDER — PREGABALIN 150 MG/1
150 CAPSULE ORAL 2 TIMES DAILY
Qty: 60 CAPSULE | Refills: 3 | Status: CANCELLED | OUTPATIENT
Start: 2025-06-23

## 2025-06-23 NOTE — TELEPHONE ENCOUNTER
Incoming Refill Request      Medication requested (name and dose): PREGABALIN 150 MG    Pharmacy where request should be sent: [facility]    Additional details provided by patient: NA    Best call back number: [phone]    Does the patient have less than a 3 day supply:  [x] Yes  [] No    Tami Jimenez Rep  06/23/25, 09:39 EDT

## 2025-06-23 NOTE — ASSESSMENT & PLAN NOTE
Patient has had symptoms in her hands and feet; has no erosions by xray; Does have degenerative changes on x-ray has intermittent MCP swelling. Biopsy proven Psoriasis  S/ Plaquenil  , MTX ( hair loss)  Mild Degenerative changes of the SI joint.   Left hip had Mild Degenerative changes of the acetabulum.  Humira discontinued 12/22 secondary to breast cancer. Plan to avoid TNF drugs. S/p chemo and radiation therapy.    Plan:  Overall significant improvement in joint pain, swelling and stiffness of peripheral joints.   Overall she feels Cosentyx is helpful.  She does feel more joint pain with the air conditioning.  She has oa of the first cmc joints. Can try topical creams such as biofreeze.  Comfort cool splints from [facility]- continue.  Orders:    etodolac (LODINE) 400 MG tablet; Take 1 tablet by mouth 2 (Two) Times a Day. WITH FOOD    QuantiFERON-TB Gold Plus (Li-Hep)    Hepatitis Panel, Acute    Comprehensive Metabolic Panel    C-reactive Protein    Sedimentation Rate    CBC & Differential

## 2025-06-23 NOTE — ASSESSMENT & PLAN NOTE
The MRI also shows degenerative disc disease with narrowing where the nerves exit.   She is currently managing the condition with Lyrica, methocarbamol, and tramadol.   She was advised to continue these medications and use the TENS unit  Follow with Dr. Chamberlain  She does not want to to have surgery.  Dr. Chamberlain has offered to refer her to [facility].  Orders:    etodolac (LODINE) 400 MG tablet; Take 1 tablet by mouth 2 (Two) Times a Day. WITH FOOD

## 2025-06-23 NOTE — PROGRESS NOTES
[facility] Office Follow Up Visit     Office Follow Up      Date: 06/25/2025   Patient Name: Mecca Renee  MRN: [id number]  YOB: 1955    Referring Physician: No ref. provider found     Chief Complaint   Patient presents with    Psoriatic arthritis       History of Present Illness: Mecca Renee is a 70 y.o. adult who is here today for follow up on psoriatic arthritis.  Today she reports feeling worse.  She rates her pain 8/10, global 8/10 and has 3 hours of morning stiffness.  She request refills of Lyrica and etodolac today.  Her back pain and hip pain drive her pain score.  She is following with Dr. Chamberlain.  She does not want to do surgery.  She is a former Dr. Ana Garcia patient.         Subjective     Allergies   Allergen Reactions    Sulfa Antibiotics Hives and Itching    Shellfish-Derived Products Hives and Itching               Current Outpatient Medications:     albuterol sulfate  (90 Base) MCG/ACT inhaler, Inhale 2 puffs Every 6 (Six) Hours As Needed for Wheezing or Shortness of Air., Disp: 18 g, Rfl: 0    Biotin 5000 MCG capsule, Take 5,000 mcg by mouth Daily., Disp: , Rfl:     bisoprolol (ZEBeta) 5 MG tablet, Take 0.5 tablets by mouth 2 (Two) Times a Day., Disp: 30 tablet, Rfl: 0    Calcium Carbonate (Calcium 600) 1500 (600 Ca) MG tablet, Take 2 tablets by mouth Daily., Disp: , Rfl:     Calcium-Magnesium-Vitamin D (CALCIUM 1200+D3 PO), Calcium 1200+D3, Disp: , Rfl:     Cholecalciferol 100 MCG (4000 UT) tablet, Take 4,000 Units by mouth Daily., Disp: , Rfl:     Coenzyme Q10 (COQ-10) 100 MG capsule, Take 3 capsules by mouth Daily., Disp: , Rfl:     COMBIVENT RESPIMAT  MCG/ACT inhaler, Inhale 1 puff As Needed., Disp: , Rfl: 3    denosumab (PROLIA) 60 MG/ML solution prefilled syringe syringe, Inject 1 mL under the skin into the appropriate area as directed Every 6 (Six) Months., Disp: , Rfl:     diclofenac (VOLTAREN) 1 %  gel gel, Apply 4 g topically As Needed., Disp: , Rfl:     Dietary Management Product (Rheumate) capsule, Take 1 capsule by mouth Daily., Disp: 90 capsule, Rfl: 4    etodolac (LODINE) 400 MG tablet, Take 1 tablet by mouth 2 (Two) Times a Day. WITH FOOD, Disp: 30 tablet, Rfl: 0    exemestane (AROMASIN) 25 MG tablet, take 1 tablet (25 mg total) by mouth daily for 90 days., Disp: , Rfl:     flecainide (TAMBOCOR) 100 MG tablet, Take 0.5 tablets by mouth 2 (Two) Times a Day. Patient needs EKG for further refills., Disp: 30 tablet, Rfl: 0    fluticasone (FLONASE) 50 MCG/ACT nasal spray, Administer 2 sprays into the nostril(s) as directed by provider Daily As Needed., Disp: , Rfl:     Folic Acid (FOLATE PO), Take  by mouth Daily., Disp: , Rfl:     furosemide (LASIX) 40 MG tablet, 1 tablet Daily As Needed., Disp: , Rfl:     guaiFENesin (MUCINEX) 600 MG 12 hr tablet, Take 1 tablet by mouth As Needed., Disp: , Rfl:     guaifenesin (ROBITUSSIN) 100 MG/5ML liquid, Take 10 mL by mouth Every 4 (Four) Hours As Needed for Cough., Disp: 118 mL, Rfl: 0    ketoconazole (NIZORAL) 2 % cream, Apply  topically to the appropriate area as directed., Disp: , Rfl:     levocetirizine (XYZAL) 5 MG tablet, Take 1 tablet by mouth As Needed., Disp: , Rfl:     loratadine (CLARITIN) 10 MG tablet, Take 1 tablet by mouth Daily., Disp: , Rfl:     methocarbamol (ROBAXIN) 750 MG tablet, TAKE 1 TABLET BY MOUTH 3 (THREE) TIMES A DAY., Disp: 90 tablet, Rfl: 4    nitrofurantoin (MACRODANTIN) 100 MG capsule, TAKE ONE TWICE A DAY FOR 7 DAYS, Disp: , Rfl:     ondansetron (ZOFRAN) 4 MG tablet, Take 1 tablet by mouth Every 4 (Four) Hours As Needed for Nausea or Vomiting., Disp: 15 tablet, Rfl: 0    ondansetron (ZOFRAN) 8 MG tablet, As Needed., Disp: , Rfl:     oxybutynin XL (DITROPAN-XL) 10 MG 24 hr tablet, Take 1 tablet by mouth Daily., Disp: , Rfl:     pantoprazole (PROTONIX) 20 MG EC tablet, Take 1 tablet by mouth Daily., Disp: , Rfl:     phenazopyridine  (PYRIDIUM) 200 MG tablet, As Needed., Disp: , Rfl:     pregabalin (LYRICA) 150 MG capsule, Take 1 capsule by mouth 2 (Two) Times a Day., Disp: 60 capsule, Rfl: 3    rizatriptan MLT (MAXALT-MLT) 5 MG disintegrating tablet, Place 1 tablet on the tongue 1 (One) Time As Needed for Migraine., Disp: , Rfl:     S-Adenosylmethionine (DORIAN-e) 400 MG tablet, Take 400 mg by mouth Daily., Disp: , Rfl:     Secukinumab (Cosentyx Sensoready Pen) 150 MG/ML solution auto-injector, Inject 150 mg under the skin into the appropriate area as directed Every 28 (Twenty-Eight) Days., Disp: 1 mL, Rfl: 5    traMADol (ULTRAM) 50 MG tablet, Take 1 tablet by mouth Every 8 (Eight) Hours As Needed for Moderate Pain., Disp: 90 tablet, Rfl: 3    triamcinolone (KENALOG) 0.1 % cream, APPLY TO RASH TWICE A DAY AS NEEDED - AVOID CONTACT WITH FACE, Disp: , Rfl:     venlafaxine XR (EFFEXOR-XR) 75 MG 24 hr capsule, Take 1 capsule by mouth Daily., Disp: , Rfl:     vitamin D (ERGOCALCIFEROL) 1.25 MG (39277 UT) capsule capsule, Take 1 capsule by mouth 1 (One) Time Per Week., Disp: 12 capsule, Rfl: 0    Past Medical History:   Diagnosis Date    Ankle fracture     11/15/2018 - Left Tibial stress fracture. May 2018. Dr. Helm.    Asthma     Breast cancer, stage 1     9/2022 Dr. Miranda    Cancer     breast    Chronic pain disorder     COVID-19     positive    Diverticulosis     Fatigue     Fibromyalgia     Foot fracture, left     Foot fracture, right     GERD (gastroesophageal reflux disease)     High risk medication use     History of degenerative disc disease     Inflammatory arthritis     Low back pain     Metatarsal stress fracture of left foot     Migraine     Neck pain     Osteoarthritis     Osteoporosis     Pain     ABNORMAL PAIN, EPI GASTRIC- Status is Resolved    Palpitations     palpitations with near syncope    Paresthesias     Posterior tibial tendonitis, left     11/15/2018 - Dr. Helm.2018.    Pre-syncope     Rheumatoid arthritis     Rib  fracture     6/21 XR ; Chronic L 4th rib fracture. Age indeterminate. 5-7 anterolateral rib fractures.  12/03/2021 -Traumatic.    Right shoulder pain     Tachycardia     Vitamin D deficiency     Whiplash         Past Surgical History:   Procedure Laterality Date    BLEPHAROPLASTY  01/07/2020    Dr. Das.    CHOLECYSTECTOMY      EYE SURGERY      eye lid lift    EYE SURGERY  12/2019    FOOT SURGERY Right     MASTECTOMY PARTIAL / LUMPECTOMY Left 10/2022    SHOULDER SURGERY Right        Family History   Problem Relation Age of Onset    Diabetes Mother     Cancer Mother         kidney    Heart disease Mother         CHF    Hypertension Mother     Kidney disease Mother     Cancer Father         bladder/kidney    Cancer Brother         lung    Cancer Brother         lung    Lupus Brother     Heart disease Maternal Grandmother     Stroke Maternal Grandmother     Arthritis Maternal Grandmother     Asthma Paternal Grandmother         Social History     Socioeconomic History    Marital status:      Spouse name: 2   Tobacco Use    Smoking status: Never     Passive exposure: Past    Smokeless tobacco: Never   Vaping Use    Vaping status: Never Used   Substance and Sexual Activity    Alcohol use: No    Drug use: No    Sexual activity: Defer       Review of Systems   Constitutional:  Positive for chills, diaphoresis and fatigue.   HENT:  Positive for congestion and postnasal drip.    Eyes:  Positive for photophobia and redness.   Gastrointestinal:  Positive for indigestion.   Endocrine: Positive for cold intolerance and heat intolerance.   Musculoskeletal:  Positive for arthralgias, back pain, gait problem, joint swelling, neck pain and neck stiffness.   Skin:  Positive for dry skin and rash.        Nail changes, hair loss   Allergic/Immunologic: Positive for environmental allergies.   Neurological:  Positive for light-headedness and headache.   All other systems reviewed and are negative.       I have reviewed and updated  "the patient's chief complaint, history of present illness, review of systems, past medical history, surgical history, family history, social history, medications and allergy list as appropriate.     Objective    Vital Signs:   Vitals:    06/25/25 1026   BP: 128/78   BP Location: Left arm   Patient Position: Sitting   Cuff Size: Adult   Pulse: 67   Temp: 97.2 °F (36.2 °C)   Weight: 84.1 kg (185 lb 8 oz)   Height: 165.1 cm (65\")   PainSc: 8    PainLoc: Generalized             Body mass index is 30.87 kg/m².        Physical Exam           Results Review:   Imaging Results (Last 24 Hours)       ** No results found for the last 24 hours. **            Procedures    Assessment / Plan          Assessment & Plan  Psoriatic arthritis  Patient has had symptoms in her hands and feet; has no erosions by xray; Does have degenerative changes on x-ray has intermittent MCP swelling. Biopsy proven Psoriasis  S/ Plaquenil  , MTX ( hair loss)  Mild Degenerative changes of the SI joint.   Left hip had Mild Degenerative changes of the acetabulum.  Humira discontinued 12/22 secondary to breast cancer. Plan to avoid TNF drugs. S/p chemo and radiation therapy.    Plan:  Overall significant improvement in joint pain, swelling and stiffness of peripheral joints.   Overall she feels Cosentyx is helpful.  She does feel more joint pain with the air conditioning.  She has oa of the first cmc joints. Can try topical creams such as biofreeze.  Comfort cool splints from [facility] continue.  Orders:    etodolac (LODINE) 400 MG tablet; Take 1 tablet by mouth 2 (Two) Times a Day. WITH FOOD    QuantiFERON-TB Gold Plus (Li-Hep)    Hepatitis Panel, Acute    Comprehensive Metabolic Panel    C-reactive Protein    Sedimentation Rate    CBC & Differential     Back pain, unspecified back location, unspecified back pain laterality, unspecified chronicity  The MRI also shows degenerative disc disease with narrowing where the nerves exit.   She is currently " managing the condition with Lyrica, methocarbamol, and tramadol.   She was advised to continue these medications and use the TENS unit  Follow with Dr. Chamberlain  She does not want to to have surgery.  Dr. Chamberlain has offered to refer her to .  Orders:    etodolac (LODINE) 400 MG tablet; Take 1 tablet by mouth 2 (Two) Times a Day. WITH FOOD    Encounter for medication monitoring  Lyrica    Controlled substance agreement reviewed and signed 6/25/2025  PDMP reviewed  UDS ordered 6/25/2025  She reports being without lyrica for some time due to not receiving the medicine.  It was filled 5/19/25 per ROBERTO but patient denies receiving it.  She has been withdrawing without medication.    Discussed risks of lyrica including dizziness sedation, weight gain, leg swelling,  withdrawal.  Discussed lyrica is controlled medication and that she should keep it in a locked cabinet.    Orders:    ToxAssure Flex 22, Urine - Urine, Random Void; Future    High risk medication use  Cosentyx    Update QTB and hepatitis panel today    We discussed biologic agents at length. Risks and alternatives were discussed at length and the option of no treatment was also given. We discussed risks including but not limited to infections which can be unusual, severe, and deadly. When possible, these agents should be stopped immediately if infections occur. Unusual infection such as TB and fungal infections can occur. There may be an increased risk of lymphoma with these agents. Other risks can include a multiple sclerosis-like illness and worsening of heart failure. Infusion or injection reactions which can be deadly have been reported. Studies on pregnancy have not been done so pregnancy should be avoided while on these agents. Reactivation of a deadly brain virus and hepatitis viruses have been reported. Worsening of COPD has been seen with orencia. Elevated lipids, elevation in liver functions, and dangerous changes in blood counts have been seen with  certain agents. Regular monitoring will be required    Orders:    ToxAssure Flex 22, Urine - Urine, Random Void; Future    QuantiFERON-TB Gold Plus (Li-Hep)    Hepatitis Panel, Acute    Comprehensive Metabolic Panel    C-reactive Protein    Sedimentation Rate    CBC & Differential    Neck pain  Pain/decreased rom; painful extension--Selena Ramirez years ago--?surgery-deferred;  xray c/w DDD C5-6 and spasm/PT aggravated sx w/HA  Cervical spine MRI 8/21 - Multilevel ddd and facet arthritis , C5-6 neuroforaminal narrowing at several levels that is mild. Pain radiates to her shoulders and down both arms.  She did see NS and they felt she did not need any surgical intervention at this time. She can do pain medication, injection, therapy, etc.   S/p chiropractor   Currently has intermittent neck pain    Plan:  Continue Methocarbamol, she is also on Lyrica and Tramadol.   Continue with Dr. Chamberlain  TENS unit. She is using this.  Orders:    etodolac (LODINE) 400 MG tablet; Take 1 tablet by mouth 2 (Two) Times a Day. WITH FOOD     Fibromyalgia  Diffuse chronic muscle pain with sx that wax/wane.  Improved with water therapy in the past.  Muscles continue to be sore. Chronic.    Plan:  Continue Methocarbamol and Etodolac.  Continue Lyrica.     Four Cornerstones of treatment include  1- Treat Sleep- restoration.  2- Exercise- chair aerobics or chair yoga YouTube; Water aerobics, water walking - local.  3- Treat anxiety and depression  4- Treat pain. Non addictive muscle relaxer. Biofreeze, Massage , Heat , ICE.  FMS packet given in the past and discussed for her to work with pcp regarding treatment.  Orders:    pregabalin (LYRICA) 150 MG capsule; Take 1 capsule by mouth 2 (Two) Times a Day.     Other fatigue  Check QTB and hepatitis panel  Orders:    QuantiFERON-TB Gold Plus (Li-Hep)    Hepatitis Panel, Acute    I attest that the documentation was copied from a previous note but is still current and accurate.        Follow Up:    Return in about 4 months (around 10/25/2025) for NP.       ROBEL Walden  [facility]

## 2025-06-24 RX ORDER — PREGABALIN 150 MG/1
150 CAPSULE ORAL 2 TIMES DAILY
Qty: 60 CAPSULE | Refills: 3 | OUTPATIENT
Start: 2025-06-24

## 2025-06-24 NOTE — TELEPHONE ENCOUNTER
Refill request for Pregabalin 150mg denied. Pt has appt on 6/25/25.  A new rx will be sent at her appt. -CLAUDIO Licea

## 2025-06-25 ENCOUNTER — OFFICE VISIT (OUTPATIENT)
Age: 70
End: 2025-06-25
Payer: MEDICARE

## 2025-06-25 VITALS
HEART RATE: 67 BPM | DIASTOLIC BLOOD PRESSURE: 78 MMHG | BODY MASS INDEX: 30.91 KG/M2 | HEIGHT: 65 IN | WEIGHT: 185.5 LBS | TEMPERATURE: 97.2 F | SYSTOLIC BLOOD PRESSURE: 128 MMHG

## 2025-06-25 DIAGNOSIS — L40.50 PSORIATIC ARTHRITIS: Primary | ICD-10-CM

## 2025-06-25 DIAGNOSIS — Z51.81 ENCOUNTER FOR MEDICATION MONITORING: ICD-10-CM

## 2025-06-25 DIAGNOSIS — M79.7 FIBROMYALGIA: ICD-10-CM

## 2025-06-25 DIAGNOSIS — Z79.899 HIGH RISK MEDICATION USE: ICD-10-CM

## 2025-06-25 DIAGNOSIS — M54.2 NECK PAIN: ICD-10-CM

## 2025-06-25 DIAGNOSIS — R53.83 OTHER FATIGUE: ICD-10-CM

## 2025-06-25 DIAGNOSIS — M54.9 BACK PAIN, UNSPECIFIED BACK LOCATION, UNSPECIFIED BACK PAIN LATERALITY, UNSPECIFIED CHRONICITY: ICD-10-CM

## 2025-06-25 RX ORDER — NITROFURANTOIN MACROCRYSTALS 100 MG/1
CAPSULE ORAL
COMMUNITY
Start: 2025-06-24

## 2025-06-25 RX ORDER — ETODOLAC 400 MG/1
400 TABLET, FILM COATED ORAL 2 TIMES DAILY
Qty: 30 TABLET | Refills: 0 | Status: SHIPPED | OUTPATIENT
Start: 2025-06-25

## 2025-06-25 RX ORDER — PREGABALIN 150 MG/1
150 CAPSULE ORAL 2 TIMES DAILY
Qty: 60 CAPSULE | Refills: 3 | Status: SHIPPED | OUTPATIENT
Start: 2025-06-25

## 2025-06-25 NOTE — ASSESSMENT & PLAN NOTE
Pain/decreased rom; painful extension--Selena Ramirez years ago--?surgery-deferred;  xray c/w DDD C5-6 and spasm/PT aggravated sx w/HA  Cervical spine MRI 8/21 - Multilevel ddd and facet arthritis , C5-6 neuroforaminal narrowing at several levels that is mild. Pain radiates to her shoulders and down both arms.  She did see NS and they felt she did not need any surgical intervention at this time. She can do pain medication, injection, therapy, etc.   S/p chiropractor   Currently has intermittent neck pain    Plan:  Continue Methocarbamol, she is also on Lyrica and Tramadol.   Continue with Dr. Chamberlain  TENS unit. She is using this.  Orders:    etodolac (LODINE) 400 MG tablet; Take 1 tablet by mouth 2 (Two) Times a Day. WITH FOOD

## 2025-06-25 NOTE — ASSESSMENT & PLAN NOTE
Cosentyx    Update QTB and hepatitis panel today    We discussed biologic agents at length. Risks and alternatives were discussed at length and the option of no treatment was also given. We discussed risks including but not limited to infections which can be unusual, severe, and deadly. When possible, these agents should be stopped immediately if infections occur. Unusual infection such as TB and fungal infections can occur. There may be an increased risk of lymphoma with these agents. Other risks can include a multiple sclerosis-like illness and worsening of heart failure. Infusion or injection reactions which can be deadly have been reported. Studies on pregnancy have not been done so pregnancy should be avoided while on these agents. Reactivation of a deadly brain virus and hepatitis viruses have been reported. Worsening of COPD has been seen with orencia. Elevated lipids, elevation in liver functions, and dangerous changes in blood counts have been seen with certain agents. Regular monitoring will be required    Orders:    ToxAssure Flex 22, Urine - Urine, Random Void; Future    QuantiFERON-TB Gold Plus (Li-Hep)    Hepatitis Panel, Acute    Comprehensive Metabolic Panel    C-reactive Protein    Sedimentation Rate    CBC & Differential

## 2025-06-25 NOTE — ASSESSMENT & PLAN NOTE
Diffuse chronic muscle pain with sx that wax/wane.  Improved with water therapy in the past.  Muscles continue to be sore. Chronic.    Plan:  Continue Methocarbamol and Etodolac.  Continue Lyrica.     Four Cornerstones of treatment include  1- Treat Sleep- restoration.  2- Exercise- chair aerobics or chair yoga YouTube; Water aerobics, water walking - local.  3- Treat anxiety and depression  4- Treat pain. Non addictive muscle relaxer. Biofreeze, Massage , Heat , ICE.  FMS packet given in the past and discussed for her to work with pcp regarding treatment.  Orders:    pregabalin (LYRICA) 150 MG capsule; Take 1 capsule by mouth 2 (Two) Times a Day.

## 2025-06-26 ENCOUNTER — TELEPHONE (OUTPATIENT)
Dept: CARDIOLOGY | Facility: CLINIC | Age: 70
End: 2025-06-26
Payer: MEDICARE

## 2025-06-27 ENCOUNTER — OFFICE VISIT (OUTPATIENT)
Dept: CARDIOLOGY | Facility: CLINIC | Age: 70
End: 2025-06-27
Payer: MEDICARE

## 2025-06-27 VITALS
DIASTOLIC BLOOD PRESSURE: 72 MMHG | HEIGHT: 65 IN | OXYGEN SATURATION: 94 % | BODY MASS INDEX: 31.62 KG/M2 | SYSTOLIC BLOOD PRESSURE: 120 MMHG | WEIGHT: 189.8 LBS | HEART RATE: 65 BPM

## 2025-06-27 DIAGNOSIS — R00.2 PALPITATIONS: Primary | ICD-10-CM

## 2025-06-27 DIAGNOSIS — E78.2 MIXED HYPERLIPIDEMIA: ICD-10-CM

## 2025-06-27 LAB
ALBUMIN SERPL-MCNC: 4.5 G/DL (ref 3.5–5.2)
ALBUMIN/GLOB SERPL: 1.8 G/DL
ALP SERPL-CCNC: 87 U/L (ref 39–117)
ALT SERPL-CCNC: 13 U/L (ref 1–33)
AST SERPL-CCNC: 17 U/L (ref 1–32)
BASOPHILS # BLD AUTO: 0.03 10*3/MM3 (ref 0–0.2)
BASOPHILS NFR BLD AUTO: 0.4 % (ref 0–1.5)
BILIRUB SERPL-MCNC: 0.4 MG/DL (ref 0–1.2)
BUN SERPL-MCNC: 16 MG/DL (ref 8–23)
BUN/CREAT SERPL: 23.9 (ref 7–25)
CALCIUM SERPL-MCNC: 9.5 MG/DL (ref 8.6–10.5)
CHLORIDE SERPL-SCNC: 101 MMOL/L (ref 98–107)
CO2 SERPL-SCNC: 28 MMOL/L (ref 22–29)
CREAT SERPL-MCNC: 0.67 MG/DL (ref 0.57–1)
CRP SERPL-MCNC: 1.21 MG/DL (ref 0–0.5)
EGFRCR SERPLBLD CKD-EPI 2021: 94.7 ML/MIN/1.73
EOSINOPHIL # BLD AUTO: 0.18 10*3/MM3 (ref 0–0.4)
EOSINOPHIL NFR BLD AUTO: 2.5 % (ref 0.3–6.2)
ERYTHROCYTE [DISTWIDTH] IN BLOOD BY AUTOMATED COUNT: 13 % (ref 12.3–15.4)
ERYTHROCYTE [SEDIMENTATION RATE] IN BLOOD BY WESTERGREN METHOD: 6 MM/HR (ref 0–30)
GAMMA INTERFERON BACKGROUND BLD IA-ACNC: 0.02 IU/ML
GLOBULIN SER CALC-MCNC: 2.5 GM/DL
GLUCOSE SERPL-MCNC: 73 MG/DL (ref 65–99)
HAV IGM SERPL QL IA: NEGATIVE
HBV CORE IGM SERPL QL IA: NEGATIVE
HBV SURFACE AG SERPL QL IA: NEGATIVE
HCT VFR BLD AUTO: 42.8 % (ref 34–46.6)
HCV AB SERPL QL IA: NON REACTIVE
HCV AB SERPL QL IA: NORMAL
HGB BLD-MCNC: 14.1 G/DL (ref 12–15.9)
IMM GRANULOCYTES # BLD AUTO: 0.02 10*3/MM3 (ref 0–0.05)
IMM GRANULOCYTES NFR BLD AUTO: 0.3 % (ref 0–0.5)
LYMPHOCYTES # BLD AUTO: 0.87 10*3/MM3 (ref 0.7–3.1)
LYMPHOCYTES NFR BLD AUTO: 12 % (ref 19.6–45.3)
M TB IFN-G BLD-IMP: NEGATIVE
M TB IFN-G CD4+ BCKGRND COR BLD-ACNC: 0.02 IU/ML
M TB IFN-G CD4+CD8+ BCKGRND COR BLD-ACNC: 0.02 IU/ML
MCH RBC QN AUTO: 30.1 PG (ref 26.6–33)
MCHC RBC AUTO-ENTMCNC: 32.9 G/DL (ref 31.5–35.7)
MCV RBC AUTO: 91.3 FL (ref 79–97)
MITOGEN IGNF BCKGRD COR BLD-ACNC: >10 IU/ML
MONOCYTES # BLD AUTO: 0.54 10*3/MM3 (ref 0.1–0.9)
MONOCYTES NFR BLD AUTO: 7.5 % (ref 5–12)
NEUTROPHILS # BLD AUTO: 5.58 10*3/MM3 (ref 1.7–7)
NEUTROPHILS NFR BLD AUTO: 77.3 % (ref 42.7–76)
NRBC BLD AUTO-RTO: 0 /100 WBC (ref 0–0.2)
PLATELET # BLD AUTO: 221 10*3/MM3 (ref 140–450)
POTASSIUM SERPL-SCNC: 5 MMOL/L (ref 3.5–5.2)
PROT SERPL-MCNC: 7 G/DL (ref 6–8.5)
QUANTIFERON INCUBATION: NORMAL
RBC # BLD AUTO: 4.69 10*6/MM3 (ref 3.77–5.28)
SERVICE CMNT-IMP: NORMAL
SODIUM SERPL-SCNC: 139 MMOL/L (ref 136–145)
WBC # BLD AUTO: 7.22 10*3/MM3 (ref 3.4–10.8)

## 2025-06-27 PROCEDURE — 99214 OFFICE O/P EST MOD 30 MIN: CPT | Performed by: INTERNAL MEDICINE

## 2025-06-27 NOTE — PROGRESS NOTES
[facility] Cardiology  Office Progress Note  Mecca Renee  1955  [address] [place] [address]       Visit Date: 06/27/25    PCP: Mg Sarkar [facility]  [facility] [facility] [address] [place] [address] [place] [address]    IDENTIFICATION: A 70 y.o. adult   from [place].        PROBLEM LIST:   Palpitations with near syncope:  Echocardiogram, 09/13/2013: LVEF (55% to 60%), abnormal LV diastolic filling consistent with impaired relaxation, trace MR, mild TR with an RVSP of 31.  Event recorder, September through October 2013: Low frequency PACs with occasional short runs of nonsustained atrial tachycardia.   CP  6/15 Lexiscan wnl  2/21 2D echo: LVEF =60%.  Trace MR.  Trace TR.  10/22 stress: Anh PET scan within normal limits, EF hyperdynamic, no coronary calcifications  Lipid status  11/21  [id number]  GERD.  Rheumatoid arthritis.   bilat carpal tunnel  Covid 11/20  Diverticulosis  DDD/Chronic low back pain  Breast cancer, 10/22 - chemo/radiation 2/23  Surgical history:  Right shoulder surgery.  Right foot surgery.   Lumpectomy         CC:   Chief Complaint   Patient presents with    Palpitations       Allergies  Allergies   Allergen Reactions    Sulfa Antibiotics Hives and Itching    Shellfish-Derived Products Hives and Itching             Current Medications  Current Outpatient Medications   Medication Instructions    albuterol sulfate  (90 Base) MCG/ACT inhaler 2 puffs, Inhalation, Every 6 Hours PRN    Biotin 5,000 mcg, Daily    bisoprolol (ZEBETA) 2.5 mg, Oral, 2 Times Daily    Calcium Carbonate (Calcium 600) 1500 (600 Ca) MG tablet 2 tablets, Daily    Calcium-Magnesium-Vitamin D (CALCIUM 1200+D3 PO) Calcium 1200+D3    Cholecalciferol 4,000 Units, Daily    Coenzyme Q10 (COQ-10) 100 MG capsule Take 3 capsules by mouth Daily.    COMBIVENT RESPIMAT  MCG/ACT inhaler 1 puff, As Needed    Cosentyx Sensoready Pen 150 mg, Subcutaneous, Every 28 Days    denosumab (PROLIA) 60  MG/ML solution prefilled syringe syringe 1 mL, Every 6 Months    diclofenac (VOLTAREN) 4 g, As Needed    Dietary Management Product (Rheumate) capsule 1 capsule, Oral, Daily    etodolac (LODINE) 400 mg, Oral, 2 Times Daily, WITH FOOD    exemestane (AROMASIN) 25 MG tablet take 1 tablet (25 mg total) by mouth daily for 90 days.    flecainide (TAMBOCOR) 50 mg, Oral, 2 Times Daily, Patient needs EKG for further refills.    fluticasone (FLONASE) 50 MCG/ACT nasal spray 2 sprays, Daily PRN    Folic Acid (FOLATE PO) Daily    furosemide (LASIX) 40 MG tablet 1 tablet Daily As Needed.    guaiFENesin (MUCINEX) 600 mg, As Needed    guaifenesin (ROBITUSSIN) 200 mg, Oral, Every 4 Hours PRN    ketoconazole (NIZORAL) 2 % cream Apply  topically to the appropriate area as directed.    levocetirizine (XYZAL) 5 mg, As Needed    loratadine (CLARITIN) 10 mg, Daily    methocarbamol (ROBAXIN) 750 mg, Oral, 3 Times Daily    nitrofurantoin (MACRODANTIN) 100 MG capsule TAKE ONE TWICE A DAY FOR 7 DAYS    ondansetron (ZOFRAN) 8 MG tablet As Needed    ondansetron (ZOFRAN) 4 mg, Oral, Every 4 Hours PRN    oxybutynin XL (DITROPAN-XL) 10 mg, Daily    pantoprazole (PROTONIX) 20 mg, Daily    phenazopyridine (PYRIDIUM) 200 MG tablet As Needed    pregabalin (LYRICA) 150 mg, Oral, 2 Times Daily    rizatriptan MLT (MAXALT-MLT) 5 mg, Once As Needed    DORIAN-e 400 mg, Daily    traMADol (ULTRAM) 50 mg, Oral, Every 8 Hours PRN    triamcinolone (KENALOG) 0.1 % cream APPLY TO RASH TWICE A DAY AS NEEDED - AVOID CONTACT WITH FACE    venlafaxine XR (EFFEXOR-XR) 75 mg, Daily    vitamin D (ERGOCALCIFEROL) 50,000 Units, Oral, Weekly        History of Present Illness   Mecca Renee is a 70 y.o. year old adult here for follow up.  Feels well traveling to [place] later this winter.  She did have a corgi dog of which she had to get rid of due to too much shedding  No overt palpitations or new symptoms  OBJECTIVE:  Vitals:    06/27/25 1125   BP: 120/72   Pulse: 65  "  SpO2: 94%   Weight: 86.1 kg (189 lb 12.8 oz)   Height: 165.1 cm (65\")       Body mass index is 31.58 kg/m².    Constitutional:       Appearance: Healthy appearance. Not in distress.   Neck:      Vascular: No JVR. JVD normal.   Pulmonary:      Effort: Pulmonary effort is normal.      Breath sounds: Normal breath sounds. No wheezing. No rhonchi. No rales.   Chest:      Chest wall: Not tender to palpatation.   Cardiovascular:      PMI at left midclavicular line. Normal rate. Regular rhythm. Normal S1. Normal S2.       Murmurs: There is no murmur.      No gallop.  No click. No rub.   Pulses:     Intact distal pulses.   Edema:     Peripheral edema absent.   Abdominal:      General: Bowel sounds are normal.      Palpations: Abdomen is soft.      Tenderness: There is no abdominal tenderness.   Musculoskeletal: Normal range of motion.         General: No tenderness. Skin:     General: Skin is warm and dry.   Neurological:      General: No focal deficit present.      Mental Status: Alert and oriented to person, place and time.         Diagnostic Data:  Procedures        ASSESSMENT:   Diagnosis Plan   1. Palpitations        2. Mixed hyperlipidemia              PLAN:  Palpitations well-controlled on Zebeta/flecainide with preserved LV function continued observation    Mixed dyslipidemia controlled on dietary therapy with no coronary calcium noted on most recent stress test        William Brewster MD, FACC  "

## 2025-07-01 ENCOUNTER — OFFICE VISIT (OUTPATIENT)
Age: 70
End: 2025-07-01
Payer: MEDICARE

## 2025-07-01 VITALS
WEIGHT: 193.7 LBS | SYSTOLIC BLOOD PRESSURE: 120 MMHG | HEIGHT: 65 IN | BODY MASS INDEX: 32.27 KG/M2 | DIASTOLIC BLOOD PRESSURE: 70 MMHG

## 2025-07-01 DIAGNOSIS — M05.79 RHEUMATOID ARTHRITIS INVOLVING MULTIPLE SITES WITH POSITIVE RHEUMATOID FACTOR: ICD-10-CM

## 2025-07-01 DIAGNOSIS — M17.4 OTHER SECONDARY OSTEOARTHRITIS OF BOTH KNEES: Primary | ICD-10-CM

## 2025-07-01 DIAGNOSIS — M17.5 OTHER SECONDARY OSTEOARTHRITIS OF RIGHT KNEE: ICD-10-CM

## 2025-07-01 NOTE — PROGRESS NOTES
[facility] Office Follow Up       Office Follow Up Visit       Patient Name: Mecca Renee    Chief Complaint:   Chief Complaint   Patient presents with    Follow-up     1.5 month follow up--Other secondary osteoarthritis of both knees       Referring Physician: No ref. provider found    History of Present Illness:   Mecca Renee returns to clinic today for to discuss her right knee pain.  She is well-known to me and I have been seeing her for years for bilateral knee arthritis.  She also has a history of rheumatoid arthritis.  She is having persisting pain and dysfunction in her right knee now that is affecting her quality of life and ability to do normal daily activities.  She has failed extensive conservative treatment including cortisone injection, viscosupplementation, anti-inflammatories.  Her rheumatoid arthritis is well-controlled.  She is ready to consider knee replacement.      Subjective     Review of Systems   Constitutional:  Negative for chills, fever, unexpected weight gain and unexpected weight loss.   HENT:  Negative for congestion, postnasal drip and rhinorrhea.    Eyes:  Negative for blurred vision.   Respiratory:  Negative for shortness of breath.    Cardiovascular:  Negative for leg swelling.   Gastrointestinal:  Negative for abdominal pain, nausea and vomiting.   Genitourinary:  Negative for difficulty urinating.   Musculoskeletal:  Positive for arthralgias. Negative for gait problem, joint swelling and myalgias.   Skin:  Negative for skin lesions and wound.   Neurological:  Negative for dizziness, weakness, light-headedness and numbness.   Hematological:  Does not bruise/bleed easily.   Psychiatric/Behavioral:  Negative for depressed mood.         I have reviewed and updated the following portions of the patient's history and review of systems: allergies, current medications, past family history, past medical history,  past social history, past surgical history and problem list.    Medications:   Current Outpatient Medications:     albuterol sulfate  (90 Base) MCG/ACT inhaler, Inhale 2 puffs Every 6 (Six) Hours As Needed for Wheezing or Shortness of Air., Disp: 18 g, Rfl: 0    Biotin 5000 MCG capsule, Take 5,000 mcg by mouth Daily., Disp: , Rfl:     bisoprolol (ZEBeta) 5 MG tablet, Take 0.5 tablets by mouth 2 (Two) Times a Day., Disp: 30 tablet, Rfl: 0    Calcium Carbonate (Calcium 600) 1500 (600 Ca) MG tablet, Take 2 tablets by mouth Daily., Disp: , Rfl:     Calcium-Magnesium-Vitamin D (CALCIUM 1200+D3 PO), Calcium 1200+D3, Disp: , Rfl:     Cholecalciferol 100 MCG (4000 UT) tablet, Take 4,000 Units by mouth Daily., Disp: , Rfl:     Coenzyme Q10 (COQ-10) 100 MG capsule, Take 3 capsules by mouth Daily., Disp: , Rfl:     COMBIVENT RESPIMAT  MCG/ACT inhaler, Inhale 1 puff As Needed., Disp: , Rfl: 3    denosumab (PROLIA) 60 MG/ML solution prefilled syringe syringe, Inject 1 mL under the skin into the appropriate area as directed Every 6 (Six) Months., Disp: , Rfl:     diclofenac (VOLTAREN) 1 % gel gel, Apply 4 g topically As Needed., Disp: , Rfl:     Dietary Management Product (Rheumate) capsule, Take 1 capsule by mouth Daily., Disp: 90 capsule, Rfl: 4    etodolac (LODINE) 400 MG tablet, Take 1 tablet by mouth 2 (Two) Times a Day. WITH FOOD, Disp: 30 tablet, Rfl: 0    exemestane (AROMASIN) 25 MG tablet, take 1 tablet (25 mg total) by mouth daily for 90 days., Disp: , Rfl:     flecainide (TAMBOCOR) 100 MG tablet, Take 0.5 tablets by mouth 2 (Two) Times a Day. Patient needs EKG for further refills., Disp: 30 tablet, Rfl: 0    fluticasone (FLONASE) 50 MCG/ACT nasal spray, Administer 2 sprays into the nostril(s) as directed by provider Daily As Needed., Disp: , Rfl:     Folic Acid (FOLATE PO), Take  by mouth Daily., Disp: , Rfl:     furosemide (LASIX) 40 MG tablet, 1 tablet Daily As Needed., Disp: , Rfl:     guaiFENesin  (MUCINEX) 600 MG 12 hr tablet, Take 1 tablet by mouth As Needed., Disp: , Rfl:     guaifenesin (ROBITUSSIN) 100 MG/5ML liquid, Take 10 mL by mouth Every 4 (Four) Hours As Needed for Cough., Disp: 118 mL, Rfl: 0    ketoconazole (NIZORAL) 2 % cream, Apply  topically to the appropriate area as directed., Disp: , Rfl:     levocetirizine (XYZAL) 5 MG tablet, Take 1 tablet by mouth As Needed., Disp: , Rfl:     loratadine (CLARITIN) 10 MG tablet, Take 1 tablet by mouth Daily., Disp: , Rfl:     methocarbamol (ROBAXIN) 750 MG tablet, TAKE 1 TABLET BY MOUTH 3 (THREE) TIMES A DAY., Disp: 90 tablet, Rfl: 4    nitrofurantoin (MACRODANTIN) 100 MG capsule, TAKE ONE TWICE A DAY FOR 7 DAYS, Disp: , Rfl:     ondansetron (ZOFRAN) 4 MG tablet, Take 1 tablet by mouth Every 4 (Four) Hours As Needed for Nausea or Vomiting., Disp: 15 tablet, Rfl: 0    ondansetron (ZOFRAN) 8 MG tablet, As Needed., Disp: , Rfl:     oxybutynin XL (DITROPAN-XL) 10 MG 24 hr tablet, Take 1 tablet by mouth Daily., Disp: , Rfl:     pantoprazole (PROTONIX) 20 MG EC tablet, Take 1 tablet by mouth Daily., Disp: , Rfl:     phenazopyridine (PYRIDIUM) 200 MG tablet, As Needed., Disp: , Rfl:     pregabalin (LYRICA) 150 MG capsule, Take 1 capsule by mouth 2 (Two) Times a Day., Disp: 60 capsule, Rfl: 3    rizatriptan MLT (MAXALT-MLT) 5 MG disintegrating tablet, Place 1 tablet on the tongue 1 (One) Time As Needed for Migraine., Disp: , Rfl:     S-Adenosylmethionine (DORIAN-e) 400 MG tablet, Take 400 mg by mouth Daily., Disp: , Rfl:     Secukinumab (Cosentyx Sensoready Pen) 150 MG/ML solution auto-injector, Inject 150 mg under the skin into the appropriate area as directed Every 28 (Twenty-Eight) Days., Disp: 1 mL, Rfl: 5    traMADol (ULTRAM) 50 MG tablet, Take 1 tablet by mouth Every 8 (Eight) Hours As Needed for Moderate Pain., Disp: 90 tablet, Rfl: 3    triamcinolone (KENALOG) 0.1 % cream, APPLY TO RASH TWICE A DAY AS NEEDED - AVOID CONTACT WITH FACE, Disp: , Rfl:      "venlafaxine XR (EFFEXOR-XR) 75 MG 24 hr capsule, Take 1 capsule by mouth Daily., Disp: , Rfl:     vitamin D (ERGOCALCIFEROL) 1.25 MG (19143 UT) capsule capsule, Take 1 capsule by mouth 1 (One) Time Per Week., Disp: 12 capsule, Rfl: 0    Allergies:   Allergies   Allergen Reactions    Sulfa Antibiotics Hives and Itching    Shellfish-Derived Products Hives and Itching               Objective      Vital Signs:   Vitals:    07/01/25 0937   BP: 120/70   Weight: 87.9 kg (193 lb 11.2 oz)   Height: 165.1 cm (65\")       Ortho Exam:  Right knee exam: Tender over the medial patellofemoral joint line.  Range of motion 0-1 20.  Ligament stable.  Neurovascular intact distally.    Results Review:      Assessment / Plan      Assessment:   Diagnoses and all orders for this visit:    1. Other secondary osteoarthritis of both knees (Primary)  -     XR Knee 4+ View Right    2. Other secondary osteoarthritis of right knee        Quality Metrics:   BMI:   BMI is >= 30 and <35. (Class 1 Obesity). The following options were offered after discussion;: Information on healthy weight added to patient's after visit summary.       Tobacco:   Mecca Renee  reports that she has never smoked. She has been exposed to tobacco smoke. She has never used smokeless tobacco.         Plan:  Right knee arthritis in the face of rheumatoid arthritis.  I reviewed today's x-rays, clinical findings past and current treatment the patient.  Patient has failed extensive conservative treatment including Visco, cortisone, anti-inflammatories, activity modification.  She is ready to pursue knee replacement.   The surgical procedure itself was discussed in detail. Risks of the procedure were discussed, which included but are not limited to, bleeding, infection, damage to blood vessels and nerves, incomplete pain relief, loosening of the prosthesis (early or late), deep infection (early or late), need for further surgery, loss of limb, deep venous thrombosis, " pulmonary embolus, death, heart attack, stroke, kidney failure, liver failure, and anesthetic complications.  In addition, the potential for deep infection developing in the future was discussed, which could require further surgery.  The knee would have to be re-opened, debrided, and potentially remove the prosthesis, which may or may not be replaced in the future.  Also, the possibility for loosening of the prosthesis has been mentioned.  If the prosthesis loosened, a revision arthroplasty could be performed, with results that are not as predictable compared to the original procedure.  The typical rehabilitative course has also been discussed, and full recovery may take up to a year to see the maximum benefit.  The importance of patient cooperation in the rehabilitative efforts has also been discussed.  No guarantees were given.  The patient understands the potential risks versus the benefits and desires to proceed with total knee arthroplasty at a mutually convenient time.  Plan today is to get her penciled in on the schedule with for a right total knee arthroplasty with Dr. Stahl.  I will have her come back to clinic for follow-up in a day Dr. Stahl with is in clinic with me to finalize plans.  She will return sooner if needed.  Surgery will be need to be after 8/12/2025 secondary to prior injection      Leny Alcaraz PA-C  [facility]    Dictated using Dragon Speech Recognition.

## 2025-07-09 ENCOUNTER — OFFICE VISIT (OUTPATIENT)
Dept: ORTHOPEDIC SURGERY | Facility: CLINIC | Age: 70
End: 2025-07-09
Payer: MEDICARE

## 2025-07-09 VITALS
HEIGHT: 65 IN | WEIGHT: 193.78 LBS | DIASTOLIC BLOOD PRESSURE: 74 MMHG | SYSTOLIC BLOOD PRESSURE: 120 MMHG | BODY MASS INDEX: 32.29 KG/M2

## 2025-07-09 DIAGNOSIS — M54.9 BACK PAIN, UNSPECIFIED BACK LOCATION, UNSPECIFIED BACK PAIN LATERALITY, UNSPECIFIED CHRONICITY: ICD-10-CM

## 2025-07-09 DIAGNOSIS — M05.79 RHEUMATOID ARTHRITIS INVOLVING MULTIPLE SITES WITH POSITIVE RHEUMATOID FACTOR: ICD-10-CM

## 2025-07-09 DIAGNOSIS — M54.2 NECK PAIN: ICD-10-CM

## 2025-07-09 DIAGNOSIS — M17.5 OTHER SECONDARY OSTEOARTHRITIS OF RIGHT KNEE: Primary | ICD-10-CM

## 2025-07-09 DIAGNOSIS — L40.50 PSORIATIC ARTHRITIS: ICD-10-CM

## 2025-07-09 RX ORDER — PREGABALIN 150 MG/1
150 CAPSULE ORAL ONCE
OUTPATIENT
Start: 2025-07-09 | End: 2025-07-09

## 2025-07-09 NOTE — TELEPHONE ENCOUNTER
Incoming Refill Request      Medication requested (name and dose): etodolac (LODINE) 400 MG tablet - 60 pills take two pills a day for a month    Pharmacy where request should be sent: [address] [phone] [facility] [phone] [facility]     Additional details provided by patient: PT STATED THE ORIGINAL WAS SENT IN WRONG, [facility] SENT IN 60 PILLS TWICE A DAY FOR A MONTH, PT STATED SHE ONLY RECEIVED 30.     Best call back number: [phone]    Does the patient have less than a 3 day supply:  [x] Yes  [] No    Tami Carbajal Rep  07/09/25, 16:28 EDT

## 2025-07-09 NOTE — PROGRESS NOTES
[facility] Office Follow Up       Office Follow Up Visit       Patient Name: Mecca Renee    Chief Complaint:   Chief Complaint   Patient presents with    Follow-up     1 week recheck- Other secondary osteoarthritis of both knees       Referring Physician: No ref. provider found    History of Present Illness:   Mecca Renee returns to clinic today for for follow-up bilateral knee arthritis, right more painful than left.  She is ready to pursue right knee replacement.  She has failed extensive conservative treatment including cortisone injection, Visco, activity modifications, nsaids with persisting pain and dysfunction.  She has RA and it is well controlled.      Subjective     Review of Systems   Constitutional: Negative.  Negative for chills, fatigue and fever.   HENT: Negative.  Negative for congestion and dental problem.    Eyes: Negative.  Negative for blurred vision.   Respiratory: Negative.  Negative for shortness of breath.    Cardiovascular: Negative.  Negative for leg swelling.   Gastrointestinal: Negative.  Negative for abdominal pain.   Endocrine: Negative.  Negative for polyuria.   Genitourinary: Negative.  Negative for difficulty urinating.   Musculoskeletal:  Positive for arthralgias.   Skin: Negative.    Allergic/Immunologic: Negative.    Neurological: Negative.    Hematological: Negative.  Negative for adenopathy.   Psychiatric/Behavioral: Negative.  Negative for behavioral problems.         I have reviewed and updated the following portions of the patient's history and review of systems: allergies, current medications, past family history, past medical history, past social history, past surgical history and problem list.    Medications:   Current Outpatient Medications:     albuterol sulfate  (90 Base) MCG/ACT inhaler, Inhale 2 puffs Every 6 (Six) Hours As Needed for Wheezing or Shortness of Air., Disp: 18 g, Rfl:  0    Biotin 5000 MCG capsule, Take 5,000 mcg by mouth Daily., Disp: , Rfl:     bisoprolol (ZEBeta) 5 MG tablet, Take 0.5 tablets by mouth 2 (Two) Times a Day., Disp: 30 tablet, Rfl: 0    Calcium Carbonate (Calcium 600) 1500 (600 Ca) MG tablet, Take 2 tablets by mouth Daily., Disp: , Rfl:     Calcium-Magnesium-Vitamin D (CALCIUM 1200+D3 PO), Calcium 1200+D3, Disp: , Rfl:     Cholecalciferol 100 MCG (4000 UT) tablet, Take 4,000 Units by mouth Daily., Disp: , Rfl:     Coenzyme Q10 (COQ-10) 100 MG capsule, Take 3 capsules by mouth Daily., Disp: , Rfl:     COMBIVENT RESPIMAT  MCG/ACT inhaler, Inhale 1 puff As Needed., Disp: , Rfl: 3    denosumab (PROLIA) 60 MG/ML solution prefilled syringe syringe, Inject 1 mL under the skin into the appropriate area as directed Every 6 (Six) Months., Disp: , Rfl:     diclofenac (VOLTAREN) 1 % gel gel, Apply 4 g topically As Needed., Disp: , Rfl:     Dietary Management Product (Rheumate) capsule, Take 1 capsule by mouth Daily., Disp: 90 capsule, Rfl: 4    etodolac (LODINE) 400 MG tablet, Take 1 tablet by mouth 2 (Two) Times a Day. WITH FOOD, Disp: 30 tablet, Rfl: 0    exemestane (AROMASIN) 25 MG tablet, take 1 tablet (25 mg total) by mouth daily for 90 days., Disp: , Rfl:     flecainide (TAMBOCOR) 100 MG tablet, Take 0.5 tablets by mouth 2 (Two) Times a Day. Patient needs EKG for further refills., Disp: 30 tablet, Rfl: 0    fluticasone (FLONASE) 50 MCG/ACT nasal spray, Administer 2 sprays into the nostril(s) as directed by provider Daily As Needed., Disp: , Rfl:     Folic Acid (FOLATE PO), Take  by mouth Daily., Disp: , Rfl:     furosemide (LASIX) 40 MG tablet, 1 tablet Daily As Needed., Disp: , Rfl:     guaiFENesin (MUCINEX) 600 MG 12 hr tablet, Take 1 tablet by mouth As Needed., Disp: , Rfl:     guaifenesin (ROBITUSSIN) 100 MG/5ML liquid, Take 10 mL by mouth Every 4 (Four) Hours As Needed for Cough., Disp: 118 mL, Rfl: 0    ketoconazole (NIZORAL) 2 % cream, Apply  topically to  the appropriate area as directed., Disp: , Rfl:     levocetirizine (XYZAL) 5 MG tablet, Take 1 tablet by mouth As Needed., Disp: , Rfl:     loratadine (CLARITIN) 10 MG tablet, Take 1 tablet by mouth Daily., Disp: , Rfl:     methocarbamol (ROBAXIN) 750 MG tablet, TAKE 1 TABLET BY MOUTH 3 (THREE) TIMES A DAY., Disp: 90 tablet, Rfl: 4    nitrofurantoin (MACRODANTIN) 100 MG capsule, TAKE ONE TWICE A DAY FOR 7 DAYS, Disp: , Rfl:     ondansetron (ZOFRAN) 4 MG tablet, Take 1 tablet by mouth Every 4 (Four) Hours As Needed for Nausea or Vomiting., Disp: 15 tablet, Rfl: 0    ondansetron (ZOFRAN) 8 MG tablet, As Needed., Disp: , Rfl:     oxybutynin XL (DITROPAN-XL) 10 MG 24 hr tablet, Take 1 tablet by mouth Daily., Disp: , Rfl:     pantoprazole (PROTONIX) 20 MG EC tablet, Take 1 tablet by mouth Daily., Disp: , Rfl:     phenazopyridine (PYRIDIUM) 200 MG tablet, As Needed., Disp: , Rfl:     pregabalin (LYRICA) 150 MG capsule, Take 1 capsule by mouth 2 (Two) Times a Day., Disp: 60 capsule, Rfl: 3    rizatriptan MLT (MAXALT-MLT) 5 MG disintegrating tablet, Place 1 tablet on the tongue 1 (One) Time As Needed for Migraine., Disp: , Rfl:     S-Adenosylmethionine (DORIAN-e) 400 MG tablet, Take 400 mg by mouth Daily., Disp: , Rfl:     Secukinumab (Cosentyx Sensoready Pen) 150 MG/ML solution auto-injector, Inject 150 mg under the skin into the appropriate area as directed Every 28 (Twenty-Eight) Days., Disp: 1 mL, Rfl: 5    traMADol (ULTRAM) 50 MG tablet, Take 1 tablet by mouth Every 8 (Eight) Hours As Needed for Moderate Pain., Disp: 90 tablet, Rfl: 3    triamcinolone (KENALOG) 0.1 % cream, APPLY TO RASH TWICE A DAY AS NEEDED - AVOID CONTACT WITH FACE, Disp: , Rfl:     venlafaxine XR (EFFEXOR-XR) 75 MG 24 hr capsule, Take 1 capsule by mouth Daily., Disp: , Rfl:     vitamin D (ERGOCALCIFEROL) 1.25 MG (08109 UT) capsule capsule, Take 1 capsule by mouth 1 (One) Time Per Week., Disp: 12 capsule, Rfl: 0    Allergies:   Allergies   Allergen  "Reactions    Sulfa Antibiotics Hives and Itching    Shellfish-Derived Products Hives and Itching               Objective      Vital Signs:   Vitals:    07/09/25 1006 07/09/25 1011   BP:  120/74   Weight: 87.9 kg (193 lb 12.6 oz) 87.9 kg (193 lb 12.6 oz)   Height: 165.1 cm (65\") 165.1 cm (65\")       Ortho Exam:  Right Hip Exam  ----------  FLEXION CONTRACTURE: None  FLEXION: 110 degrees  INTERNAL ROTATION: 20 degrees at 90 degrees of flexion   EXTERNAL ROTATION: 40 degrees at 90 degrees of flexion    PAIN WITH HIP MOTION: no      Right Knee Exam  ----------  ALIGNMENT: Right: neutral----------  RANGE OF MOTION:  Right: Normal (0-120 degrees) with no extensor lag or flexion contracture  LIGAMENTOUS STABILITY:   Right:stable to varus and valgus stress at terminal extension and 30 degrees without any evidence of laxity----------  STRENGTH:  KNEE FLEXION Right 5/5  KNEE EXTENSION Right 5/5 ----------  PAIN WITH PALPATION: Right medial joint line  PAIN WITH KNEE ROM: Right no  PATELLAR CREPITUS: Right yes   ----------  SENSATION TO LIGHT TOUCH:  DEEP PERONEAL/SUPERFICIAL PERONEAL/SURAL/SAPHENOUS/TIBIAL:   Right intact  ----------  EFFUSION  Right:  no;  ERYTHEMA:  Right: no;  WOUNDS/INCISIONS: none, no overlying skin problems.      Results Review:  XR Knee 4+ View Right  Right knee X-Ray    Indication: Pain    Views: AP, lateral, sunrise, skiers view weightbearing    Comparison:  3/12/2024    Findings:  Tricompartmental degenerative changes worse in the medial compartment with   osteophyte formation, joint space narrowing, subchondral sclerosis       XR Abdomen KUB  Result Date: 5/23/2025  No acute finding. - Note: Radiology results need to be interpreted within a comprehensive clinical context.  If you have questions about the radiology report, please contact the office of the ordering clinician.        Assessment / Plan      Assessment:   Diagnoses and all orders for this visit:    1. Other secondary osteoarthritis of " right knee (Primary)    2. Rheumatoid arthritis involving multiple sites with positive rheumatoid factor        Quality Metrics:   BMI:   BMI is >= 30 and <35. (Class 1 Obesity). The following options were offered after discussion;: Information on healthy weight added to patient's after visit summary.       Tobacco:   Mecca Renee  reports that she has never smoked. She has been exposed to tobacco smoke. She has never used smokeless tobacco.      Plan:  Right knee arthritis in the face of rheumatoid arthritis.  I reviewed prior x-rays clinical findings past and current treatment the patient.  She is ready to pursue knee replacement.  She has failed extensive conservative treatment including cortisone injection, Visco, NSAIDs, activity modification.  She has well-controlled rheumatoid arthritis.  I again reviewed perioperative and postoperative stages as well as risks and complications with the patient and her daughter.  I was able to answer all questions.  She understands that she will have to come off her Cosentyx for a time  before and after surgery.  Patient is not diabetic, non-smoker.  She has no history of blood clots and no allergy to penicillin.  We will get her scheduled for a right total knee replacement with Dr. Stahl on August 28, 2025 at [facility].  We will see her postoperatively, sooner if needed.    Surgical Counseling     I have informed the patient of the diagnosis and the prognosis.  Exhaustive conservative treatment modalities have not resulted in long term pain relief.  The symptoms have progressed to the point of daily pain and inability to perform activities of daily living without significant pain. The patient has reached the point of desiring to proceed with total knee arthroplasty after discussing the risks, benefits and alternatives to the procedure.  The surgical procedure itself was discussed in detail. Risks of the procedure were discussed, which included but are not limited to,  bleeding, infection, damage to blood vessels and nerves, incomplete pain relief, loosening of the prosthesis (early or late), deep infection (early or late), need for further surgery, loss of limb, deep venous thrombosis, pulmonary embolus, death, heart attack, stroke, kidney failure, liver failure, and anesthetic complications.  In addition, the potential for deep infection developing in the future was discussed, which could require further surgery.  The knee would have to be re-opened, debrided, and potentially remove the prosthesis, which may or may not be replaced in the future.  Also, the possibility for loosening of the prosthesis has been mentioned.  If the prosthesis loosened, a revision arthroplasty could be performed, with results that are not as predictable compared to the original procedure.  The typical rehabilitative course has also been discussed, and full recovery may take up to a year to see the maximum benefit.  The importance of patient cooperation in the rehabilitative efforts has also been discussed.  No guarantees were given.  The patient understands the potential risks versus the benefits and desires to proceed with total knee arthroplasty at a mutually convenient time.    Patient history, diagnosis and treatment plan discussed with Dr. Stahl.            Leny Alcaraz PA-C  [facility]    Dictated using Dragon Speech Recognition.

## 2025-07-10 ENCOUNTER — TELEPHONE (OUTPATIENT)
Dept: CARDIOLOGY | Facility: CLINIC | Age: 70
End: 2025-07-10
Payer: MEDICARE

## 2025-07-10 RX ORDER — ETODOLAC 400 MG/1
400 TABLET, FILM COATED ORAL 2 TIMES DAILY
Qty: 60 TABLET | Refills: 0 | Status: SHIPPED | OUTPATIENT
Start: 2025-07-10

## 2025-08-07 DIAGNOSIS — Z96.651 AFTERCARE FOLLOWING RIGHT KNEE JOINT REPLACEMENT SURGERY: Primary | ICD-10-CM

## 2025-08-07 DIAGNOSIS — Z47.1 AFTERCARE FOLLOWING RIGHT KNEE JOINT REPLACEMENT SURGERY: Primary | ICD-10-CM

## 2025-08-08 DIAGNOSIS — M54.2 NECK PAIN: ICD-10-CM

## 2025-08-08 DIAGNOSIS — L40.50 PSORIATIC ARTHRITIS: ICD-10-CM

## 2025-08-08 DIAGNOSIS — M54.9 BACK PAIN, UNSPECIFIED BACK LOCATION, UNSPECIFIED BACK PAIN LATERALITY, UNSPECIFIED CHRONICITY: ICD-10-CM

## 2025-08-08 RX ORDER — ETODOLAC 400 MG/1
400 TABLET, FILM COATED ORAL 2 TIMES DAILY
Qty: 60 TABLET | Refills: 0 | Status: SHIPPED | OUTPATIENT
Start: 2025-08-08

## 2025-08-13 ENCOUNTER — TELEPHONE (OUTPATIENT)
Dept: CARDIOLOGY | Facility: CLINIC | Age: 70
End: 2025-08-13
Payer: MEDICARE

## 2025-08-14 ENCOUNTER — PRE-ADMISSION TESTING (OUTPATIENT)
Dept: PREADMISSION TESTING | Facility: HOSPITAL | Age: 70
End: 2025-08-14
Payer: MEDICARE

## 2025-08-14 VITALS — BODY MASS INDEX: 32.51 KG/M2 | HEIGHT: 65 IN | WEIGHT: 195.11 LBS

## 2025-08-14 DIAGNOSIS — M17.5 OTHER SECONDARY OSTEOARTHRITIS OF RIGHT KNEE: ICD-10-CM

## 2025-08-14 DIAGNOSIS — M05.79 RHEUMATOID ARTHRITIS INVOLVING MULTIPLE SITES WITH POSITIVE RHEUMATOID FACTOR: ICD-10-CM

## 2025-08-14 LAB
ANION GAP SERPL CALCULATED.3IONS-SCNC: 6.8 MMOL/L (ref 5–15)
APTT PPP: 31.3 SECONDS (ref 22–39)
BASOPHILS # BLD AUTO: 0.03 10*3/MM3 (ref 0–0.2)
BASOPHILS NFR BLD AUTO: 0.4 % (ref 0–1.5)
BUN SERPL-MCNC: 14.7 MG/DL (ref 8–23)
BUN/CREAT SERPL: 21 (ref 7–25)
CALCIUM SPEC-SCNC: 9.5 MG/DL (ref 8.6–10.5)
CHLORIDE SERPL-SCNC: 101 MMOL/L (ref 98–107)
CO2 SERPL-SCNC: 29.2 MMOL/L (ref 22–29)
CREAT SERPL-MCNC: 0.7 MG/DL (ref 0.57–1)
CRP SERPL-MCNC: 1.69 MG/DL (ref 0–0.5)
DEPRECATED RDW RBC AUTO: 46 FL (ref 37–54)
EGFRCR SERPLBLD CKD-EPI 2021: 93.8 ML/MIN/1.73
EOSINOPHIL # BLD AUTO: 0.23 10*3/MM3 (ref 0–0.4)
EOSINOPHIL NFR BLD AUTO: 3 % (ref 0.3–6.2)
ERYTHROCYTE [DISTWIDTH] IN BLOOD BY AUTOMATED COUNT: 13.7 % (ref 12.3–15.4)
ERYTHROCYTE [SEDIMENTATION RATE] IN BLOOD: 20 MM/HR (ref 0–30)
GLUCOSE SERPL-MCNC: 79 MG/DL (ref 65–99)
HBA1C MFR BLD: 5.11 % (ref 4.8–5.6)
HCT VFR BLD AUTO: 42 % (ref 34–46.6)
HGB BLD-MCNC: 13.5 G/DL (ref 12–15.9)
IMM GRANULOCYTES # BLD AUTO: 0.04 10*3/MM3 (ref 0–0.05)
IMM GRANULOCYTES NFR BLD AUTO: 0.5 % (ref 0–0.5)
INR PPP: 0.98 (ref 0.89–1.12)
LYMPHOCYTES # BLD AUTO: 0.87 10*3/MM3 (ref 0.7–3.1)
LYMPHOCYTES NFR BLD AUTO: 11.2 % (ref 19.6–45.3)
MCH RBC QN AUTO: 29.4 PG (ref 26.6–33)
MCHC RBC AUTO-ENTMCNC: 32.1 G/DL (ref 31.5–35.7)
MCV RBC AUTO: 91.5 FL (ref 79–97)
MONOCYTES # BLD AUTO: 0.6 10*3/MM3 (ref 0.1–0.9)
MONOCYTES NFR BLD AUTO: 7.7 % (ref 5–12)
NEUTROPHILS NFR BLD AUTO: 6.02 10*3/MM3 (ref 1.7–7)
NEUTROPHILS NFR BLD AUTO: 77.2 % (ref 42.7–76)
NRBC BLD AUTO-RTO: 0 /100 WBC (ref 0–0.2)
PLATELET # BLD AUTO: 230 10*3/MM3 (ref 140–450)
PMV BLD AUTO: 12.1 FL (ref 6–12)
POTASSIUM SERPL-SCNC: 4.8 MMOL/L (ref 3.5–5.2)
PROTHROMBIN TIME: 13.6 SECONDS (ref 12.2–15.3)
RBC # BLD AUTO: 4.59 10*6/MM3 (ref 3.77–5.28)
SODIUM SERPL-SCNC: 137 MMOL/L (ref 136–145)
WBC NRBC COR # BLD AUTO: 7.79 10*3/MM3 (ref 3.4–10.8)

## 2025-08-14 PROCEDURE — 85652 RBC SED RATE AUTOMATED: CPT

## 2025-08-14 PROCEDURE — 85730 THROMBOPLASTIN TIME PARTIAL: CPT

## 2025-08-14 PROCEDURE — 36415 COLL VENOUS BLD VENIPUNCTURE: CPT

## 2025-08-14 PROCEDURE — 83036 HEMOGLOBIN GLYCOSYLATED A1C: CPT

## 2025-08-14 PROCEDURE — 85610 PROTHROMBIN TIME: CPT

## 2025-08-14 PROCEDURE — 80048 BASIC METABOLIC PNL TOTAL CA: CPT

## 2025-08-14 PROCEDURE — 85025 COMPLETE CBC W/AUTO DIFF WBC: CPT

## 2025-08-14 PROCEDURE — 93005 ELECTROCARDIOGRAM TRACING: CPT

## 2025-08-14 PROCEDURE — 86140 C-REACTIVE PROTEIN: CPT

## 2025-08-17 LAB
QT INTERVAL: 434 MS
QTC INTERVAL: 444 MS

## 2025-08-20 ENCOUNTER — TELEPHONE (OUTPATIENT)
Dept: ORTHOPEDIC SURGERY | Facility: CLINIC | Age: 70
End: 2025-08-20
Payer: MEDICARE

## 2025-08-21 ENCOUNTER — PRE-PROCEDURE SCREENING (OUTPATIENT)
Dept: ORTHOPEDIC SURGERY | Facility: CLINIC | Age: 70
End: 2025-08-21
Payer: MEDICARE

## 2025-08-22 ENCOUNTER — TELEPHONE (OUTPATIENT)
Dept: ORTHOPEDIC SURGERY | Facility: CLINIC | Age: 70
End: 2025-08-22
Payer: MEDICARE

## 2025-08-28 ENCOUNTER — DOCUMENTATION (OUTPATIENT)
Dept: ORTHOPEDIC SURGERY | Facility: CLINIC | Age: 70
End: 2025-08-28

## 2025-08-28 ENCOUNTER — OUTSIDE FACILITY SERVICE (OUTPATIENT)
Dept: ORTHOPEDIC SURGERY | Facility: CLINIC | Age: 70
End: 2025-08-28
Payer: MEDICARE

## 2025-08-28 RX ORDER — OXYCODONE HYDROCHLORIDE 5 MG/1
5 TABLET ORAL EVERY 4 HOURS PRN
Qty: 40 TABLET | Refills: 0 | Status: SHIPPED | OUTPATIENT
Start: 2025-08-28

## 2025-08-28 RX ORDER — MELOXICAM 15 MG/1
15 TABLET ORAL DAILY PRN
Qty: 30 TABLET | Refills: 0 | Status: SHIPPED | OUTPATIENT
Start: 2025-08-28 | End: 2025-08-28

## 2025-08-29 ENCOUNTER — OFFICE VISIT (OUTPATIENT)
Dept: ORTHOPEDIC SURGERY | Facility: CLINIC | Age: 70
End: 2025-08-29
Payer: MEDICARE

## 2025-08-29 ENCOUNTER — TELEPHONE (OUTPATIENT)
Dept: ORTHOPEDIC SURGERY | Facility: CLINIC | Age: 70
End: 2025-08-29
Payer: MEDICARE

## 2025-08-29 DIAGNOSIS — Z96.651 S/P TKR (TOTAL KNEE REPLACEMENT), RIGHT: Primary | ICD-10-CM

## 2025-08-29 PROCEDURE — 1160F RVW MEDS BY RX/DR IN RCRD: CPT | Performed by: PHYSICIAN ASSISTANT

## 2025-08-29 PROCEDURE — 99024 POSTOP FOLLOW-UP VISIT: CPT | Performed by: PHYSICIAN ASSISTANT

## 2025-08-29 PROCEDURE — 1159F MED LIST DOCD IN RCRD: CPT | Performed by: PHYSICIAN ASSISTANT

## 2025-08-29 RX ORDER — MEMANTINE HYDROCHLORIDE 10 MG/1
10 TABLET ORAL
COMMUNITY
Start: 2025-08-19